# Patient Record
Sex: MALE | Race: BLACK OR AFRICAN AMERICAN | Employment: OTHER | URBAN - METROPOLITAN AREA
[De-identification: names, ages, dates, MRNs, and addresses within clinical notes are randomized per-mention and may not be internally consistent; named-entity substitution may affect disease eponyms.]

---

## 2018-12-03 ENCOUNTER — HOSPITAL ENCOUNTER (INPATIENT)
Age: 53
LOS: 8 days | Discharge: HOME | DRG: 192 | End: 2018-12-11
Attending: EMERGENCY MEDICINE | Admitting: INTERNAL MEDICINE
Payer: MEDICAID

## 2018-12-03 ENCOUNTER — APPOINTMENT (OUTPATIENT)
Dept: CT IMAGING | Age: 53
DRG: 192 | End: 2018-12-03
Payer: MEDICAID

## 2018-12-03 ENCOUNTER — APPOINTMENT (OUTPATIENT)
Dept: GENERAL RADIOLOGY | Age: 53
DRG: 192 | End: 2018-12-03
Payer: MEDICAID

## 2018-12-03 DIAGNOSIS — I50.21 ACUTE SYSTOLIC CONGESTIVE HEART FAILURE (HCC): ICD-10-CM

## 2018-12-03 DIAGNOSIS — I31.39 PERICARDIAL EFFUSION: ICD-10-CM

## 2018-12-03 DIAGNOSIS — I10 ESSENTIAL HYPERTENSION: ICD-10-CM

## 2018-12-03 DIAGNOSIS — I42.9 CARDIOMYOPATHY, UNSPECIFIED TYPE (HCC): ICD-10-CM

## 2018-12-03 DIAGNOSIS — I50.9 ACUTE CONGESTIVE HEART FAILURE, UNSPECIFIED HEART FAILURE TYPE (HCC): Primary | ICD-10-CM

## 2018-12-03 LAB
A/G RATIO: 0.9 (ref 1.1–2.2)
ALBUMIN SERPL-MCNC: 3.2 G/DL (ref 3.4–5)
ALP BLD-CCNC: 74 U/L (ref 40–129)
ALT SERPL-CCNC: 18 U/L (ref 10–40)
ANION GAP SERPL CALCULATED.3IONS-SCNC: 12 MMOL/L (ref 3–16)
AST SERPL-CCNC: 22 U/L (ref 15–37)
BASOPHILS ABSOLUTE: 0 K/UL (ref 0–0.2)
BASOPHILS RELATIVE PERCENT: 0.6 %
BILIRUB SERPL-MCNC: 0.9 MG/DL (ref 0–1)
BILIRUBIN URINE: NEGATIVE
BLOOD, URINE: NEGATIVE
BUN BLDV-MCNC: 22 MG/DL (ref 7–20)
CALCIUM SERPL-MCNC: 8.9 MG/DL (ref 8.3–10.6)
CHLORIDE BLD-SCNC: 106 MMOL/L (ref 99–110)
CLARITY: CLEAR
CO2: 22 MMOL/L (ref 21–32)
COLOR: YELLOW
CREAT SERPL-MCNC: 1 MG/DL (ref 0.9–1.3)
EOSINOPHILS ABSOLUTE: 0 K/UL (ref 0–0.6)
EOSINOPHILS RELATIVE PERCENT: 1 %
GFR AFRICAN AMERICAN: >60
GFR NON-AFRICAN AMERICAN: >60
GLOBULIN: 3.6 G/DL
GLUCOSE BLD-MCNC: 137 MG/DL
GLUCOSE BLD-MCNC: 137 MG/DL (ref 70–99)
GLUCOSE BLD-MCNC: 166 MG/DL (ref 70–99)
GLUCOSE BLD-MCNC: 219 MG/DL (ref 70–99)
GLUCOSE URINE: NEGATIVE MG/DL
HCT VFR BLD CALC: 48.6 % (ref 40.5–52.5)
HEMOGLOBIN: 15.5 G/DL (ref 13.5–17.5)
KETONES, URINE: NEGATIVE MG/DL
LACTIC ACID: 1.3 MMOL/L (ref 0.4–2)
LEUKOCYTE ESTERASE, URINE: NEGATIVE
LIPASE: 49 U/L (ref 13–60)
LYMPHOCYTES ABSOLUTE: 2.4 K/UL (ref 1–5.1)
LYMPHOCYTES RELATIVE PERCENT: 49.4 %
MCH RBC QN AUTO: 29.7 PG (ref 26–34)
MCHC RBC AUTO-ENTMCNC: 31.8 G/DL (ref 31–36)
MCV RBC AUTO: 93.4 FL (ref 80–100)
MICROSCOPIC EXAMINATION: NORMAL
MONOCYTES ABSOLUTE: 0.3 K/UL (ref 0–1.3)
MONOCYTES RELATIVE PERCENT: 6.8 %
NEUTROPHILS ABSOLUTE: 2.1 K/UL (ref 1.7–7.7)
NEUTROPHILS RELATIVE PERCENT: 42.2 %
NITRITE, URINE: NEGATIVE
PDW BLD-RTO: 15.1 % (ref 12.4–15.4)
PERFORMED ON: ABNORMAL
PERFORMED ON: ABNORMAL
PH UA: 5
PLATELET # BLD: 188 K/UL (ref 135–450)
PMV BLD AUTO: 8.6 FL (ref 5–10.5)
POTASSIUM SERPL-SCNC: 4.6 MMOL/L (ref 3.5–5.1)
PRO-BNP: 7919 PG/ML (ref 0–124)
PROTEIN UA: NEGATIVE MG/DL
RBC # BLD: 5.2 M/UL (ref 4.2–5.9)
SODIUM BLD-SCNC: 140 MMOL/L (ref 136–145)
SPECIFIC GRAVITY UA: 1.01
TOTAL PROTEIN: 6.8 G/DL (ref 6.4–8.2)
TROPONIN: 0.04 NG/ML
URINE REFLEX TO CULTURE: NORMAL
URINE TYPE: NORMAL
UROBILINOGEN, URINE: 0.2 E.U./DL
WBC # BLD: 4.9 K/UL (ref 4–11)

## 2018-12-03 PROCEDURE — 1200000000 HC SEMI PRIVATE

## 2018-12-03 PROCEDURE — 6360000002 HC RX W HCPCS: Performed by: PHYSICIAN ASSISTANT

## 2018-12-03 PROCEDURE — 80053 COMPREHEN METABOLIC PANEL: CPT

## 2018-12-03 PROCEDURE — 6360000002 HC RX W HCPCS: Performed by: INTERNAL MEDICINE

## 2018-12-03 PROCEDURE — 6370000000 HC RX 637 (ALT 250 FOR IP): Performed by: INTERNAL MEDICINE

## 2018-12-03 PROCEDURE — 94760 N-INVAS EAR/PLS OXIMETRY 1: CPT

## 2018-12-03 PROCEDURE — 6360000004 HC RX CONTRAST MEDICATION: Performed by: EMERGENCY MEDICINE

## 2018-12-03 PROCEDURE — 83880 ASSAY OF NATRIURETIC PEPTIDE: CPT

## 2018-12-03 PROCEDURE — 83690 ASSAY OF LIPASE: CPT

## 2018-12-03 PROCEDURE — 81003 URINALYSIS AUTO W/O SCOPE: CPT

## 2018-12-03 PROCEDURE — 83605 ASSAY OF LACTIC ACID: CPT

## 2018-12-03 PROCEDURE — 2580000003 HC RX 258: Performed by: INTERNAL MEDICINE

## 2018-12-03 PROCEDURE — 96374 THER/PROPH/DIAG INJ IV PUSH: CPT

## 2018-12-03 PROCEDURE — 71260 CT THORAX DX C+: CPT

## 2018-12-03 PROCEDURE — 84484 ASSAY OF TROPONIN QUANT: CPT

## 2018-12-03 PROCEDURE — 93005 ELECTROCARDIOGRAM TRACING: CPT | Performed by: EMERGENCY MEDICINE

## 2018-12-03 PROCEDURE — 99285 EMERGENCY DEPT VISIT HI MDM: CPT

## 2018-12-03 PROCEDURE — 74177 CT ABD & PELVIS W/CONTRAST: CPT

## 2018-12-03 PROCEDURE — 85025 COMPLETE CBC W/AUTO DIFF WBC: CPT

## 2018-12-03 PROCEDURE — 71046 X-RAY EXAM CHEST 2 VIEWS: CPT

## 2018-12-03 RX ORDER — FUROSEMIDE 10 MG/ML
40 INJECTION INTRAMUSCULAR; INTRAVENOUS ONCE
Status: COMPLETED | OUTPATIENT
Start: 2018-12-03 | End: 2018-12-03

## 2018-12-03 RX ORDER — AMLODIPINE BESYLATE 10 MG/1
10 TABLET ORAL DAILY
Status: DISCONTINUED | OUTPATIENT
Start: 2018-12-03 | End: 2018-12-08

## 2018-12-03 RX ORDER — DEXTROSE MONOHYDRATE 25 G/50ML
12.5 INJECTION, SOLUTION INTRAVENOUS PRN
Status: DISCONTINUED | OUTPATIENT
Start: 2018-12-03 | End: 2018-12-11 | Stop reason: HOSPADM

## 2018-12-03 RX ORDER — SODIUM CHLORIDE 0.9 % (FLUSH) 0.9 %
10 SYRINGE (ML) INJECTION PRN
Status: DISCONTINUED | OUTPATIENT
Start: 2018-12-03 | End: 2018-12-10 | Stop reason: SDUPTHER

## 2018-12-03 RX ORDER — ASPIRIN 81 MG/1
81 TABLET, CHEWABLE ORAL DAILY
Status: DISCONTINUED | OUTPATIENT
Start: 2018-12-04 | End: 2018-12-04

## 2018-12-03 RX ORDER — SODIUM CHLORIDE 0.9 % (FLUSH) 0.9 %
10 SYRINGE (ML) INJECTION EVERY 12 HOURS SCHEDULED
Status: DISCONTINUED | OUTPATIENT
Start: 2018-12-03 | End: 2018-12-10 | Stop reason: SDUPTHER

## 2018-12-03 RX ORDER — FUROSEMIDE 10 MG/ML
40 INJECTION INTRAMUSCULAR; INTRAVENOUS 2 TIMES DAILY
Status: DISCONTINUED | OUTPATIENT
Start: 2018-12-03 | End: 2018-12-09

## 2018-12-03 RX ORDER — SIMVASTATIN 20 MG
20 TABLET ORAL NIGHTLY
Status: DISCONTINUED | OUTPATIENT
Start: 2018-12-03 | End: 2018-12-05

## 2018-12-03 RX ORDER — ONDANSETRON 2 MG/ML
4 INJECTION INTRAMUSCULAR; INTRAVENOUS EVERY 4 HOURS PRN
Status: DISCONTINUED | OUTPATIENT
Start: 2018-12-03 | End: 2018-12-11 | Stop reason: HOSPADM

## 2018-12-03 RX ORDER — DEXTROSE MONOHYDRATE 50 MG/ML
100 INJECTION, SOLUTION INTRAVENOUS PRN
Status: DISCONTINUED | OUTPATIENT
Start: 2018-12-03 | End: 2018-12-11 | Stop reason: HOSPADM

## 2018-12-03 RX ORDER — NICOTINE POLACRILEX 4 MG
15 LOZENGE BUCCAL PRN
Status: DISCONTINUED | OUTPATIENT
Start: 2018-12-03 | End: 2018-12-11 | Stop reason: HOSPADM

## 2018-12-03 RX ORDER — BISACODYL 10 MG
10 SUPPOSITORY, RECTAL RECTAL DAILY PRN
Status: DISCONTINUED | OUTPATIENT
Start: 2018-12-03 | End: 2018-12-11 | Stop reason: HOSPADM

## 2018-12-03 RX ORDER — GLIPIZIDE 10 MG/1
10 TABLET ORAL
Status: ON HOLD | COMMUNITY
End: 2019-04-09 | Stop reason: SDUPTHER

## 2018-12-03 RX ORDER — DOCUSATE SODIUM 100 MG/1
100 CAPSULE, LIQUID FILLED ORAL 2 TIMES DAILY PRN
Status: DISCONTINUED | OUTPATIENT
Start: 2018-12-03 | End: 2018-12-11 | Stop reason: HOSPADM

## 2018-12-03 RX ORDER — LISINOPRIL 10 MG/1
40 TABLET ORAL DAILY
Status: CANCELLED | OUTPATIENT
Start: 2018-12-03

## 2018-12-03 RX ORDER — FAMOTIDINE 20 MG/1
20 TABLET, FILM COATED ORAL 2 TIMES DAILY
Status: DISCONTINUED | OUTPATIENT
Start: 2018-12-03 | End: 2018-12-11 | Stop reason: HOSPADM

## 2018-12-03 RX ADMIN — INSULIN LISPRO 1 UNITS: 100 INJECTION, SOLUTION INTRAVENOUS; SUBCUTANEOUS at 21:19

## 2018-12-03 RX ADMIN — Medication 10 ML: at 21:18

## 2018-12-03 RX ADMIN — FAMOTIDINE 20 MG: 20 TABLET ORAL at 21:18

## 2018-12-03 RX ADMIN — FUROSEMIDE 40 MG: 10 INJECTION, SOLUTION INTRAMUSCULAR; INTRAVENOUS at 21:14

## 2018-12-03 RX ADMIN — FUROSEMIDE 40 MG: 10 INJECTION, SOLUTION INTRAMUSCULAR; INTRAVENOUS at 15:56

## 2018-12-03 RX ADMIN — IOPAMIDOL 75 ML: 755 INJECTION, SOLUTION INTRAVENOUS at 15:42

## 2018-12-03 RX ADMIN — AMLODIPINE BESYLATE 10 MG: 10 TABLET ORAL at 21:18

## 2018-12-03 RX ADMIN — SIMVASTATIN 20 MG: 20 TABLET, FILM COATED ORAL at 22:55

## 2018-12-03 ASSESSMENT — ENCOUNTER SYMPTOMS
EYE REDNESS: 0
APNEA: 0
BACK PAIN: 0
FACIAL SWELLING: 0
EYE DISCHARGE: 0
NAUSEA: 0
ABDOMINAL PAIN: 0
CHOKING: 0
VOMITING: 0
SHORTNESS OF BREATH: 1

## 2018-12-03 ASSESSMENT — PAIN SCALES - GENERAL: PAINLEVEL_OUTOF10: 0

## 2018-12-03 NOTE — ED PROVIDER NOTES
Cardiovascular: Positive for chest pain and leg swelling. Gastrointestinal: Negative for abdominal pain, nausea and vomiting. Genitourinary: Negative for dysuria. Musculoskeletal: Negative for back pain, neck pain and neck stiffness. Neurological: Negative for dizziness, tremors, seizures and headaches. All other systems reviewed and are negative. Positives and Pertinent negatives as per HPI. Except as noted abovein the ROS, all other systems were reviewed and negative. PAST MEDICAL HISTORY     Past Medical History:   Diagnosis Date    Diabetes mellitus ([facility] [address]     Hyperlipidemia     Hypertension     Vitamin D deficiency          SURGICAL HISTORY   History reviewed. No pertinent surgical history. [address]       Current Discharge Medication List      CONTINUE these medications which have NOT CHANGED    Details   glipiZIDE (GLUCOTROL) 10 MG tablet Take 10 mg by mouth 2 times daily (before meals)      lisinopril (PRINIVIL;ZESTRIL) 40 MG tablet Take 40 mg by mouth daily      amLODIPine (NORVASC) 10 MG tablet Take 10 mg by mouth daily      simvastatin (ZOCOR) 20 MG tablet Take 20 mg by mouth nightly      Cholecalciferol (VITAMIN D3) 1000 units TABS Take 1,000 Units by mouth daily      metFORMIN (GLUCOPHAGE) 500 MG tablet Take 1,000 mg by mouth 2 times daily (with meals)       aspirin 81 MG tablet Take 81 mg by mouth daily               ALLERGIES     Lisinopril    FAMILYHISTORY     History reviewed. No pertinent family history.        SOCIAL HISTORY       Social History     Social History    Marital status: Single     Spouse name: N/A    Number of children: N/A    Years of education: N/A     Social History Main Topics    Smoking status: Current Every Day Smoker     Packs/day: 0.20     Types: Cigarettes    Smokeless tobacco: Never Used    Alcohol use Yes      Comment: 2-3 times a week    Drug use: No    Sexual activity: Yes     Other Topics Concern    None     Social History Narrative    None       SCREENINGS    [place] Coma Scale  Eye Opening: Spontaneous  Best Verbal Response: Oriented  Best Motor Response: Obeys commands  [place] Coma Scale Score: 15        PHYSICAL EXAM    (up to 7 for level 4, 8 or more for level 5)     ED Triage Vitals [12/03/18 1119]   BP Temp Temp Source Pulse Resp SpO2 Height Weight   (!) 151/103 97.7 °F (36.5 °C) Oral 114 20 97 % 6' 3\" (1.905 m) 267 lb 10.2 oz (121.4 kg)       Physical Exam   Constitutional: He is oriented to person, place, and time. He appears well-developed and well-nourished. HENT:   Head: Normocephalic and atraumatic. Nose: Nose normal.   Eyes: Right eye exhibits no discharge. Left eye exhibits no discharge. Neck: Normal range of motion. Neck supple. Cardiovascular: Normal rate, regular rhythm and normal heart sounds. Exam reveals no gallop and no friction rub. No murmur heard. Pulmonary/Chest: Effort normal and breath sounds normal. No respiratory distress. He has no wheezes. He has no rales. He exhibits no tenderness. Abdominal: Soft. Bowel sounds are normal. He exhibits no distension and no mass. There is no tenderness. There is no rebound and no guarding. Musculoskeletal: Normal range of motion. Right lower leg: He exhibits edema. Left lower leg: He exhibits edema. Neurological: He is alert and oriented to person, place, and time. Skin: Skin is warm and dry. He is not diaphoretic. Psychiatric: He has a normal mood and affect. His behavior is normal.   Nursing note and vitals reviewed.       DIAGNOSTIC RESULTS   LABS:    Labs Reviewed   COMPREHENSIVE METABOLIC PANEL - Abnormal; Notable for the following:        Result Value    Glucose 219 (*)     BUN 22 (*)     Alb 3.2 (*)     Albumin/Globulin Ratio 0.9 (*)     All other components within normal limits    Narrative:     Performed at:  [address]   Phone [phone]

## 2018-12-03 NOTE — H&P
Hospital Medicine  History and Physical    PCP: Nereyda Nunes  Patient Name: Gibran Omer    Date of Service: Pt seen/examined on 12/03/2018 and Admitted to Inpatient with expected LOS greater than two midnights due to medical therapy    CHIEF COMPLAINT:  Pt c/o shortness of breath, edema  HISTORY OF PRESENT ILLNESS: Patient is a 41-year-old man with history of hypertension, hyperlipidemia and diabetes mellitus. He has not taken any of his medications for the past several months. He presents to the emergency room following a one-month history of worsening shortness of breath and lower extremity edema. He states that his shortness of breath is worse with exertion in improved with rest. It is now severe. In the emergency room he was found to have an acute CHF exacerbation. He has been admitted for further evaluation and treatment. Associated signs and symptoms do not include typical chest pain, diaphoresis, orthopnea, paroxysmal nocturnal dyspnea, fever or chills. Past Medical History:        Diagnosis Date    Diabetes mellitus ([address]     Hyperlipidemia     Hypertension     Vitamin D deficiency        Past Surgical History:    History reviewed. No pertinent surgical history. Allergies:  Patient has no known allergies. Medications Prior to Admission:    Prior to Admission medications    Medication Sig Start Date End Date Taking?  Authorizing Provider   glipiZIDE (GLUCOTROL) 10 MG tablet Take 10 mg by mouth 2 times daily (before meals)    Historical Provider, MD   lisinopril (PRINIVIL;ZESTRIL) 40 MG tablet Take 40 mg by mouth daily    Historical Provider, MD   amLODIPine (NORVASC) 10 MG tablet Take 10 mg by mouth daily    Historical Provider, MD   simvastatin (ZOCOR) 20 MG tablet Take 20 mg by mouth nightly    Historical Provider, MD   Cholecalciferol (VITAMIN D3) 1000 units TABS Take 1,000 Units by mouth daily    Historical Provider, MD   metFORMIN (GLUCOPHAGE) 500 MG tablet Take 1,000 mg by mouth 2 see a dilated appendix. Pelvis: There is pelvic ascites. No pelvic masses are seen. Peritoneum/retroperitoneum: The abdominal aorta is not aneurysmal.  No retroperitoneal or mesenteric lymphadenopathy is seen. There intra-abdominal and pelvic ascites. Soft Tissues/Bones: No acute bony abnormalities are noted. There is diffuse body wall edema. 1. No CTA evidence for acute pulmonary embolus. 2. Interval development of cardiomegaly with small pericardial effusion. 3. Small right pleural effusion. 4. Intra-abdominal and pelvic ascites. 5. Anasarca. I suspect this likely is due to congestive failure. Ct Abdomen Pelvis W Iv Contrast Additional Contrast? Iv    Result Date: 12/3/2018  EXAMINATION: CT OF THE ABDOMEN AND PELVIS WITH CONTRAST; CT OF THE CHEST WITH CONTRAST 12/3/2018 3:43 pm TECHNIQUE: CT of the abdomen and pelvis was performed with the administration of intravenous contrast. Multiplanar reformatted images are provided for review. Dose modulation, iterative reconstruction, and/or weight based adjustment of the mA/kV was utilized to reduce the radiation dose to as low as reasonably achievable.; CT of the chest was performed with the administration of intravenous contrast. Multiplanar reformatted images are provided for review. Dose modulation, iterative reconstruction, and/or weight based adjustment of the mA/kV was utilized to reduce the radiation dose to as low as reasonably achievable. COMPARISON: None.  HISTORY: ORDERING SYSTEM PROVIDED HISTORY: Abdominal swelling TECHNOLOGIST PROVIDED HISTORY: Additional Contrast?->IV Ordering Physician Provided Reason for Exam: abd pain, swelling, distention, Acuity: Acute Type of Exam: Initial; ORDERING SYSTEM PROVIDED HISTORY: Possible pericardial effusion TECHNOLOGIST PROVIDED HISTORY: Ordering Physician Provided Reason for Exam: distention, bilateral lef/feet swelling, no surg, noca, Acuity: Acute Type of Exam: Initial FINDINGS: Pulmonary Arteries: Pulmonary strict I&Os and daily weights. A low sodium diet has been ordered. Pt on ACEI, as EF is <40%. Cardiology has been consulted. The CHF nurse has been consulted to discuss disease management     Non compliance with medications - Pt has not taken any medications in months. Explore cause and help facilitate compliance    Diabetes mellitus II - SSI and CCD    Essential (primary) hypertension - previous adverse to Lisinopril;  Restart Norvasc monitor blood pressure. Hyperlipidemia - Restart statin therapy         DVT Prophylaxis: Lovenox  Diet: Low Sodium, CCD  Code Status: Full  (Advanced care planning has been discussed with patient and/or responsible family member and is reflected in the code status. Further orders associated with this have been entered if appropriate)    Disposition: Anticipate that patient will remain in the hospital for 2 to 3 days depending on further evaluation and clinical course.      Theodora Pop MD

## 2018-12-03 NOTE — PROGRESS NOTES
Medication Reconciliation    List of medications patient is currently taking is complete. Source of information: 1. Conversation with patient at bedside                                       2. EPIC records      Allergies  Patient has no known allergies. Notes regarding home medications:   1. Patient has not taken any of his home medications for the past several months.     Naz DavisonD, BCPS  12/3/2018 5:14 PM

## 2018-12-04 LAB
ANION GAP SERPL CALCULATED.3IONS-SCNC: 17 MMOL/L (ref 3–16)
BASOPHILS ABSOLUTE: 0 K/UL (ref 0–0.2)
BASOPHILS RELATIVE PERCENT: 0.5 %
BUN BLDV-MCNC: 23 MG/DL (ref 7–20)
CALCIUM SERPL-MCNC: 9 MG/DL (ref 8.3–10.6)
CHLORIDE BLD-SCNC: 104 MMOL/L (ref 99–110)
CO2: 24 MMOL/L (ref 21–32)
CREAT SERPL-MCNC: 1.1 MG/DL (ref 0.9–1.3)
EOSINOPHILS ABSOLUTE: 0.1 K/UL (ref 0–0.6)
EOSINOPHILS RELATIVE PERCENT: 1.4 %
GFR AFRICAN AMERICAN: >60
GFR NON-AFRICAN AMERICAN: >60
GLUCOSE BLD-MCNC: 123 MG/DL (ref 70–99)
GLUCOSE BLD-MCNC: 127 MG/DL (ref 70–99)
GLUCOSE BLD-MCNC: 145 MG/DL (ref 70–99)
GLUCOSE BLD-MCNC: 159 MG/DL (ref 70–99)
GLUCOSE BLD-MCNC: 193 MG/DL (ref 70–99)
HCT VFR BLD CALC: 49.2 % (ref 40.5–52.5)
HEMOGLOBIN: 15.7 G/DL (ref 13.5–17.5)
LV EF: 18 %
LVEF MODALITY: NORMAL
LYMPHOCYTES ABSOLUTE: 2.8 K/UL (ref 1–5.1)
LYMPHOCYTES RELATIVE PERCENT: 49.3 %
MCH RBC QN AUTO: 29 PG (ref 26–34)
MCHC RBC AUTO-ENTMCNC: 31.9 G/DL (ref 31–36)
MCV RBC AUTO: 91 FL (ref 80–100)
MONOCYTES ABSOLUTE: 0.5 K/UL (ref 0–1.3)
MONOCYTES RELATIVE PERCENT: 8.2 %
NEUTROPHILS ABSOLUTE: 2.3 K/UL (ref 1.7–7.7)
NEUTROPHILS RELATIVE PERCENT: 40.6 %
PDW BLD-RTO: 14.8 % (ref 12.4–15.4)
PERFORMED ON: ABNORMAL
PLATELET # BLD: 213 K/UL (ref 135–450)
PMV BLD AUTO: 8.8 FL (ref 5–10.5)
POTASSIUM REFLEX MAGNESIUM: 4 MMOL/L (ref 3.5–5.1)
RBC # BLD: 5.41 M/UL (ref 4.2–5.9)
SODIUM BLD-SCNC: 145 MMOL/L (ref 136–145)
TROPONIN: 0.05 NG/ML
WBC # BLD: 5.7 K/UL (ref 4–11)

## 2018-12-04 PROCEDURE — 2580000003 HC RX 258: Performed by: INTERNAL MEDICINE

## 2018-12-04 PROCEDURE — 6360000002 HC RX W HCPCS: Performed by: INTERNAL MEDICINE

## 2018-12-04 PROCEDURE — 80048 BASIC METABOLIC PNL TOTAL CA: CPT

## 2018-12-04 PROCEDURE — 85025 COMPLETE CBC W/AUTO DIFF WBC: CPT

## 2018-12-04 PROCEDURE — 93306 TTE W/DOPPLER COMPLETE: CPT

## 2018-12-04 PROCEDURE — 99255 IP/OBS CONSLTJ NEW/EST HI 80: CPT | Performed by: INTERNAL MEDICINE

## 2018-12-04 PROCEDURE — 36415 COLL VENOUS BLD VENIPUNCTURE: CPT

## 2018-12-04 PROCEDURE — 84484 ASSAY OF TROPONIN QUANT: CPT

## 2018-12-04 PROCEDURE — 94760 N-INVAS EAR/PLS OXIMETRY 1: CPT

## 2018-12-04 PROCEDURE — 6370000000 HC RX 637 (ALT 250 FOR IP): Performed by: INTERNAL MEDICINE

## 2018-12-04 PROCEDURE — 1200000000 HC SEMI PRIVATE

## 2018-12-04 RX ORDER — CARVEDILOL 6.25 MG/1
6.25 TABLET ORAL 2 TIMES DAILY WITH MEALS
Status: DISCONTINUED | OUTPATIENT
Start: 2018-12-04 | End: 2018-12-08

## 2018-12-04 RX ORDER — HYDRALAZINE HYDROCHLORIDE 20 MG/ML
10 INJECTION INTRAMUSCULAR; INTRAVENOUS EVERY 6 HOURS PRN
Status: DISCONTINUED | OUTPATIENT
Start: 2018-12-04 | End: 2018-12-11 | Stop reason: HOSPADM

## 2018-12-04 RX ADMIN — ASPIRIN 81 MG 81 MG: 81 TABLET ORAL at 08:51

## 2018-12-04 RX ADMIN — INSULIN LISPRO 1 UNITS: 100 INJECTION, SOLUTION INTRAVENOUS; SUBCUTANEOUS at 21:45

## 2018-12-04 RX ADMIN — FAMOTIDINE 20 MG: 20 TABLET ORAL at 08:51

## 2018-12-04 RX ADMIN — HYDRALAZINE HYDROCHLORIDE 10 MG: 20 INJECTION INTRAMUSCULAR; INTRAVENOUS at 10:33

## 2018-12-04 RX ADMIN — ENOXAPARIN SODIUM 40 MG: 40 INJECTION SUBCUTANEOUS at 08:52

## 2018-12-04 RX ADMIN — FUROSEMIDE 40 MG: 10 INJECTION, SOLUTION INTRAMUSCULAR; INTRAVENOUS at 17:05

## 2018-12-04 RX ADMIN — INSULIN LISPRO 2 UNITS: 100 INJECTION, SOLUTION INTRAVENOUS; SUBCUTANEOUS at 17:06

## 2018-12-04 RX ADMIN — INSULIN LISPRO 2 UNITS: 100 INJECTION, SOLUTION INTRAVENOUS; SUBCUTANEOUS at 12:52

## 2018-12-04 RX ADMIN — AMLODIPINE BESYLATE 10 MG: 10 TABLET ORAL at 08:51

## 2018-12-04 RX ADMIN — FUROSEMIDE 40 MG: 10 INJECTION, SOLUTION INTRAMUSCULAR; INTRAVENOUS at 08:52

## 2018-12-04 RX ADMIN — SIMVASTATIN 20 MG: 20 TABLET, FILM COATED ORAL at 21:42

## 2018-12-04 RX ADMIN — FAMOTIDINE 20 MG: 20 TABLET ORAL at 21:42

## 2018-12-04 RX ADMIN — Medication 10 ML: at 08:54

## 2018-12-04 RX ADMIN — CARVEDILOL 6.25 MG: 6.25 TABLET, FILM COATED ORAL at 17:05

## 2018-12-04 RX ADMIN — Medication 10 ML: at 21:49

## 2018-12-04 ASSESSMENT — PAIN SCALES - GENERAL
PAINLEVEL_OUTOF10: 0
PAINLEVEL_OUTOF10: 0

## 2018-12-04 NOTE — PLAN OF CARE
Problem: Safety:  Goal: Free from accidental physical injury  Free from accidental physical injury   Outcome: Ongoing      Problem: Pain:  Goal: Patient's pain/discomfort is manageable  Patient's pain/discomfort is manageable   Outcome: Ongoing      Problem: Discharge Planning:  Goal: Patients continuum of care needs are met  Patients continuum of care needs are met   Outcome: Ongoing

## 2018-12-04 NOTE — PROGRESS NOTES
Page out to Dr. Zuleyma Burns r/t elevated Troponin 0.05. Patient denies having chest pain or discomfort,awaiting return call,will continue to monitor.

## 2018-12-04 NOTE — PROGRESS NOTES
Patient was admitted to [place] [address] at 2001PM. Patient oriented to room and taught how to use call light. Patient is resting comfortably in bed, no c/o SBO or chest pain at this time. Will continue to monitor and update as needed.

## 2018-12-04 NOTE — PROGRESS NOTES
Department of Internal Medicine  General Internal Medicine  Progress Note    CHIEF COMPLAINT:  Pt c/o shortness of breath, edema    SUBJECTIVE:    B/p 141/124 his am. IV hydralazine ordered. Feels better. No cp, abd pain, fever or chills. Still has LE swelling. OBJECTIVE      Medications    Current Facility-Administered Medications: hydrALAZINE (APRESOLINE) injection 10 mg, 10 mg, Intravenous, Q6H PRN  simvastatin (ZOCOR) tablet 20 mg, 20 mg, Oral, Nightly  aspirin chewable tablet 81 mg, 81 mg, Oral, Daily  amLODIPine (NORVASC) tablet 10 mg, 10 mg, Oral, Daily  furosemide (LASIX) injection 40 mg, 40 mg, Intravenous, BID  sodium chloride flush 0.9 % injection 10 mL, 10 mL, Intravenous, 2 times per day  sodium chloride flush 0.9 % injection 10 mL, 10 mL, Intravenous, PRN  magnesium hydroxide (MILK OF MAGNESIA) 400 MG/5ML suspension 30 mL, 30 mL, Oral, Daily PRN  docusate sodium (COLACE) capsule 100 mg, 100 mg, Oral, BID PRN  bisacodyl (DULCOLAX) suppository 10 mg, 10 mg, Rectal, Daily PRN  ondansetron (ZOFRAN) injection 4 mg, 4 mg, Intravenous, Q4H PRN  famotidine (PEPCID) tablet 20 mg, 20 mg, Oral, BID  enoxaparin (LOVENOX) injection 40 mg, 40 mg, Subcutaneous, Daily  insulin lispro (HUMALOG) injection vial 0-12 Units, 0-12 Units, Subcutaneous, TID WC  insulin lispro (HUMALOG) injection vial 0-6 Units, 0-6 Units, Subcutaneous, Nightly  glucose (GLUTOSE) 40 % oral gel 15 g, 15 g, Oral, PRN  dextrose 50 % solution 12.5 g, 12.5 g, Intravenous, PRN  glucagon (rDNA) injection 1 mg, 1 mg, Intramuscular, PRN  dextrose 5 % solution, 100 mL/hr, Intravenous, PRN  Physical    VITALS:  BP (!) 140/113 Comment: MD is aware,new orders received. Denies Symptons  Pulse 116   Temp 98.2 °F (36.8 °C) (Oral)   Resp 12   Ht 6' 3\" (1.905 m)   Wt 258 lb 2.5 oz (117.1 kg)   SpO2 95%   BMI 32.27 kg/m²   General appearance: WD/WN 46y.o. year-old AA male who is alert, appears stated age and is cooperative  HEENT: Head: Normocephalic,

## 2018-12-04 NOTE — CONSULTS
ng performed by Merlin Santana  Cardiology Consult    Ximena Quezada  1965 December 4, 2018      Reason for Referral: heart failure with unknown EF    CC: shortness of breath and edema      Subjective:     History of Present Illness:    Ximena Quezada is a 46 y.o. patient who  has a past medical history of Diabetes mellitus ([facility] [address] Hyperlipidemia; Hypertension; and Vitamin D deficiency. He presented to [facility] ED with complaints of one month history of SOB and lower extremity swelling which has progressively worsened. He reports he has not taken any of his medications for past several months. BNP on admission 7919, troponin 0.04. Past Medical History:   has a past medical history of Diabetes mellitus ([facility] [address] Hyperlipidemia; Hypertension; and Vitamin D deficiency. Surgical History:   has no past surgical history on file. Social History:   reports that he has been smoking Cigarettes. He has been smoking about 0.20 packs per day. He has never used smokeless tobacco. He reports that he drinks alcohol. He reports that he does not use drugs. Family History:  family history is not on file. Home Medications:  Were reviewed and are listed in nursing record and/or below  Prior to Admission medications    Medication Sig Start Date End Date Taking?  Authorizing Provider   glipiZIDE (GLUCOTROL) 10 MG tablet Take 10 mg by mouth 2 times daily (before meals)    Historical Provider, MD   lisinopril (PRINIVIL;ZESTRIL) 40 MG tablet Take 40 mg by mouth daily    Historical Provider, MD   amLODIPine (NORVASC) 10 MG tablet Take 10 mg by mouth daily    Historical Provider, MD   simvastatin (ZOCOR) 20 MG tablet Take 20 mg by mouth nightly    Historical Provider, MD   Cholecalciferol (VITAMIN D3) 1000 units TABS Take 1,000 Units by mouth daily    Historical Provider, MD   metFORMIN (GLUCOPHAGE) 500 MG tablet Take 1,000 mg by mouth 2 times daily (with meals)     Historical Provider, MD   aspirin 81 complaints. · Integumentary: No rash or pruritis. · Neurological: No headache, diplopia, change in muscle strength, numbness or tingling. · Psychiatric: No anxiety or depression. · Endocrine: No temperature intolerance. No excessive thirst, fluid intake, or urination. No tremor. · Hematologic/Lymphatic: No abnormal bruising or bleeding, blood clots or swollen lymph nodes. · Allergic/Immunologic: No nasal congestion or hives. Objective:     PHYSICAL EXAM:      Vitals:    12/04/18 1006   BP: (!) 141/124   Pulse: 116   Resp: 12   Temp: 98.2 °F (36.8 °C)   SpO2: 95%    Weight: 258 lb 2.5 oz (117.1 kg)       General Appearance:  Alert, cooperative, no distress, appears stated age. Head:  Normocephalic, without obvious abnormality, atraumatic. Eyes:  Pupils equal and round. No scleral icterus. Mouth: Moist mucosa, no pharyngeal erythema. Nose: Nares normal. No drainage or sinus tenderness. Neck: Supple, symmetrical, trachea midline. No adenopathy. No tenderness/mass/nodules. No carotid bruit or elevated JVD. Lungs:   Respiratory Effort: Normal   Auscultation: Clear to auscultation bilaterally, respirations unlabored. No wheeze, rales   Chest Wall:  No tenderness or deformity. Cardiovascular:    Pulses  Palpation: normal   Ascultation: Regular rate, S1/ S2 normal. No murmur, rub, or gallop. 2+ radial and pedal pulses, symmetric  Carotid  Femoral   Abdomen and Gastrointestinal:   Soft, non-tender, bowel sounds active. Liver and Spleen  Masses   Musculoskeletal: No muscle wasting  Back  Gait   Extremities: Extremities normal, atraumatic. 3+ edema. No cyanosis clubbing       Skin: Inspection and palpation performed, no rashes or lesions. Pysch: Normal mood and affect.  Alert and oriented to time place person   Neurologic: Normal gross motor and sensory exam.       Labs     CBC: Lab Results   Component Value Date    WBC 5.7 12/04/2018    RBC 5.41 12/04/2018    HGB 15.7 12/04/2018    HCT 49.2

## 2018-12-05 LAB
ANION GAP SERPL CALCULATED.3IONS-SCNC: 14 MMOL/L (ref 3–16)
BASOPHILS ABSOLUTE: 0 K/UL (ref 0–0.2)
BASOPHILS RELATIVE PERCENT: 0.6 %
BUN BLDV-MCNC: 21 MG/DL (ref 7–20)
CALCIUM SERPL-MCNC: 8.6 MG/DL (ref 8.3–10.6)
CHLORIDE BLD-SCNC: 101 MMOL/L (ref 99–110)
CO2: 27 MMOL/L (ref 21–32)
CREAT SERPL-MCNC: 1 MG/DL (ref 0.9–1.3)
EOSINOPHILS ABSOLUTE: 0.1 K/UL (ref 0–0.6)
EOSINOPHILS RELATIVE PERCENT: 1.5 %
GFR AFRICAN AMERICAN: >60
GFR NON-AFRICAN AMERICAN: >60
GLUCOSE BLD-MCNC: 150 MG/DL (ref 70–99)
GLUCOSE BLD-MCNC: 164 MG/DL (ref 70–99)
GLUCOSE BLD-MCNC: 168 MG/DL (ref 70–99)
GLUCOSE BLD-MCNC: 199 MG/DL (ref 70–99)
GLUCOSE BLD-MCNC: 211 MG/DL (ref 70–99)
HCT VFR BLD CALC: 45.4 % (ref 40.5–52.5)
HEMOGLOBIN: 14.7 G/DL (ref 13.5–17.5)
LYMPHOCYTES ABSOLUTE: 2.2 K/UL (ref 1–5.1)
LYMPHOCYTES RELATIVE PERCENT: 48.8 %
MCH RBC QN AUTO: 29.4 PG (ref 26–34)
MCHC RBC AUTO-ENTMCNC: 32.4 G/DL (ref 31–36)
MCV RBC AUTO: 90.6 FL (ref 80–100)
MONOCYTES ABSOLUTE: 0.3 K/UL (ref 0–1.3)
MONOCYTES RELATIVE PERCENT: 7.9 %
NEUTROPHILS ABSOLUTE: 1.8 K/UL (ref 1.7–7.7)
NEUTROPHILS RELATIVE PERCENT: 41.2 %
PDW BLD-RTO: 14.6 % (ref 12.4–15.4)
PERFORMED ON: ABNORMAL
PLATELET # BLD: 185 K/UL (ref 135–450)
PMV BLD AUTO: 8.6 FL (ref 5–10.5)
POTASSIUM SERPL-SCNC: 3.5 MMOL/L (ref 3.5–5.1)
RBC # BLD: 5.01 M/UL (ref 4.2–5.9)
SODIUM BLD-SCNC: 142 MMOL/L (ref 136–145)
TROPONIN: 0.05 NG/ML
WBC # BLD: 4.5 K/UL (ref 4–11)

## 2018-12-05 PROCEDURE — 6370000000 HC RX 637 (ALT 250 FOR IP): Performed by: NURSE PRACTITIONER

## 2018-12-05 PROCEDURE — 6360000002 HC RX W HCPCS: Performed by: INTERNAL MEDICINE

## 2018-12-05 PROCEDURE — 6370000000 HC RX 637 (ALT 250 FOR IP): Performed by: INTERNAL MEDICINE

## 2018-12-05 PROCEDURE — 84484 ASSAY OF TROPONIN QUANT: CPT

## 2018-12-05 PROCEDURE — 80048 BASIC METABOLIC PNL TOTAL CA: CPT

## 2018-12-05 PROCEDURE — 94640 AIRWAY INHALATION TREATMENT: CPT

## 2018-12-05 PROCEDURE — 2580000003 HC RX 258: Performed by: INTERNAL MEDICINE

## 2018-12-05 PROCEDURE — 36415 COLL VENOUS BLD VENIPUNCTURE: CPT

## 2018-12-05 PROCEDURE — 94664 DEMO&/EVAL PT USE INHALER: CPT

## 2018-12-05 PROCEDURE — 6370000000 HC RX 637 (ALT 250 FOR IP): Performed by: PHYSICIAN ASSISTANT

## 2018-12-05 PROCEDURE — 94761 N-INVAS EAR/PLS OXIMETRY MLT: CPT

## 2018-12-05 PROCEDURE — 99233 SBSQ HOSP IP/OBS HIGH 50: CPT | Performed by: NURSE PRACTITIONER

## 2018-12-05 PROCEDURE — 1200000000 HC SEMI PRIVATE

## 2018-12-05 PROCEDURE — 85025 COMPLETE CBC W/AUTO DIFF WBC: CPT

## 2018-12-05 RX ORDER — IPRATROPIUM BROMIDE AND ALBUTEROL SULFATE 2.5; .5 MG/3ML; MG/3ML
1 SOLUTION RESPIRATORY (INHALATION)
Status: DISCONTINUED | OUTPATIENT
Start: 2018-12-05 | End: 2018-12-05

## 2018-12-05 RX ORDER — ISOSORBIDE MONONITRATE 30 MG/1
30 TABLET, EXTENDED RELEASE ORAL DAILY
Status: DISCONTINUED | OUTPATIENT
Start: 2018-12-05 | End: 2018-12-09

## 2018-12-05 RX ORDER — ACETAMINOPHEN 500 MG
1000 TABLET ORAL EVERY 6 HOURS PRN
Status: DISCONTINUED | OUTPATIENT
Start: 2018-12-05 | End: 2018-12-10 | Stop reason: SDUPTHER

## 2018-12-05 RX ORDER — ASPIRIN 81 MG/1
81 TABLET, CHEWABLE ORAL DAILY
Status: DISCONTINUED | OUTPATIENT
Start: 2018-12-05 | End: 2018-12-11 | Stop reason: HOSPADM

## 2018-12-05 RX ORDER — HYDRALAZINE HYDROCHLORIDE 25 MG/1
25 TABLET, FILM COATED ORAL EVERY 8 HOURS SCHEDULED
Status: DISCONTINUED | OUTPATIENT
Start: 2018-12-05 | End: 2018-12-08

## 2018-12-05 RX ORDER — POTASSIUM CHLORIDE 20 MEQ/1
20 TABLET, EXTENDED RELEASE ORAL 2 TIMES DAILY WITH MEALS
Status: DISCONTINUED | OUTPATIENT
Start: 2018-12-05 | End: 2018-12-11 | Stop reason: HOSPADM

## 2018-12-05 RX ORDER — IPRATROPIUM BROMIDE AND ALBUTEROL SULFATE 2.5; .5 MG/3ML; MG/3ML
1 SOLUTION RESPIRATORY (INHALATION) EVERY 4 HOURS PRN
Status: DISCONTINUED | OUTPATIENT
Start: 2018-12-05 | End: 2018-12-11 | Stop reason: HOSPADM

## 2018-12-05 RX ORDER — SPIRONOLACTONE 25 MG/1
25 TABLET ORAL DAILY
Status: DISCONTINUED | OUTPATIENT
Start: 2018-12-05 | End: 2018-12-11 | Stop reason: HOSPADM

## 2018-12-05 RX ORDER — ATORVASTATIN CALCIUM 40 MG/1
40 TABLET, FILM COATED ORAL NIGHTLY
Status: DISCONTINUED | OUTPATIENT
Start: 2018-12-05 | End: 2018-12-11 | Stop reason: HOSPADM

## 2018-12-05 RX ADMIN — FUROSEMIDE 40 MG: 10 INJECTION, SOLUTION INTRAMUSCULAR; INTRAVENOUS at 08:16

## 2018-12-05 RX ADMIN — INSULIN LISPRO 4 UNITS: 100 INJECTION, SOLUTION INTRAVENOUS; SUBCUTANEOUS at 17:34

## 2018-12-05 RX ADMIN — AMLODIPINE BESYLATE 10 MG: 10 TABLET ORAL at 08:16

## 2018-12-05 RX ADMIN — CARVEDILOL 6.25 MG: 6.25 TABLET, FILM COATED ORAL at 17:29

## 2018-12-05 RX ADMIN — ASPIRIN 81 MG 81 MG: 81 TABLET ORAL at 10:57

## 2018-12-05 RX ADMIN — POTASSIUM CHLORIDE 20 MEQ: 20 TABLET, EXTENDED RELEASE ORAL at 10:57

## 2018-12-05 RX ADMIN — SPIRONOLACTONE 25 MG: 25 TABLET ORAL at 10:57

## 2018-12-05 RX ADMIN — INSULIN LISPRO 2 UNITS: 100 INJECTION, SOLUTION INTRAVENOUS; SUBCUTANEOUS at 08:21

## 2018-12-05 RX ADMIN — INSULIN LISPRO 2 UNITS: 100 INJECTION, SOLUTION INTRAVENOUS; SUBCUTANEOUS at 12:11

## 2018-12-05 RX ADMIN — INSULIN LISPRO 1 UNITS: 100 INJECTION, SOLUTION INTRAVENOUS; SUBCUTANEOUS at 21:26

## 2018-12-05 RX ADMIN — POTASSIUM CHLORIDE 20 MEQ: 20 TABLET, EXTENDED RELEASE ORAL at 17:34

## 2018-12-05 RX ADMIN — CARVEDILOL 6.25 MG: 6.25 TABLET, FILM COATED ORAL at 08:16

## 2018-12-05 RX ADMIN — ISOSORBIDE MONONITRATE 30 MG: 30 TABLET, EXTENDED RELEASE ORAL at 10:57

## 2018-12-05 RX ADMIN — HYDRALAZINE HYDROCHLORIDE 25 MG: 25 TABLET, FILM COATED ORAL at 14:01

## 2018-12-05 RX ADMIN — ATORVASTATIN CALCIUM 40 MG: 40 TABLET, FILM COATED ORAL at 21:25

## 2018-12-05 RX ADMIN — FAMOTIDINE 20 MG: 20 TABLET ORAL at 08:16

## 2018-12-05 RX ADMIN — Medication 10 ML: at 08:18

## 2018-12-05 RX ADMIN — FAMOTIDINE 20 MG: 20 TABLET ORAL at 21:25

## 2018-12-05 RX ADMIN — IPRATROPIUM BROMIDE AND ALBUTEROL SULFATE 1 AMPULE: .5; 3 SOLUTION RESPIRATORY (INHALATION) at 09:39

## 2018-12-05 RX ADMIN — ACETAMINOPHEN 1000 MG: 500 TABLET ORAL at 22:43

## 2018-12-05 RX ADMIN — FUROSEMIDE 40 MG: 10 INJECTION, SOLUTION INTRAMUSCULAR; INTRAVENOUS at 17:34

## 2018-12-05 RX ADMIN — HYDRALAZINE HYDROCHLORIDE 25 MG: 25 TABLET, FILM COATED ORAL at 21:25

## 2018-12-05 ASSESSMENT — PAIN SCALES - GENERAL
PAINLEVEL_OUTOF10: 0
PAINLEVEL_OUTOF10: 7

## 2018-12-05 NOTE — PROGRESS NOTES
Nutrition Education    Type and Reason for Visit: CHF dx  Patient with new dx CHF. Pt has not seen CHF nurse yet. Pt stated that he does his own shopping and cooking at home. He reports that he worked 3rd shift so his eating schedule was off. He snacks on junk food throughout the night and rarely eats breakfast in the morning. When he does eat lunch and dinner, it is usually convenience foods (hot dogs, frozen meals, etc). Pt knows that what he is eating is poor choice. Pt has not had to follow a low Na diet before and it is all new to him. Pt was falling asleep during interview and pt preferred that I come back at later time to complete education. Provided handout for \"heart failure nutrition therapy\". Time spent: 10 min. · Verbally reviewed following information with Patient. · Written educational materials provided. · Contact name and number provided. · Refer to Patient Education activity for more details.     Electronically signed by Alyssa Leon RD, LD on 12/5/18 at 11:34 AM    Contact Number: [phone]

## 2018-12-05 NOTE — PROGRESS NOTES
Patient c/o SOB when lying in bed,stated that he slept in his recliner last night. SPO2 98 % RA,denies having chest pain/discomfort. Spoke with ,new orders received for Dunoreen N every 4 hr. Pt is aware.

## 2018-12-05 NOTE — CONSULTS
[facility]  HEART FAILURE PROGRAM      NAME:  Kamron Burnett RECORD NUMBER:  [id number]  AGE: 46 y.o.    GENDER: male  : 1965  TODAY'S DATE:  2018    Subjective:     VISIT TYPE: evaluation     ADMITTING PHYSICIAN:  Sonia Valladares MD    PAST MEDICAL HISTORY        Diagnosis Date    Diabetes mellitus ([address]     Hyperlipidemia     Hypertension     Vitamin D deficiency        SOCIAL HISTORY    Social History   Substance Use Topics    Smoking status: Current Every Day Smoker     Packs/day: 0.20     Types: Cigarettes    Smokeless tobacco: Never Used    Alcohol use Yes      Comment: 2-3 times a week       ALLERGIES    No Known Allergies    MEDICATIONS  Scheduled Meds:   potassium chloride  20 mEq Oral BID WC    atorvastatin  40 mg Oral Nightly    spironolactone  25 mg Oral Daily    hydrALAZINE  25 mg Oral 3 times per day    isosorbide mononitrate  30 mg Oral Daily    aspirin  81 mg Oral Daily    carvedilol  6.25 mg Oral BID WC    amLODIPine  10 mg Oral Daily    furosemide  40 mg Intravenous BID    sodium chloride flush  10 mL Intravenous 2 times per day    famotidine  20 mg Oral BID    enoxaparin  40 mg Subcutaneous Daily    insulin lispro  0-12 Units Subcutaneous TID WC    insulin lispro  0-6 Units Subcutaneous Nightly       ADMIT DATE: 12/3/2018      Objective:     ADMISSION DIAGNOSIS:   CHF with unknown LVEF (HCC) [I50.9]     BP (!) 126/95   Pulse 93   Temp 98.2 °F (36.8 °C) (Oral)   Resp 16   Ht 6' 3\" (1.905 m)   Wt 250 lb 7.1 oz (113.6 kg)   SpO2 97%   BMI 31.30 kg/m²     ADMIT:  Weight: 267 lb 10.2 oz (121.4 kg)    TODAY: Weight: 250 lb 7.1 oz (113.6 kg)    Wt Readings from Last 25 Encounters:   18 250 lb 7.1 oz (113.6 kg)   18 222 lb 0.1 oz (100.7 kg)   08/10/15 260 lb (117.9 kg)          Intake/Output Summary (Last 24 hours) at 18 1412  Last data filed at 18 1058   Gross per 24 hour   Intake             1680 ml   Output 5000 ml   Net            -3320 ml       LABS  BMP: Lab Results   Component Value Date     12/05/2018    K 3.5 12/05/2018    K 4.0 12/04/2018     12/05/2018    CO2 27 12/05/2018    BUN 21 12/05/2018    LABALBU 3.2 12/03/2018    CREATININE 1.0 12/05/2018    CALCIUM 8.6 12/05/2018    GFRAA >60 12/05/2018    LABGLOM >60 12/05/2018    GLUCOSE 150 12/05/2018     CBC: Recent Labs      12/03/18   1206  12/04/18   0830  12/05/18   0603   WBC  4.9  5.7  4.5   HGB  15.5  15.7  14.7   HCT  48.6  49.2  45.4   MCV  93.4  91.0  90.6   PLT  188  213  185     BNP: No results found for: BNP    ECHOCARDIOGRAM: 12/4/2018  Summary   Global left ventricular function is severely decreased with ejection   fraction estimated from 15 % to 20 %.   concentric left ventricular hypertrophy.   The left atrium is moderately dilated.   Right ventricular systolic function is severely reduced   mild MR   SPAP 56 mmHg markedly elevated RA pressure (15 mmHg). Assessment:     CONSULTS:   IP CONSULT TO HOSPITALIST  IP CONSULT TO HEART FAILURE NURSE/COORDINATOR  IP CONSULT TO CARDIOLOGY    Patient has a CARDIOLOGY CONSULT: Yes      Patient taking an ACEI/ARB:  No: allergy       Patient taking a BETA BLOCKER:  Yes    SCALE AVAILABLE:  Yes     EDUCATION STATUS: Patient -- no one else present at this time. [x]  Provided both written and verbal education on Heart Failure signs/symptoms. [x]  Provided instructions on daily medications. [x]  Provided instructions to monitor and record weight daily. [x]  Provided instructions to call if weight increases 3 lbs in one day or 5 lbs in one week. [x]  Received verbal acknowledgment/understanding of Heart Failure related causes. [x]  Provided instructions on how to maintain a low sodium diet. [x]  Provided recommendations for smoking cessation programs  [x]  Provided recommendations on activity and exercise    [x]  Other:HF RN consult received from Drs Del Fothergill and Shayan Helm.   Patient is also being coded HF per CDS HF list.  Chart reviewed. Patient presented to ED with c/o SOB/CP/LE edema x 1 month. He reports he has been out of meds \"for some time\" (several months per Dr Ko Wynn consult). He was also noted to be hypertensive. I could find no established cardiologist in [address] [place] [address] or [facility]. Of note, patient did report \"lip swelling\" when he took Lisinopril before - so Lisinopril was added to his allergy list.     ProBNP on admission was 7919 with Cr 1.0. Cardiology was consulted. Patient has been treated with IV bolus Lasix and has diuresed ~5 liters to date. He continues to endorse orthopnea, cough, SOB, LE edema. Echo was done showing severe CMO EF 15-20%. Plan is for [facility] once CHF and BP improved. Attempted to meet with patient 12/5 afternoon but he was sleeping and asked if I would please wait. HF RN will attempt again tomorrow. Met with patient on 12/6. He is agreeable to spend time with me for HF education. He is on room air and appears comfortable at rest and with conversation. He confirms the above summary of events leading to admission. He states he went to a [facility] clinic and was initially treated for high BP (lisinopril reaction) and diabetes. He was working third shift and thought it was just shift related. He reports the edema was recent (last two weeks). He shares his niece, who works in cardiology office ([employer]) and his sister were insistent he would come to the hospital.  He states \"this is all scary\". I acknowledged his emotions and encouraged him that now he is on a path to feel better. We reviewed the general definition of systolic HF. We discussed the importance of daily management with self assessement to best treat this disease that has no cure. I emphasized the importance of EARLY report of concerns to most easily address issues. He voices understanding. He is agreeable for follow up with [facility]. Contact numbers and brochure provided. Patient reports he has a scale at home. I provided weight log and corresponding AHA HF zones sheet. We discussed the 3/5 rule and s/sx of worsening HF. We discussed different scenarios he should report. I urged him to use the verbage \"I am your HF patient \" when contacting the office. He voices understanding. He reports he had all of the yellow zone s/sx. He shares he was so SOB he would put 3-4 pillows in his lap as he sat on the edge of the bed and lay his head over his propped forearms to \"catnap\". He shares his symptoms are all improved - claims his feet / legs are \"half what they were\" though they remain with 2+ edema bilaterally. He reports his abdomen is also \"about half as big as it was\". Patient reports he really doesn't use added salt. He is aware of high sodium content foods and admits to frequently having chips / nachos, fast food, etc.  We discussed the rationale of sodium and fluid restriction and the recommendations of each. We discussed tips on how to offset dry mouth and to alleviate thirst.  He questioned if fruits were allowed and we discussed this as an alternative to drinking fluids. He readily grasped concept of how remain balanced with good food options coordinated with self assessment. Patient has a fairly good grasp of medications and reports he is still having headaches. He attributes it to lack of sleep. He voiced understanding of importance of taking meds as directed - not just when he feels bad. We discussed outpatient support / treatments. He is agreeable for Cardiac Rehab referral to be made as well as [address] Brochures given for each. Smoking cessation flyer supplied. Patient reports he had stopped smoking when symptoms were severe. Praise and encouragement given for sustaining. I also informed him of 7 day follow up in place with Hi Nino NP. He is agreeable to date / time.      Overall, patient was very polite and voiced

## 2018-12-06 LAB
ANION GAP SERPL CALCULATED.3IONS-SCNC: 14 MMOL/L (ref 3–16)
BASOPHILS ABSOLUTE: 0 K/UL (ref 0–0.2)
BASOPHILS RELATIVE PERCENT: 0.6 %
BUN BLDV-MCNC: 16 MG/DL (ref 7–20)
CALCIUM SERPL-MCNC: 8.9 MG/DL (ref 8.3–10.6)
CHLORIDE BLD-SCNC: 102 MMOL/L (ref 99–110)
CHOLESTEROL, TOTAL: 92 MG/DL (ref 0–199)
CO2: 27 MMOL/L (ref 21–32)
CREAT SERPL-MCNC: 1 MG/DL (ref 0.9–1.3)
EOSINOPHILS ABSOLUTE: 0.1 K/UL (ref 0–0.6)
EOSINOPHILS RELATIVE PERCENT: 2.1 %
GFR AFRICAN AMERICAN: >60
GFR NON-AFRICAN AMERICAN: >60
GLUCOSE BLD-MCNC: 114 MG/DL (ref 70–99)
GLUCOSE BLD-MCNC: 139 MG/DL (ref 70–99)
GLUCOSE BLD-MCNC: 151 MG/DL (ref 70–99)
GLUCOSE BLD-MCNC: 160 MG/DL (ref 70–99)
GLUCOSE BLD-MCNC: 215 MG/DL (ref 70–99)
HCT VFR BLD CALC: 47.4 % (ref 40.5–52.5)
HDLC SERPL-MCNC: 28 MG/DL (ref 40–60)
HEMOGLOBIN: 15.3 G/DL (ref 13.5–17.5)
LDL CHOLESTEROL CALCULATED: 53 MG/DL
LYMPHOCYTES ABSOLUTE: 2.5 K/UL (ref 1–5.1)
LYMPHOCYTES RELATIVE PERCENT: 48.5 %
MCH RBC QN AUTO: 29.2 PG (ref 26–34)
MCHC RBC AUTO-ENTMCNC: 32.4 G/DL (ref 31–36)
MCV RBC AUTO: 90.2 FL (ref 80–100)
MONOCYTES ABSOLUTE: 0.4 K/UL (ref 0–1.3)
MONOCYTES RELATIVE PERCENT: 8.4 %
NEUTROPHILS ABSOLUTE: 2.1 K/UL (ref 1.7–7.7)
NEUTROPHILS RELATIVE PERCENT: 40.4 %
PDW BLD-RTO: 14.6 % (ref 12.4–15.4)
PERFORMED ON: ABNORMAL
PLATELET # BLD: 199 K/UL (ref 135–450)
PMV BLD AUTO: 8.4 FL (ref 5–10.5)
POTASSIUM REFLEX MAGNESIUM: 3.7 MMOL/L (ref 3.5–5.1)
PRO-BNP: 4469 PG/ML (ref 0–124)
RBC # BLD: 5.25 M/UL (ref 4.2–5.9)
SODIUM BLD-SCNC: 143 MMOL/L (ref 136–145)
TRIGL SERPL-MCNC: 57 MG/DL (ref 0–150)
VLDLC SERPL CALC-MCNC: 11 MG/DL
WBC # BLD: 5.1 K/UL (ref 4–11)

## 2018-12-06 PROCEDURE — 2580000003 HC RX 258: Performed by: INTERNAL MEDICINE

## 2018-12-06 PROCEDURE — 6370000000 HC RX 637 (ALT 250 FOR IP): Performed by: PHYSICIAN ASSISTANT

## 2018-12-06 PROCEDURE — 93010 ELECTROCARDIOGRAM REPORT: CPT | Performed by: INTERNAL MEDICINE

## 2018-12-06 PROCEDURE — 80048 BASIC METABOLIC PNL TOTAL CA: CPT

## 2018-12-06 PROCEDURE — 6370000000 HC RX 637 (ALT 250 FOR IP): Performed by: INTERNAL MEDICINE

## 2018-12-06 PROCEDURE — 1200000000 HC SEMI PRIVATE

## 2018-12-06 PROCEDURE — 99232 SBSQ HOSP IP/OBS MODERATE 35: CPT | Performed by: NURSE PRACTITIONER

## 2018-12-06 PROCEDURE — 6370000000 HC RX 637 (ALT 250 FOR IP): Performed by: NURSE PRACTITIONER

## 2018-12-06 PROCEDURE — 94760 N-INVAS EAR/PLS OXIMETRY 1: CPT

## 2018-12-06 PROCEDURE — 6360000002 HC RX W HCPCS: Performed by: INTERNAL MEDICINE

## 2018-12-06 PROCEDURE — 83880 ASSAY OF NATRIURETIC PEPTIDE: CPT

## 2018-12-06 PROCEDURE — 80061 LIPID PANEL: CPT

## 2018-12-06 PROCEDURE — 85025 COMPLETE CBC W/AUTO DIFF WBC: CPT

## 2018-12-06 PROCEDURE — 36415 COLL VENOUS BLD VENIPUNCTURE: CPT

## 2018-12-06 RX ADMIN — ENOXAPARIN SODIUM 40 MG: 40 INJECTION SUBCUTANEOUS at 09:02

## 2018-12-06 RX ADMIN — Medication 10 ML: at 09:07

## 2018-12-06 RX ADMIN — HYDRALAZINE HYDROCHLORIDE 25 MG: 25 TABLET, FILM COATED ORAL at 13:47

## 2018-12-06 RX ADMIN — ASPIRIN 81 MG 81 MG: 81 TABLET ORAL at 09:02

## 2018-12-06 RX ADMIN — INSULIN LISPRO 1 UNITS: 100 INJECTION, SOLUTION INTRAVENOUS; SUBCUTANEOUS at 21:18

## 2018-12-06 RX ADMIN — FUROSEMIDE 40 MG: 10 INJECTION, SOLUTION INTRAMUSCULAR; INTRAVENOUS at 17:38

## 2018-12-06 RX ADMIN — CARVEDILOL 6.25 MG: 6.25 TABLET, FILM COATED ORAL at 09:03

## 2018-12-06 RX ADMIN — ISOSORBIDE MONONITRATE 30 MG: 30 TABLET, EXTENDED RELEASE ORAL at 09:02

## 2018-12-06 RX ADMIN — SPIRONOLACTONE 25 MG: 25 TABLET ORAL at 09:06

## 2018-12-06 RX ADMIN — POTASSIUM CHLORIDE 20 MEQ: 20 TABLET, EXTENDED RELEASE ORAL at 17:38

## 2018-12-06 RX ADMIN — POTASSIUM CHLORIDE 20 MEQ: 20 TABLET, EXTENDED RELEASE ORAL at 09:02

## 2018-12-06 RX ADMIN — INSULIN LISPRO 4 UNITS: 100 INJECTION, SOLUTION INTRAVENOUS; SUBCUTANEOUS at 17:38

## 2018-12-06 RX ADMIN — ACETAMINOPHEN 1000 MG: 500 TABLET ORAL at 21:17

## 2018-12-06 RX ADMIN — FUROSEMIDE 40 MG: 10 INJECTION, SOLUTION INTRAMUSCULAR; INTRAVENOUS at 09:02

## 2018-12-06 RX ADMIN — FAMOTIDINE 20 MG: 20 TABLET ORAL at 21:15

## 2018-12-06 RX ADMIN — INSULIN LISPRO 2 UNITS: 100 INJECTION, SOLUTION INTRAVENOUS; SUBCUTANEOUS at 09:03

## 2018-12-06 RX ADMIN — AMLODIPINE BESYLATE 10 MG: 10 TABLET ORAL at 09:03

## 2018-12-06 RX ADMIN — HYDRALAZINE HYDROCHLORIDE 25 MG: 25 TABLET, FILM COATED ORAL at 21:15

## 2018-12-06 RX ADMIN — FAMOTIDINE 20 MG: 20 TABLET ORAL at 09:02

## 2018-12-06 RX ADMIN — ATORVASTATIN CALCIUM 40 MG: 40 TABLET, FILM COATED ORAL at 21:15

## 2018-12-06 RX ADMIN — CARVEDILOL 6.25 MG: 6.25 TABLET, FILM COATED ORAL at 17:38

## 2018-12-06 RX ADMIN — HYDRALAZINE HYDROCHLORIDE 25 MG: 25 TABLET, FILM COATED ORAL at 04:53

## 2018-12-06 ASSESSMENT — PAIN SCALES - GENERAL: PAINLEVEL_OUTOF10: 7

## 2018-12-06 NOTE — PROGRESS NOTES
Patient is alert and oriented, up at brielle, call light within reach. Fall precautions in place. AM meds complete, patient tolerated well. VSS and WDL. No s/s of distress, no further needs noted at this time.  Electronically signed by Carlota Evans RN on 12/6/2018 at 11:24 AM

## 2018-12-06 NOTE — CONSULTS
ejection   fraction estimated from 15 % to 20 %.   concentric left ventricular hypertrophy.   The left atrium is moderately dilated.   Right ventricular systolic function is severely reduced   mild MR   SPAP 56 mmHg markedly elevated RA pressure (15 mmHg).      HgBA1c:  No components found for: HGBA1C  LIPID PANEL:    Lab Results   Component Value Date    CHOL 92 12/06/2018    HDL 28 12/06/2018    TRIG 57 12/06/2018        Assessment:     CONSULTS:   IP CONSULT TO HOSPITALIST  IP CONSULT TO HEART FAILURE NURSE/COORDINATOR  IP CONSULT TO CARDIOLOGY  IP CONSULT TO CARDIAC REHAB    Patient has a CARDIOLOGY CONSULT: Yes        EDUCATION STATUS: Patient   [x]  Provided both written and verbal education on Cardiopulmonary Rehabilitation. []  Provided instructions for smoking cessation programs. []  Provided education of CAD risk factors. []  Provided recommendations on activity and exercise. []  Provided education on medications. []  Provided education on coronary anatomy and coronary interventions. []  Other:    CURRENT DIET: DIET LOW SODIUM 2 GM;    EDUCATIONAL PACKETS PROVIDED- PRINTED FROM [employer]. Titles and material given:  Yes   [x]  Managing Heart Disease and Preventing Stroke  []  Heart Owner's Manual  []  Living Well with Heart Failure  []  Other:     PATIENT/CAREGIVER TEACHING:    Level of patient/caregiver understanding able to:   [x] Verbalize understanding   [] Demonstrate understanding       [] Teach back        [] Needs reinforcement     []  Other:       TEACHING TIME:  15 minutes       Plan:       DISCHARGE PLAN:  Placement for patient upon discharge: home with support   Hospice Care:  no  Code Status: Full Code  Discharge appointment scheduled: No     RECOMMENDATIONS:   [x]  Patient/Family instructed to call Cardiopulmonary Rehab ([phone]) upon discharge schedule the initial evaluation  [x]  Encourage to call Cardiopulmonary Rehabilitation with any questions.   []  Referral to alternate Cardiopulmonary Rehabilitation facility.    []  Other:    [] Appointment scheduled for   [] Chooses to not schedule at this time          Electronically signed by Maddie Menjivar RN,MS on 12/6/2018 at 5:50 PM

## 2018-12-06 NOTE — PLAN OF CARE
Problem: Safety:  Goal: Free from accidental physical injury  Free from accidental physical injury   Outcome: Ongoing    Goal: Free from intentional harm  Free from intentional harm   Outcome: Ongoing      Problem: Daily Care:  Goal: Daily care needs are met  Daily care needs are met   Outcome: Ongoing      Problem: Pain:  Goal: Patient's pain/discomfort is manageable  Patient's pain/discomfort is manageable   Outcome: Ongoing      Problem: Discharge Planning:  Goal: Patients continuum of care needs are met  Patients continuum of care needs are met   Outcome: Ongoing      Problem: OXYGENATION/RESPIRATORY FUNCTION  Goal: Patient will maintain patent airway  Outcome: Ongoing    Goal: Patient will achieve/maintain normal respiratory rate/effort  Respiratory rate and effort will be within normal limits for the patient   Outcome: Ongoing      Problem: HEMODYNAMIC STATUS  Goal: Patient has stable vital signs and fluid balance  Outcome: Ongoing      Problem: FLUID AND ELECTROLYTE IMBALANCE  Goal: Fluid and electrolyte balance are achieved/maintained  Outcome: Ongoing      Problem: ACTIVITY INTOLERANCE/IMPAIRED MOBILITY  Goal: Mobility/activity is maintained at optimum level for patient  Outcome: Ongoing

## 2018-12-07 LAB
ANION GAP SERPL CALCULATED.3IONS-SCNC: 13 MMOL/L (ref 3–16)
BASOPHILS ABSOLUTE: 0 K/UL (ref 0–0.2)
BASOPHILS RELATIVE PERCENT: 0.4 %
BUN BLDV-MCNC: 17 MG/DL (ref 7–20)
CALCIUM SERPL-MCNC: 8.6 MG/DL (ref 8.3–10.6)
CHLORIDE BLD-SCNC: 101 MMOL/L (ref 99–110)
CO2: 27 MMOL/L (ref 21–32)
CREAT SERPL-MCNC: 1 MG/DL (ref 0.9–1.3)
EOSINOPHILS ABSOLUTE: 0.3 K/UL (ref 0–0.6)
EOSINOPHILS RELATIVE PERCENT: 5 %
GFR AFRICAN AMERICAN: >60
GFR NON-AFRICAN AMERICAN: >60
GLUCOSE BLD-MCNC: 124 MG/DL (ref 70–99)
GLUCOSE BLD-MCNC: 159 MG/DL (ref 70–99)
GLUCOSE BLD-MCNC: 182 MG/DL (ref 70–99)
GLUCOSE BLD-MCNC: 206 MG/DL (ref 70–99)
GLUCOSE BLD-MCNC: 217 MG/DL (ref 70–99)
HCT VFR BLD CALC: 46.4 % (ref 40.5–52.5)
HEMOGLOBIN: 14.9 G/DL (ref 13.5–17.5)
LYMPHOCYTES ABSOLUTE: 2.3 K/UL (ref 1–5.1)
LYMPHOCYTES RELATIVE PERCENT: 41 %
MCH RBC QN AUTO: 29.1 PG (ref 26–34)
MCHC RBC AUTO-ENTMCNC: 32.1 G/DL (ref 31–36)
MCV RBC AUTO: 90.6 FL (ref 80–100)
MONOCYTES ABSOLUTE: 0.5 K/UL (ref 0–1.3)
MONOCYTES RELATIVE PERCENT: 9 %
NEUTROPHILS ABSOLUTE: 2.4 K/UL (ref 1.7–7.7)
NEUTROPHILS RELATIVE PERCENT: 44.6 %
PDW BLD-RTO: 15 % (ref 12.4–15.4)
PERFORMED ON: ABNORMAL
PLATELET # BLD: 196 K/UL (ref 135–450)
PMV BLD AUTO: 8.7 FL (ref 5–10.5)
POTASSIUM REFLEX MAGNESIUM: 3.6 MMOL/L (ref 3.5–5.1)
RBC # BLD: 5.12 M/UL (ref 4.2–5.9)
SODIUM BLD-SCNC: 141 MMOL/L (ref 136–145)
WBC # BLD: 5.5 K/UL (ref 4–11)

## 2018-12-07 PROCEDURE — 6370000000 HC RX 637 (ALT 250 FOR IP): Performed by: PHYSICIAN ASSISTANT

## 2018-12-07 PROCEDURE — 6370000000 HC RX 637 (ALT 250 FOR IP): Performed by: INTERNAL MEDICINE

## 2018-12-07 PROCEDURE — 6360000002 HC RX W HCPCS: Performed by: INTERNAL MEDICINE

## 2018-12-07 PROCEDURE — 2580000003 HC RX 258: Performed by: INTERNAL MEDICINE

## 2018-12-07 PROCEDURE — 1200000000 HC SEMI PRIVATE

## 2018-12-07 PROCEDURE — 94760 N-INVAS EAR/PLS OXIMETRY 1: CPT

## 2018-12-07 PROCEDURE — 6370000000 HC RX 637 (ALT 250 FOR IP): Performed by: NURSE PRACTITIONER

## 2018-12-07 PROCEDURE — 80048 BASIC METABOLIC PNL TOTAL CA: CPT

## 2018-12-07 PROCEDURE — 85025 COMPLETE CBC W/AUTO DIFF WBC: CPT

## 2018-12-07 PROCEDURE — 99232 SBSQ HOSP IP/OBS MODERATE 35: CPT | Performed by: INTERNAL MEDICINE

## 2018-12-07 RX ADMIN — HYDRALAZINE HYDROCHLORIDE 25 MG: 25 TABLET, FILM COATED ORAL at 20:06

## 2018-12-07 RX ADMIN — ISOSORBIDE MONONITRATE 30 MG: 30 TABLET, EXTENDED RELEASE ORAL at 09:18

## 2018-12-07 RX ADMIN — POTASSIUM CHLORIDE 20 MEQ: 20 TABLET, EXTENDED RELEASE ORAL at 09:18

## 2018-12-07 RX ADMIN — FUROSEMIDE 40 MG: 10 INJECTION, SOLUTION INTRAMUSCULAR; INTRAVENOUS at 09:17

## 2018-12-07 RX ADMIN — CARVEDILOL 6.25 MG: 6.25 TABLET, FILM COATED ORAL at 09:17

## 2018-12-07 RX ADMIN — ACETAMINOPHEN 1000 MG: 500 TABLET ORAL at 22:43

## 2018-12-07 RX ADMIN — ASPIRIN 81 MG 81 MG: 81 TABLET ORAL at 09:18

## 2018-12-07 RX ADMIN — FAMOTIDINE 20 MG: 20 TABLET ORAL at 20:06

## 2018-12-07 RX ADMIN — Medication 10 ML: at 20:06

## 2018-12-07 RX ADMIN — INSULIN LISPRO 4 UNITS: 100 INJECTION, SOLUTION INTRAVENOUS; SUBCUTANEOUS at 09:20

## 2018-12-07 RX ADMIN — Medication 10 ML: at 18:04

## 2018-12-07 RX ADMIN — Medication 10 ML: at 10:34

## 2018-12-07 RX ADMIN — HYDRALAZINE HYDROCHLORIDE 25 MG: 25 TABLET, FILM COATED ORAL at 06:05

## 2018-12-07 RX ADMIN — SPIRONOLACTONE 25 MG: 25 TABLET ORAL at 09:18

## 2018-12-07 RX ADMIN — CARVEDILOL 6.25 MG: 6.25 TABLET, FILM COATED ORAL at 18:02

## 2018-12-07 RX ADMIN — INSULIN LISPRO 1 UNITS: 100 INJECTION, SOLUTION INTRAVENOUS; SUBCUTANEOUS at 22:40

## 2018-12-07 RX ADMIN — ATORVASTATIN CALCIUM 40 MG: 40 TABLET, FILM COATED ORAL at 20:06

## 2018-12-07 RX ADMIN — FUROSEMIDE 40 MG: 10 INJECTION, SOLUTION INTRAMUSCULAR; INTRAVENOUS at 18:03

## 2018-12-07 RX ADMIN — FAMOTIDINE 20 MG: 20 TABLET ORAL at 09:19

## 2018-12-07 RX ADMIN — INSULIN LISPRO 2 UNITS: 100 INJECTION, SOLUTION INTRAVENOUS; SUBCUTANEOUS at 13:24

## 2018-12-07 RX ADMIN — POTASSIUM CHLORIDE 20 MEQ: 20 TABLET, EXTENDED RELEASE ORAL at 18:02

## 2018-12-07 RX ADMIN — AMLODIPINE BESYLATE 10 MG: 10 TABLET ORAL at 09:18

## 2018-12-07 ASSESSMENT — PAIN SCALES - GENERAL
PAINLEVEL_OUTOF10: 0
PAINLEVEL_OUTOF10: 6

## 2018-12-07 NOTE — PROGRESS NOTES
sputum. · Respiratory: No cough or wheezing, no sputum production. No hematemesis. · Gastrointestinal: No abdominal pain, appetite loss, blood in stools. No change in bowel or bladder habits. · Genitourinary: No dysuria, trouble voiding, or hematuria. · Musculoskeletal:  No gait disturbance, no joint complaints. · Integumentary: No rash or pruritis. · Neurological: No headache, diplopia, change in muscle strength, numbness or tingling. · Psychiatric: No anxiety or depression. · Endocrine: No temperature intolerance. No excessive thirst, fluid intake, or urination. No tremor. · Hematologic/Lymphatic: No abnormal bruising or bleeding, blood clots or swollen lymph nodes. · Allergic/Immunologic: No nasal congestion or hives. Objective:     PHYSICAL EXAM:      Vitals:    12/07/18 1319   BP: 133/84   Pulse: 103   Resp: 16   Temp: 98.2 °F (36.8 °C)   SpO2: 94%    Weight: 236 lb 1.8 oz (107.1 kg)       General Appearance:  Alert, cooperative, no distress, appears stated age. Head:  Normocephalic, without obvious abnormality, atraumatic. Eyes:  Pupils equal and round. No scleral icterus. Mouth: Moist mucosa, no pharyngeal erythema. Nose: Nares normal. No drainage or sinus tenderness. Neck: Supple, symmetrical, trachea midline. No adenopathy. No tenderness/mass/nodules. No carotid bruit or elevated JVD. Lungs:   Respiratory Effort: Normal   Auscultation: Clear to auscultation bilaterally, respirations unlabored. No wheeze, rales   Chest Wall:  No tenderness or deformity. Cardiovascular:    Pulses  Palpation: normal   Ascultation: Regular rate, S1/ S2 normal. No murmur, rub, or gallop. 2+ radial and pedal pulses, symmetric  Carotid  Femoral   Abdomen and Gastrointestinal:   Soft, non-tender, bowel sounds active. Liver and Spleen  Masses   Musculoskeletal: No muscle wasting  Back  Gait   Extremities: Extremities normal, atraumatic. 2+ edema.  No cyanosis clubbing       Skin: Inspection and

## 2018-12-07 NOTE — PROGRESS NOTES
tested. Psychiatric: Alert and oriented, thought content appropriate, normal insight    Medications:  Scheduled Meds:   potassium chloride  20 mEq Oral BID WC    atorvastatin  40 mg Oral Nightly    spironolactone  25 mg Oral Daily    hydrALAZINE  25 mg Oral 3 times per day    isosorbide mononitrate  30 mg Oral Daily    aspirin  81 mg Oral Daily    carvedilol  6.25 mg Oral BID WC    amLODIPine  10 mg Oral Daily    furosemide  40 mg Intravenous BID    sodium chloride flush  10 mL Intravenous 2 times per day    famotidine  20 mg Oral BID    enoxaparin  40 mg Subcutaneous Daily    insulin lispro  0-12 Units Subcutaneous TID     insulin lispro  0-6 Units Subcutaneous Nightly       PRN Meds:  ipratropium-albuterol, acetaminophen, hydrALAZINE, sodium chloride flush, magnesium hydroxide, docusate sodium, bisacodyl, ondansetron, glucose, dextrose, glucagon (rDNA), dextrose    IV:   dextrose           Intake/Output Summary (Last 24 hours) at 12/07/18 1051  Last data filed at 12/07/18 0725   Gross per 24 hour   Intake              720 ml   Output             2650 ml   Net            -1930 ml       Results:  CBC: Recent Labs      12/05/18   0603  12/06/18   0739  12/07/18   0634   WBC  4.5  5.1  5.5   HGB  14.7  15.3  14.9   HCT  45.4  47.4  46.4   MCV  90.6  90.2  90.6   PLT  185  199  196     BMP: Recent Labs      12/05/18   0603  12/06/18   0739  12/07/18   0634   NA  142  143  141   K  3.5  3.7  3.6   CL  101  102  101   CO2  27  27  27   BUN  21*  16  17   CREATININE  1.0  1.0  1.0     Mag: No results for input(s): MAG in the last 72 hours. Phos: No results found for: PHOS  No components found for: GLU    LIVER PROFILE: No results for input(s): AST, ALT, LIPASE, BILIDIR, BILITOT, ALKPHOS in the last 72 hours. Invalid input(s): AMYLASE,  ALB  PT/INR: No results for input(s): PROTIME, INR in the last 72 hours. APTT: No results for input(s): APTT in the last 72 hours.   UA:No results for input(s):

## 2018-12-07 NOTE — PROGRESS NOTES
No complaints t/o night. Denies SOB and CP. Pt A&O x4, on RA, tele monitor on, VSS. Pt UAL.  Call light in reach

## 2018-12-08 LAB
ANION GAP SERPL CALCULATED.3IONS-SCNC: 12 MMOL/L (ref 3–16)
BASOPHILS ABSOLUTE: 0 K/UL (ref 0–0.2)
BASOPHILS RELATIVE PERCENT: 1 %
BUN BLDV-MCNC: 15 MG/DL (ref 7–20)
CALCIUM SERPL-MCNC: 8.8 MG/DL (ref 8.3–10.6)
CHLORIDE BLD-SCNC: 100 MMOL/L (ref 99–110)
CO2: 27 MMOL/L (ref 21–32)
CREAT SERPL-MCNC: 0.9 MG/DL (ref 0.9–1.3)
EOSINOPHILS ABSOLUTE: 0.1 K/UL (ref 0–0.6)
EOSINOPHILS RELATIVE PERCENT: 2.6 %
GFR AFRICAN AMERICAN: >60
GFR NON-AFRICAN AMERICAN: >60
GLUCOSE BLD-MCNC: 120 MG/DL (ref 70–99)
GLUCOSE BLD-MCNC: 146 MG/DL (ref 70–99)
GLUCOSE BLD-MCNC: 156 MG/DL (ref 70–99)
GLUCOSE BLD-MCNC: 225 MG/DL (ref 70–99)
GLUCOSE BLD-MCNC: 229 MG/DL (ref 70–99)
HCT VFR BLD CALC: 44.5 % (ref 40.5–52.5)
HEMOGLOBIN: 14.6 G/DL (ref 13.5–17.5)
LYMPHOCYTES ABSOLUTE: 2.7 K/UL (ref 1–5.1)
LYMPHOCYTES RELATIVE PERCENT: 55.4 %
MCH RBC QN AUTO: 29.4 PG (ref 26–34)
MCHC RBC AUTO-ENTMCNC: 32.8 G/DL (ref 31–36)
MCV RBC AUTO: 89.4 FL (ref 80–100)
MONOCYTES ABSOLUTE: 0.4 K/UL (ref 0–1.3)
MONOCYTES RELATIVE PERCENT: 8.8 %
NEUTROPHILS ABSOLUTE: 1.6 K/UL (ref 1.7–7.7)
NEUTROPHILS RELATIVE PERCENT: 32.2 %
PDW BLD-RTO: 14.3 % (ref 12.4–15.4)
PERFORMED ON: ABNORMAL
PLATELET # BLD: 176 K/UL (ref 135–450)
PMV BLD AUTO: 8.9 FL (ref 5–10.5)
POTASSIUM REFLEX MAGNESIUM: 3.8 MMOL/L (ref 3.5–5.1)
RBC # BLD: 4.97 M/UL (ref 4.2–5.9)
SODIUM BLD-SCNC: 139 MMOL/L (ref 136–145)
WBC # BLD: 4.8 K/UL (ref 4–11)

## 2018-12-08 PROCEDURE — 6370000000 HC RX 637 (ALT 250 FOR IP): Performed by: INTERNAL MEDICINE

## 2018-12-08 PROCEDURE — 2580000003 HC RX 258: Performed by: INTERNAL MEDICINE

## 2018-12-08 PROCEDURE — 6370000000 HC RX 637 (ALT 250 FOR IP): Performed by: NURSE PRACTITIONER

## 2018-12-08 PROCEDURE — 94760 N-INVAS EAR/PLS OXIMETRY 1: CPT

## 2018-12-08 PROCEDURE — 6370000000 HC RX 637 (ALT 250 FOR IP): Performed by: PHYSICIAN ASSISTANT

## 2018-12-08 PROCEDURE — 36415 COLL VENOUS BLD VENIPUNCTURE: CPT

## 2018-12-08 PROCEDURE — 85025 COMPLETE CBC W/AUTO DIFF WBC: CPT

## 2018-12-08 PROCEDURE — 6360000002 HC RX W HCPCS: Performed by: INTERNAL MEDICINE

## 2018-12-08 PROCEDURE — 1200000000 HC SEMI PRIVATE

## 2018-12-08 PROCEDURE — 99233 SBSQ HOSP IP/OBS HIGH 50: CPT | Performed by: NURSE PRACTITIONER

## 2018-12-08 PROCEDURE — 80048 BASIC METABOLIC PNL TOTAL CA: CPT

## 2018-12-08 RX ORDER — CARVEDILOL 12.5 MG/1
12.5 TABLET ORAL 2 TIMES DAILY WITH MEALS
Status: DISCONTINUED | OUTPATIENT
Start: 2018-12-08 | End: 2018-12-09

## 2018-12-08 RX ORDER — AMLODIPINE BESYLATE 5 MG/1
5 TABLET ORAL DAILY
Status: DISCONTINUED | OUTPATIENT
Start: 2018-12-09 | End: 2018-12-09

## 2018-12-08 RX ORDER — HYDRALAZINE HYDROCHLORIDE 25 MG/1
50 TABLET, FILM COATED ORAL EVERY 8 HOURS SCHEDULED
Status: DISCONTINUED | OUTPATIENT
Start: 2018-12-08 | End: 2018-12-11 | Stop reason: HOSPADM

## 2018-12-08 RX ADMIN — ACETAMINOPHEN 1000 MG: 500 TABLET ORAL at 17:10

## 2018-12-08 RX ADMIN — CARVEDILOL 6.25 MG: 6.25 TABLET, FILM COATED ORAL at 08:27

## 2018-12-08 RX ADMIN — INSULIN LISPRO 2 UNITS: 100 INJECTION, SOLUTION INTRAVENOUS; SUBCUTANEOUS at 20:55

## 2018-12-08 RX ADMIN — POTASSIUM CHLORIDE 20 MEQ: 20 TABLET, EXTENDED RELEASE ORAL at 08:27

## 2018-12-08 RX ADMIN — ISOSORBIDE MONONITRATE 30 MG: 30 TABLET, EXTENDED RELEASE ORAL at 08:27

## 2018-12-08 RX ADMIN — FAMOTIDINE 20 MG: 20 TABLET ORAL at 08:28

## 2018-12-08 RX ADMIN — Medication 10 ML: at 20:59

## 2018-12-08 RX ADMIN — POTASSIUM CHLORIDE 20 MEQ: 20 TABLET, EXTENDED RELEASE ORAL at 17:10

## 2018-12-08 RX ADMIN — ENOXAPARIN SODIUM 40 MG: 40 INJECTION SUBCUTANEOUS at 08:27

## 2018-12-08 RX ADMIN — Medication 10 ML: at 08:28

## 2018-12-08 RX ADMIN — HYDRALAZINE HYDROCHLORIDE 25 MG: 25 TABLET, FILM COATED ORAL at 05:45

## 2018-12-08 RX ADMIN — CARVEDILOL 12.5 MG: 12.5 TABLET, FILM COATED ORAL at 17:10

## 2018-12-08 RX ADMIN — FUROSEMIDE 40 MG: 10 INJECTION, SOLUTION INTRAMUSCULAR; INTRAVENOUS at 17:10

## 2018-12-08 RX ADMIN — ATORVASTATIN CALCIUM 40 MG: 40 TABLET, FILM COATED ORAL at 21:00

## 2018-12-08 RX ADMIN — INSULIN LISPRO 4 UNITS: 100 INJECTION, SOLUTION INTRAVENOUS; SUBCUTANEOUS at 12:44

## 2018-12-08 RX ADMIN — ASPIRIN 81 MG 81 MG: 81 TABLET ORAL at 08:27

## 2018-12-08 RX ADMIN — HYDRALAZINE HYDROCHLORIDE 50 MG: 50 TABLET, FILM COATED ORAL at 14:22

## 2018-12-08 RX ADMIN — FUROSEMIDE 40 MG: 10 INJECTION, SOLUTION INTRAMUSCULAR; INTRAVENOUS at 08:27

## 2018-12-08 RX ADMIN — HYDRALAZINE HYDROCHLORIDE 50 MG: 50 TABLET, FILM COATED ORAL at 21:00

## 2018-12-08 RX ADMIN — FAMOTIDINE 20 MG: 20 TABLET ORAL at 21:00

## 2018-12-08 RX ADMIN — SPIRONOLACTONE 25 MG: 25 TABLET ORAL at 08:27

## 2018-12-08 RX ADMIN — AMLODIPINE BESYLATE 10 MG: 10 TABLET ORAL at 08:28

## 2018-12-08 RX ADMIN — INSULIN LISPRO 2 UNITS: 100 INJECTION, SOLUTION INTRAVENOUS; SUBCUTANEOUS at 17:19

## 2018-12-08 ASSESSMENT — PAIN SCALES - GENERAL
PAINLEVEL_OUTOF10: 7
PAINLEVEL_OUTOF10: 0
PAINLEVEL_OUTOF10: 4

## 2018-12-08 ASSESSMENT — PAIN DESCRIPTION - LOCATION: LOCATION: HEAD

## 2018-12-08 ASSESSMENT — PAIN DESCRIPTION - FREQUENCY: FREQUENCY: INTERMITTENT

## 2018-12-08 ASSESSMENT — PAIN DESCRIPTION - ORIENTATION: ORIENTATION: MID

## 2018-12-08 ASSESSMENT — PAIN DESCRIPTION - DESCRIPTORS: DESCRIPTORS: ACHING

## 2018-12-08 ASSESSMENT — PAIN DESCRIPTION - PAIN TYPE: TYPE: ACUTE PAIN

## 2018-12-08 NOTE — PROGRESS NOTES
dextrose         Intake/Output Summary (Last 24 hours) at 12/08/18 1132  Last data filed at 12/08/18 0547   Gross per 24 hour   Intake              610 ml   Output             3200 ml   Net            -2590 ml       CBC:   Recent Labs      12/08/18   0621   WBC  4.8   HGB  14.6   PLT  176     BMP:  Recent Labs      12/08/18   0621   NA  139   K  3.8   CL  100   CO2  27   BUN  15   CREATININE  0.9   GLUCOSE  146*     ABGs: No results found for: PHART, PO2ART, WMV9ZPM  INR: No results for input(s): INR in the last 72 hours. CARDIAC LABS     ENZYMES:No results for input(s): CKMB, CKMBINDEX, TROPONINI in the last 72 hours. Invalid input(s): CKTOTAL;3  FASTING LIPID PANEL:  Lab Results   Component Value Date    HDL 28 12/06/2018    LDLCALC 53 12/06/2018    TRIG 57 12/06/2018     LIVER PROFILE:No results for input(s): AST, ALT, ALB in the last 72 hours. -----------------------------------------------------------------  Telemetry: personally reviewed     RAD:   12/3/18  CXR:  FINDINGS:   The heart size is enlarged.  The pulmonary vasculature is within normal   limits.  No acute infiltrates are seen.  No pneumothoraces are noted. Harlene Moros   is subsegmental atelectasis in the right lung base.           Impression   1. Interval cardiomegaly without overt failure.  The possibility of   pericardial effusion is raised. EKG: personally reviewed     Echo:   12/4/18  Summary   Global left ventricular function is severely decreased with ejection   fraction estimated from 15 % to 20 %.   concentric left ventricular hypertrophy.   The left atrium is moderately dilated.   Right ventricular systolic function is severely reduced   mild MR   SPAP 56 mmHg markedly elevated RA pressure (15 mmHg).     Cath: none       Objective:   Vitals: BP (!) 136/94   Pulse 103   Temp 98.3 °F (36.8 °C) (Oral)   Resp 18   Ht 6' 3\" (1.905 m)   Wt 236 lb 12.4 oz (107.4 kg)   SpO2 97%   BMI 29.59 kg/m²   General appearance: alert, appears

## 2018-12-08 NOTE — PROGRESS NOTES
Hospitalist Progress Note    CC: sob, elevated BP    Admit date: 12/3/2018  Days in hospital:  5    Subjective: Pt is feeling better and has no new complaints. ROS:   Review of Systems   Constitutional: Negative for chills and fever. Respiratory: Negative for cough and shortness of breath. Cardiovascular: Negative for chest pain and palpitations. Gastrointestinal: Negative for abdominal pain, constipation and diarrhea. Genitourinary: Negative for dysuria and urgency. Neurological: Negative for headaches. Objective:  Vitals: BP (!) 136/94   Pulse 103   Temp 98.3 °F (36.8 °C) (Oral)   Resp 18   Ht 6' 3\" (1.905 m)   Wt 236 lb 12.4 oz (107.4 kg)   SpO2 97%   BMI 29.59 kg/m²   General appearance: WD/WN 46y.o. year-old 2+ edema bilateral lower extremities male who is sitting in a chair alert, appears stated age and is cooperative  HEENT: Head: Normocephalic, no lesions, without obvious abnormality. Eye: Normal external eye, conjunctiva, lids cornea, ANGELINA. Ears: Normal TM's bilaterally. Normal auditory canals and external ears. Non-tender. Nose: Normal external nose, mucus membranes and septum. Pharynx: Dental Hygiene adequate. Normal buccal mucosa. Normal pharynx. Neck: no adenopathy, no carotid bruit, no JVD, supple, symmetrical, trachea midline and thyroid not enlarged, symmetric, no tenderness/mass/nodules  Lungs: clear to auscultation bilaterally and no use of accessory muscles.   Heart: regular rate and rhythm, S1, S2 normal, no murmur, click, rub or gallop and normal apical impulse  Abdomen: soft, non-tender; bowel sounds normal; no masses,  no organomegaly  Extremities: extremities normal, atraumatic, no cyanosis, + edema bilateral lower extremities, Homans sign is negative, no sign of DVT  Skin: Skin color, texture, turgor normal. No rashes or lesions  Neurologic: Mental status: Alert, oriented, thought content appropriate, Cranial nerves II-XII intact; grossly non focal. Gait was LEUKOCYTESUR, UROBILINOGEN, BILIRUBINUR, BLOODU, GLUCOSEU, AMORPHOUS in the last 72 hours. Invalid input(s): Soo Fam input(s): ABG  Lab Results   Component Value Date    CALCIUM 8.8 12/08/2018       Assessment and Plan:    Acute systolic CHF exacerbation - new onset  Severely decreased EF estimated from 15 % to 20 %  Diurese with IV Lasix   Monitoring  strict I&Os and daily weights. Low sodium diet   Cardiology following - ct med optimization, no ace/arb given angioedema  Given elevated troponin and smoking history with abnormal ECG, will need ischemic evaluation planned once better compensated. Continue BB and aldactone     Hypertensive crisis - BP controlled   2/2 non compliance with medications - Pt has not taken any medications in months. Previous adverse to Lisinopril. Continue BB and Amlodpine for now.  Hydralazine and Nitrate added      Elevated troponin  2/2 demand ischemia mismatch  Cont asa, BB and statin      Diabetes mellitus II - SSI and CCD     Hyperlipidemia - Cont statin therapy      DVT Prophylaxis: Lovenox  Diet: Low Sodium, CCD  Code Status: Full     Electronically signed by Antoni Nick MD on 12/8/2018 at 11:48 AM

## 2018-12-08 NOTE — PROGRESS NOTES
Denies pain or nausea. Respirations even and unlabored. 1800 ml of yellow urine out. Call light in reach. All needs met at this time.

## 2018-12-09 LAB
ANION GAP SERPL CALCULATED.3IONS-SCNC: 12 MMOL/L (ref 3–16)
BASOPHILS ABSOLUTE: 0.1 K/UL (ref 0–0.2)
BASOPHILS RELATIVE PERCENT: 1 %
BUN BLDV-MCNC: 18 MG/DL (ref 7–20)
CALCIUM SERPL-MCNC: 9.2 MG/DL (ref 8.3–10.6)
CHLORIDE BLD-SCNC: 105 MMOL/L (ref 99–110)
CO2: 26 MMOL/L (ref 21–32)
CREAT SERPL-MCNC: 1 MG/DL (ref 0.9–1.3)
EOSINOPHILS ABSOLUTE: 0.1 K/UL (ref 0–0.6)
EOSINOPHILS RELATIVE PERCENT: 2.5 %
GFR AFRICAN AMERICAN: >60
GFR NON-AFRICAN AMERICAN: >60
GLUCOSE BLD-MCNC: 167 MG/DL (ref 70–99)
GLUCOSE BLD-MCNC: 168 MG/DL (ref 70–99)
GLUCOSE BLD-MCNC: 241 MG/DL (ref 70–99)
GLUCOSE BLD-MCNC: 248 MG/DL (ref 70–99)
GLUCOSE BLD-MCNC: 257 MG/DL (ref 70–99)
HCT VFR BLD CALC: 47.7 % (ref 40.5–52.5)
HEMOGLOBIN: 15.3 G/DL (ref 13.5–17.5)
LYMPHOCYTES ABSOLUTE: 2.6 K/UL (ref 1–5.1)
LYMPHOCYTES RELATIVE PERCENT: 51.7 %
MCH RBC QN AUTO: 29.4 PG (ref 26–34)
MCHC RBC AUTO-ENTMCNC: 32.1 G/DL (ref 31–36)
MCV RBC AUTO: 91.5 FL (ref 80–100)
MONOCYTES ABSOLUTE: 0.5 K/UL (ref 0–1.3)
MONOCYTES RELATIVE PERCENT: 9.2 %
NEUTROPHILS ABSOLUTE: 1.8 K/UL (ref 1.7–7.7)
NEUTROPHILS RELATIVE PERCENT: 35.6 %
PDW BLD-RTO: 14.4 % (ref 12.4–15.4)
PERFORMED ON: ABNORMAL
PLATELET # BLD: 198 K/UL (ref 135–450)
PMV BLD AUTO: 8.6 FL (ref 5–10.5)
POTASSIUM REFLEX MAGNESIUM: 4.3 MMOL/L (ref 3.5–5.1)
RBC # BLD: 5.21 M/UL (ref 4.2–5.9)
SODIUM BLD-SCNC: 143 MMOL/L (ref 136–145)
WBC # BLD: 5 K/UL (ref 4–11)

## 2018-12-09 PROCEDURE — 6370000000 HC RX 637 (ALT 250 FOR IP): Performed by: NURSE PRACTITIONER

## 2018-12-09 PROCEDURE — 36415 COLL VENOUS BLD VENIPUNCTURE: CPT

## 2018-12-09 PROCEDURE — 94760 N-INVAS EAR/PLS OXIMETRY 1: CPT

## 2018-12-09 PROCEDURE — 1200000000 HC SEMI PRIVATE

## 2018-12-09 PROCEDURE — 2580000003 HC RX 258: Performed by: INTERNAL MEDICINE

## 2018-12-09 PROCEDURE — 6370000000 HC RX 637 (ALT 250 FOR IP): Performed by: PHYSICIAN ASSISTANT

## 2018-12-09 PROCEDURE — 80048 BASIC METABOLIC PNL TOTAL CA: CPT

## 2018-12-09 PROCEDURE — 99232 SBSQ HOSP IP/OBS MODERATE 35: CPT | Performed by: NURSE PRACTITIONER

## 2018-12-09 PROCEDURE — 85025 COMPLETE CBC W/AUTO DIFF WBC: CPT

## 2018-12-09 PROCEDURE — 6370000000 HC RX 637 (ALT 250 FOR IP): Performed by: INTERNAL MEDICINE

## 2018-12-09 PROCEDURE — 6360000002 HC RX W HCPCS: Performed by: INTERNAL MEDICINE

## 2018-12-09 PROCEDURE — 6360000002 HC RX W HCPCS: Performed by: PHYSICIAN ASSISTANT

## 2018-12-09 RX ORDER — CARVEDILOL 25 MG/1
25 TABLET ORAL 2 TIMES DAILY WITH MEALS
Status: DISCONTINUED | OUTPATIENT
Start: 2018-12-09 | End: 2018-12-11 | Stop reason: HOSPADM

## 2018-12-09 RX ORDER — FUROSEMIDE 40 MG/1
40 TABLET ORAL 2 TIMES DAILY
Status: DISCONTINUED | OUTPATIENT
Start: 2018-12-09 | End: 2018-12-11 | Stop reason: HOSPADM

## 2018-12-09 RX ORDER — KETOROLAC TROMETHAMINE 30 MG/ML
30 INJECTION, SOLUTION INTRAMUSCULAR; INTRAVENOUS ONCE
Status: COMPLETED | OUTPATIENT
Start: 2018-12-09 | End: 2018-12-09

## 2018-12-09 RX ORDER — LANOLIN ALCOHOL/MO/W.PET/CERES
3 CREAM (GRAM) TOPICAL NIGHTLY PRN
Status: DISCONTINUED | OUTPATIENT
Start: 2018-12-09 | End: 2018-12-11 | Stop reason: HOSPADM

## 2018-12-09 RX ORDER — ISOSORBIDE MONONITRATE 60 MG/1
60 TABLET, EXTENDED RELEASE ORAL DAILY
Status: DISCONTINUED | OUTPATIENT
Start: 2018-12-09 | End: 2018-12-11 | Stop reason: HOSPADM

## 2018-12-09 RX ORDER — CARVEDILOL 25 MG/1
25 TABLET ORAL 2 TIMES DAILY WITH MEALS
Status: DISCONTINUED | OUTPATIENT
Start: 2018-12-09 | End: 2018-12-09

## 2018-12-09 RX ADMIN — POTASSIUM CHLORIDE 20 MEQ: 20 TABLET, EXTENDED RELEASE ORAL at 16:32

## 2018-12-09 RX ADMIN — HYDRALAZINE HYDROCHLORIDE 50 MG: 50 TABLET, FILM COATED ORAL at 06:40

## 2018-12-09 RX ADMIN — CARVEDILOL 25 MG: 25 TABLET, FILM COATED ORAL at 16:32

## 2018-12-09 RX ADMIN — Medication 3 MG: at 21:32

## 2018-12-09 RX ADMIN — CARVEDILOL 25 MG: 25 TABLET, FILM COATED ORAL at 09:23

## 2018-12-09 RX ADMIN — HYDRALAZINE HYDROCHLORIDE 50 MG: 50 TABLET, FILM COATED ORAL at 21:18

## 2018-12-09 RX ADMIN — FAMOTIDINE 20 MG: 20 TABLET ORAL at 21:18

## 2018-12-09 RX ADMIN — HYDRALAZINE HYDROCHLORIDE 50 MG: 50 TABLET, FILM COATED ORAL at 14:16

## 2018-12-09 RX ADMIN — Medication 10 ML: at 09:24

## 2018-12-09 RX ADMIN — ACETAMINOPHEN 1000 MG: 500 TABLET ORAL at 16:32

## 2018-12-09 RX ADMIN — INSULIN LISPRO 4 UNITS: 100 INJECTION, SOLUTION INTRAVENOUS; SUBCUTANEOUS at 12:03

## 2018-12-09 RX ADMIN — ASPIRIN 81 MG 81 MG: 81 TABLET ORAL at 09:23

## 2018-12-09 RX ADMIN — INSULIN LISPRO 2 UNITS: 100 INJECTION, SOLUTION INTRAVENOUS; SUBCUTANEOUS at 21:19

## 2018-12-09 RX ADMIN — FUROSEMIDE 40 MG: 40 TABLET ORAL at 16:32

## 2018-12-09 RX ADMIN — SPIRONOLACTONE 25 MG: 25 TABLET ORAL at 09:23

## 2018-12-09 RX ADMIN — INSULIN LISPRO 2 UNITS: 100 INJECTION, SOLUTION INTRAVENOUS; SUBCUTANEOUS at 16:36

## 2018-12-09 RX ADMIN — POTASSIUM CHLORIDE 20 MEQ: 20 TABLET, EXTENDED RELEASE ORAL at 09:23

## 2018-12-09 RX ADMIN — FAMOTIDINE 20 MG: 20 TABLET ORAL at 09:23

## 2018-12-09 RX ADMIN — FUROSEMIDE 40 MG: 10 INJECTION, SOLUTION INTRAMUSCULAR; INTRAVENOUS at 09:23

## 2018-12-09 RX ADMIN — KETOROLAC TROMETHAMINE 30 MG: 30 INJECTION, SOLUTION INTRAMUSCULAR at 21:31

## 2018-12-09 RX ADMIN — Medication 10 ML: at 21:32

## 2018-12-09 RX ADMIN — ISOSORBIDE MONONITRATE 60 MG: 60 TABLET, EXTENDED RELEASE ORAL at 09:23

## 2018-12-09 RX ADMIN — ATORVASTATIN CALCIUM 40 MG: 40 TABLET, FILM COATED ORAL at 21:18

## 2018-12-09 RX ADMIN — INSULIN LISPRO 2 UNITS: 100 INJECTION, SOLUTION INTRAVENOUS; SUBCUTANEOUS at 09:24

## 2018-12-09 ASSESSMENT — PAIN SCALES - GENERAL
PAINLEVEL_OUTOF10: 4
PAINLEVEL_OUTOF10: 7
PAINLEVEL_OUTOF10: 3

## 2018-12-09 NOTE — PROGRESS NOTES
General appearance: alert, appears stated age and cooperative, No acute distress   Skin: Skin color, texture, turgor normal. No rashes or ecchymosis. HEENT: Head: Normocephalic, no lesions, without obvious abnormality. Neck: no adenopathy, no carotid bruit, no JVD, supple, symmetrical, trachea midline and thyroid not enlarged, symmetric, no tenderness/mass/nodules  Lungs: clear to auscultation bilaterally, no accessory muscle use, no respiratory distress, on RA  Heart: regular rate and rhythm, S1, S2 normal, no murmur, click, rub or gallop  Abdomen: soft, non-tender; bowel sounds normal; no masses,  no organomegaly  Extremities: extremities normal, atraumatic, no cyanosis or edema, pulses: DP +2/+2, PT +2/+2  Neurologic: Mental status: Alert, oriented, thought content appropriate, no tremors, no gross sensory motor deficit,   Psychiatric: normal insight and affect      Assessment & Plan:    Patient Active Problem List:     CHF with unknown LVEF ([address]     Essential hypertension     Hyperlipidemia     DMII (diabetes mellitus, type 2) (HCC)     Acute systolic heart failure (HCC)     Cardiomyopathy (HCC)     Elevated troponin        Plan:  1. Heart failure   Acute on chronic systolic    Appears nearly compensated on exam    EF 15-20%   No ACE/ARB secondary to angioedema    On Imdur/hydralazine-continue    Continue spironolactone    On Coreg    Increase with borderline tachycardia   On lasix IVP     Change to oral    Compression stockings   Continue low Na diet and fluid restrictions   Patient needs LHC/RHC to assess for CAD as an outpt   2. Cardiomyopathy   With severe LV dysfunction   Likely secondary to HTN however needs ischemic evaluation given risk factors    Patient will need outpt sleep study  3. HTN    stable   Increase Coreg   Increase Imdur and stop Norvasc   4.  Tobacco abuse    Patient counseled     Saad Miller [facility]  Cardiology   12/9/2018  8:47 AM

## 2018-12-09 NOTE — PROGRESS NOTES
tested. Medications:  Scheduled Meds:   isosorbide mononitrate  60 mg Oral Daily    carvedilol  25 mg Oral BID     furosemide  40 mg Oral BID    hydrALAZINE  50 mg Oral 3 times per day    potassium chloride  20 mEq Oral BID     atorvastatin  40 mg Oral Nightly    spironolactone  25 mg Oral Daily    aspirin  81 mg Oral Daily    sodium chloride flush  10 mL Intravenous 2 times per day    famotidine  20 mg Oral BID    insulin lispro  0-12 Units Subcutaneous TID     insulin lispro  0-6 Units Subcutaneous Nightly       PRN Meds:  ipratropium-albuterol, acetaminophen, hydrALAZINE, sodium chloride flush, magnesium hydroxide, docusate sodium, bisacodyl, ondansetron, glucose, dextrose, glucagon (rDNA), dextrose    IV:   dextrose           Intake/Output Summary (Last 24 hours) at 12/09/18 1518  Last data filed at 12/08/18 1743   Gross per 24 hour   Intake              120 ml   Output             1025 ml   Net             -905 ml       Results:  CBC:   Recent Labs      12/07/18   0634  12/08/18   0621  12/09/18   0700   WBC  5.5  4.8  5.0   HGB  14.9  14.6  15.3   HCT  46.4  44.5  47.7   MCV  90.6  89.4  91.5   PLT  196  176  198     BMP:   Recent Labs      12/07/18   0634  12/08/18   0621  12/09/18   0701   NA  141  139  143   K  3.6  3.8  4.3   CL  101  100  105   CO2  27  27  26   BUN  17  15  18   CREATININE  1.0  0.9  1.0     Mag: No results for input(s): MAG in the last 72 hours. Phos: No results found for: PHOS  No components found for: GLU    LIVER PROFILE: No results for input(s): AST, ALT, LIPASE, BILIDIR, BILITOT, ALKPHOS in the last 72 hours. Invalid input(s): AMYLASE,  ALB  PT/INR: No results for input(s): PROTIME, INR in the last 72 hours. APTT: No results for input(s): APTT in the last 72 hours.   UA:No results for input(s): NITRITE, [address], PHUR, LABCAST, [address], [facility], MUCUS, TRICHOMONAS, YEAST, BACTERIA, CLARITYU, SPECGRAV, [facility], [address], [address] [facility] [address] GLUCOSEU, AMORPHOUS in the last 72 hours. Invalid input(s): Yoanna Arriaza input(s): ABG  Lab Results   Component Value Date    CALCIUM 9.2 12/09/2018       Assessment and Plan:    Acute systolic CHF exacerbation - new onset  - Echocardiogram with severely decreased EF estimated from 15 % to 20 %  - Diureseed with IV Lasix, changed to oral today  - Monitoring  strict I&Os and daily weights. - Low sodium diet     Cardiomyopathy  - Cardiology following - continue med optimization, no ACE/ARB given h/o Angioedema  - Given elevated troponin and smoking history with abnormal ECG, will need ischemic evaluation planned once better compensated. - Continue BB and aldactone     Hypertensive crisis - BP now controlled   - Crisis was secondary to non compliance with medications - Pt has not taken any medications in months. - Previous adverse to Lisinopril. Continue BB and Amlodpine for now.  Hydralazine and Nitrate added      Elevated troponin  2/2 demand ischemia mismatch  Cont asa, BB and statin      Diabetes mellitus II - SSI and CCD     Hyperlipidemia - Cont statin therapy          DVT Prophylaxis: Lovenox  Diet: Low Sodium, CCD  Code Status: Full     Electronically signed by Ashley Olivas MD on 12/9/2018 at 3:18 PM

## 2018-12-09 NOTE — PROGRESS NOTES
D: CMR reported 17 beats PAT at 1946. A: Notified NP Joon Maynard at 2002 via [address] [place] [address]. R: Awaiting response. 2006  R: Message read by NPJim.

## 2018-12-09 NOTE — PROGRESS NOTES
Pt's son at bedside at 1927, states pt is not at baseline with cognitive function and that his confusion is reminiscent of a previous hospitalization when he had a blood infection, he is wondering if his father has a UTI. Informed pt that I will check labs, but that blood cultures are reportedly still pending. Later reviewed chart, U/A negative; pt informed, pt's son had already left. Will continue to monitor.

## 2018-12-10 LAB
ANION GAP SERPL CALCULATED.3IONS-SCNC: 12 MMOL/L (ref 3–16)
BASOPHILS ABSOLUTE: 0 K/UL (ref 0–0.2)
BASOPHILS RELATIVE PERCENT: 0.7 %
BUN BLDV-MCNC: 20 MG/DL (ref 7–20)
CALCIUM SERPL-MCNC: 8.5 MG/DL (ref 8.3–10.6)
CHLORIDE BLD-SCNC: 103 MMOL/L (ref 99–110)
CO2: 26 MMOL/L (ref 21–32)
CREAT SERPL-MCNC: 1 MG/DL (ref 0.9–1.3)
EKG ATRIAL RATE: 116 BPM
EKG DIAGNOSIS: NORMAL
EKG P AXIS: 52 DEGREES
EKG P-R INTERVAL: 150 MS
EKG Q-T INTERVAL: 332 MS
EKG QRS DURATION: 86 MS
EKG QTC CALCULATION (BAZETT): 461 MS
EKG R AXIS: 82 DEGREES
EKG T AXIS: 3 DEGREES
EKG VENTRICULAR RATE: 116 BPM
EOSINOPHILS ABSOLUTE: 0.1 K/UL (ref 0–0.6)
EOSINOPHILS RELATIVE PERCENT: 3 %
GFR AFRICAN AMERICAN: >60
GFR NON-AFRICAN AMERICAN: >60
GLUCOSE BLD-MCNC: 122 MG/DL (ref 70–99)
GLUCOSE BLD-MCNC: 161 MG/DL (ref 70–99)
GLUCOSE BLD-MCNC: 222 MG/DL (ref 70–99)
GLUCOSE BLD-MCNC: 295 MG/DL (ref 70–99)
GLUCOSE BLD-MCNC: 315 MG/DL (ref 70–99)
HCT VFR BLD CALC: 45.1 % (ref 40.5–52.5)
HEMOGLOBIN: 14.3 G/DL (ref 13.5–17.5)
LYMPHOCYTES ABSOLUTE: 1.9 K/UL (ref 1–5.1)
LYMPHOCYTES RELATIVE PERCENT: 53.5 %
MCH RBC QN AUTO: 28.9 PG (ref 26–34)
MCHC RBC AUTO-ENTMCNC: 31.8 G/DL (ref 31–36)
MCV RBC AUTO: 91.1 FL (ref 80–100)
MONOCYTES ABSOLUTE: 0.3 K/UL (ref 0–1.3)
MONOCYTES RELATIVE PERCENT: 8.8 %
NEUTROPHILS ABSOLUTE: 1.2 K/UL (ref 1.7–7.7)
NEUTROPHILS RELATIVE PERCENT: 34 %
PDW BLD-RTO: 14.5 % (ref 12.4–15.4)
PERFORMED ON: ABNORMAL
PLATELET # BLD: 170 K/UL (ref 135–450)
PMV BLD AUTO: 8.6 FL (ref 5–10.5)
POTASSIUM REFLEX MAGNESIUM: 3.9 MMOL/L (ref 3.5–5.1)
RBC # BLD: 4.95 M/UL (ref 4.2–5.9)
SODIUM BLD-SCNC: 141 MMOL/L (ref 136–145)
WBC # BLD: 3.5 K/UL (ref 4–11)

## 2018-12-10 PROCEDURE — 99152 MOD SED SAME PHYS/QHP 5/>YRS: CPT | Performed by: INTERNAL MEDICINE

## 2018-12-10 PROCEDURE — 36415 COLL VENOUS BLD VENIPUNCTURE: CPT

## 2018-12-10 PROCEDURE — 94760 N-INVAS EAR/PLS OXIMETRY 1: CPT

## 2018-12-10 PROCEDURE — 6370000000 HC RX 637 (ALT 250 FOR IP): Performed by: INTERNAL MEDICINE

## 2018-12-10 PROCEDURE — 80048 BASIC METABOLIC PNL TOTAL CA: CPT

## 2018-12-10 PROCEDURE — 2580000003 HC RX 258: Performed by: INTERNAL MEDICINE

## 2018-12-10 PROCEDURE — 6370000000 HC RX 637 (ALT 250 FOR IP): Performed by: NURSE PRACTITIONER

## 2018-12-10 PROCEDURE — 6360000002 HC RX W HCPCS

## 2018-12-10 PROCEDURE — 99153 MOD SED SAME PHYS/QHP EA: CPT | Performed by: INTERNAL MEDICINE

## 2018-12-10 PROCEDURE — 2709999900 HC NON-CHARGEABLE SUPPLY

## 2018-12-10 PROCEDURE — 99024 POSTOP FOLLOW-UP VISIT: CPT | Performed by: INTERNAL MEDICINE

## 2018-12-10 PROCEDURE — 6360000004 HC RX CONTRAST MEDICATION: Performed by: INTERNAL MEDICINE

## 2018-12-10 PROCEDURE — C1769 GUIDE WIRE: HCPCS

## 2018-12-10 PROCEDURE — B2161ZZ FLUOROSCOPY OF RIGHT AND LEFT HEART USING LOW OSMOLAR CONTRAST: ICD-10-PCS | Performed by: INTERNAL MEDICINE

## 2018-12-10 PROCEDURE — B2111ZZ FLUOROSCOPY OF MULTIPLE CORONARY ARTERIES USING LOW OSMOLAR CONTRAST: ICD-10-PCS | Performed by: INTERNAL MEDICINE

## 2018-12-10 PROCEDURE — 93460 R&L HRT ART/VENTRICLE ANGIO: CPT | Performed by: INTERNAL MEDICINE

## 2018-12-10 PROCEDURE — C1894 INTRO/SHEATH, NON-LASER: HCPCS

## 2018-12-10 PROCEDURE — 4A023N8 MEASUREMENT OF CARDIAC SAMPLING AND PRESSURE, BILATERAL, PERCUTANEOUS APPROACH: ICD-10-PCS | Performed by: INTERNAL MEDICINE

## 2018-12-10 PROCEDURE — 85025 COMPLETE CBC W/AUTO DIFF WBC: CPT

## 2018-12-10 PROCEDURE — C1751 CATH, INF, PER/CENT/MIDLINE: HCPCS

## 2018-12-10 PROCEDURE — 1200000000 HC SEMI PRIVATE

## 2018-12-10 PROCEDURE — 2500000003 HC RX 250 WO HCPCS

## 2018-12-10 RX ORDER — SODIUM CHLORIDE 0.9 % (FLUSH) 0.9 %
10 SYRINGE (ML) INJECTION PRN
Status: DISCONTINUED | OUTPATIENT
Start: 2018-12-10 | End: 2018-12-11 | Stop reason: HOSPADM

## 2018-12-10 RX ORDER — SODIUM CHLORIDE 0.9 % (FLUSH) 0.9 %
10 SYRINGE (ML) INJECTION EVERY 12 HOURS SCHEDULED
Status: CANCELLED | OUTPATIENT
Start: 2018-12-10

## 2018-12-10 RX ORDER — GLIPIZIDE 5 MG/1
10 TABLET ORAL
Status: DISCONTINUED | OUTPATIENT
Start: 2018-12-10 | End: 2018-12-10

## 2018-12-10 RX ORDER — ACETAMINOPHEN 325 MG/1
650 TABLET ORAL EVERY 4 HOURS PRN
Status: DISCONTINUED | OUTPATIENT
Start: 2018-12-10 | End: 2018-12-11 | Stop reason: HOSPADM

## 2018-12-10 RX ORDER — SODIUM CHLORIDE 0.9 % (FLUSH) 0.9 %
10 SYRINGE (ML) INJECTION EVERY 12 HOURS SCHEDULED
Status: DISCONTINUED | OUTPATIENT
Start: 2018-12-10 | End: 2018-12-11 | Stop reason: HOSPADM

## 2018-12-10 RX ORDER — SODIUM CHLORIDE 0.9 % (FLUSH) 0.9 %
10 SYRINGE (ML) INJECTION PRN
Status: CANCELLED | OUTPATIENT
Start: 2018-12-10

## 2018-12-10 RX ADMIN — ISOSORBIDE MONONITRATE 60 MG: 60 TABLET, EXTENDED RELEASE ORAL at 09:06

## 2018-12-10 RX ADMIN — INSULIN LISPRO 4 UNITS: 100 INJECTION, SOLUTION INTRAVENOUS; SUBCUTANEOUS at 12:35

## 2018-12-10 RX ADMIN — IOPAMIDOL 85 ML: 755 INJECTION, SOLUTION INTRAVENOUS at 15:57

## 2018-12-10 RX ADMIN — Medication 10 ML: at 09:06

## 2018-12-10 RX ADMIN — FAMOTIDINE 20 MG: 20 TABLET ORAL at 09:06

## 2018-12-10 RX ADMIN — CARVEDILOL 25 MG: 25 TABLET, FILM COATED ORAL at 17:57

## 2018-12-10 RX ADMIN — POTASSIUM CHLORIDE 20 MEQ: 20 TABLET, EXTENDED RELEASE ORAL at 09:06

## 2018-12-10 RX ADMIN — CARVEDILOL 25 MG: 25 TABLET, FILM COATED ORAL at 09:06

## 2018-12-10 RX ADMIN — HYDRALAZINE HYDROCHLORIDE 50 MG: 50 TABLET, FILM COATED ORAL at 22:13

## 2018-12-10 RX ADMIN — ASPIRIN 81 MG 81 MG: 81 TABLET ORAL at 09:06

## 2018-12-10 RX ADMIN — POTASSIUM CHLORIDE 20 MEQ: 20 TABLET, EXTENDED RELEASE ORAL at 17:57

## 2018-12-10 RX ADMIN — INSULIN LISPRO 1 UNITS: 100 INJECTION, SOLUTION INTRAVENOUS; SUBCUTANEOUS at 20:13

## 2018-12-10 RX ADMIN — FUROSEMIDE 40 MG: 40 TABLET ORAL at 17:57

## 2018-12-10 RX ADMIN — HYDRALAZINE HYDROCHLORIDE 50 MG: 50 TABLET, FILM COATED ORAL at 05:54

## 2018-12-10 RX ADMIN — FUROSEMIDE 40 MG: 40 TABLET ORAL at 09:06

## 2018-12-10 RX ADMIN — Medication 10 ML: at 22:16

## 2018-12-10 RX ADMIN — FAMOTIDINE 20 MG: 20 TABLET ORAL at 20:13

## 2018-12-10 RX ADMIN — SPIRONOLACTONE 25 MG: 25 TABLET ORAL at 09:06

## 2018-12-10 RX ADMIN — ATORVASTATIN CALCIUM 40 MG: 40 TABLET, FILM COATED ORAL at 20:13

## 2018-12-10 RX ADMIN — INSULIN LISPRO 8 UNITS: 100 INJECTION, SOLUTION INTRAVENOUS; SUBCUTANEOUS at 09:05

## 2018-12-10 NOTE — PLAN OF CARE
Problem: Pain:  Goal: Patient's pain/discomfort is manageable  Patient's pain/discomfort is manageable   Outcome: Ongoing  Patient c/o ongoing pain from left side of head  Toradol and melatonin ordered  Patient is resting comfortably

## 2018-12-10 NOTE — PROGRESS NOTES
Hospitalist Progress Note      PCP: Carmella Willis    Date of Admission: 12/3/2018      Subjective: up to chair, feels ok, no chest pain or SOB at rest, no fever, chills or blurry vision. Medications:  Reviewed    Infusion Medications    dextrose       Scheduled Medications    glipiZIDE  10 mg Oral BID AC    isosorbide mononitrate  60 mg Oral Daily    carvedilol  25 mg Oral BID WC    furosemide  40 mg Oral BID    hydrALAZINE  50 mg Oral 3 times per day    potassium chloride  20 mEq Oral BID WC    atorvastatin  40 mg Oral Nightly    spironolactone  25 mg Oral Daily    aspirin  81 mg Oral Daily    sodium chloride flush  10 mL Intravenous 2 times per day    famotidine  20 mg Oral BID    insulin lispro  0-12 Units Subcutaneous TID WC    insulin lispro  0-6 Units Subcutaneous Nightly     PRN Meds: melatonin, ipratropium-albuterol, acetaminophen, hydrALAZINE, sodium chloride flush, magnesium hydroxide, docusate sodium, bisacodyl, ondansetron, glucose, dextrose, glucagon (rDNA), dextrose      Intake/Output Summary (Last 24 hours) at 12/10/18 1228  Last data filed at 12/10/18 0756   Gross per 24 hour   Intake              240 ml   Output                0 ml   Net              240 ml       Physical Exam Performed:    /84   Pulse 91   Temp 98.4 °F (36.9 °C) (Oral)   Resp 17   Ht 6' 3\" (1.905 m)   Wt 227 lb 4.7 oz (103.1 kg)   SpO2 98%   BMI 28.41 kg/m²     General appearance: No apparent distress. Neck: Supple  Respiratory:  Normal respiratory effort. Clear to auscultation, bilaterally without Rales/Wheezes/Rhonchi. Cardiovascular: Regular rate and rhythm with normal S1/S2 without murmurs, rubs or gallops. Abdomen: Soft, non-tender, non-distended with normal bowel sounds. Musculoskeletal: No clubbing. Minimal edema LE. Skin: Skin color, texture, turgor normal.  No rashes or lesions. Neurologic:  No focal weakness.    Psychiatric: Alert and oriented  Capillary Refill: Brisk,< 3

## 2018-12-10 NOTE — PROGRESS NOTES
Yesika Sidhu  1965    December 10, 2018      Reason for Referral: CHF    CC: Dyspnea       Subjective:     History of Present Illness:    Yesika Sidhu is a 46 y.o. patient who seeing us for heart failure who presented with complaints of shortness of breath     He is improving  Making urine  Shortness of breath better  Edema still present       Patient denies any symptoms of chest pain, pressure, tightness, nausea, vomiting, near syncope, syncope, heart racing, palpitations, dizziness, lightheaded, PND, orthopnea, bilateral lower extremities pain, cramping or fatigue. Past Medical History:   has a past medical history of Diabetes mellitus ([facility] [address] Hyperlipidemia; Hypertension; and Vitamin D deficiency. Surgical History:   has no past surgical history on file. Social History:   reports that he has been smoking Cigarettes. He has been smoking about 0.20 packs per day. He has never used smokeless tobacco. He reports that he drinks alcohol. He reports that he does not use drugs. Family History:  family history is not on file. Home Medications:  Were reviewed and are listed in nursing record and/or below  Prior to Admission medications    Medication Sig Start Date End Date Taking?  Authorizing Provider   glipiZIDE (GLUCOTROL) 10 MG tablet Take 10 mg by mouth 2 times daily (before meals)    Historical Provider, MD   lisinopril (PRINIVIL;ZESTRIL) 40 MG tablet Take 40 mg by mouth daily    Historical Provider, MD   amLODIPine (NORVASC) 10 MG tablet Take 10 mg by mouth daily    Historical Provider, MD   simvastatin (ZOCOR) 20 MG tablet Take 20 mg by mouth nightly    Historical Provider, MD   Cholecalciferol (VITAMIN D3) 1000 units TABS Take 1,000 Units by mouth daily    Historical Provider, MD   metFORMIN (GLUCOPHAGE) 500 MG tablet Take 1,000 mg by mouth 2 times daily (with meals)     Historical Provider, MD   aspirin 81 MG tablet Take 81 mg by mouth daily    Historical Provider, MD CURRENT Medications:    isosorbide mononitrate (IMDUR) extended release tablet 60 mg Daily   carvedilol (COREG) tablet 25 mg BID WC   furosemide (LASIX) tablet 40 mg BID   melatonin tablet 3 mg Nightly PRN   hydrALAZINE (APRESOLINE) tablet 50 mg 3 times per day   potassium chloride (KLOR-CON M) extended release tablet 20 mEq BID    ipratropium-albuterol (DUONEB) nebulizer solution 1 ampule Q4H PRN   atorvastatin (LIPITOR) tablet 40 mg Nightly   spironolactone (ALDACTONE) tablet 25 mg Daily   aspirin chewable tablet 81 mg Daily   acetaminophen (TYLENOL) tablet 1,000 mg Q6H PRN   hydrALAZINE (APRESOLINE) injection 10 mg Q6H PRN   sodium chloride flush 0.9 % injection 10 mL 2 times per day   sodium chloride flush 0.9 % injection 10 mL PRN   magnesium hydroxide (MILK OF MAGNESIA) 400 MG/5ML suspension 30 mL Daily PRN   docusate sodium (COLACE) capsule 100 mg BID PRN   bisacodyl (DULCOLAX) suppository 10 mg Daily PRN   ondansetron (ZOFRAN) injection 4 mg Q4H PRN   famotidine (PEPCID) tablet 20 mg BID   insulin lispro (HUMALOG) injection vial 0-12 Units TID WC   insulin lispro (HUMALOG) injection vial 0-6 Units Nightly   glucose (GLUTOSE) 40 % oral gel 15 g PRN   dextrose 50 % solution 12.5 g PRN   glucagon (rDNA) injection 1 mg PRN   dextrose 5 % solution PRN       Allergies:  Lisinopril           Review of Systems:  · Constitutional: no unanticipated weight loss. There's been no change in energy level, sleep pattern, or activity level. No fevers, chills. · Eyes: No visual changes or diplopia. No scleral icterus. · ENT: No Headaches, hearing loss or vertigo. No mouth sores or sore throat. · Cardiovascular: No Chest pain, tightness or discomfort.  has Shortness of breath.  No Palpitations.  No Syncope ('blackouts', 'faints', 'collapse') or dizziness.  has Dyspnea on exertion   · No Wheezing, sweating, distress and cough with frothy or bloodstained sputum.    · Respiratory: No cough or wheezing, no sputum and affect. Alert and oriented to time place person   Neurologic: Normal gross motor and sensory exam.       Labs     All labs have been reviewed    CBC:   Lab Results   Component Value Date    WBC 3.5 12/10/2018    RBC 4.95 12/10/2018    HGB 14.3 12/10/2018    HCT 45.1 12/10/2018    MCV 91.1 12/10/2018    RDW 14.5 12/10/2018     12/10/2018     CMP:  Lab Results   Component Value Date     12/10/2018    K 3.9 12/10/2018     12/10/2018    CO2 26 12/10/2018    BUN 20 12/10/2018    CREATININE 1.0 12/10/2018    GFRAA >60 12/10/2018    AGRATIO 0.9 12/03/2018    LABGLOM >60 12/10/2018    GLUCOSE 295 12/10/2018    PROT 6.8 12/03/2018    CALCIUM 8.5 12/10/2018    BILITOT 0.9 12/03/2018    ALKPHOS 74 12/03/2018    AST 22 12/03/2018    ALT 18 12/03/2018     PT/INR:  No results found for: PTINR  HgBA1c:No results found for: LABA1C  Lab Results   Component Value Date    TROPONINI 0.05 (H) 12/05/2018       Cardiac, Vascular and Imaging Data all Personally Reviewed in Detail by Myself      All imaging reveiwed    EKG: Sinus tachycardiaPossible Left atrial enlargementSeptal infarct , age undeterminedAbnormal ECGWhen compared with ECG of 19-JAN-2018 11:51,Questionable change in QRS axis     Echocardiogram: Global left ventricular function is severely decreased with ejection   fraction estimated from 15 % to 20 %.   concentric left ventricular hypertrophy.   The left atrium is moderately dilated.   Right ventricular systolic function is severely reduced   mild MR   SPAP 56 mmHg markedly elevated RA pressure (15 mmHg).    Stress Test: n/a     Cath: n/a     Imaging:    CXR 12/3/18  Interval cardiomegaly without overt failure.  The possibility of   pericardial effusion is raised         Assessment and Plan     --Patient has Acute-Systolic Heart Failure.   Severe Cardiomyopathy   Uncontrolled HTN    LVEF assessed by imaging is 20%  Strict I/O, fluid balance  Daily weight assessment  Fluid restriction  Diet education lower salt diet, exercise following weight  HF Nurse education   Follow Labs, check BNP and BMP  Continue diuretics with lasix   Patient needs to be on BB with proven heart failure benefit   Cannot take ACEI ARB as has had angioedema   Systolic and diastolic blood pressure should be controlled   Patient is on hydralazine and carvedilol     Outpatient [facility] and C     Thank you for allowing us to participate in the care of [facility]. Please do not hesitate to contact me if you have any questions.     Zara Rodríguez MD, MPH    [facility], [facility] [address]  Ph: [phone]  Fax: [phone]    12/10/2018 10:11 AM

## 2018-12-10 NOTE — PROGRESS NOTES
Patient c/o persistent right sided headache that has been ongoing for a few days now, unrelieved with tylenol.  Message sent to MD

## 2018-12-11 VITALS
DIASTOLIC BLOOD PRESSURE: 73 MMHG | BODY MASS INDEX: 28.29 KG/M2 | SYSTOLIC BLOOD PRESSURE: 112 MMHG | HEIGHT: 75 IN | TEMPERATURE: 98.3 F | HEART RATE: 94 BPM | OXYGEN SATURATION: 99 % | RESPIRATION RATE: 18 BRPM | WEIGHT: 227.51 LBS

## 2018-12-11 LAB
ANION GAP SERPL CALCULATED.3IONS-SCNC: 12 MMOL/L (ref 3–16)
BASOPHILS ABSOLUTE: 0 K/UL (ref 0–0.2)
BASOPHILS RELATIVE PERCENT: 0.4 %
BUN BLDV-MCNC: 17 MG/DL (ref 7–20)
CALCIUM SERPL-MCNC: 8.8 MG/DL (ref 8.3–10.6)
CHLORIDE BLD-SCNC: 104 MMOL/L (ref 99–110)
CO2: 26 MMOL/L (ref 21–32)
CREAT SERPL-MCNC: 0.8 MG/DL (ref 0.9–1.3)
EOSINOPHILS ABSOLUTE: 0.1 K/UL (ref 0–0.6)
EOSINOPHILS RELATIVE PERCENT: 2.7 %
GFR AFRICAN AMERICAN: >60
GFR NON-AFRICAN AMERICAN: >60
GLUCOSE BLD-MCNC: 198 MG/DL (ref 70–99)
GLUCOSE BLD-MCNC: 210 MG/DL (ref 70–99)
GLUCOSE BLD-MCNC: 274 MG/DL (ref 70–99)
HCT VFR BLD CALC: 44 % (ref 40.5–52.5)
HEMOGLOBIN: 14.2 G/DL (ref 13.5–17.5)
LYMPHOCYTES ABSOLUTE: 1.9 K/UL (ref 1–5.1)
LYMPHOCYTES RELATIVE PERCENT: 47.6 %
MCH RBC QN AUTO: 29.2 PG (ref 26–34)
MCHC RBC AUTO-ENTMCNC: 32.4 G/DL (ref 31–36)
MCV RBC AUTO: 90.2 FL (ref 80–100)
MONOCYTES ABSOLUTE: 0.4 K/UL (ref 0–1.3)
MONOCYTES RELATIVE PERCENT: 9.9 %
NEUTROPHILS ABSOLUTE: 1.6 K/UL (ref 1.7–7.7)
NEUTROPHILS RELATIVE PERCENT: 39.4 %
PDW BLD-RTO: 14.5 % (ref 12.4–15.4)
PERFORMED ON: ABNORMAL
PERFORMED ON: ABNORMAL
PLATELET # BLD: 171 K/UL (ref 135–450)
PMV BLD AUTO: 8.6 FL (ref 5–10.5)
POTASSIUM SERPL-SCNC: 4.3 MMOL/L (ref 3.5–5.1)
RBC # BLD: 4.88 M/UL (ref 4.2–5.9)
SODIUM BLD-SCNC: 142 MMOL/L (ref 136–145)
WBC # BLD: 4 K/UL (ref 4–11)

## 2018-12-11 PROCEDURE — 36415 COLL VENOUS BLD VENIPUNCTURE: CPT

## 2018-12-11 PROCEDURE — 6370000000 HC RX 637 (ALT 250 FOR IP): Performed by: NURSE PRACTITIONER

## 2018-12-11 PROCEDURE — 6370000000 HC RX 637 (ALT 250 FOR IP): Performed by: INTERNAL MEDICINE

## 2018-12-11 PROCEDURE — 85025 COMPLETE CBC W/AUTO DIFF WBC: CPT

## 2018-12-11 PROCEDURE — 2580000003 HC RX 258: Performed by: INTERNAL MEDICINE

## 2018-12-11 PROCEDURE — 80048 BASIC METABOLIC PNL TOTAL CA: CPT

## 2018-12-11 PROCEDURE — 99233 SBSQ HOSP IP/OBS HIGH 50: CPT | Performed by: NURSE PRACTITIONER

## 2018-12-11 PROCEDURE — 94760 N-INVAS EAR/PLS OXIMETRY 1: CPT

## 2018-12-11 RX ORDER — SPIRONOLACTONE 25 MG/1
25 TABLET ORAL DAILY
Qty: 30 TABLET | Refills: 0 | Status: SHIPPED | OUTPATIENT
Start: 2018-12-12 | End: 2019-02-07 | Stop reason: SDUPTHER

## 2018-12-11 RX ORDER — SPIRONOLACTONE 25 MG/1
25 TABLET ORAL DAILY
Qty: 30 TABLET | Refills: 0 | Status: SHIPPED | OUTPATIENT
Start: 2018-12-12 | End: 2018-12-11

## 2018-12-11 RX ORDER — ATORVASTATIN CALCIUM 40 MG/1
40 TABLET, FILM COATED ORAL NIGHTLY
Qty: 30 TABLET | Refills: 0 | Status: SHIPPED | OUTPATIENT
Start: 2018-12-11 | End: 2019-02-07 | Stop reason: SDUPTHER

## 2018-12-11 RX ORDER — POTASSIUM CHLORIDE 20 MEQ/1
20 TABLET, EXTENDED RELEASE ORAL DAILY
Qty: 60 TABLET | Refills: 0 | Status: SHIPPED | OUTPATIENT
Start: 2018-12-11 | End: 2018-12-11

## 2018-12-11 RX ORDER — FUROSEMIDE 40 MG/1
40 TABLET ORAL 2 TIMES DAILY
Qty: 60 TABLET | Refills: 0 | Status: SHIPPED | OUTPATIENT
Start: 2018-12-11 | End: 2018-12-11

## 2018-12-11 RX ORDER — ISOSORBIDE MONONITRATE 60 MG/1
60 TABLET, EXTENDED RELEASE ORAL DAILY
Qty: 30 TABLET | Refills: 0 | Status: SHIPPED | OUTPATIENT
Start: 2018-12-12 | End: 2018-12-11

## 2018-12-11 RX ORDER — ISOSORBIDE MONONITRATE 60 MG/1
60 TABLET, EXTENDED RELEASE ORAL DAILY
Qty: 30 TABLET | Refills: 0 | Status: SHIPPED | OUTPATIENT
Start: 2018-12-12 | End: 2019-02-07 | Stop reason: SDUPTHER

## 2018-12-11 RX ORDER — ATORVASTATIN CALCIUM 40 MG/1
40 TABLET, FILM COATED ORAL NIGHTLY
Qty: 30 TABLET | Refills: 0 | Status: SHIPPED | OUTPATIENT
Start: 2018-12-11 | End: 2018-12-11

## 2018-12-11 RX ORDER — FUROSEMIDE 40 MG/1
40 TABLET ORAL 2 TIMES DAILY
Qty: 60 TABLET | Refills: 0 | Status: SHIPPED | OUTPATIENT
Start: 2018-12-11 | End: 2019-02-07 | Stop reason: SDUPTHER

## 2018-12-11 RX ORDER — CARVEDILOL 25 MG/1
25 TABLET ORAL 2 TIMES DAILY WITH MEALS
Qty: 60 TABLET | Refills: 0 | Status: SHIPPED | OUTPATIENT
Start: 2018-12-11 | End: 2018-12-11

## 2018-12-11 RX ORDER — POTASSIUM CHLORIDE 20 MEQ/1
20 TABLET, EXTENDED RELEASE ORAL DAILY
Qty: 60 TABLET | Refills: 0 | Status: SHIPPED | OUTPATIENT
Start: 2018-12-11 | End: 2019-02-07 | Stop reason: SDUPTHER

## 2018-12-11 RX ORDER — HYDRALAZINE HYDROCHLORIDE 50 MG/1
50 TABLET, FILM COATED ORAL EVERY 8 HOURS SCHEDULED
Qty: 90 TABLET | Refills: 0 | Status: SHIPPED | OUTPATIENT
Start: 2018-12-11 | End: 2019-02-07 | Stop reason: SDUPTHER

## 2018-12-11 RX ORDER — HYDRALAZINE HYDROCHLORIDE 50 MG/1
50 TABLET, FILM COATED ORAL EVERY 8 HOURS SCHEDULED
Qty: 90 TABLET | Refills: 0 | Status: SHIPPED | OUTPATIENT
Start: 2018-12-11 | End: 2018-12-11

## 2018-12-11 RX ORDER — CARVEDILOL 25 MG/1
25 TABLET ORAL 2 TIMES DAILY WITH MEALS
Qty: 60 TABLET | Refills: 0 | Status: SHIPPED | OUTPATIENT
Start: 2018-12-11 | End: 2019-02-07 | Stop reason: SDUPTHER

## 2018-12-11 RX ADMIN — HYDRALAZINE HYDROCHLORIDE 50 MG: 50 TABLET, FILM COATED ORAL at 15:39

## 2018-12-11 RX ADMIN — ISOSORBIDE MONONITRATE 60 MG: 60 TABLET, EXTENDED RELEASE ORAL at 08:09

## 2018-12-11 RX ADMIN — HYDRALAZINE HYDROCHLORIDE 50 MG: 50 TABLET, FILM COATED ORAL at 05:58

## 2018-12-11 RX ADMIN — FUROSEMIDE 40 MG: 40 TABLET ORAL at 08:09

## 2018-12-11 RX ADMIN — CARVEDILOL 25 MG: 25 TABLET, FILM COATED ORAL at 08:09

## 2018-12-11 RX ADMIN — ACETAMINOPHEN 650 MG: 325 TABLET, FILM COATED ORAL at 15:38

## 2018-12-11 RX ADMIN — SPIRONOLACTONE 25 MG: 25 TABLET ORAL at 08:09

## 2018-12-11 RX ADMIN — POTASSIUM CHLORIDE 20 MEQ: 20 TABLET, EXTENDED RELEASE ORAL at 08:09

## 2018-12-11 RX ADMIN — INSULIN LISPRO 6 UNITS: 100 INJECTION, SOLUTION INTRAVENOUS; SUBCUTANEOUS at 12:16

## 2018-12-11 RX ADMIN — ASPIRIN 81 MG 81 MG: 81 TABLET ORAL at 08:09

## 2018-12-11 RX ADMIN — FAMOTIDINE 20 MG: 20 TABLET ORAL at 08:09

## 2018-12-11 RX ADMIN — INSULIN LISPRO 2 UNITS: 100 INJECTION, SOLUTION INTRAVENOUS; SUBCUTANEOUS at 08:13

## 2018-12-11 RX ADMIN — Medication 10 ML: at 08:11

## 2018-12-11 ASSESSMENT — PAIN SCALES - GENERAL
PAINLEVEL_OUTOF10: 4
PAINLEVEL_OUTOF10: 7

## 2018-12-12 ENCOUNTER — TELEPHONE (OUTPATIENT)
Dept: PHARMACY | Age: 53
End: 2018-12-12

## 2018-12-12 NOTE — TELEPHONE ENCOUNTER
Patient did not show for today's appointment in the [facility] I number not in service.   Will try to call again next week

## 2018-12-18 ENCOUNTER — SCHEDULED TELEPHONE ENCOUNTER (OUTPATIENT)
Dept: PHARMACY | Age: 53
End: 2018-12-18
Payer: MEDICAID

## 2018-12-20 ENCOUNTER — SCHEDULED TELEPHONE ENCOUNTER (OUTPATIENT)
Dept: PHARMACY | Age: 53
End: 2018-12-20
Payer: MEDICAID

## 2019-01-03 ENCOUNTER — SCHEDULED TELEPHONE ENCOUNTER (OUTPATIENT)
Dept: PHARMACY | Age: 54
End: 2019-01-03
Payer: COMMERCIAL

## 2019-01-31 ENCOUNTER — SCHEDULED TELEPHONE ENCOUNTER (OUTPATIENT)
Dept: PHARMACY | Age: 54
End: 2019-01-31

## 2019-01-31 DIAGNOSIS — I42.8 NONISCHEMIC CARDIOMYOPATHY (HCC): Primary | ICD-10-CM

## 2019-02-02 ENCOUNTER — APPOINTMENT (OUTPATIENT)
Dept: GENERAL RADIOLOGY | Age: 54
End: 2019-02-02
Payer: COMMERCIAL

## 2019-02-02 ENCOUNTER — HOSPITAL ENCOUNTER (EMERGENCY)
Age: 54
Discharge: HOME | End: 2019-02-02
Attending: EMERGENCY MEDICINE
Payer: COMMERCIAL

## 2019-02-02 VITALS
DIASTOLIC BLOOD PRESSURE: 92 MMHG | SYSTOLIC BLOOD PRESSURE: 136 MMHG | OXYGEN SATURATION: 100 % | RESPIRATION RATE: 23 BRPM | HEIGHT: 76 IN | HEART RATE: 85 BPM | WEIGHT: 256.17 LBS | TEMPERATURE: 96.7 F | BODY MASS INDEX: 31.2 KG/M2

## 2019-02-02 DIAGNOSIS — R60.9 FLUID RETENTION: Primary | ICD-10-CM

## 2019-02-02 DIAGNOSIS — R06.00 DYSPNEA, UNSPECIFIED TYPE: ICD-10-CM

## 2019-02-02 LAB
ANION GAP SERPL CALCULATED.3IONS-SCNC: 9 MMOL/L (ref 3–16)
BASOPHILS ABSOLUTE: 0.1 K/UL (ref 0–0.2)
BASOPHILS RELATIVE PERCENT: 0.9 %
BUN BLDV-MCNC: 13 MG/DL (ref 7–20)
CALCIUM SERPL-MCNC: 8.8 MG/DL (ref 8.3–10.6)
CHLORIDE BLD-SCNC: 103 MMOL/L (ref 99–110)
CO2: 28 MMOL/L (ref 21–32)
CREAT SERPL-MCNC: 1 MG/DL (ref 0.9–1.3)
EOSINOPHILS ABSOLUTE: 0.1 K/UL (ref 0–0.6)
EOSINOPHILS RELATIVE PERCENT: 1.5 %
GFR AFRICAN AMERICAN: >60
GFR NON-AFRICAN AMERICAN: >60
GLUCOSE BLD-MCNC: 206 MG/DL (ref 70–99)
HCT VFR BLD CALC: 42 % (ref 40.5–52.5)
HEMOGLOBIN: 13.4 G/DL (ref 13.5–17.5)
LACTIC ACID: 1.2 MMOL/L (ref 0.4–2)
LYMPHOCYTES ABSOLUTE: 2.1 K/UL (ref 1–5.1)
LYMPHOCYTES RELATIVE PERCENT: 37.6 %
MCH RBC QN AUTO: 29.1 PG (ref 26–34)
MCHC RBC AUTO-ENTMCNC: 31.9 G/DL (ref 31–36)
MCV RBC AUTO: 91.4 FL (ref 80–100)
MONOCYTES ABSOLUTE: 0.6 K/UL (ref 0–1.3)
MONOCYTES RELATIVE PERCENT: 10.8 %
NEUTROPHILS ABSOLUTE: 2.7 K/UL (ref 1.7–7.7)
NEUTROPHILS RELATIVE PERCENT: 49.2 %
PDW BLD-RTO: 18 % (ref 12.4–15.4)
PLATELET # BLD: 179 K/UL (ref 135–450)
PMV BLD AUTO: 8.7 FL (ref 5–10.5)
POTASSIUM REFLEX MAGNESIUM: 4.3 MMOL/L (ref 3.5–5.1)
PRO-BNP: ABNORMAL PG/ML (ref 0–124)
RAPID INFLUENZA  B AGN: NEGATIVE
RAPID INFLUENZA A AGN: NEGATIVE
RBC # BLD: 4.6 M/UL (ref 4.2–5.9)
SODIUM BLD-SCNC: 140 MMOL/L (ref 136–145)
TROPONIN: 0.01 NG/ML
WBC # BLD: 5.6 K/UL (ref 4–11)

## 2019-02-02 PROCEDURE — 84484 ASSAY OF TROPONIN QUANT: CPT

## 2019-02-02 PROCEDURE — 93005 ELECTROCARDIOGRAM TRACING: CPT | Performed by: EMERGENCY MEDICINE

## 2019-02-02 PROCEDURE — 71046 X-RAY EXAM CHEST 2 VIEWS: CPT

## 2019-02-02 PROCEDURE — 85025 COMPLETE CBC W/AUTO DIFF WBC: CPT

## 2019-02-02 PROCEDURE — 6360000002 HC RX W HCPCS: Performed by: EMERGENCY MEDICINE

## 2019-02-02 PROCEDURE — 80048 BASIC METABOLIC PNL TOTAL CA: CPT

## 2019-02-02 PROCEDURE — 83880 ASSAY OF NATRIURETIC PEPTIDE: CPT

## 2019-02-02 PROCEDURE — 99285 EMERGENCY DEPT VISIT HI MDM: CPT

## 2019-02-02 PROCEDURE — 96374 THER/PROPH/DIAG INJ IV PUSH: CPT

## 2019-02-02 PROCEDURE — 87804 INFLUENZA ASSAY W/OPTIC: CPT

## 2019-02-02 PROCEDURE — 83605 ASSAY OF LACTIC ACID: CPT

## 2019-02-02 RX ORDER — FUROSEMIDE 10 MG/ML
40 INJECTION INTRAMUSCULAR; INTRAVENOUS ONCE
Status: COMPLETED | OUTPATIENT
Start: 2019-02-02 | End: 2019-02-02

## 2019-02-02 RX ADMIN — FUROSEMIDE 40 MG: 10 INJECTION, SOLUTION INTRAMUSCULAR; INTRAVENOUS at 13:41

## 2019-02-02 ASSESSMENT — ENCOUNTER SYMPTOMS
BACK PAIN: 0
NAUSEA: 0
CHEST TIGHTNESS: 0
VOMITING: 0
COLOR CHANGE: 0
BLOOD IN STOOL: 0
EYE PAIN: 0
COUGH: 0
WHEEZING: 0
APNEA: 0
EYE ITCHING: 0
EYE DISCHARGE: 0
CHOKING: 0
STRIDOR: 0
ABDOMINAL PAIN: 0
DIARRHEA: 0
ANAL BLEEDING: 0
RECTAL PAIN: 0
EYE REDNESS: 0
SHORTNESS OF BREATH: 1
ABDOMINAL DISTENTION: 0
PHOTOPHOBIA: 0
CONSTIPATION: 0

## 2019-02-02 ASSESSMENT — PAIN DESCRIPTION - LOCATION: LOCATION: LEG

## 2019-02-02 ASSESSMENT — PAIN SCALES - GENERAL
PAINLEVEL_OUTOF10: 0
PAINLEVEL_OUTOF10: 6

## 2019-02-02 ASSESSMENT — PAIN DESCRIPTION - ORIENTATION: ORIENTATION: LEFT;RIGHT

## 2019-02-02 ASSESSMENT — PAIN DESCRIPTION - PAIN TYPE: TYPE: ACUTE PAIN

## 2019-02-03 LAB
EKG ATRIAL RATE: 84 BPM
EKG DIAGNOSIS: NORMAL
EKG P AXIS: 70 DEGREES
EKG P-R INTERVAL: 164 MS
EKG Q-T INTERVAL: 402 MS
EKG QRS DURATION: 86 MS
EKG QTC CALCULATION (BAZETT): 475 MS
EKG R AXIS: 81 DEGREES
EKG T AXIS: -4 DEGREES
EKG VENTRICULAR RATE: 84 BPM

## 2019-02-03 PROCEDURE — 93010 ELECTROCARDIOGRAM REPORT: CPT | Performed by: INTERNAL MEDICINE

## 2019-02-06 ENCOUNTER — OFFICE VISIT (OUTPATIENT)
Dept: CARDIOLOGY CLINIC | Age: 54
End: 2019-02-06
Payer: COMMERCIAL

## 2019-02-06 ENCOUNTER — HOSPITAL ENCOUNTER (OUTPATIENT)
Dept: INFUSION THERAPY | Age: 54
Setting detail: INFUSION SERIES
Discharge: HOME | End: 2019-02-06
Payer: COMMERCIAL

## 2019-02-06 VITALS
WEIGHT: 237 LBS | HEIGHT: 76 IN | BODY MASS INDEX: 28.86 KG/M2 | DIASTOLIC BLOOD PRESSURE: 70 MMHG | SYSTOLIC BLOOD PRESSURE: 104 MMHG | OXYGEN SATURATION: 98 % | HEART RATE: 80 BPM

## 2019-02-06 VITALS
DIASTOLIC BLOOD PRESSURE: 70 MMHG | HEART RATE: 84 BPM | BODY MASS INDEX: 28.3 KG/M2 | WEIGHT: 232.37 LBS | OXYGEN SATURATION: 97 % | TEMPERATURE: 97.8 F | HEIGHT: 76 IN | RESPIRATION RATE: 16 BRPM | SYSTOLIC BLOOD PRESSURE: 120 MMHG

## 2019-02-06 DIAGNOSIS — I25.10 CAD IN NATIVE ARTERY: ICD-10-CM

## 2019-02-06 DIAGNOSIS — I50.23 ACUTE ON CHRONIC SYSTOLIC HF (HEART FAILURE) (HCC): Primary | ICD-10-CM

## 2019-02-06 DIAGNOSIS — Z72.0 TOBACCO USE: ICD-10-CM

## 2019-02-06 DIAGNOSIS — I42.8 NONISCHEMIC CARDIOMYOPATHY (HCC): ICD-10-CM

## 2019-02-06 DIAGNOSIS — I10 ESSENTIAL HYPERTENSION: ICD-10-CM

## 2019-02-06 DIAGNOSIS — I50.23 ACUTE ON CHRONIC SYSTOLIC HF (HEART FAILURE) (HCC): ICD-10-CM

## 2019-02-06 PROBLEM — I50.9 ACUTE CONGESTIVE HEART FAILURE (HCC): Chronic | Status: ACTIVE | Noted: 2018-12-03

## 2019-02-06 LAB
ANION GAP SERPL CALCULATED.3IONS-SCNC: 10 MMOL/L (ref 3–16)
ANION GAP SERPL CALCULATED.3IONS-SCNC: 11 MMOL/L (ref 3–16)
BUN BLDV-MCNC: 11 MG/DL (ref 7–20)
BUN BLDV-MCNC: 11 MG/DL (ref 7–20)
CALCIUM SERPL-MCNC: 8.7 MG/DL (ref 8.3–10.6)
CALCIUM SERPL-MCNC: 8.8 MG/DL (ref 8.3–10.6)
CHLORIDE BLD-SCNC: 103 MMOL/L (ref 99–110)
CHLORIDE BLD-SCNC: 105 MMOL/L (ref 99–110)
CO2: 28 MMOL/L (ref 21–32)
CO2: 29 MMOL/L (ref 21–32)
CREAT SERPL-MCNC: 1 MG/DL (ref 0.9–1.3)
CREAT SERPL-MCNC: 1 MG/DL (ref 0.9–1.3)
GFR AFRICAN AMERICAN: >60
GFR AFRICAN AMERICAN: >60
GFR NON-AFRICAN AMERICAN: >60
GFR NON-AFRICAN AMERICAN: >60
GLUCOSE BLD-MCNC: 195 MG/DL (ref 70–99)
GLUCOSE BLD-MCNC: 212 MG/DL (ref 70–99)
POTASSIUM SERPL-SCNC: 3.9 MMOL/L (ref 3.5–5.1)
POTASSIUM SERPL-SCNC: 4.5 MMOL/L (ref 3.5–5.1)
PRO-BNP: 6927 PG/ML (ref 0–124)
PRO-BNP: 6948 PG/ML (ref 0–124)
SODIUM BLD-SCNC: 143 MMOL/L (ref 136–145)
SODIUM BLD-SCNC: 143 MMOL/L (ref 136–145)

## 2019-02-06 PROCEDURE — 96374 THER/PROPH/DIAG INJ IV PUSH: CPT

## 2019-02-06 PROCEDURE — 99214 OFFICE O/P EST MOD 30 MIN: CPT | Performed by: NURSE PRACTITIONER

## 2019-02-06 PROCEDURE — 80048 BASIC METABOLIC PNL TOTAL CA: CPT

## 2019-02-06 PROCEDURE — 6360000002 HC RX W HCPCS: Performed by: NURSE PRACTITIONER

## 2019-02-06 PROCEDURE — 83880 ASSAY OF NATRIURETIC PEPTIDE: CPT

## 2019-02-06 RX ORDER — FUROSEMIDE 10 MG/ML
40 INJECTION INTRAMUSCULAR; INTRAVENOUS ONCE
Status: COMPLETED | OUTPATIENT
Start: 2019-02-06 | End: 2019-02-06

## 2019-02-06 RX ORDER — FUROSEMIDE 10 MG/ML
40 INJECTION INTRAMUSCULAR; INTRAVENOUS ONCE
Status: CANCELLED
Start: 2019-02-06 | End: 2019-02-06

## 2019-02-06 RX ADMIN — FUROSEMIDE 40 MG: 10 INJECTION, SOLUTION INTRAMUSCULAR; INTRAVENOUS at 12:10

## 2019-02-06 NOTE — PROGRESS NOTES
Outpatient [address]     Lasix Visit    NAME:  Paul Fu  YOB: 1965  MEDICAL RECORD NUMBER:  [id number]  Episode Date:  2/6/2019    Patient arrived to [facility]     [] per wheelchair   [x] ambulatory    Alert and oriented X4. Referred to clinic after appointment with Qian Durand. Patient was seen 2/2/19 in the ED for 1 dose of Lasix IV. Weight has decreased since visit but continues to c/o difficulty sleeping, difficulty breathing in bed. Hospitalized in December for heart failure. Did not start weighing daily until ED visit. Meds updated this Am at MD office. C/O increased neuropathy, had not been taking diabetic meds for 1 month. /78   Pulse 79   Temp 97.8 °F (36.6 °C) (Oral)   Resp 16   Ht 6' 4\" (1.93 m)   Wt 232 lb 5.8 oz (105.4 kg)   SpO2 97%   BMI 28.28 kg/m²     Recent Weight Gain 0 lbs  Wt 2/2/19 in  ED 256lb 2.8 oz. (wt in md office this  lb and in our office after voiding 232 5.8 oz) weight loss of almost 24 lbs    Pulse Oximetry: 97 %      Breath Sounds: No increased work of breathing, Breath sounds clear to auscultation bilaterally and Good air exchange.  Denies cough     Shortness of Breath:   []   None   []   Dyspneic on exertion   [x]   Dyspnea with normal activities  []   Dyspnea at rest    Fatigue:  Not sleeping at night  []   None  []   Increase over baseline but not altering normal activities  [x]   Moderate of causing difficulty performing some activities  []   Severe or loss of ability to perform some activities  []   Bedridden or disabling    Edema: 1+ Location of edema: left leg, right leg    Chest Pain: No    Patient Active Problem List   Diagnosis    Essential hypertension    Hyperlipidemia    DMII [facility]di[facility] mellitus, type 2) [facility] [facility]    Nonischemic cardiomyopathy ([facility]    Tobacco abuse    CAD in native artery    Acute on chronic systolic HF (heart failure) ([facility] [facility]       PAST MEDICAL HISTORY        Diagnosis Date    CHF (congestive heart failure) (HCC)     Diabetes mellitus ([facility] [address]     Hyperlipidemia     Hypertension     Vitamin D deficiency        CMP:    Lab Results   Component Value Date     02/06/2019    K 3.9 02/06/2019    K 4.3 02/02/2019     02/06/2019    CO2 28 02/06/2019    BUN 11 02/06/2019    PROT 6.8 12/03/2018         Normal saline lock placed: Yes    Labs drawn using a Vacutainer/Syringe: Yes    BMP, pro BNP stat    Voided 350 ml while waiting on labs. Napping for short periods waiting on results. Given Lasix 40mg IVP slowly over 8 minutes. Up to void at 1230      For   375 ml    Saline lock left in place per patient request. Alcohol cap applied, Wrapped with 4x4 and coban wrap. Response to treatment:  Well tolerated by patient. Given written information re:  heart failure, when to call MD, daily weights, low sodium diet, fluid restriction, activity. He did not feel well today, reviewed lightly and will review more in depth the next 2 days. Patient to follow up with Infusion center tomorrow.     Electronically signed by Dannie Dorantes RN on 2/6/2019 at 12:21 PM

## 2019-02-07 ENCOUNTER — TELEPHONE (OUTPATIENT)
Dept: CARDIOLOGY CLINIC | Age: 54
End: 2019-02-07

## 2019-02-07 ENCOUNTER — HOSPITAL ENCOUNTER (OUTPATIENT)
Dept: INFUSION THERAPY | Age: 54
Setting detail: INFUSION SERIES
Discharge: HOME | End: 2019-02-07
Payer: COMMERCIAL

## 2019-02-07 VITALS
DIASTOLIC BLOOD PRESSURE: 97 MMHG | SYSTOLIC BLOOD PRESSURE: 142 MMHG | BODY MASS INDEX: 28.02 KG/M2 | HEART RATE: 91 BPM | OXYGEN SATURATION: 97 % | WEIGHT: 230.16 LBS | RESPIRATION RATE: 17 BRPM | TEMPERATURE: 98 F

## 2019-02-07 DIAGNOSIS — I50.23 ACUTE ON CHRONIC SYSTOLIC HF (HEART FAILURE) (HCC): ICD-10-CM

## 2019-02-07 DIAGNOSIS — I50.23 ACUTE ON CHRONIC SYSTOLIC CONGESTIVE HEART FAILURE (HCC): Primary | ICD-10-CM

## 2019-02-07 LAB
ANION GAP SERPL CALCULATED.3IONS-SCNC: 11 MMOL/L (ref 3–16)
BUN BLDV-MCNC: 14 MG/DL (ref 7–20)
CALCIUM SERPL-MCNC: 8.9 MG/DL (ref 8.3–10.6)
CHLORIDE BLD-SCNC: 104 MMOL/L (ref 99–110)
CO2: 26 MMOL/L (ref 21–32)
CREAT SERPL-MCNC: 0.9 MG/DL (ref 0.9–1.3)
GFR AFRICAN AMERICAN: >60
GFR NON-AFRICAN AMERICAN: >60
GLUCOSE BLD-MCNC: 186 MG/DL (ref 70–99)
POTASSIUM SERPL-SCNC: 3.9 MMOL/L (ref 3.5–5.1)
SODIUM BLD-SCNC: 141 MMOL/L (ref 136–145)

## 2019-02-07 PROCEDURE — 96374 THER/PROPH/DIAG INJ IV PUSH: CPT

## 2019-02-07 PROCEDURE — 6360000002 HC RX W HCPCS: Performed by: NURSE PRACTITIONER

## 2019-02-07 PROCEDURE — 80048 BASIC METABOLIC PNL TOTAL CA: CPT

## 2019-02-07 RX ORDER — FUROSEMIDE 40 MG/1
40 TABLET ORAL 2 TIMES DAILY
Qty: 60 TABLET | Refills: 3 | Status: SHIPPED | OUTPATIENT
Start: 2019-02-07 | End: 2019-02-08 | Stop reason: SDUPTHER

## 2019-02-07 RX ORDER — SPIRONOLACTONE 25 MG/1
25 TABLET ORAL DAILY
Qty: 30 TABLET | Refills: 3 | Status: SHIPPED | OUTPATIENT
Start: 2019-02-07 | End: 2019-02-08 | Stop reason: SDUPTHER

## 2019-02-07 RX ORDER — POTASSIUM CHLORIDE 20 MEQ/1
20 TABLET, EXTENDED RELEASE ORAL DAILY
Qty: 30 TABLET | Refills: 3 | Status: ON HOLD | OUTPATIENT
Start: 2019-02-07 | End: 2019-04-09 | Stop reason: SDUPTHER

## 2019-02-07 RX ORDER — FUROSEMIDE 10 MG/ML
40 INJECTION INTRAMUSCULAR; INTRAVENOUS ONCE
Status: COMPLETED | OUTPATIENT
Start: 2019-02-07 | End: 2019-02-07

## 2019-02-07 RX ORDER — CARVEDILOL 25 MG/1
25 TABLET ORAL 2 TIMES DAILY WITH MEALS
Qty: 60 TABLET | Refills: 3 | Status: SHIPPED | OUTPATIENT
Start: 2019-02-07 | End: 2019-02-08 | Stop reason: SDUPTHER

## 2019-02-07 RX ORDER — ATORVASTATIN CALCIUM 40 MG/1
40 TABLET, FILM COATED ORAL NIGHTLY
Qty: 30 TABLET | Refills: 3 | Status: SHIPPED | OUTPATIENT
Start: 2019-02-07 | End: 2019-02-08 | Stop reason: SDUPTHER

## 2019-02-07 RX ORDER — HYDRALAZINE HYDROCHLORIDE 50 MG/1
50 TABLET, FILM COATED ORAL EVERY 8 HOURS SCHEDULED
Qty: 90 TABLET | Refills: 3 | Status: SHIPPED | OUTPATIENT
Start: 2019-02-07 | End: 2019-02-08 | Stop reason: SDUPTHER

## 2019-02-07 RX ORDER — ISOSORBIDE MONONITRATE 60 MG/1
60 TABLET, EXTENDED RELEASE ORAL DAILY
Qty: 30 TABLET | Refills: 3 | Status: SHIPPED | OUTPATIENT
Start: 2019-02-07 | End: 2019-02-08 | Stop reason: SDUPTHER

## 2019-02-07 RX ORDER — FUROSEMIDE 10 MG/ML
40 INJECTION INTRAMUSCULAR; INTRAVENOUS ONCE
Status: CANCELLED
Start: 2019-02-07 | End: 2019-02-07

## 2019-02-07 RX ADMIN — FUROSEMIDE 40 MG: 10 INJECTION, SOLUTION INTRAMUSCULAR; INTRAVENOUS at 15:43

## 2019-02-07 NOTE — PROGRESS NOTES
Outpatient [id number]     Las Visit    NAME:  Franklyn Thompson  YOB: 1965  MEDICAL RECORD NUMBER:  [id number]  Episode Date:  2/7/2019    Patient arrived to [facility]    [] per wheelchair   [x] ambulatory   Color natural , pt sl. Drowsy but is alert. , oriented , . Pt states his sob has sl. Lessened and he states the swelling in his lower legs has lessened . Pt forgot to weigh self this am. States he urinated several times when he got home yesterday. Wt 230 lb 2.6 oz (104.4 kg)   BMI 28.02 kg/m²     Recent Weight Gain - none --is losing wt. See flow sheets== has lost 2 lbs since yesterday     Pt took his am meds when he arrived at his appnt today. Shortness of Breath: states sob is sl. Improved , has trouble sleeping at night unless he uses several pillows and props himself up . No wheezing noted   []   None   []   Dyspneic on exertion   []   Dyspnea with normal activities  [x]   Dyspnea at rest    Fatigue:  []   None  []   Increase over baseline but not altering normal activities  [x]   Moderate of causing difficulty performing some activities  []   Severe or loss of ability to perform some activities  []   Bedridden or disabling    Edema: 1+ Location of edema:  [place].  Ankles and lower legs to mid calf     Chest Pain: No, has occasional heart palpitations    Patient Active Problem List   Diagnosis    Essential hypertension    Hyperlipidemia    DMII (diabetes mellitus, type 2) ([address]    Nonischemic cardiomyopathy ([address]    Tobacco abuse    CAD in native artery    Acute on chronic systolic HF (heart failure) ([address]       PAST MEDICAL HISTORY        Diagnosis Date    CHF (congestive heart failure) ([address]     Diabetes mellitus ([address]     Hyperlipidemia     Hypertension     Vitamin D deficiency        CMP:    Lab Results   Component Value Date     02/07/2019    K 3.9 02/07/2019    K 4.3 02/02/2019     02/07/2019    CO2 26 02/07/2019    BUN 14 02/07/2019    PROT 6.8 12/03/2018     Today;s labs reviewed before Lasix given IVP    Normal saline lock placed: -- on 2/6/19--    Labs drawn using a Vacutainer/Syringe: Yes, from left wrist with # 19 ga butterfly , DSD to site . Stat BMP  drawn at 1455pm    Given Lasix 40  mg IVP slowly over 7 minutes. ( after BMP results were done and  reviewed by RN today) . Saline lock left in place per pt request . NSS flush given, Wrapped with Coban drsng . Response to treatment:  Well tolerated by patient. Pt voided 375 ml of clear med. Orange/yellow  urine   Pt will see a NP at [address] [place] [address] in am to get his diabetic meds filled . He has not taken his diabetic meds in approx one mos. Pt then mentioned that he has not been taking his cardiac meds and his diuretics. Pt was given these meds on discharge from [facility] on 12/11/18, Pt had a one mos supply . Pt states he left his meds at a relatives house and he was without them for awhile. Pt needs refills on his cardiac meds  And his diuretics . Patient to follow up with outpt clinic tomorrow at [facility] for third dose of Lasix. at 230pm. Pt has a list of all his upcoming appnts on his AVS and these reviewed with pt. Pt was given a calendar to keep his appnts recorded. Pt also given a pill container to set up his meds. Pt given calender sheets from the [facility] to keep a record of his weights every am. Reviewed fluid restriction and salt restriction with pt. Pt has basic understanding and will need more teaching.      Electronically signed by Lizz Jefferson RN on 2/7/2019 at 4:23 PM

## 2019-02-08 ENCOUNTER — HOSPITAL ENCOUNTER (OUTPATIENT)
Dept: INFUSION THERAPY | Age: 54
Setting detail: INFUSION SERIES
Discharge: HOME | End: 2019-02-08
Payer: COMMERCIAL

## 2019-02-08 VITALS
HEART RATE: 86 BPM | DIASTOLIC BLOOD PRESSURE: 80 MMHG | BODY MASS INDEX: 27.35 KG/M2 | TEMPERATURE: 98.4 F | WEIGHT: 224.65 LBS | OXYGEN SATURATION: 97 % | SYSTOLIC BLOOD PRESSURE: 138 MMHG | RESPIRATION RATE: 16 BRPM

## 2019-02-08 DIAGNOSIS — I50.23 ACUTE ON CHRONIC SYSTOLIC HF (HEART FAILURE) (HCC): ICD-10-CM

## 2019-02-08 DIAGNOSIS — I50.23 ACUTE ON CHRONIC SYSTOLIC CONGESTIVE HEART FAILURE (HCC): ICD-10-CM

## 2019-02-08 LAB
ANION GAP SERPL CALCULATED.3IONS-SCNC: 13 MMOL/L (ref 3–16)
BUN BLDV-MCNC: 17 MG/DL (ref 7–20)
CALCIUM SERPL-MCNC: 9.4 MG/DL (ref 8.3–10.6)
CHLORIDE BLD-SCNC: 102 MMOL/L (ref 99–110)
CO2: 28 MMOL/L (ref 21–32)
CREAT SERPL-MCNC: 1 MG/DL (ref 0.9–1.3)
GFR AFRICAN AMERICAN: >60
GFR NON-AFRICAN AMERICAN: >60
GLUCOSE BLD-MCNC: 230 MG/DL (ref 70–99)
POTASSIUM SERPL-SCNC: 3.8 MMOL/L (ref 3.5–5.1)
PRO-BNP: 4778 PG/ML (ref 0–124)
SODIUM BLD-SCNC: 143 MMOL/L (ref 136–145)

## 2019-02-08 PROCEDURE — 83880 ASSAY OF NATRIURETIC PEPTIDE: CPT

## 2019-02-08 PROCEDURE — 80048 BASIC METABOLIC PNL TOTAL CA: CPT

## 2019-02-08 PROCEDURE — 6360000002 HC RX W HCPCS: Performed by: NURSE PRACTITIONER

## 2019-02-08 PROCEDURE — 96374 THER/PROPH/DIAG INJ IV PUSH: CPT

## 2019-02-08 RX ORDER — ISOSORBIDE MONONITRATE 60 MG/1
60 TABLET, EXTENDED RELEASE ORAL DAILY
Qty: 30 TABLET | Refills: 3 | Status: ON HOLD | OUTPATIENT
Start: 2019-02-08 | End: 2019-04-09 | Stop reason: SDUPTHER

## 2019-02-08 RX ORDER — CARVEDILOL 25 MG/1
25 TABLET ORAL 2 TIMES DAILY WITH MEALS
Qty: 60 TABLET | Refills: 3 | Status: ON HOLD | OUTPATIENT
Start: 2019-02-08 | End: 2019-04-09 | Stop reason: HOSPADM

## 2019-02-08 RX ORDER — SPIRONOLACTONE 25 MG/1
25 TABLET ORAL DAILY
Qty: 30 TABLET | Refills: 3 | Status: ON HOLD | OUTPATIENT
Start: 2019-02-08 | End: 2019-04-09 | Stop reason: SDUPTHER

## 2019-02-08 RX ORDER — HYDRALAZINE HYDROCHLORIDE 50 MG/1
50 TABLET, FILM COATED ORAL EVERY 8 HOURS SCHEDULED
Qty: 90 TABLET | Refills: 3 | Status: ON HOLD | OUTPATIENT
Start: 2019-02-08 | End: 2019-04-09 | Stop reason: HOSPADM

## 2019-02-08 RX ORDER — FUROSEMIDE 10 MG/ML
40 INJECTION INTRAMUSCULAR; INTRAVENOUS ONCE
Status: COMPLETED | OUTPATIENT
Start: 2019-02-08 | End: 2019-02-08

## 2019-02-08 RX ORDER — ATORVASTATIN CALCIUM 40 MG/1
40 TABLET, FILM COATED ORAL NIGHTLY
Qty: 30 TABLET | Refills: 3 | Status: ON HOLD | OUTPATIENT
Start: 2019-02-08 | End: 2019-04-09 | Stop reason: SDUPTHER

## 2019-02-08 RX ORDER — LOSARTAN POTASSIUM 25 MG/1
25 TABLET ORAL DAILY
Status: ON HOLD | COMMUNITY
End: 2019-04-09 | Stop reason: SDUPTHER

## 2019-02-08 RX ORDER — FUROSEMIDE 10 MG/ML
40 INJECTION INTRAMUSCULAR; INTRAVENOUS ONCE
Status: CANCELLED
Start: 2019-02-08 | End: 2019-02-08

## 2019-02-08 RX ORDER — FUROSEMIDE 40 MG/1
40 TABLET ORAL 2 TIMES DAILY
Qty: 60 TABLET | Refills: 3 | Status: ON HOLD | OUTPATIENT
Start: 2019-02-08 | End: 2019-04-09 | Stop reason: SDUPTHER

## 2019-02-08 RX ADMIN — FUROSEMIDE 40 MG: 10 INJECTION, SOLUTION INTRAMUSCULAR; INTRAVENOUS at 16:01

## 2019-02-08 NOTE — PROGRESS NOTES
[facility]    Peripheral Lab Draw    NAME:  Nimisha Parish  YOB: 1965  MEDICAL RECORD NUMBER:  [id number]  Episode Date:  2/8/2019    Blood Draw Site: Location:left arm  Site cleansed with Chloroprep Scrub for 30 seconds: Yes  Site cleansed with Alcohol pads: Yes  Labs drawn with: 23 gauge             [x] Butterfly    [] Needle  Labs Obtained: Yes  Number of attempts: 1    Lab Test(s) Ordered: BMP, BNP    Lab Draw Site:  Redness: No  Bruising: No   Edema: No  Pain: No     Response to treatment:  Well tolerated by patient. Electronically signed by Phillip Holden RN on 2/8/2019 at 4:35 PM             Outpatient [address] [facility]     Lasix Visit    NAME:  Nimisha Parish  YOB: 1965  MEDICAL RECORD NUMBER:  [id number]  Episode Date:  2/8/2019    Patient arrived to [facility] 58    [] per wheelchair   [x] ambulatory    Alert and oriented X4, States is breathing better since having Lasix, C/O pain in legs but improving. Saw NP at [facility] clinic today. States meds adjusted but did not bring new list with him. (encounter printed from [facility] and verified with med list.) States he will  his cardiac meds from the hospital pharmacy. Call placed to [facility] [place] [facility]. No prescriptions available. Instructed I spoke with Nayely this AM and she said she will go through med list and talk with Gisel Birmingham  for any prescriptions not filled. Sister showed up to transport pt home and clarified prescriptions were called to pharmacy on [address] and they are picking up this afternoon.        /80   Pulse 86   Temp 98.4 °F (36.9 °C) (Oral)   Resp 16   Wt 224 lb 10.4 oz (101.9 kg)   SpO2 97%   BMI 27.35 kg/m²     Recent Weight Loss 5 lbs since yesterday    Pulse Oximetry: 97 %      Breath Sounds: No increased work of breathing, Breath sounds clear to auscultation bilaterally and Good air exchange    Shortness of Breath:   [x]   None   []   Dyspneic on exertion   []   Dyspnea with normal activities  []   Dyspnea at rest    Fatigue: Still not sleeping well   []   None  [x]   Increase over baseline but not altering normal activities  []   Moderate of causing difficulty performing some activities  []   Severe or loss of ability to perform some activities  []   Bedridden or disabling    Edema: Trace Location of edema: left leg and right leg    Chest Pain: No    Patient Active Problem List   Diagnosis    Essential hypertension    Hyperlipidemia    DMII (diabetes mellitus, type 2) ([facility]    Nonischemic cardiomyopathy ([facility]    Tobacco abuse    CAD in native artery    Acute on chronic systolic HF (heart failure) ([facility]       PAST MEDICAL HISTORY        Diagnosis Date    [facility] (congest[facility] failure) ([facility]     [facility]     Hyperlipidemia     Hypertension     Vitamin D deficiency        CMP:    Lab Results   Component Value Date     02/08/2019    K 3.8 02/08/2019    K 4.3 02/02/2019     02/08/2019    CO2 28 02/08/2019    BUN 17 02/08/2019    PROT 6.8 12/03/2018         Normal saline lock placed: No: already in place from initial visit    Labs drawn using a Vacutainer/Syringe: Yes, BMP results reviewed    Given Lasix 40mg IVP slowly over 8 minutes. Saline lock discontinued after voiding 300 ml martha urine 10 mins post Lasix                    Response to treatment:  Well tolerated by patient.       Patient to follow up with Umu Padilla NP 2/13/19    Electronically signed by Geeta Gunderson RN on 2/8/2019 at 4:35 PM

## 2019-02-13 ENCOUNTER — APPOINTMENT (OUTPATIENT)
Dept: PHARMACY | Age: 54
End: 2019-02-13
Payer: COMMERCIAL

## 2019-02-21 ENCOUNTER — OFFICE VISIT (OUTPATIENT)
Dept: PHARMACY | Age: 54
End: 2019-02-21
Payer: COMMERCIAL

## 2019-02-21 ENCOUNTER — APPOINTMENT (OUTPATIENT)
Dept: GENERAL RADIOLOGY | Age: 54
End: 2019-02-21
Payer: COMMERCIAL

## 2019-02-21 ENCOUNTER — HOSPITAL ENCOUNTER (EMERGENCY)
Age: 54
Discharge: HOME | End: 2019-02-21
Payer: COMMERCIAL

## 2019-02-21 ENCOUNTER — OFFICE VISIT (OUTPATIENT)
Dept: CARDIOLOGY CLINIC | Age: 54
End: 2019-02-21
Payer: COMMERCIAL

## 2019-02-21 VITALS
SYSTOLIC BLOOD PRESSURE: 144 MMHG | HEIGHT: 72 IN | DIASTOLIC BLOOD PRESSURE: 72 MMHG | WEIGHT: 228 LBS | OXYGEN SATURATION: 98 % | BODY MASS INDEX: 30.88 KG/M2 | HEART RATE: 83 BPM

## 2019-02-21 VITALS
WEIGHT: 231.48 LBS | DIASTOLIC BLOOD PRESSURE: 90 MMHG | SYSTOLIC BLOOD PRESSURE: 138 MMHG | OXYGEN SATURATION: 98 % | HEART RATE: 96 BPM | TEMPERATURE: 97.5 F | BODY MASS INDEX: 28.78 KG/M2 | RESPIRATION RATE: 17 BRPM | HEIGHT: 75 IN

## 2019-02-21 VITALS — BODY MASS INDEX: 31.17 KG/M2 | WEIGHT: 229.8 LBS

## 2019-02-21 DIAGNOSIS — S89.92XA LEFT KNEE INJURY, INITIAL ENCOUNTER: Primary | ICD-10-CM

## 2019-02-21 DIAGNOSIS — I10 ESSENTIAL HYPERTENSION: ICD-10-CM

## 2019-02-21 DIAGNOSIS — I25.10 CAD IN NATIVE ARTERY: ICD-10-CM

## 2019-02-21 DIAGNOSIS — I42.8 NONISCHEMIC CARDIOMYOPATHY (HCC): ICD-10-CM

## 2019-02-21 DIAGNOSIS — I50.22 CHRONIC SYSTOLIC HEART FAILURE (HCC): Primary | ICD-10-CM

## 2019-02-21 DIAGNOSIS — I50.23 ACUTE ON CHRONIC SYSTOLIC HF (HEART FAILURE) (HCC): Primary | ICD-10-CM

## 2019-02-21 PROCEDURE — 1036F TOBACCO NON-USER: CPT | Performed by: NURSE PRACTITIONER

## 2019-02-21 PROCEDURE — 3017F COLORECTAL CA SCREEN DOC REV: CPT | Performed by: NURSE PRACTITIONER

## 2019-02-21 PROCEDURE — G8417 CALC BMI ABV UP PARAM F/U: HCPCS | Performed by: NURSE PRACTITIONER

## 2019-02-21 PROCEDURE — 99213 OFFICE O/P EST LOW 20 MIN: CPT | Performed by: NURSE PRACTITIONER

## 2019-02-21 PROCEDURE — 73560 X-RAY EXAM OF KNEE 1 OR 2: CPT

## 2019-02-21 PROCEDURE — 99283 EMERGENCY DEPT VISIT LOW MDM: CPT

## 2019-02-21 PROCEDURE — G8598 ASA/ANTIPLAT THER USED: HCPCS | Performed by: NURSE PRACTITIONER

## 2019-02-21 PROCEDURE — G8427 DOCREV CUR MEDS BY ELIG CLIN: HCPCS | Performed by: NURSE PRACTITIONER

## 2019-02-21 PROCEDURE — 99214 OFFICE O/P EST MOD 30 MIN: CPT

## 2019-02-21 PROCEDURE — G8484 FLU IMMUNIZE NO ADMIN: HCPCS | Performed by: NURSE PRACTITIONER

## 2019-02-21 PROCEDURE — 6370000000 HC RX 637 (ALT 250 FOR IP): Performed by: PHYSICIAN ASSISTANT

## 2019-02-21 RX ORDER — IBUPROFEN 800 MG/1
800 TABLET ORAL EVERY 8 HOURS PRN
Qty: 20 TABLET | Refills: 0 | Status: SHIPPED | OUTPATIENT
Start: 2019-02-21 | End: 2019-02-21

## 2019-02-21 RX ORDER — IBUPROFEN 400 MG/1
800 TABLET ORAL ONCE
Status: COMPLETED | OUTPATIENT
Start: 2019-02-21 | End: 2019-02-21

## 2019-02-21 RX ADMIN — IBUPROFEN 800 MG: 400 TABLET ORAL at 10:17

## 2019-02-21 ASSESSMENT — PAIN SCALES - GENERAL
PAINLEVEL_OUTOF10: 5
PAINLEVEL_OUTOF10: 5
PAINLEVEL_OUTOF10: 8

## 2019-02-21 ASSESSMENT — PAIN - FUNCTIONAL ASSESSMENT: PAIN_FUNCTIONAL_ASSESSMENT: 0-10

## 2019-04-06 ENCOUNTER — APPOINTMENT (OUTPATIENT)
Dept: GENERAL RADIOLOGY | Age: 54
DRG: 194 | End: 2019-04-06
Payer: COMMERCIAL

## 2019-04-06 ENCOUNTER — APPOINTMENT (OUTPATIENT)
Dept: CT IMAGING | Age: 54
DRG: 194 | End: 2019-04-06
Payer: COMMERCIAL

## 2019-04-06 ENCOUNTER — HOSPITAL ENCOUNTER (INPATIENT)
Age: 54
LOS: 3 days | Discharge: HOME | DRG: 194 | End: 2019-04-09
Attending: EMERGENCY MEDICINE | Admitting: FAMILY MEDICINE
Payer: COMMERCIAL

## 2019-04-06 DIAGNOSIS — M25.431 WRIST EFFUSION, RIGHT: ICD-10-CM

## 2019-04-06 DIAGNOSIS — R00.0 TACHYCARDIA: ICD-10-CM

## 2019-04-06 DIAGNOSIS — I50.9 ACUTE ON CHRONIC CONGESTIVE HEART FAILURE, UNSPECIFIED HEART FAILURE TYPE (HCC): ICD-10-CM

## 2019-04-06 DIAGNOSIS — M13.0 POLYARTHROPATHY: ICD-10-CM

## 2019-04-06 DIAGNOSIS — M25.462 KNEE EFFUSION, LEFT: Primary | ICD-10-CM

## 2019-04-06 DIAGNOSIS — I50.23 ACUTE ON CHRONIC SYSTOLIC CONGESTIVE HEART FAILURE (HCC): ICD-10-CM

## 2019-04-06 PROBLEM — I50.21 CHF (CONGESTIVE HEART FAILURE), NYHA CLASS III, ACUTE, SYSTOLIC (HCC): Status: ACTIVE | Noted: 2019-04-06

## 2019-04-06 LAB
ANION GAP SERPL CALCULATED.3IONS-SCNC: 12 MMOL/L (ref 3–16)
BASOPHILS ABSOLUTE: 0 K/UL (ref 0–0.2)
BASOPHILS RELATIVE PERCENT: 0.6 %
BUN BLDV-MCNC: 8 MG/DL (ref 7–20)
CALCIUM SERPL-MCNC: 9 MG/DL (ref 8.3–10.6)
CHLORIDE BLD-SCNC: 98 MMOL/L (ref 99–110)
CO2: 24 MMOL/L (ref 21–32)
CREAT SERPL-MCNC: 0.6 MG/DL (ref 0.9–1.3)
D DIMER: 563 NG/ML DDU (ref 0–229)
EKG ATRIAL RATE: 113 BPM
EKG DIAGNOSIS: NORMAL
EKG P AXIS: 74 DEGREES
EKG P-R INTERVAL: 156 MS
EKG Q-T INTERVAL: 352 MS
EKG QRS DURATION: 90 MS
EKG QTC CALCULATION (BAZETT): 482 MS
EKG R AXIS: 31 DEGREES
EKG T AXIS: 45 DEGREES
EKG VENTRICULAR RATE: 113 BPM
EOSINOPHILS ABSOLUTE: 0.1 K/UL (ref 0–0.6)
EOSINOPHILS RELATIVE PERCENT: 1.3 %
GFR AFRICAN AMERICAN: >60
GFR NON-AFRICAN AMERICAN: >60
GLUCOSE BLD-MCNC: 191 MG/DL (ref 70–99)
GLUCOSE BLD-MCNC: 228 MG/DL (ref 70–99)
GLUCOSE BLD-MCNC: 238 MG/DL (ref 70–99)
HCT VFR BLD CALC: 43.1 % (ref 40.5–52.5)
HEMOGLOBIN: 14.3 G/DL (ref 13.5–17.5)
LYMPHOCYTES ABSOLUTE: 2.2 K/UL (ref 1–5.1)
LYMPHOCYTES RELATIVE PERCENT: 29.8 %
MCH RBC QN AUTO: 29.9 PG (ref 26–34)
MCHC RBC AUTO-ENTMCNC: 33.2 G/DL (ref 31–36)
MCV RBC AUTO: 90 FL (ref 80–100)
MONOCYTES ABSOLUTE: 0.6 K/UL (ref 0–1.3)
MONOCYTES RELATIVE PERCENT: 7.9 %
NEUTROPHILS ABSOLUTE: 4.5 K/UL (ref 1.7–7.7)
NEUTROPHILS RELATIVE PERCENT: 60.4 %
PDW BLD-RTO: 15.7 % (ref 12.4–15.4)
PERFORMED ON: ABNORMAL
PERFORMED ON: ABNORMAL
PLATELET # BLD: 234 K/UL (ref 135–450)
PMV BLD AUTO: 7.8 FL (ref 5–10.5)
POTASSIUM REFLEX MAGNESIUM: 4 MMOL/L (ref 3.5–5.1)
PRO-BNP: 6803 PG/ML (ref 0–124)
RBC # BLD: 4.79 M/UL (ref 4.2–5.9)
SODIUM BLD-SCNC: 134 MMOL/L (ref 136–145)
TROPONIN: <0.01 NG/ML
URIC ACID, SERUM: 6.6 MG/DL (ref 3.5–7.2)
WBC # BLD: 7.4 K/UL (ref 4–11)

## 2019-04-06 PROCEDURE — 71046 X-RAY EXAM CHEST 2 VIEWS: CPT

## 2019-04-06 PROCEDURE — 83880 ASSAY OF NATRIURETIC PEPTIDE: CPT

## 2019-04-06 PROCEDURE — 36415 COLL VENOUS BLD VENIPUNCTURE: CPT

## 2019-04-06 PROCEDURE — 93005 ELECTROCARDIOGRAM TRACING: CPT | Performed by: EMERGENCY MEDICINE

## 2019-04-06 PROCEDURE — 93010 ELECTROCARDIOGRAM REPORT: CPT | Performed by: INTERNAL MEDICINE

## 2019-04-06 PROCEDURE — 99285 EMERGENCY DEPT VISIT HI MDM: CPT

## 2019-04-06 PROCEDURE — G0378 HOSPITAL OBSERVATION PER HR: HCPCS

## 2019-04-06 PROCEDURE — 2580000003 HC RX 258: Performed by: FAMILY MEDICINE

## 2019-04-06 PROCEDURE — 84550 ASSAY OF BLOOD/URIC ACID: CPT

## 2019-04-06 PROCEDURE — 6370000000 HC RX 637 (ALT 250 FOR IP): Performed by: FAMILY MEDICINE

## 2019-04-06 PROCEDURE — 85379 FIBRIN DEGRADATION QUANT: CPT

## 2019-04-06 PROCEDURE — 84484 ASSAY OF TROPONIN QUANT: CPT

## 2019-04-06 PROCEDURE — 6360000002 HC RX W HCPCS: Performed by: FAMILY MEDICINE

## 2019-04-06 PROCEDURE — 80048 BASIC METABOLIC PNL TOTAL CA: CPT

## 2019-04-06 PROCEDURE — 96374 THER/PROPH/DIAG INJ IV PUSH: CPT

## 2019-04-06 PROCEDURE — 1200000000 HC SEMI PRIVATE

## 2019-04-06 PROCEDURE — 85025 COMPLETE CBC W/AUTO DIFF WBC: CPT

## 2019-04-06 PROCEDURE — 6370000000 HC RX 637 (ALT 250 FOR IP): Performed by: EMERGENCY MEDICINE

## 2019-04-06 PROCEDURE — 71260 CT THORAX DX C+: CPT

## 2019-04-06 PROCEDURE — 6360000004 HC RX CONTRAST MEDICATION: Performed by: EMERGENCY MEDICINE

## 2019-04-06 PROCEDURE — 6360000002 HC RX W HCPCS: Performed by: EMERGENCY MEDICINE

## 2019-04-06 PROCEDURE — 96375 TX/PRO/DX INJ NEW DRUG ADDON: CPT

## 2019-04-06 PROCEDURE — 96376 TX/PRO/DX INJ SAME DRUG ADON: CPT

## 2019-04-06 RX ORDER — POTASSIUM CHLORIDE 20 MEQ/1
20 TABLET, EXTENDED RELEASE ORAL DAILY
Status: DISCONTINUED | OUTPATIENT
Start: 2019-04-06 | End: 2019-04-09 | Stop reason: HOSPADM

## 2019-04-06 RX ORDER — HYDRALAZINE HYDROCHLORIDE 50 MG/1
50 TABLET, FILM COATED ORAL EVERY 8 HOURS SCHEDULED
Status: DISCONTINUED | OUTPATIENT
Start: 2019-04-06 | End: 2019-04-07

## 2019-04-06 RX ORDER — ATORVASTATIN CALCIUM 40 MG/1
40 TABLET, FILM COATED ORAL NIGHTLY
Status: DISCONTINUED | OUTPATIENT
Start: 2019-04-06 | End: 2019-04-09 | Stop reason: HOSPADM

## 2019-04-06 RX ORDER — SODIUM CHLORIDE 0.9 % (FLUSH) 0.9 %
10 SYRINGE (ML) INJECTION PRN
Status: DISCONTINUED | OUTPATIENT
Start: 2019-04-06 | End: 2019-04-09 | Stop reason: HOSPADM

## 2019-04-06 RX ORDER — NICOTINE POLACRILEX 4 MG
15 LOZENGE BUCCAL PRN
Status: DISCONTINUED | OUTPATIENT
Start: 2019-04-06 | End: 2019-04-09 | Stop reason: HOSPADM

## 2019-04-06 RX ORDER — CARVEDILOL 25 MG/1
25 TABLET ORAL 2 TIMES DAILY WITH MEALS
Status: DISCONTINUED | OUTPATIENT
Start: 2019-04-06 | End: 2019-04-07

## 2019-04-06 RX ORDER — DEXTROSE MONOHYDRATE 25 G/50ML
12.5 INJECTION, SOLUTION INTRAVENOUS PRN
Status: DISCONTINUED | OUTPATIENT
Start: 2019-04-06 | End: 2019-04-09 | Stop reason: HOSPADM

## 2019-04-06 RX ORDER — FUROSEMIDE 10 MG/ML
40 INJECTION INTRAMUSCULAR; INTRAVENOUS 2 TIMES DAILY
Status: DISCONTINUED | OUTPATIENT
Start: 2019-04-06 | End: 2019-04-07

## 2019-04-06 RX ORDER — SODIUM CHLORIDE 0.9 % (FLUSH) 0.9 %
10 SYRINGE (ML) INJECTION EVERY 12 HOURS SCHEDULED
Status: DISCONTINUED | OUTPATIENT
Start: 2019-04-06 | End: 2019-04-09 | Stop reason: HOSPADM

## 2019-04-06 RX ORDER — COLCHICINE 0.6 MG/1
1.2 TABLET ORAL ONCE
Status: COMPLETED | OUTPATIENT
Start: 2019-04-06 | End: 2019-04-06

## 2019-04-06 RX ORDER — FUROSEMIDE 40 MG/1
40 TABLET ORAL 2 TIMES DAILY
Status: DISCONTINUED | OUTPATIENT
Start: 2019-04-06 | End: 2019-04-06 | Stop reason: SDUPTHER

## 2019-04-06 RX ORDER — LOSARTAN POTASSIUM 25 MG/1
25 TABLET ORAL DAILY
Status: DISCONTINUED | OUTPATIENT
Start: 2019-04-06 | End: 2019-04-09 | Stop reason: HOSPADM

## 2019-04-06 RX ORDER — ACETAMINOPHEN 325 MG/1
650 TABLET ORAL EVERY 4 HOURS PRN
Status: DISCONTINUED | OUTPATIENT
Start: 2019-04-06 | End: 2019-04-09 | Stop reason: HOSPADM

## 2019-04-06 RX ORDER — ASPIRIN 81 MG/1
324 TABLET, CHEWABLE ORAL ONCE
Status: COMPLETED | OUTPATIENT
Start: 2019-04-06 | End: 2019-04-06

## 2019-04-06 RX ORDER — ISOSORBIDE MONONITRATE 60 MG/1
60 TABLET, EXTENDED RELEASE ORAL DAILY
Status: DISCONTINUED | OUTPATIENT
Start: 2019-04-06 | End: 2019-04-09 | Stop reason: HOSPADM

## 2019-04-06 RX ORDER — ONDANSETRON 2 MG/ML
4 INJECTION INTRAMUSCULAR; INTRAVENOUS EVERY 6 HOURS PRN
Status: DISCONTINUED | OUTPATIENT
Start: 2019-04-06 | End: 2019-04-09 | Stop reason: HOSPADM

## 2019-04-06 RX ORDER — FUROSEMIDE 10 MG/ML
40 INJECTION INTRAMUSCULAR; INTRAVENOUS ONCE
Status: COMPLETED | OUTPATIENT
Start: 2019-04-06 | End: 2019-04-06

## 2019-04-06 RX ORDER — SPIRONOLACTONE 25 MG/1
25 TABLET ORAL DAILY
Status: DISCONTINUED | OUTPATIENT
Start: 2019-04-06 | End: 2019-04-09 | Stop reason: HOSPADM

## 2019-04-06 RX ORDER — ASPIRIN 81 MG/1
81 TABLET, CHEWABLE ORAL DAILY
Status: DISCONTINUED | OUTPATIENT
Start: 2019-04-06 | End: 2019-04-09 | Stop reason: HOSPADM

## 2019-04-06 RX ORDER — DEXTROSE MONOHYDRATE 50 MG/ML
100 INJECTION, SOLUTION INTRAVENOUS PRN
Status: DISCONTINUED | OUTPATIENT
Start: 2019-04-06 | End: 2019-04-09 | Stop reason: HOSPADM

## 2019-04-06 RX ADMIN — HYDRALAZINE HYDROCHLORIDE 50 MG: 50 TABLET, FILM COATED ORAL at 14:39

## 2019-04-06 RX ADMIN — ASPIRIN 81 MG 81 MG: 81 TABLET ORAL at 14:39

## 2019-04-06 RX ADMIN — POTASSIUM CHLORIDE 20 MEQ: 20 TABLET, EXTENDED RELEASE ORAL at 14:39

## 2019-04-06 RX ADMIN — VITAMIN D, TAB 1000IU (100/BT) 1000 UNITS: 25 TAB at 14:39

## 2019-04-06 RX ADMIN — ISOSORBIDE MONONITRATE 60 MG: 60 TABLET, EXTENDED RELEASE ORAL at 14:39

## 2019-04-06 RX ADMIN — COLCHICINE 1.2 MG: 0.6 TABLET, FILM COATED ORAL at 14:39

## 2019-04-06 RX ADMIN — Medication 10 ML: at 21:46

## 2019-04-06 RX ADMIN — CARVEDILOL 25 MG: 25 TABLET, FILM COATED ORAL at 17:09

## 2019-04-06 RX ADMIN — IOPAMIDOL 75 ML: 755 INJECTION, SOLUTION INTRAVENOUS at 10:09

## 2019-04-06 RX ADMIN — SPIRONOLACTONE 25 MG: 25 TABLET ORAL at 14:39

## 2019-04-06 RX ADMIN — LOSARTAN POTASSIUM 25 MG: 25 TABLET ORAL at 14:39

## 2019-04-06 RX ADMIN — ATORVASTATIN CALCIUM 40 MG: 40 TABLET, FILM COATED ORAL at 21:44

## 2019-04-06 RX ADMIN — FUROSEMIDE 40 MG: 10 INJECTION, SOLUTION INTRAMUSCULAR; INTRAVENOUS at 10:58

## 2019-04-06 RX ADMIN — FUROSEMIDE 40 MG: 10 INJECTION, SOLUTION INTRAMUSCULAR; INTRAVENOUS at 17:09

## 2019-04-06 RX ADMIN — ASPIRIN 81 MG 324 MG: 81 TABLET ORAL at 08:59

## 2019-04-06 RX ADMIN — ACETAMINOPHEN 650 MG: 325 TABLET, FILM COATED ORAL at 22:07

## 2019-04-06 ASSESSMENT — PAIN DESCRIPTION - LOCATION
LOCATION: HAND;KNEE
LOCATION: CHEST
LOCATION: CHEST
LOCATION: KNEE
LOCATION: CHEST

## 2019-04-06 ASSESSMENT — PAIN DESCRIPTION - ORIENTATION
ORIENTATION: LEFT
ORIENTATION: MID
ORIENTATION: RIGHT;LEFT

## 2019-04-06 ASSESSMENT — PAIN DESCRIPTION - DESCRIPTORS
DESCRIPTORS: TIGHTNESS
DESCRIPTORS: ACHING;BURNING
DESCRIPTORS: TIGHTNESS
DESCRIPTORS: TIGHTNESS

## 2019-04-06 ASSESSMENT — PAIN DESCRIPTION - PAIN TYPE
TYPE: ACUTE PAIN

## 2019-04-06 ASSESSMENT — PAIN DESCRIPTION - ONSET
ONSET: ON-GOING

## 2019-04-06 ASSESSMENT — PAIN SCALES - GENERAL
PAINLEVEL_OUTOF10: 3
PAINLEVEL_OUTOF10: 3
PAINLEVEL_OUTOF10: 5
PAINLEVEL_OUTOF10: 3
PAINLEVEL_OUTOF10: 5
PAINLEVEL_OUTOF10: 5
PAINLEVEL_OUTOF10: 0

## 2019-04-06 ASSESSMENT — ENCOUNTER SYMPTOMS
SORE THROAT: 0
COUGH: 0
RHINORRHEA: 0
ABDOMINAL PAIN: 0
EYE REDNESS: 0
SHORTNESS OF BREATH: 1

## 2019-04-06 ASSESSMENT — PAIN DESCRIPTION - PROGRESSION
CLINICAL_PROGRESSION: GRADUALLY IMPROVING
CLINICAL_PROGRESSION: GRADUALLY IMPROVING
CLINICAL_PROGRESSION: NOT CHANGED

## 2019-04-06 ASSESSMENT — PAIN DESCRIPTION - FREQUENCY
FREQUENCY: INTERMITTENT
FREQUENCY: INTERMITTENT
FREQUENCY: CONTINUOUS
FREQUENCY: INTERMITTENT

## 2019-04-06 ASSESSMENT — PAIN - FUNCTIONAL ASSESSMENT: PAIN_FUNCTIONAL_ASSESSMENT: PREVENTS OR INTERFERES SOME ACTIVE ACTIVITIES AND ADLS

## 2019-04-06 NOTE — FLOWSHEET NOTE
4 Eyes Skin Assessment     The patient is being assess for  Admission    I agree that 2 RN's have performed a thorough Head to Toe Skin Assessment on the patient. ALL assessment sites listed below have been assessed. Areas assessed by both nurses:   [x]   Head, Face, and Ears   [x]   Shoulders, Back, and Chest  [x]   Arms, Elbows, and Hands   [x]   Coccyx, Sacrum, and IschIum  [x]   Legs, Feet, and Heels        Does the Patient have Skin Breakdown?   No         Omega Prevention initiated:  NA   Wound Care Orders initiated:  NA      [facility] nurse consulted for Pressure Injury (Stage 3,4, Unstageable, DTI, NWPT, and Complex wounds), New and Established Ostomies:  NA      Nurse 1 eSignature: Electronically signed by María Aguila RN on 5/2/47 at 2:12 PM    **SHARE this note so that the co-signing nurse is able to place an eSignature**    Nurse 2 eSignature: Electronically signed by Emma Sanabria RN on 4/6/19 at 2:12 PM

## 2019-04-06 NOTE — H&P
Hospital Medicine History & Physical      PCP: Thalia Nick    Date of Admission: 4/6/2019    Date of Service: Pt seen/examined, with 1st encounter, on 4/6/2019 and Admitted to Inpatient   Chief Complaint:  SOB      History Of Present Illness: The patient is a 46 y.o. male who presents to [facility] with chest pain, SOB. In the ED his ECG showed nonspecific ST changes; the Twave inversions in lateral leads were present on previous ECGs. His initial troponins were negative. His BNP was elevated. CT chest and CXR were nonacute. He also had pain and swelling in his left knee and rt hand. He was here for the same reasons of CHF exacerbation and elevated troponins from 12/3/18 - 12/11/18. He has continued to have decompensations with an effort by cardiology to keep him out of the hospital     Past Medical History:        Diagnosis Date    [facility]     Diabetes mellitus [facility]     Hyperlipidemia     Hypertension     Vitamin D deficiency        Past Surgical History:        Procedure Laterality Date    DIAGNOSTIC CARDIAC CATH LAB PROCEDURE  12/2018       Medications Prior to Admission:    Prior to Admission medications    Medication Sig Start Date End Date Taking?  Authorizing Provider   metFORMIN (GLUCOPHAGE) 1000 MG tablet Take 1,000 mg by mouth 2 times daily (with meals)   Yes Historical Provider, MD   isosorbide mononitrate (IMDUR) 60 MG extended release tablet Take 1 tablet by mouth daily 2/8/19  Yes MACO Hubbard CNP   hydrALAZINE (APRESOLINE) 50 MG tablet Take 1 tablet by mouth every 8 hours 2/8/19  Yes MACO Hubbard CNP   furosemide (LASIX) 40 MG tablet Take 1 tablet by mouth 2 times daily 2/8/19  Yes MACO Olivares CNP   carvedilol (COREG) 25 MG tablet Take 1 tablet by mouth 2 times daily (with cardiology    CAD  - trop neg, will trend  - cath 12/18: proximal RCA 30% disease, LCx 50% disease, LAD 25%, LVEF 20%, pulm HTN   - cont ASA, statin, BB    Polyarthropathy, likely gout   - pain and swelling in Lt knee and Rt hand  - check uric acid  - start colchicine 1.2 mg x 1, then . 6 mg daily    HTN  - cont BB, ARB, indur, hydralazine, lasix    Diabetes  - stable  - hold home PO meds  - SSI, accuchecks    DVT Prophylaxis: lovenox  Diet: No diet orders on file  Code Status: Prior    Dispo - in pt       Bernice Lomas, DO    Thank you Anna Miller for the opportunity to be involved in this patient's care. If you have any questions or concerns please feel free to contact me at [phone].

## 2019-04-06 NOTE — ED PROVIDER NOTES
[facility]  eMERGENCY dEPARTMENT eNCOUnter      Pt Name: Millie Pierson  MRN: [id number]  [place] 1965  Date of evaluation: 4/6/2019  Provider: Hu Stearns MD    CHIEF COMPLAINT       Chief Complaint   Patient presents with    Shortness of Breath     started a \"couple of days ago\" woith swelling in right hand and left knee    Chest Pain     started a \"couple of days ago\", dx with CHF in December         HISTORY OF PRESENT ILLNESS   (Location/Symptom, Timing/Onset,Context/Setting, Quality, Duration, Modifying Factors, Severity)  Note limiting factors. Millie Pierson is a 46 y.o. male who presents to the emergency department plane of chest pain and shortness of breath. The patient states for the past 2 days she's had intermittent chest pain to his right anterior chest.  He describes it as sharp. At times is pleuritic. It doesn't radiate. No ripping or tearing pain. He reports associated shortness of breath with this chest discomfort. No fever or chills or cough. He has no past medical history of gout but also states for last couple of days he'sswelling to his left knee and his right hand. Her low bit warm. No history of arthritis. No prior history of PE or DVT. HPI    NursingNotes were reviewed. REVIEW OF SYSTEMS    (2-9 systems for level 4, 10 or more for level 5)     Review of Systems   Constitutional: Negative for chills and fever. HENT: Negative for rhinorrhea and sore throat. Eyes: Negative for redness. Respiratory: Positive for shortness of breath. Negative for cough. Cardiovascular: Positive for chest pain. Gastrointestinal: Negative for abdominal pain. Genitourinary: Negative for flank pain. Musculoskeletal:        Left knee, right hand swelling. Skin: Negative for rash. Neurological: Negative for headaches. Hematological: Negative for adenopathy. Psychiatric/Behavioral: Negative for confusion. Except as noted above the remainder of the review of systems was reviewed and negative.        PAST MEDICAL HISTORY     Past Medical History:   Diagnosis Date    [facility]) [facility]     Diabetes mellitus ([facility]     Hyperlipidemia     Hypertension     Vitamin D deficiency          SURGICALHISTORY       Past Surgical History:   Procedure Laterality Date    DIAGNOSTIC CARDIAC CATH LAB PROCEDURE  12/2018         CURRENT MEDICATIONS       Previous Medications    ASPIRIN 81 MG TABLET    Take 1 tablet by mouth daily    ATORVASTATIN (LIPITOR) 40 MG TABLET    Take 1 tablet by mouth nightly    CARVEDILOL (COREG) 25 MG TABLET    Take 1 tablet by mouth 2 times daily (with meals)    CHOLECALCIFEROL (VITAMIN D3) 1000 UNITS TABS    Take 1,000 Units by mouth daily    FUROSEMIDE (LASIX) 40 MG TABLET    Take 1 tablet by mouth 2 times daily    GLIPIZIDE (GLUCOTROL) 10 MG TABLET    Take 10 mg by mouth 2 times daily (before meals)    HYDRALAZINE (APRESOLINE) 50 MG TABLET    Take 1 tablet by mouth every 8 hours    ISOSORBIDE MONONITRATE (IMDUR) 60 MG EXTENDED RELEASE TABLET    Take 1 tablet by mouth daily    LOSARTAN (COZAAR) 25 MG TABLET    Take 25 mg by mouth daily    METFORMIN (GLUCOPHAGE) 1000 MG TABLET    Take 1,000 mg by mouth 2 times daily (with meals)    POTASSIUM CHLORIDE (KLOR-CON M) 20 MEQ EXTENDED RELEASE TABLET    Take 1 tablet by mouth daily    SPIRONOLACTONE (ALDACTONE) 25 MG TABLET    Take 1 tablet by mouth daily       ALLERGIES     Lisinopril    FAMILY HISTORY       Family History   Problem Relation Age of Onset    Heart Failure Mother     Hypertension Father     Diabetes Father     Hypertension Sister     Heart Failure Sister           SOCIAL HISTORY       Social History     Socioeconomic History    Marital status: Single     Spouse name: None    Number of children: None    Years of education: None    Highest education level: None   Occupational History    None   Social Needs    Financial resource strain: None    Food insecurity:     Worry: None     Inability: None    Transportation needs:     Medical: None     Non-medical: None   Tobacco Use    Smoking status: Former Smoker     Packs/day: 0.20     Types: Cigarettes    Smokeless tobacco: Never Used    Tobacco comment: occassional   Substance and Sexual Activity    Alcohol use: Yes     Comment: few beers every few weeks    Drug use: No    Sexual activity: Yes   Lifestyle    Physical activity:     Days per week: None     Minutes per session: None    Stress: None   Relationships    Social connections:     Talks on phone: None     Gets together: None     Attends Taoist service: None     Active member of club or organization: None     Attends meetings of clubs or organizations: None     Relationship status: None    Intimate partner violence:     Fear of current or ex partner: None     Emotionally abused: None     Physically abused: None     Forced sexual activity: None   Other Topics Concern    None   Social History Narrative    None       SCREENINGS             PHYSICAL EXAM    (up to 7 for level 4, 8 or more for level 5)     ED Triage Vitals   BP Temp Temp src Pulse Resp SpO2 Height Weight   -- -- -- -- -- -- -- --       Physical Exam   Constitutional: He is oriented to person, place, and time. He appears well-developed and well-nourished. HENT:   Head: Normocephalic and atraumatic. Eyes: Conjunctivae are normal. Right eye exhibits no discharge. Left eye exhibits no discharge. Neck: Neck supple. Cardiovascular: Regular rhythm. Exam reveals no friction rub. No murmur heard. The patient is tachycardic. His heart rate is regular. Pulmonary/Chest: Effort normal and breath sounds normal. No stridor. No respiratory distress. He has no wheezes. Abdominal: Soft. Bowel sounds are normal. He exhibits no distension and no mass. There is no tenderness. There is no guarding. Musculoskeletal: Normal range of motion.    The patient's left knee is swollen and warm. No cellulitis. Patient's right hand is warm as well and swollen. His distal pulses are normal.  Cap refill normal.   Neurological: He is alert and oriented to person, place, and time. Skin: Skin is warm and dry. He is not diaphoretic. Psychiatric: He has a normal mood and affect. His behavior is normal.   Nursing note and vitals reviewed. DIAGNOSTIC RESULTS     EKG: All EKG's are interpreted by the Emergency Department Physician who either signs or Co-signsthis chart in the absence of a cardiologist.    The Ekg interpreted by me shows  sinus tachycardia, pbnv=683   Axis is   Normal  QTc is  482 msec  Intervals and Durations are unremarkable. ST Segments: She has nonspecific ST-T wave changes. The patient has T-wave inversion noted laterally. When I compared today's EKG with an EKG performed February 2, 2019 the patient sinus rhythm has been replaced with sinus tachycardia. The lateral T-wave changes are not new. ECG #2 - unchanged      RADIOLOGY:   Non-plain filmimages such as CT, Ultrasound and MRI are read by the radiologist. Plain radiographic images are visualized and preliminarily interpreted by the emergency physician with the below findings:        Interpretation per the Radiologist below, if available at the time ofthis note:    CT CHEST PULMONARY EMBOLISM W CONTRAST   Final Result   No evidence of pulmonary embolism or acute pulmonary abnormality.          XR CHEST STANDARD (2 VW)   Final Result   No acute findings               ED BEDSIDE ULTRASOUND:   Performed by ED Physician - none    LABS:  Labs Reviewed   CBC WITH AUTO DIFFERENTIAL - Abnormal; Notable for the following components:       Result Value    RDW 15.7 (*)     All other components within normal limits    Narrative:     Performed at:  [address] [address]   Phone [phone]   BASIC METABOLIC PANEL W/ REFLEX TO MG FOR LOW K - Abnormal; Notable for the following components:    Sodium 134 (*)     Chloride 98 (*)     Glucose 191 (*)     CREATININE 0.6 (*)     All other components within normal limits    Narrative:     Performed at:  [facility]  [address] [facility] [employer] [address]   Phone [phone]   D-DIMER, QUANTITATIVE - Abnormal; Notable for the following components:    D-Dimer, Quant 563 (*)     All other components within normal limits    Narrative:     Performed at:  [facility]  [address] [facility] [employer] [address]   Phone [phone]   BRAIN NATRIURETIC PEPTIDE - Abnormal; Notable for the following components:    Pro-BNP 6,803 (*)     All other components within normal limits    Narrative:     Performed at:  [facility]  [address] [facility] [employer] [address]   Phone [phone]   TROPONIN    Narrative:     Performed at:  [facility]  [address] [place] [address] [employer] [address]   Phone [phone]       All other labs were within normal range or not returned as of this dictation. EMERGENCY DEPARTMENT COURSE and DIFFERENTIAL DIAGNOSIS/MDM:   Vitals:    Vitals:    04/06/19 1001 04/06/19 1016 04/06/19 1032 04/06/19 1047   BP: (!) 153/106 (!) 150/97 (!) 148/97 (!) 151/102   Pulse: 112 100 110 113   Resp: 20 14 21 20   Temp:       SpO2: 95% 99% 98% 97%   Weight:       Height:             MDM  Number of Diagnoses or Management Options  Acute on chronic congestive heart failure, unspecified heart failure type [facility]):   Knee effusion, left:   Polyarthropathy:   Tachycardia:   Wrist effusion, right:   Diagnosis management comments: DDX: Pneumonia, PE, pleural effusion, other    Workup of the patient shows a negative troponin so far. There is no pulmonary emboli on his chest CT. Chest x-ray shows no pneumothorax, pneumonia, pleural effusion.   Patient's BMP is elevated and clinically I believe that he has congestive heart care. He is given Lasix 40 mg IV. I'm not sure what is causing the patient's polyarthropathy. If he had monoarticular joint swelling be concerned about a potential infection, however with his left knee and right wrist being swollen that is very unlikely. The patient has large left knee effusion and a right wrist effusion. I feel that he would benefit from admission to better control his heart failure, see if that resolves his tachycardia, and further evaluate his arthropathies. CRITICAL CARE TIME   Total Critical Care time was 0 minutes, excluding separately reportable procedures. There was a high probability of clinically significant/life threatening deterioration in the patient's condition which required my urgent intervention. CONSULTS:  None    PROCEDURES:  Unless otherwise noted below, none     Procedures    FINAL IMPRESSION      1. Acute on chronic congestive heart failure, unspecified heart failure type ([address]    2. Knee effusion, left    3. Wrist effusion, right    4. Polyarthropathy    5. Tachycardia          DISPOSITION/PLAN   DISPOSITION Decision To Admit 04/06/2019 10:53:14 AM      PATIENT REFERRED TO:  No follow-up provider specified.     DISCHARGE MEDICATIONS:  New Prescriptions    No medications on file          (Please note that portions of this note were completed with a voice recognition program.Efforts were made to edit the dictations but occasionally words are mis-transcribed.)    Patricia Null MD (electronically signed)  Attending Emergency Physician          Patricia Null MD  04/06/19 7126

## 2019-04-06 NOTE — PLAN OF CARE
Problem: OXYGENATION/RESPIRATORY FUNCTION  Goal: Patient will maintain patent airway  Outcome: Ongoing  Goal: Patient will achieve/maintain normal respiratory rate/effort  Description  Respiratory rate and effort will be within normal limits for the patient  Outcome: Ongoing     Problem: HEMODYNAMIC STATUS  Goal: Patient has stable vital signs and fluid balance  Outcome: Ongoing     Problem: FLUID AND ELECTROLYTE IMBALANCE  Goal: Fluid and electrolyte balance are achieved/maintained  Outcome: Ongoing     Problem: ACTIVITY INTOLERANCE/IMPAIRED MOBILITY  Goal: Mobility/activity is maintained at optimum level for patient  Outcome: Ongoing     Problem: Falls - Risk of:  Goal: Will remain free from falls  Description  Will remain free from falls  Outcome: Ongoing  Goal: Absence of physical injury  Description  Absence of physical injury  Outcome: Ongoing

## 2019-04-06 NOTE — PROGRESS NOTES
Medication Reconciliation     List of medications patient is currently taking is complete. Source of information: 1. Conversation with patient at bedside                                      2. EPIC records     Allergies  Lisinopril     Notes regarding home medications:  1.  Patient states that he has not had any of his home medications in \"a few days\" because he left them at his sister's house     Denies use of any OTC products or dietary supplements        Wilfred Driver, Pharmacy Intern  4/6/2019 8:59 AM

## 2019-04-06 NOTE — ED TRIAGE NOTES
Pt to ED via private vehicle with c/o chest pain and shortness of breath that started a \"couple of days ago\". Pt states he was dx with CHF in December. Also c/o swelling in his right and and left knee. No signs of distress noted. Call light in reach.

## 2019-04-06 NOTE — LETTER
2019    [facility]  Consult Billing Form:      DEMOGRAPHICS:                                                                                                              .    Patient Name:  Amelia Crenshaw  Patient :  1965   Patient SS#:  [id number]    Patient Phone:  [phone] (home)  Alt. Patient Phone:    Patient Address:  [address] [address]    PCP:  [address]:  Payor: [address] [facility]  [address] / Plan: [address] [facility] [address] / Product Type: *No Product type* /   Insurance ID Number:    DIAGNOSIS & PROCEDURE:                                                                                            .    Diagnosis:   Right wrist pain, left knee effusion, ?gout    Hospital:  [facility]    Provider:  Mary SIERRA    SCHEDULING INFORMATION:                                                                                         .     Date of Consultation:                              Mary SIERRA  19     BILLING INFORMATION:                                                                                                    .    Procedure:       CPT Code Modifier

## 2019-04-06 NOTE — FLOWSHEET NOTE
ED admit here to room 4104, orders reviewed and released. Standing scale for weight. Dr. Celi Hagan to room.

## 2019-04-07 LAB
ANION GAP SERPL CALCULATED.3IONS-SCNC: 13 MMOL/L (ref 3–16)
BUN BLDV-MCNC: 15 MG/DL (ref 7–20)
CALCIUM SERPL-MCNC: 8.8 MG/DL (ref 8.3–10.6)
CHLORIDE BLD-SCNC: 97 MMOL/L (ref 99–110)
CHOLESTEROL, TOTAL: 120 MG/DL (ref 0–199)
CO2: 27 MMOL/L (ref 21–32)
CREAT SERPL-MCNC: 0.9 MG/DL (ref 0.9–1.3)
EKG ATRIAL RATE: 112 BPM
EKG DIAGNOSIS: NORMAL
EKG P AXIS: 65 DEGREES
EKG P-R INTERVAL: 158 MS
EKG Q-T INTERVAL: 348 MS
EKG QRS DURATION: 90 MS
EKG QTC CALCULATION (BAZETT): 475 MS
EKG R AXIS: 4 DEGREES
EKG T AXIS: 18 DEGREES
EKG VENTRICULAR RATE: 112 BPM
GFR AFRICAN AMERICAN: >60
GFR NON-AFRICAN AMERICAN: >60
GLUCOSE BLD-MCNC: 149 MG/DL (ref 70–99)
GLUCOSE BLD-MCNC: 178 MG/DL (ref 70–99)
GLUCOSE BLD-MCNC: 201 MG/DL (ref 70–99)
GLUCOSE BLD-MCNC: 251 MG/DL (ref 70–99)
GLUCOSE BLD-MCNC: 336 MG/DL (ref 70–99)
HCT VFR BLD CALC: 40.8 % (ref 40.5–52.5)
HDLC SERPL-MCNC: 38 MG/DL (ref 40–60)
HEMOGLOBIN: 13.7 G/DL (ref 13.5–17.5)
LDL CHOLESTEROL CALCULATED: 71 MG/DL
MAGNESIUM: 1.7 MG/DL (ref 1.8–2.4)
MCH RBC QN AUTO: 29.7 PG (ref 26–34)
MCHC RBC AUTO-ENTMCNC: 33.6 G/DL (ref 31–36)
MCV RBC AUTO: 88.2 FL (ref 80–100)
PDW BLD-RTO: 15.4 % (ref 12.4–15.4)
PERFORMED ON: ABNORMAL
PLATELET # BLD: 224 K/UL (ref 135–450)
PMV BLD AUTO: 8 FL (ref 5–10.5)
POTASSIUM SERPL-SCNC: 3.7 MMOL/L (ref 3.5–5.1)
RBC # BLD: 4.62 M/UL (ref 4.2–5.9)
SODIUM BLD-SCNC: 137 MMOL/L (ref 136–145)
TRIGL SERPL-MCNC: 56 MG/DL (ref 0–150)
VLDLC SERPL CALC-MCNC: 11 MG/DL
WBC # BLD: 6 K/UL (ref 4–11)

## 2019-04-07 PROCEDURE — 83735 ASSAY OF MAGNESIUM: CPT

## 2019-04-07 PROCEDURE — 85027 COMPLETE CBC AUTOMATED: CPT

## 2019-04-07 PROCEDURE — G0378 HOSPITAL OBSERVATION PER HR: HCPCS

## 2019-04-07 PROCEDURE — 80048 BASIC METABOLIC PNL TOTAL CA: CPT

## 2019-04-07 PROCEDURE — 96372 THER/PROPH/DIAG INJ SC/IM: CPT

## 2019-04-07 PROCEDURE — 6360000002 HC RX W HCPCS: Performed by: FAMILY MEDICINE

## 2019-04-07 PROCEDURE — 96366 THER/PROPH/DIAG IV INF ADDON: CPT

## 2019-04-07 PROCEDURE — 80061 LIPID PANEL: CPT

## 2019-04-07 PROCEDURE — 96376 TX/PRO/DX INJ SAME DRUG ADON: CPT

## 2019-04-07 PROCEDURE — 93010 ELECTROCARDIOGRAM REPORT: CPT | Performed by: INTERNAL MEDICINE

## 2019-04-07 PROCEDURE — 1200000000 HC SEMI PRIVATE

## 2019-04-07 PROCEDURE — 99223 1ST HOSP IP/OBS HIGH 75: CPT | Performed by: INTERNAL MEDICINE

## 2019-04-07 PROCEDURE — 96365 THER/PROPH/DIAG IV INF INIT: CPT

## 2019-04-07 PROCEDURE — 6370000000 HC RX 637 (ALT 250 FOR IP): Performed by: FAMILY MEDICINE

## 2019-04-07 PROCEDURE — 2580000003 HC RX 258: Performed by: FAMILY MEDICINE

## 2019-04-07 RX ORDER — HYDRALAZINE HYDROCHLORIDE 25 MG/1
25 TABLET, FILM COATED ORAL EVERY 8 HOURS SCHEDULED
Status: DISCONTINUED | OUTPATIENT
Start: 2019-04-07 | End: 2019-04-09 | Stop reason: HOSPADM

## 2019-04-07 RX ORDER — FUROSEMIDE 40 MG/1
40 TABLET ORAL 2 TIMES DAILY
Status: DISCONTINUED | OUTPATIENT
Start: 2019-04-08 | End: 2019-04-09 | Stop reason: HOSPADM

## 2019-04-07 RX ORDER — FUROSEMIDE 10 MG/ML
40 INJECTION INTRAMUSCULAR; INTRAVENOUS DAILY
Status: DISCONTINUED | OUTPATIENT
Start: 2019-04-07 | End: 2019-04-07

## 2019-04-07 RX ORDER — PREDNISONE 20 MG/1
40 TABLET ORAL ONCE
Status: COMPLETED | OUTPATIENT
Start: 2019-04-07 | End: 2019-04-07

## 2019-04-07 RX ORDER — COLCHICINE 0.6 MG/1
0.6 TABLET ORAL DAILY
Status: DISCONTINUED | OUTPATIENT
Start: 2019-04-07 | End: 2019-04-09 | Stop reason: HOSPADM

## 2019-04-07 RX ORDER — FUROSEMIDE 40 MG/1
40 TABLET ORAL 2 TIMES DAILY
Status: DISCONTINUED | OUTPATIENT
Start: 2019-04-07 | End: 2019-04-07

## 2019-04-07 RX ORDER — MAGNESIUM SULFATE IN WATER 40 MG/ML
2 INJECTION, SOLUTION INTRAVENOUS ONCE
Status: COMPLETED | OUTPATIENT
Start: 2019-04-07 | End: 2019-04-07

## 2019-04-07 RX ORDER — CARVEDILOL 12.5 MG/1
12.5 TABLET ORAL 2 TIMES DAILY WITH MEALS
Status: DISCONTINUED | OUTPATIENT
Start: 2019-04-07 | End: 2019-04-09 | Stop reason: HOSPADM

## 2019-04-07 RX ADMIN — ISOSORBIDE MONONITRATE 60 MG: 60 TABLET, EXTENDED RELEASE ORAL at 11:12

## 2019-04-07 RX ADMIN — LOSARTAN POTASSIUM 25 MG: 25 TABLET ORAL at 11:12

## 2019-04-07 RX ADMIN — ATORVASTATIN CALCIUM 40 MG: 40 TABLET, FILM COATED ORAL at 21:30

## 2019-04-07 RX ADMIN — INSULIN LISPRO 4 UNITS: 100 INJECTION, SOLUTION INTRAVENOUS; SUBCUTANEOUS at 21:30

## 2019-04-07 RX ADMIN — HYDRALAZINE HYDROCHLORIDE 25 MG: 25 TABLET ORAL at 21:30

## 2019-04-07 RX ADMIN — INSULIN LISPRO 2 UNITS: 100 INJECTION, SOLUTION INTRAVENOUS; SUBCUTANEOUS at 08:46

## 2019-04-07 RX ADMIN — ENOXAPARIN SODIUM 40 MG: 40 INJECTION SUBCUTANEOUS at 21:29

## 2019-04-07 RX ADMIN — PREDNISONE 40 MG: 20 TABLET ORAL at 12:18

## 2019-04-07 RX ADMIN — CARVEDILOL 25 MG: 25 TABLET, FILM COATED ORAL at 08:45

## 2019-04-07 RX ADMIN — ACETAMINOPHEN 650 MG: 325 TABLET, FILM COATED ORAL at 21:36

## 2019-04-07 RX ADMIN — INSULIN LISPRO 4 UNITS: 100 INJECTION, SOLUTION INTRAVENOUS; SUBCUTANEOUS at 12:18

## 2019-04-07 RX ADMIN — MAGNESIUM SULFATE HEPTAHYDRATE 2 G: 40 INJECTION, SOLUTION INTRAVENOUS at 09:56

## 2019-04-07 RX ADMIN — Medication 10 ML: at 21:30

## 2019-04-07 RX ADMIN — FUROSEMIDE 40 MG: 10 INJECTION, SOLUTION INTRAMUSCULAR; INTRAVENOUS at 08:45

## 2019-04-07 RX ADMIN — CARVEDILOL 12.5 MG: 12.5 TABLET, FILM COATED ORAL at 16:58

## 2019-04-07 RX ADMIN — INSULIN LISPRO 6 UNITS: 100 INJECTION, SOLUTION INTRAVENOUS; SUBCUTANEOUS at 16:58

## 2019-04-07 RX ADMIN — SPIRONOLACTONE 25 MG: 25 TABLET ORAL at 08:45

## 2019-04-07 RX ADMIN — COLCHICINE 0.6 MG: 0.6 TABLET, FILM COATED ORAL at 09:56

## 2019-04-07 RX ADMIN — POTASSIUM CHLORIDE 20 MEQ: 20 TABLET, EXTENDED RELEASE ORAL at 08:45

## 2019-04-07 RX ADMIN — ASPIRIN 81 MG 81 MG: 81 TABLET ORAL at 08:45

## 2019-04-07 RX ADMIN — VITAMIN D, TAB 1000IU (100/BT) 1000 UNITS: 25 TAB at 08:45

## 2019-04-07 ASSESSMENT — PAIN - FUNCTIONAL ASSESSMENT: PAIN_FUNCTIONAL_ASSESSMENT: PREVENTS OR INTERFERES SOME ACTIVE ACTIVITIES AND ADLS

## 2019-04-07 ASSESSMENT — PAIN DESCRIPTION - ORIENTATION
ORIENTATION: LEFT
ORIENTATION: RIGHT;LEFT

## 2019-04-07 ASSESSMENT — PAIN DESCRIPTION - PAIN TYPE
TYPE: ACUTE PAIN
TYPE: ACUTE PAIN

## 2019-04-07 ASSESSMENT — PAIN SCALES - GENERAL
PAINLEVEL_OUTOF10: 3
PAINLEVEL_OUTOF10: 3
PAINLEVEL_OUTOF10: 6

## 2019-04-07 ASSESSMENT — PAIN DESCRIPTION - FREQUENCY: FREQUENCY: CONTINUOUS

## 2019-04-07 ASSESSMENT — PAIN DESCRIPTION - LOCATION
LOCATION: KNEE
LOCATION: HAND;KNEE

## 2019-04-07 ASSESSMENT — PAIN DESCRIPTION - DESCRIPTORS: DESCRIPTORS: ACHING;BURNING

## 2019-04-07 NOTE — PLAN OF CARE
Problem: OXYGENATION/RESPIRATORY FUNCTION  Goal: Patient will maintain patent airway  Outcome: Ongoing  Goal: Patient will achieve/maintain normal respiratory rate/effort  Description  Respiratory rate and effort will be within normal limits for the patient  Outcome: Ongoing     Problem: HEMODYNAMIC STATUS  Goal: Patient has stable vital signs and fluid balance  Outcome: Ongoing     Problem: FLUID AND ELECTROLYTE IMBALANCE  Goal: Fluid and electrolyte balance are achieved/maintained  Outcome: Ongoing     Problem: ACTIVITY INTOLERANCE/IMPAIRED MOBILITY  Goal: Mobility/activity is maintained at optimum level for patient  Outcome: Ongoing     Problem: Falls - Risk of:  Goal: Will remain free from falls  Description  Will remain free from falls  Outcome: Ongoing  Goal: Absence of physical injury  Description  Absence of physical injury  Outcome: Ongoing     Problem: Pain:  Goal: Pain level will decrease  Description  Pain level will decrease  Outcome: Ongoing  Goal: Control of acute pain  Description  Control of acute pain  Outcome: Ongoing  Goal: Control of chronic pain  Description  Control of chronic pain  Outcome: Ongoing

## 2019-04-07 NOTE — PROGRESS NOTES
Pt refused bedtime dose of sliding scale insulin and also refused to take his Lovenox. Blood pressure noted to be 82/59. Pt appears asymptomatic.   Perfect serve sent to MD.

## 2019-04-07 NOTE — PROGRESS NOTES
Hospitalist Progress Note    CC: <principal problem not specified>      Admit date: 4/6/2019  Days in hospital:  1    Subjective: Pt S/E. On RA, no SOB. States his wrist feels a little better but his knee still hurts when bearing wt on it. ROS:   Pertinent items are noted in HPI. Objective:    /79   Pulse 81   Temp 98.9 °F (37.2 °C) (Oral)   Resp 18   Ht 6' 3\" (1.905 m)   Wt 215 lb 6.4 oz (97.7 kg)   SpO2 97%   BMI 26.92 kg/m²     Gen: alert, NAD  HEENT: NC/AT, moist mucous membranes, no oropharyngeal erythema or exudate  Neck: supple, trachea midline, no JVD  Heart: Normal s1/s2, RRR, no murmurs, gallops, or rubs. Lungs: clear bilaterally, no wheezing, no rales, no rhonchi, no use of accessory muscles  Abd: bowel sounds present, soft, nontender, nondistended, no masses  Ext: rt wrist warm but less swollen and TTp today. Lt knee war, swollen +TTP  Skin: no rashes or lesions  Psych: A & O x3, affect appropriate  Neuro: grossly intact, moves all four extremities spontaneously.   Cap refill: +2 sec    Medications:  Scheduled Meds:   vitamin D  1,000 Units Oral Daily    potassium chloride  20 mEq Oral Daily    aspirin  81 mg Oral Daily    spironolactone  25 mg Oral Daily    isosorbide mononitrate  60 mg Oral Daily    hydrALAZINE  50 mg Oral 3 times per day    carvedilol  25 mg Oral BID WC    atorvastatin  40 mg Oral Nightly    losartan  25 mg Oral Daily    sodium chloride flush  10 mL Intravenous 2 times per day    enoxaparin  40 mg Subcutaneous Nightly    furosemide  40 mg Intravenous BID    insulin lispro  0-12 Units Subcutaneous TID WC    insulin lispro  0-6 Units Subcutaneous Nightly       PRN Meds:  sodium chloride flush, magnesium hydroxide, ondansetron, acetaminophen, glucose, dextrose, glucagon (rDNA), dextrose    IV:   dextrose           Intake/Output Summary (Last 24 hours) at 4/7/2019 0836  Last data filed at 4/7/2019 0412  Gross per 24 hour   Intake 540 ml   Output 1525 ml   Net -985 ml       Results:  CBC:   Recent Labs     04/06/19  0855 04/07/19  0526   WBC 7.4 6.0   HGB 14.3 13.7   HCT 43.1 40.8   MCV 90.0 88.2    224     BMP:   Recent Labs     04/06/19  0855 04/07/19  0526   * 137   K 4.0 3.7   CL 98* 97*   CO2 24 27   BUN 8 15   CREATININE 0.6* 0.9     Mag: No results for input(s): MAG in the last 72 hours. Phos: No results found for: PHOS  No components found for: GLU    LIVER PROFILE: No results for input(s): AST, ALT, LIPASE, BILIDIR, BILITOT, ALKPHOS in the last 72 hours. Invalid input(s): AMYLASE,  ALB  PT/INR: No results for input(s): PROTIME, INR in the last 72 hours. APTT: No results for input(s): APTT in the last 72 hours. UA:No results for input(s): NITRITE, COLORU, PHUR, LABCAST, WBCUA, RBCUA, MUCUS, TRICHOMONAS, YEAST, BACTERIA, CLARITYU, SPECGRAV, LEUKOCYTESUR, UROBILINOGEN, BILIRUBINUR, BLOODU, GLUCOSEU, AMORPHOUS in the last 72 hours. Invalid input(s): Armani Presleyter input(s): ABG  Lab Results   Component Value Date    CALCIUM 8.8 04/07/2019       Assessment:    Active Problems:    CHF (congestive heart failure), NYHA class III, acute, systolic (HCC)  Resolved Problems:    * No resolved hospital problems. [facility] course: a 46 y.o. male who presents to [facility] with chest pain, SOB. In the ED his ECG showed nonspecific ST changes; the Twave inversions in lateral leads were present on previous ECGs. His initial troponins were negative. His BNP was elevated. CT chest and CXR were nonacute. He also had pain and swelling in his left knee and rt hand. He was here for the same reasons of CHF exacerbation and elevated troponins from 12/3/18 - 12/11/18.  He has continued to have decompensations with an effort by cardiology to keep him out of the hospital         Plan:  Cardiomyopathy   chronic systolic CHF with questionable exacerbation - he appears euvolemic now  - last ECHO: LVEF 15-20%, pulm HTN  - proBNP 68K  - lasix 40 mg IV switched to PO lasix tomorrow  - cont BB, ARB, aldactone   - daily wts; dry wt 218 - 221lbs, today he is 215  - I/O: .9 L out since admission   - consult CHF RN  - consult cardiology     CAD  - trop neg x3  - cath 12/18: proximal RCA 30% disease, LCx 50% disease, LAD 25%, LVEF 20%, pulm HTN   - cont ASA, statin, BB     Gout flare, new onset   - pain and swelling in Lt knee and Rt hand  - check uric acid not elevated, although this does not preclude gout  - started colchicine 1.2 mg x 1, then . 6 mg daily  - will give a dose of prednisone 40 mg today  - if no improvement in symptoms, will consult ortho for aspiration  - will need allopurinol after acute attack resolves     HTN  - cont BB, ARB, indur, hydralazine, lasix     Diabetes  - stable  - hold home PO meds  - SSI, accuchecks      Code status:  full  DVT prophylaxis: [x] Lovenox  [] SQ Heparin  [] SCDs because of  [] warfarin/oral direct thrombin inhibitor [] Encourage ambulation      Disposition:  [] Home [] Rehab [] Psych [] SNF  [] LTAC  [] Transfer to ICU  [] Transfer to PCU [] Other: in pt      Electronically signed by Romy Grimm DO on 4/7/2019 at 8:36 AM

## 2019-04-07 NOTE — FLOWSHEET NOTE
/65, pt symptomatic with some dizziness on standing. Spoke with Dr. Marychuy King, see new orders for parameters on medications.

## 2019-04-07 NOTE — PROGRESS NOTES
Caden Carroll NP notified of pt's vital signs and episode of PAT.   Will continue to observe pt and repeat vital signs in a couple of hours

## 2019-04-07 NOTE — CONSULTS
[facility]   Cardiology Consultation   Date: 4/7/2019  Admit Date:  4/6/2019  Reason for Consultation: chest pain, dyspnea  Consult Requesting Physician: Kishore Lazo DO     Chief Complaint   Patient presents with    Shortness of Breath     started a \"couple of days ago\" woith swelling in right hand and left knee    Chest Pain     started a \"couple of days ago\", dx with CHF in December     HPI: Christel Grajeda is a 46 y.o. male who presents to [facility] ED with chest pain and dyspnea. [facility] ED workup reveals BNP 6800+, CT Chest and CXR unrevealing for PE, pleural effusion, pulmonary edema. Joselo negative x 3. EKG shows nonspecific ST changes and known T wave inversion in lateral leads. Past Medical History:   Diagnosis Date    [facility]congest[facility]) [facility]     Diabetes mellitus ([facility]     Hyperlipidemia     Hypertension     Vitamin D deficiency         Past Surgical History:   Procedure Laterality Date    DIAGNOSTIC CARDIAC CATH LAB PROCEDURE  12/2018       Allergies   Allergen Reactions    Lisinopril Swelling     Lips swell       Social History:  Reviewed. reports that he has quit smoking. His smoking use included cigarettes. He smoked 0.20 packs per day. He has never used smokeless tobacco. He reports that he drinks alcohol. He reports that he does not use drugs. Family History:  Reviewed. family history includes Diabetes in his father; Heart Failure in his mother and sister; Hypertension in his father and sister. No premature CAD. Review of System:  All other systems reviewed except for that noted above.  Pertinent negatives and positives are:     · General: negative for fever, chills   · Ophthalmic ROS: negative for - eye pain or loss of vision  · ENT ROS: negative for - headaches, sore throat   · Respiratory: negative for - cough, sputum  · Cardiovascular: Reviewed in HPI  · Gastrointestinal: negative for - abdominal pain, diarrhea, N/V  · Hematology: negative for - bleeding, blood clots, bruising or jaundice  · Genito-Urinary:  negative for - Dysuria or incontinence  · Musculoskeletal: negative for - Joint swelling, muscle pain  · Neurological: negative for - confusion, dizziness, headaches   · Psychiatric: No anxiety, no depression. · Dermatological: negative for - rash    Physical Examination:  Vitals:    19 0806   BP:    Pulse:    Resp: 18   Temp:    SpO2: 97%        Intake/Output Summary (Last 24 hours) at 2019 0907  Last data filed at 2019 0852  Gross per 24 hour   Intake 660 ml   Output 2025 ml   Net -1365 ml     In: 420 [P.O.:420]  Out: 725    Wt Readings from Last 3 Encounters:   19 215 lb 6.4 oz (97.7 kg)   19 229 lb 12.8 oz (104.2 kg)   19 228 lb (103.4 kg)     Temp  Av °F (37.2 °C)  Min: 98.2 °F (36.8 °C)  Max: 99.7 °F (37.6 °C)  Pulse  Av.4  Min: 81  Max: 139  BP  Min: 82/59  Max: 167/106  SpO2  Av.9 %  Min: 93 %  Max: 100 %    · Telemetry: Sinus rhythm with v-rates 90's bpm   · Constitutional: Alert. Oriented to person, place, and time. No distress. · Head: Normocephalic and atraumatic. · Mouth/Throat: Lips appear moist. Oropharynx is clear and moist.  · Eyes: Conjunctivae normal. EOM are normal.   · Neck: Neck supple. No lymphadenopathy. No rigidity. No JVD present. · Cardiovascular: Normal rate, regular rhythm. Normal S1&S2. Carotid pulse 2+ bilaterally. · Pulmonary/Chest: Bilateral respiratory sounds present. No respiratory accessory muscle use. No wheezes, No rhonchi. Few crackles bibasilar, but not profound. · Abdominal: Soft. Normal bowel sounds present. No distension, No tenderness. No splenomegaly. No hernia. · Musculoskeletal: No tenderness. No edema    · Lymphadenopathy: Has no cervical adenopathy. · Neurological: Alert and oriented. Cranial nerve II-XII grossly intact, No gross deficit to touch. · Skin: Skin is warm and dry. No rash, lesions, ulcerations noted.   · Psychiatric: No anxiety nor agitation. Labs:  Reviewed. Recent Labs     04/06/19  0855 04/07/19  0526   * 137   K 4.0 3.7   CL 98* 97*   CO2 24 27   BUN 8 15   CREATININE 0.6* 0.9     Recent Labs     04/06/19  0855 04/07/19  0526   WBC 7.4 6.0   HGB 14.3 13.7   HCT 43.1 40.8   MCV 90.0 88.2    224     Lab Results   Component Value Date    TROPONINI <0.01 04/06/2019     No results found for: BNP  No results found for: PROTIME, INR  Lab Results   Component Value Date    CHOL 120 04/07/2019    HDL 38 04/07/2019    TRIG 56 04/07/2019       Diagnostic and imaging results reviewed. ECG: sinus rhythm with v-rate 80's bpm, nonspecific ST changes. T wave inversion is known. Echo: 12/3/18 Summary   Global left ventricular function is severely decreased with ejection   fraction estimated from 15 % to 20 %.   concentric left ventricular hypertrophy.   The left atrium is moderately dilated.   Right ventricular systolic function is severely reduced   mild MR   SPAP 56 mmHg markedly elevated RA pressure (15 mmHg).    Cath: n/a    I independently reviewed the ECG and telemetry.     Scheduled Meds:   colchicine  0.6 mg Oral Daily    furosemide  40 mg Intravenous Daily    magnesium sulfate  2 g Intravenous Once    vitamin D  1,000 Units Oral Daily    potassium chloride  20 mEq Oral Daily    aspirin  81 mg Oral Daily    spironolactone  25 mg Oral Daily    isosorbide mononitrate  60 mg Oral Daily    hydrALAZINE  50 mg Oral 3 times per day    carvedilol  25 mg Oral BID WC    atorvastatin  40 mg Oral Nightly    losartan  25 mg Oral Daily    sodium chloride flush  10 mL Intravenous 2 times per day    enoxaparin  40 mg Subcutaneous Nightly    insulin lispro  0-12 Units Subcutaneous TID WC    insulin lispro  0-6 Units Subcutaneous Nightly     Continuous Infusions:   dextrose       PRN Meds:.sodium chloride flush, magnesium hydroxide, ondansetron, acetaminophen, glucose, dextrose, glucagon (rDNA), dextrose     Assessment: Patient Active Problem List    Diagnosis Date Noted    CHF (congestive heart failure), NYHA class III, acute, systolic ([facility] 96/70/6732    Acute on chronic systolic HF (heart failure) ([facility] 02/06/2019    CAD in native artery     Tobacco abuse     Nonischemic cardiomyopathy ([facility]     Essential hypertension     Hyperlipidemia     DMII (diabetes mellitus, type 2) ([facility]       Active Hospital Problems    Diagnosis Date Noted    CHF (congestive heart failure), NYHA class III, acute, systolic ([facility]) [R26.97] 81/05/6893         Recommendation(s):    Chest pain  -EKG unremarkable  -Joselo negative x 3 - no signs of myocardial infarction  -no further workup at this time. Consider outpatient stress test.  -cont ASA, lipitor, Coreg. Dyspnea  -although BNP is elevated, the patient's abdomen, lung exam, BLE, and JVP do not appear abnormal.  -no current obvious signs of CHF.  -diuresed overnight (-985cc)and therefore now appears near euvolemic.  -perhaps one more day of IV lasix, and then transition to oral tomorrow or late today.  -cont coreg, losartan, spironolactone. Will follow with you. Thank you for allowing me to participate in the care of [email or web address] . If you have any questions/comments, please do not hesitate to contact us.       Maria Eugenia Whitaker MD, MS, [facility]  Cardiac Electrophysiology  [address]  [address] [facility], [address] [place] [address]  Fernando Celestin [facility] [address]  [phone]

## 2019-04-08 ENCOUNTER — TELEPHONE (OUTPATIENT)
Dept: ORTHOPEDIC SURGERY | Age: 54
End: 2019-04-08

## 2019-04-08 ENCOUNTER — APPOINTMENT (OUTPATIENT)
Dept: GENERAL RADIOLOGY | Age: 54
DRG: 194 | End: 2019-04-08
Payer: COMMERCIAL

## 2019-04-08 PROBLEM — I50.22 CHRONIC SYSTOLIC HF (HEART FAILURE) (HCC): Status: ACTIVE | Noted: 2019-02-06

## 2019-04-08 LAB
ANION GAP SERPL CALCULATED.3IONS-SCNC: 11 MMOL/L (ref 3–16)
APPEARANCE FLUID: NORMAL
BUN BLDV-MCNC: 17 MG/DL (ref 7–20)
C-REACTIVE PROTEIN: 39.1 MG/L (ref 0–5.1)
CALCIUM SERPL-MCNC: 8.7 MG/DL (ref 8.3–10.6)
CELL COUNT FLUID TYPE: NORMAL
CHLORIDE BLD-SCNC: 102 MMOL/L (ref 99–110)
CLOT EVALUATION: NORMAL
CO2: 27 MMOL/L (ref 21–32)
COLOR FLUID: YELLOW
CREAT SERPL-MCNC: 0.7 MG/DL (ref 0.9–1.3)
CRYSTAL CMT 2: NORMAL
CRYSTALS, FLUID: NORMAL
EOSINOPHIL FLUID: 1 %
GFR AFRICAN AMERICAN: >60
GFR NON-AFRICAN AMERICAN: >60
GLUCOSE BLD-MCNC: 170 MG/DL (ref 70–99)
GLUCOSE BLD-MCNC: 188 MG/DL (ref 70–99)
GLUCOSE BLD-MCNC: 191 MG/DL (ref 70–99)
GLUCOSE BLD-MCNC: 204 MG/DL (ref 70–99)
GLUCOSE BLD-MCNC: 217 MG/DL (ref 70–99)
LYMPHOCYTES, BODY FLUID: 1 %
MACROPHAGE FLUID: 28 %
MAGNESIUM: 1.9 MG/DL (ref 1.8–2.4)
NEUTROPHIL, FLUID: 70 %
NUCLEATED CELLS FLUID: 6075 /CUMM
NUMBER OF CELLS COUNTED FLUID: 100
PERFORMED ON: ABNORMAL
POTASSIUM SERPL-SCNC: 4 MMOL/L (ref 3.5–5.1)
PRO-BNP: 627 PG/ML (ref 0–124)
RBC FLUID: 2082 /CUMM
SEDIMENTATION RATE, ERYTHROCYTE: 69 MM/HR (ref 0–20)
SODIUM BLD-SCNC: 140 MMOL/L (ref 136–145)
SOURCE BODY FLUID: NORMAL
URIC ACID, SERUM: 7.2 MG/DL (ref 3.5–7.2)

## 2019-04-08 PROCEDURE — 87205 SMEAR GRAM STAIN: CPT

## 2019-04-08 PROCEDURE — 88112 CYTOPATH CELL ENHANCE TECH: CPT

## 2019-04-08 PROCEDURE — 99232 SBSQ HOSP IP/OBS MODERATE 35: CPT | Performed by: NURSE PRACTITIONER

## 2019-04-08 PROCEDURE — 99221 1ST HOSP IP/OBS SF/LOW 40: CPT | Performed by: NURSE PRACTITIONER

## 2019-04-08 PROCEDURE — 87070 CULTURE OTHR SPECIMN AEROBIC: CPT

## 2019-04-08 PROCEDURE — 1200000000 HC SEMI PRIVATE

## 2019-04-08 PROCEDURE — 97161 PT EVAL LOW COMPLEX 20 MIN: CPT

## 2019-04-08 PROCEDURE — 89051 BODY FLUID CELL COUNT: CPT

## 2019-04-08 PROCEDURE — 83880 ASSAY OF NATRIURETIC PEPTIDE: CPT

## 2019-04-08 PROCEDURE — 97116 GAIT TRAINING THERAPY: CPT

## 2019-04-08 PROCEDURE — 85652 RBC SED RATE AUTOMATED: CPT

## 2019-04-08 PROCEDURE — 83735 ASSAY OF MAGNESIUM: CPT

## 2019-04-08 PROCEDURE — 94762 N-INVAS EAR/PLS OXIMTRY CONT: CPT

## 2019-04-08 PROCEDURE — 73560 X-RAY EXAM OF KNEE 1 OR 2: CPT

## 2019-04-08 PROCEDURE — 89060 EXAM SYNOVIAL FLUID CRYSTALS: CPT

## 2019-04-08 PROCEDURE — G0378 HOSPITAL OBSERVATION PER HR: HCPCS

## 2019-04-08 PROCEDURE — 0S9D3ZZ DRAINAGE OF LEFT KNEE JOINT, PERCUTANEOUS APPROACH: ICD-10-PCS | Performed by: INTERNAL MEDICINE

## 2019-04-08 PROCEDURE — 6370000000 HC RX 637 (ALT 250 FOR IP): Performed by: FAMILY MEDICINE

## 2019-04-08 PROCEDURE — 80048 BASIC METABOLIC PNL TOTAL CA: CPT

## 2019-04-08 PROCEDURE — 20610 DRAIN/INJ JOINT/BURSA W/O US: CPT | Performed by: STUDENT IN AN ORGANIZED HEALTH CARE EDUCATION/TRAINING PROGRAM

## 2019-04-08 PROCEDURE — 88305 TISSUE EXAM BY PATHOLOGIST: CPT

## 2019-04-08 PROCEDURE — 36415 COLL VENOUS BLD VENIPUNCTURE: CPT

## 2019-04-08 PROCEDURE — 86140 C-REACTIVE PROTEIN: CPT

## 2019-04-08 PROCEDURE — 84550 ASSAY OF BLOOD/URIC ACID: CPT

## 2019-04-08 PROCEDURE — 87040 BLOOD CULTURE FOR BACTERIA: CPT

## 2019-04-08 PROCEDURE — 73110 X-RAY EXAM OF WRIST: CPT

## 2019-04-08 RX ADMIN — SPIRONOLACTONE 25 MG: 25 TABLET ORAL at 09:23

## 2019-04-08 RX ADMIN — ASPIRIN 81 MG 81 MG: 81 TABLET ORAL at 09:23

## 2019-04-08 RX ADMIN — ACETAMINOPHEN 650 MG: 325 TABLET, FILM COATED ORAL at 20:58

## 2019-04-08 RX ADMIN — FUROSEMIDE 40 MG: 40 TABLET ORAL at 09:23

## 2019-04-08 RX ADMIN — ATORVASTATIN CALCIUM 40 MG: 40 TABLET, FILM COATED ORAL at 20:56

## 2019-04-08 RX ADMIN — VITAMIN D, TAB 1000IU (100/BT) 1000 UNITS: 25 TAB at 09:23

## 2019-04-08 RX ADMIN — INSULIN LISPRO 4 UNITS: 100 INJECTION, SOLUTION INTRAVENOUS; SUBCUTANEOUS at 12:15

## 2019-04-08 RX ADMIN — HYDRALAZINE HYDROCHLORIDE 25 MG: 25 TABLET ORAL at 06:57

## 2019-04-08 RX ADMIN — LOSARTAN POTASSIUM 25 MG: 25 TABLET ORAL at 09:23

## 2019-04-08 RX ADMIN — HYDRALAZINE HYDROCHLORIDE 25 MG: 25 TABLET ORAL at 20:56

## 2019-04-08 RX ADMIN — CARVEDILOL 12.5 MG: 12.5 TABLET, FILM COATED ORAL at 09:23

## 2019-04-08 RX ADMIN — POTASSIUM CHLORIDE 20 MEQ: 20 TABLET, EXTENDED RELEASE ORAL at 09:23

## 2019-04-08 RX ADMIN — INSULIN LISPRO 2 UNITS: 100 INJECTION, SOLUTION INTRAVENOUS; SUBCUTANEOUS at 20:56

## 2019-04-08 RX ADMIN — COLCHICINE 0.6 MG: 0.6 TABLET, FILM COATED ORAL at 09:23

## 2019-04-08 RX ADMIN — FUROSEMIDE 40 MG: 40 TABLET ORAL at 17:05

## 2019-04-08 RX ADMIN — ISOSORBIDE MONONITRATE 60 MG: 60 TABLET, EXTENDED RELEASE ORAL at 09:23

## 2019-04-08 RX ADMIN — INSULIN LISPRO 2 UNITS: 100 INJECTION, SOLUTION INTRAVENOUS; SUBCUTANEOUS at 17:05

## 2019-04-08 ASSESSMENT — PAIN SCALES - GENERAL
PAINLEVEL_OUTOF10: 5
PAINLEVEL_OUTOF10: 5
PAINLEVEL_OUTOF10: 0

## 2019-04-08 ASSESSMENT — PAIN DESCRIPTION - ONSET
ONSET: ON-GOING
ONSET: ON-GOING

## 2019-04-08 ASSESSMENT — PAIN - FUNCTIONAL ASSESSMENT
PAIN_FUNCTIONAL_ASSESSMENT: ACTIVITIES ARE NOT PREVENTED
PAIN_FUNCTIONAL_ASSESSMENT: PREVENTS OR INTERFERES SOME ACTIVE ACTIVITIES AND ADLS

## 2019-04-08 ASSESSMENT — PAIN DESCRIPTION - PAIN TYPE
TYPE: ACUTE PAIN
TYPE: ACUTE PAIN

## 2019-04-08 ASSESSMENT — PAIN DESCRIPTION - LOCATION
LOCATION: KNEE
LOCATION: KNEE

## 2019-04-08 ASSESSMENT — PAIN DESCRIPTION - FREQUENCY
FREQUENCY: INTERMITTENT
FREQUENCY: CONTINUOUS

## 2019-04-08 ASSESSMENT — PAIN DESCRIPTION - PROGRESSION
CLINICAL_PROGRESSION: NOT CHANGED
CLINICAL_PROGRESSION: GRADUALLY IMPROVING

## 2019-04-08 ASSESSMENT — PAIN DESCRIPTION - ORIENTATION
ORIENTATION: LEFT
ORIENTATION: LEFT

## 2019-04-08 ASSESSMENT — PAIN DESCRIPTION - DESCRIPTORS
DESCRIPTORS: ACHING;BURNING
DESCRIPTORS: ACHING

## 2019-04-08 NOTE — CONSULTS
12.5 mg, 12.5 mg, Oral, BID WC  hydrALAZINE (APRESOLINE) tablet 25 mg, 25 mg, Oral, 3 times per day  vitamin D (CHOLECALCIFEROL) tablet 1,000 Units, 1,000 Units, Oral, Daily  potassium chloride (KLOR-CON M) extended release tablet 20 mEq, 20 mEq, Oral, Daily  aspirin chewable tablet 81 mg, 81 mg, Oral, Daily  spironolactone (ALDACTONE) tablet 25 mg, 25 mg, Oral, Daily  isosorbide mononitrate (IMDUR) extended release tablet 60 mg, 60 mg, Oral, Daily  atorvastatin (LIPITOR) tablet 40 mg, 40 mg, Oral, Nightly  losartan (COZAAR) tablet 25 mg, 25 mg, Oral, Daily  sodium chloride flush 0.9 % injection 10 mL, 10 mL, Intravenous, 2 times per day  sodium chloride flush 0.9 % injection 10 mL, 10 mL, Intravenous, PRN  magnesium hydroxide (MILK OF MAGNESIA) 400 MG/5ML suspension 30 mL, 30 mL, Oral, Daily PRN  ondansetron (ZOFRAN) injection 4 mg, 4 mg, Intravenous, Q6H PRN  enoxaparin (LOVENOX) injection 40 mg, 40 mg, Subcutaneous, Nightly  acetaminophen (TYLENOL) tablet 650 mg, 650 mg, Oral, Q4H PRN  insulin lispro (HUMALOG) injection vial 0-12 Units, 0-12 Units, Subcutaneous, TID WC  insulin lispro (HUMALOG) injection vial 0-6 Units, 0-6 Units, Subcutaneous, Nightly  glucose (GLUTOSE) 40 % oral gel 15 g, 15 g, Oral, PRN  dextrose 50 % solution 12.5 g, 12.5 g, Intravenous, PRN  glucagon (rDNA) injection 1 mg, 1 mg, Intramuscular, PRN  dextrose 5 % solution, 100 mL/hr, Intravenous, PRN  Allergies:  Lisinopril    REVIEW OF SYSTEMS:    CONSTITUTIONAL:  negative for  fevers, chills and malaise  MUSCULOSKELETAL:  positive for  myalgias, arthralgias and pain  All other ROS reviewed in chart or with patient or family and are grossly negative. PHYSICAL EXAM:    VITALS:  /72   Pulse 86   Temp 98.2 °F (36.8 °C) (Oral)   Resp 16   Ht 6' 3\" (1.905 m)   Wt 215 lb 12.8 oz (97.9 kg)   SpO2 98%   BMI 26.97 kg/m²     MUSCULOSKELETAL:  bilateral foot NVI. Wiggles toes to command. Pedal pulses are palpable.  Bilateral hands NVI. Right hand grasp is not full due to right wrist pain and mild right hand and finger swelling. Able to flex and extend right wrist with mild limitation due to pain only. NO gross deformity noted. Left knee with moderate effusion and warmth, no erythema. There is moderate tenderness left knee with palpation but FROM is noted. Right knee without pain, swelling or limitations Bilateral great toes with tenderness at the base but no warmth, erythema or swelling is noted. NEUROLOGIC:   Sensory:    Touch:                     Right Upper Extremity:  normal                   Left Upper Extremity:  normal                  Right Lower Extremity:  normal                  Left Lower Extremity:  normal  Skin warm and dry  Resp deep and easy  Abdomen soft and round  Pulse is with regular rate and rhythm    DATA:    CBC:   Lab Results   Component Value Date    WBC 6.0 04/07/2019    RBC 4.62 04/07/2019    HGB 13.7 04/07/2019    HCT 40.8 04/07/2019    MCV 88.2 04/07/2019    MCH 29.7 04/07/2019    MCHC 33.6 04/07/2019    RDW 15.4 04/07/2019     04/07/2019    MPV 8.0 04/07/2019     WBC:    Lab Results   Component Value Date    WBC 6.0 04/07/2019     PT/INR:  No results found for: PROTIME, INR  PTT:  No results found for: APTT[APTT  Sed rate:pending  CRP:39.1  Radiology Review:  Noted left knee xray 2/21/19 with moderate effusion. No fracture    Xrays left knee and right wrist ordered           IMPRESSION/RECOMMENDATIONS:    Hx ETOH use  Right wrist pain, improving  Left knee pain with effusion, doubt infection but would wait until aspirate cx complete to confirm  Bilateral great toe pain, ? Podagra/arthritic  Dr Molly Tyler wants to perform knee aspirate today for me, agreed. Look for cx and crystals to confirm or deny its gout On colchicine, helping wrist. Consider medrol pack if cx neg. Had one dose Deltasone yesterday. WBAT left leg and right wrist.   Will follow. Discussed with Dr Frederick Thompson via [address] [place] [address] Jessie Gee Shine  4/8/2019  11:14 AM

## 2019-04-08 NOTE — CARE COORDINATION
INITIAL CASE MANAGEMENT ASSESSMENT    Reviewed chart, met with patient to assess possible discharge needs. Explained Case Management role/services. Living Situation: Patient lives with his Sister in a house with 5 steps to enter. ADLs: Independent      DME: none    PT/OT Recs: not seen     Active Services: none     Transportation: Doesn't drive/Sister will transport to home     Medications: [employer]/affordable with Medicaid    PCP: Dr. Adrienne Guerrero      HD/PD: n/a    PLAN/COMMENTS: Patient plans to return to home with family support. SW/CM provided contact information for patient or family to call with any questions. SW/CM will follow and assist as needed. Electronically signed by Desi Uribe RN on 4/8/2019 at 4:03 PM

## 2019-04-08 NOTE — TELEPHONE ENCOUNTER
Consult left knee pain ,right wrist pain, ?gout. Hospitalists' med student will perform the aspiration today. On colchicine. Pt is able to bend both well.

## 2019-04-08 NOTE — PROGRESS NOTES
hours) at 4/8/2019 0840  Last data filed at 4/8/2019 0730  Gross per 24 hour   Intake 1200 ml   Output 1500 ml   Net -300 ml     Wt Readings from Last 3 Encounters:   04/08/19 217 lb 9.6 oz (98.7 kg)   02/21/19 229 lb 12.8 oz (104.2 kg)   02/21/19 228 lb (103.4 kg)       Physical Exam:  General: In no acute distress. Awake, alert, and oriented X4. Resting comfortably  Skin:  Warm and dry. No new appearing rashes or lesions. Neck:  Supple. No JVD or carotid bruit appreciated. Chest: Lungs clear to auscultation. No wheezes/rhonchi/rales  Cardiovascular:  RRR. Normal S1 and S2. No murmur/gallop or rub  Abdomen:  soft, nontender, nondistended, +bowel sounds. No hepatomegaly  Extremities:  No LE edema. No clubbing or cyanosis. L knee tender to touch and with erythema and swelling; R hand with minimal tenderness. 2+ bilateral radial/brachial pulses; 2+ bilateral femoral, DP/PT pulses. Cap refill brisk.      Medications:    colchicine  0.6 mg Oral Daily    furosemide  40 mg Oral BID    carvedilol  12.5 mg Oral BID WC    hydrALAZINE  25 mg Oral 3 times per day    vitamin D  1,000 Units Oral Daily    potassium chloride  20 mEq Oral Daily    aspirin  81 mg Oral Daily    spironolactone  25 mg Oral Daily    isosorbide mononitrate  60 mg Oral Daily    atorvastatin  40 mg Oral Nightly    losartan  25 mg Oral Daily    sodium chloride flush  10 mL Intravenous 2 times per day    enoxaparin  40 mg Subcutaneous Nightly    insulin lispro  0-12 Units Subcutaneous TID     insulin lispro  0-6 Units Subcutaneous Nightly      dextrose       sodium chloride flush, magnesium hydroxide, ondansetron, acetaminophen, glucose, dextrose, glucagon (rDNA), dextrose    Lab Data:  CBC:   Recent Labs     04/06/19  0855 04/07/19  0526   WBC 7.4 6.0   HGB 14.3 13.7    224     BMP:    Recent Labs     04/06/19  0855 04/07/19  0526 04/08/19  0607   * 137 140   K 4.0 3.7 4.0   CO2 24 27 27   BUN 8 15 17   CREATININE 0.6* 0.9 mean pulmonary arterial pressure of 28.  6.  Normal mixed venous oxygen saturations at 65% in the right atrium  and 60% in the pulmonary artery. 7.  Pulmonary vascular resistance of 31.  8.  Normal cardiac output by thermodilution at 5.08 liters per minute  with a cardiac index of 2.19 liters per kilogram per minute.  This is  likely falsely elevated. Telemetry: SR-sinus tachycardia with rare PVC    Assessment/Plan:    1. Chest pain  -no objective evidence of ischemia  -improved and without recurrence  -known CAD (nonobstructive) on prior cath in 12/2018: 50% circ at bifurcation with OM, LAD 25%, RCA 30%  -continue ASA, statin and BB    2. Nonischemic Cardiomyopathy with chronic systolic HF  -euvolemic at present; last LVEF 15-20%  -now on po lasix; doubt significant decompensation on admission  -continue carvedilol, lasix, losartan, hydralazine and imdur  -NYHA class II-III  -low sodium diet and fluid restriction    3. Essential HTN  -on multiple antihypertensives  -goal BP < 130/80  -controlled    4. Gout  -L knee and R hand  -on colchicine  -RX per hospitalist: ? Ortho consult for aspiration    5. Diabetes Mellitus, type II  -Rx per hospitalist    6. H/O tobacco use  -has remained off cigarettes     He is stable from a cardiac standpoint. His CHF is compensated on exam. Defer RX of gout to hospitalist.    Emphasized and reinforced low-fat/low sodium diet, monitoring of daily weights, fluid restriction, worsening signs and symptoms of heart failure and when to call, and the importance of regular exercise and activity.     Will need follow up with cardiology (D. Enzweiler, CNP) within 7 days of discharge    Continue ASA, atorvastatin, carvedilol, lasix, hydralazine, imdur, Losartan, Klor Con and spironolactone    Will follow up as outpt. Please call if questions. Thanks for allowing us to participate in Mr. Elvia chowdary.      Electronically signed by MACO Martinez CNP on 4/8/2019 at 8:40 AM

## 2019-04-08 NOTE — PROGRESS NOTES
Internal Medicine Resident PGY-2  Progress Note    Admit Date: 4/6/2019      Chief Complaint: SOB, chest pain. Interval History: Urine output 1.5L/24h; weight is down from admission. IV lasix transitioned to oral home dose 40 mg PO BID. Continues to have right wrist pain and left knee pain/swelling. Hospital Course: Pt complaining of right wrist and left knee pain and swelling. Pain is improved since time of admission, he was unable to bear weight on knee on admission but now is able. Pt denies fever, chills. States he has had similar pain, swelling a couple weeks prior in his wrist. Denies history of trauma, gout, IVDU, multiple sexual partners.      Scheduled Medications:    colchicine  0.6 mg Oral Daily    furosemide  40 mg Oral BID    carvedilol  12.5 mg Oral BID WC    hydrALAZINE  25 mg Oral 3 times per day    vitamin D  1,000 Units Oral Daily    potassium chloride  20 mEq Oral Daily    aspirin  81 mg Oral Daily    spironolactone  25 mg Oral Daily    isosorbide mononitrate  60 mg Oral Daily    atorvastatin  40 mg Oral Nightly    losartan  25 mg Oral Daily    sodium chloride flush  10 mL Intravenous 2 times per day    enoxaparin  40 mg Subcutaneous Nightly    insulin lispro  0-12 Units Subcutaneous TID WC    insulin lispro  0-6 Units Subcutaneous Nightly      PRN Medications: sodium chloride flush, magnesium hydroxide, ondansetron, acetaminophen, glucose, dextrose, glucagon (rDNA), dextrose    Continuous Infusions:   dextrose         PHYSICAL EXAM:  /72   Pulse 86   Temp 98.2 °F (36.8 °C) (Oral)   Resp 16   Ht 6' 3\" (1.905 m)   Wt 215 lb 12.8 oz (97.9 kg)   SpO2 98%   BMI 26.97 kg/m²   Recent Labs     04/07/19  0737 04/07/19  1138 04/07/19  1651 04/07/19  1959 04/08/19  0753   POCGLU 178* 201* 251* 336* 170*       Intake/Output Summary (Last 24 hours) at 4/8/2019 0932  Last data filed at 4/8/2019 0929  Gross per 24 hour   Intake 1320 ml   Output 1000 ml   Net 320 ml Physical Exam   Constitutional: He is oriented to person, place, and time. No distress. HENT:   Head: Normocephalic and atraumatic. Eyes: Pupils are equal, round, and reactive to light. EOM are normal.   Neck: Normal range of motion. Neck supple. No JVD present. Cardiovascular: Normal rate, regular rhythm, normal heart sounds and intact distal pulses. No murmur heard. Pulmonary/Chest: Effort normal and breath sounds normal. No stridor. No respiratory distress. He has no wheezes. He has no rales. Abdominal: Soft. Bowel sounds are normal. He exhibits no distension. There is no tenderness. Musculoskeletal: Normal range of motion. Right wrist with swelling, tenderness; pain with passive/active ROM. Left knee with effusion, ROM intact but with pain. Neurological: He is alert and oriented to person, place, and time. He displays normal reflexes. No cranial nerve deficit or sensory deficit. He exhibits normal muscle tone. Coordination normal.   Skin: Skin is warm and dry. He is not diaphoretic. LABS:  Recent Labs     04/06/19  0855 04/07/19  0526   WBC 7.4 6.0   HGB 14.3 13.7   HCT 43.1 40.8    224                                                                    Recent Labs     04/06/19  0855 04/07/19  0526 04/08/19  0607   * 137 140   K 4.0 3.7 4.0   CL 98* 97* 102   CO2 24 27 27   BUN 8 15 17   CREATININE 0.6* 0.9 0.7*   GLUCOSE 191* 149* 191*     No results for input(s): AST, ALT, ALB, BILITOT, ALKPHOS in the last 72 hours. Recent Labs     04/06/19  0855 04/06/19  1407 04/06/19  2104   TROPONINI <0.01 <0.01 <0.01     No results for input(s): BNP in the last 72 hours. Recent Labs     04/07/19  0526   CHOL 120   HDL 38*     No results for input(s): INR in the last 72 hours. ECG: sinus rhythm with v-rate 80's bpm, nonspecific ST changes. T wave inversion is known.   Echo: 12/3/18 Summary   Global left ventricular function is severely decreased with ejection   fraction estimated from 15 % to 20 %.   concentric left ventricular hypertrophy.   The left atrium is moderately dilated.   Right ventricular systolic function is severely reduced   mild MR   SPAP 56 mmHg markedly elevated RA pressure (15 mmHg). Cath 12/3/18:  1. Right dominant coronary arterial circulation with a 30% proximal RCA  disease in the dominant vessel. These are very large coronary arteries. In the left system, there is no  left main disease. The circumflex  artery has 50% disease at the bifurcation of the obtuse marginal  branches and the proximal circ. In the left anterior descending artery,  there is a 25% disease in the proximal and mid-portions. 2.  Severe left ventricular systolic function. LV ejection fraction of  20%. 3.  Moderately elevated left ventricular end-diastolic pressure at 25  mmHg. 4.  Evidence of right-sided volume overload with right atrial pressure  of 11 and pulmonary capillary wedge pressure of 26 mmHg. This  corresponds to left ventricular end-diastolic pressure of 25 mmHg. 5.  Pulmonary hypertension, likely venous with an instantaneous pressure  of 37/15 for a mean pulmonary arterial pressure of 28.  6.  Normal mixed venous oxygen saturations at 65% in the right atrium  and 60% in the pulmonary artery. 7.  Pulmonary vascular resistance of 31.  8.  Normal cardiac output by thermodilution at 5.08 liters per minute  with a cardiac index of 2.19 liters per kilogram per minute. This is  likely falsely elevated. Assessment & Plan:    Millie Pierson is a 46 y.o. male, who was admitted with <principal problem not specified>. Cardiomyopathy, nonischemic. Acute on chronic systolic CHF exacerbation, now compensated. - last ECHO: LVEF 15-20%, pulm HTN  - proBNP 68K  - transitioned from IV lasix to home PO 40 mg BID.    - cont BB, ARB, aldactone  - daily wts, I/Os; dry wt 218 - 221lbs  - consult CHF RN  - consult cardiology     CAD  - trop neg, will trend  - cath 12/18: proximal RCA 30% disease, LCx 50% disease, LAD 25%, LVEF 20%, pulm HTN   - cont ASA, statin, BB     Polyarthropathy, left knee and right hand. - r/o septic arthritis. On colchicine for suspected gout. - Ortho c/s for arthrocentesis  - ESR/CRP ordered. - Synovial fluid studies. HTN  - cont BB, ARB, indur, hydralazine, lasix    HLD  - cont atorvastatin 40 mg daily     DM II  - stable  - hold home PO meds  - SSI, accuchecks    Hx ETOH abuse, tobacco dependence. Cut down but still smoking about 1 PPD. Still drinking on weekends. FEN: DIET CARB CONTROL; Low Sodium (2 GM); Daily Fluid Restriction: 1800 ml  VTE Prophylaxis:   Code Status: Full Code  Disposition: Inpatient    I will discuss the patient with attending physician. Floyd Avery M.D.    Internal Medicine Resident, PGY-2  Contact via [employer]  4/8/2019, 9:32 AM

## 2019-04-08 NOTE — PLAN OF CARE
Problem: ACTIVITY INTOLERANCE/IMPAIRED MOBILITY  Goal: Mobility/activity is maintained at optimum level for patient  Outcome: Ongoing  Pt will continue to assist with personal hygiene and needs as tolerated. Pt will state when they needs help and RN/PCA will continue to help perform personal hygiene. RN will assess skin care needs. Pt will be free from injury by using appropriate safety awareness and judgement. Pt will call for help when needed. RN will continue to do safety checks and ask if pt needs help to the bathroom in advance to prevent falls. Problem: Falls - Risk of:  Goal: Will remain free from falls  Description  Will remain free from falls  Outcome: Ongoing  Pt will remain free from falls by using appropriate safety awareness/judgement and alert the RN/PCA when help is needed. Problem: Pain:  Goal: Pain level will decrease  Description  Pain level will decrease  Outcome: Ongoing  Will continue to use the pain scale (0-10) to measure pt's pain and monitor their needs. Will assess patients pain with assessments and interactions. Pt will notify RN of any changes in pain and where. Will continue to perform therapeutic comfort measures and give pain medications as prescribed/needed.

## 2019-04-08 NOTE — PROGRESS NOTES
Nutrition Education    Type and Reason for Visit:      Patient stated he is not doing well following lo sodium diet. He seems to have good understanding of what he needs to do. States he does not use salt and has given up deli meats and canned soup. Due to his current living arrangement he has gone to eating mostly frozen dinners. He is selecting the ones on sale and not reading labels. Suggested lower sodium brands and possibly buying ahead when those brands are on sale. He is not eating lunch since he gave up the soup and sandwich items. Encouraged alternatives to that to eat three meals a day as he also has diabetes. Stressed that he has made some changes and to continue and not to be so hard on himself. · Verbally reviewed following information with patient. · Written educational materials provided for heart failure diet. · Contact name and number provided.       Electronically signed by Carla Rosas RD, REAGAN on 4/8/19 at 2:57 PM    Contact Number: [phone]

## 2019-04-09 VITALS
HEIGHT: 75 IN | HEART RATE: 90 BPM | WEIGHT: 214.29 LBS | BODY MASS INDEX: 26.64 KG/M2 | RESPIRATION RATE: 16 BRPM | OXYGEN SATURATION: 98 % | TEMPERATURE: 97.9 F | DIASTOLIC BLOOD PRESSURE: 82 MMHG | SYSTOLIC BLOOD PRESSURE: 160 MMHG

## 2019-04-09 LAB
ANION GAP SERPL CALCULATED.3IONS-SCNC: 11 MMOL/L (ref 3–16)
BUN BLDV-MCNC: 16 MG/DL (ref 7–20)
CALCIUM SERPL-MCNC: 8.8 MG/DL (ref 8.3–10.6)
CHLORIDE BLD-SCNC: 104 MMOL/L (ref 99–110)
CO2: 26 MMOL/L (ref 21–32)
CREAT SERPL-MCNC: 0.7 MG/DL (ref 0.9–1.3)
GFR AFRICAN AMERICAN: >60
GFR NON-AFRICAN AMERICAN: >60
GLUCOSE BLD-MCNC: 148 MG/DL (ref 70–99)
GLUCOSE BLD-MCNC: 164 MG/DL (ref 70–99)
GLUCOSE BLD-MCNC: 249 MG/DL (ref 70–99)
MAGNESIUM: 1.7 MG/DL (ref 1.8–2.4)
PERFORMED ON: ABNORMAL
PERFORMED ON: ABNORMAL
POTASSIUM SERPL-SCNC: 4 MMOL/L (ref 3.5–5.1)
SODIUM BLD-SCNC: 141 MMOL/L (ref 136–145)

## 2019-04-09 PROCEDURE — 6370000000 HC RX 637 (ALT 250 FOR IP): Performed by: FAMILY MEDICINE

## 2019-04-09 PROCEDURE — G0378 HOSPITAL OBSERVATION PER HR: HCPCS

## 2019-04-09 PROCEDURE — 6370000000 HC RX 637 (ALT 250 FOR IP): Performed by: NURSE PRACTITIONER

## 2019-04-09 PROCEDURE — 99232 SBSQ HOSP IP/OBS MODERATE 35: CPT | Performed by: NURSE PRACTITIONER

## 2019-04-09 PROCEDURE — 80048 BASIC METABOLIC PNL TOTAL CA: CPT

## 2019-04-09 PROCEDURE — 99231 SBSQ HOSP IP/OBS SF/LOW 25: CPT | Performed by: NURSE PRACTITIONER

## 2019-04-09 PROCEDURE — 36415 COLL VENOUS BLD VENIPUNCTURE: CPT

## 2019-04-09 PROCEDURE — 94760 N-INVAS EAR/PLS OXIMETRY 1: CPT

## 2019-04-09 PROCEDURE — 83735 ASSAY OF MAGNESIUM: CPT

## 2019-04-09 PROCEDURE — 6370000000 HC RX 637 (ALT 250 FOR IP): Performed by: STUDENT IN AN ORGANIZED HEALTH CARE EDUCATION/TRAINING PROGRAM

## 2019-04-09 RX ORDER — LANOLIN ALCOHOL/MO/W.PET/CERES
400 CREAM (GRAM) TOPICAL 2 TIMES DAILY
Status: DISCONTINUED | OUTPATIENT
Start: 2019-04-09 | End: 2019-04-09 | Stop reason: HOSPADM

## 2019-04-09 RX ORDER — COLCHICINE 0.6 MG/1
0.6 TABLET ORAL DAILY
Qty: 30 TABLET | Refills: 1 | Status: ON HOLD | OUTPATIENT
Start: 2019-04-10 | End: 2020-01-31 | Stop reason: HOSPADM

## 2019-04-09 RX ORDER — POTASSIUM CHLORIDE 20 MEQ/1
20 TABLET, EXTENDED RELEASE ORAL DAILY
Qty: 30 TABLET | Refills: 3 | Status: ON HOLD | OUTPATIENT
Start: 2019-04-09 | End: 2020-01-22

## 2019-04-09 RX ORDER — GLIPIZIDE 10 MG/1
10 TABLET ORAL
Qty: 60 TABLET | Refills: 3 | Status: ON HOLD | OUTPATIENT
Start: 2019-04-09 | End: 2020-01-22

## 2019-04-09 RX ORDER — ISOSORBIDE MONONITRATE 60 MG/1
60 TABLET, EXTENDED RELEASE ORAL DAILY
Qty: 30 TABLET | Refills: 3 | Status: ON HOLD | OUTPATIENT
Start: 2019-04-09 | End: 2020-01-31 | Stop reason: SDUPTHER

## 2019-04-09 RX ORDER — SPIRONOLACTONE 25 MG/1
25 TABLET ORAL DAILY
Qty: 30 TABLET | Refills: 3 | Status: ON HOLD | OUTPATIENT
Start: 2019-04-09 | End: 2020-01-31 | Stop reason: SDUPTHER

## 2019-04-09 RX ORDER — ALLOPURINOL 100 MG/1
100 TABLET ORAL DAILY
Status: DISCONTINUED | OUTPATIENT
Start: 2019-04-09 | End: 2019-04-09 | Stop reason: HOSPADM

## 2019-04-09 RX ORDER — FUROSEMIDE 40 MG/1
40 TABLET ORAL 2 TIMES DAILY
Qty: 60 TABLET | Refills: 3 | Status: ON HOLD | OUTPATIENT
Start: 2019-04-09 | End: 2020-01-22

## 2019-04-09 RX ORDER — LOSARTAN POTASSIUM 25 MG/1
25 TABLET ORAL DAILY
Qty: 30 TABLET | Refills: 3 | Status: ON HOLD | OUTPATIENT
Start: 2019-04-09 | End: 2020-01-22

## 2019-04-09 RX ORDER — ALLOPURINOL 100 MG/1
100 TABLET ORAL DAILY
Qty: 90 TABLET | Refills: 1 | Status: ON HOLD | OUTPATIENT
Start: 2019-04-09 | End: 2020-01-31 | Stop reason: SDUPTHER

## 2019-04-09 RX ORDER — ATORVASTATIN CALCIUM 40 MG/1
40 TABLET, FILM COATED ORAL NIGHTLY
Qty: 30 TABLET | Refills: 3 | Status: ON HOLD | OUTPATIENT
Start: 2019-04-09 | End: 2020-01-31 | Stop reason: SDUPTHER

## 2019-04-09 RX ORDER — HYDRALAZINE HYDROCHLORIDE 25 MG/1
25 TABLET, FILM COATED ORAL EVERY 8 HOURS SCHEDULED
Qty: 90 TABLET | Refills: 3 | Status: ON HOLD | OUTPATIENT
Start: 2019-04-09 | End: 2020-01-22

## 2019-04-09 RX ORDER — CARVEDILOL 12.5 MG/1
12.5 TABLET ORAL 2 TIMES DAILY WITH MEALS
Qty: 60 TABLET | Refills: 3 | Status: ON HOLD | OUTPATIENT
Start: 2019-04-09 | End: 2020-01-23

## 2019-04-09 RX ADMIN — POTASSIUM CHLORIDE 20 MEQ: 20 TABLET, EXTENDED RELEASE ORAL at 08:25

## 2019-04-09 RX ADMIN — ISOSORBIDE MONONITRATE 60 MG: 60 TABLET, EXTENDED RELEASE ORAL at 08:25

## 2019-04-09 RX ADMIN — INSULIN LISPRO 4 UNITS: 100 INJECTION, SOLUTION INTRAVENOUS; SUBCUTANEOUS at 12:49

## 2019-04-09 RX ADMIN — ASPIRIN 81 MG 81 MG: 81 TABLET ORAL at 08:25

## 2019-04-09 RX ADMIN — ALLOPURINOL 100 MG: 100 TABLET ORAL at 12:55

## 2019-04-09 RX ADMIN — Medication 400 MG: at 12:49

## 2019-04-09 RX ADMIN — CARVEDILOL 12.5 MG: 12.5 TABLET, FILM COATED ORAL at 08:25

## 2019-04-09 RX ADMIN — HYDRALAZINE HYDROCHLORIDE 25 MG: 25 TABLET ORAL at 12:49

## 2019-04-09 RX ADMIN — LOSARTAN POTASSIUM 25 MG: 25 TABLET ORAL at 08:25

## 2019-04-09 RX ADMIN — HYDRALAZINE HYDROCHLORIDE 25 MG: 25 TABLET ORAL at 06:51

## 2019-04-09 RX ADMIN — SPIRONOLACTONE 25 MG: 25 TABLET ORAL at 08:25

## 2019-04-09 RX ADMIN — COLCHICINE 0.6 MG: 0.6 TABLET, FILM COATED ORAL at 08:25

## 2019-04-09 RX ADMIN — FUROSEMIDE 40 MG: 40 TABLET ORAL at 17:31

## 2019-04-09 RX ADMIN — VITAMIN D, TAB 1000IU (100/BT) 1000 UNITS: 25 TAB at 08:25

## 2019-04-09 RX ADMIN — FUROSEMIDE 40 MG: 40 TABLET ORAL at 08:25

## 2019-04-09 ASSESSMENT — PAIN SCALES - GENERAL
PAINLEVEL_OUTOF10: 0

## 2019-04-09 NOTE — DISCHARGE INSTR - DIET

## 2019-04-09 NOTE — PROGRESS NOTES
Internal Medicine Resident PGY-2  Progress Note    Admit Date: 4/6/2019      Chief Complaint: SOB, chest pain; right wrist and left knee pain/swelling. Interval History:   Left knee arthrocentesis performed yesterday. Pleural fluid studies showing monosodium urate crystals, consistent with acute gout flare. Urine output 600 ml/24h but pt not fully compliant with using urinal. Weight today is down to 214 lb. Hospital Course:   Left knee pain and swelling is significantly improved. Pt able to bear weight. Denies fever or chills. Still having some wrist pain and swelling, but improving.       Scheduled Medications:    lidocaine  5 mL Intradermal Once    colchicine  0.6 mg Oral Daily    furosemide  40 mg Oral BID    carvedilol  12.5 mg Oral BID WC    hydrALAZINE  25 mg Oral 3 times per day    vitamin D  1,000 Units Oral Daily    potassium chloride  20 mEq Oral Daily    aspirin  81 mg Oral Daily    spironolactone  25 mg Oral Daily    isosorbide mononitrate  60 mg Oral Daily    atorvastatin  40 mg Oral Nightly    losartan  25 mg Oral Daily    sodium chloride flush  10 mL Intravenous 2 times per day    enoxaparin  40 mg Subcutaneous Nightly    insulin lispro  0-12 Units Subcutaneous TID WC    insulin lispro  0-6 Units Subcutaneous Nightly      PRN Medications: sodium chloride flush, magnesium hydroxide, ondansetron, acetaminophen, glucose, dextrose, glucagon (rDNA), dextrose    Continuous Infusions:   dextrose         PHYSICAL EXAM:  /86   Pulse 93   Temp 98.5 °F (36.9 °C) (Oral)   Resp 16   Ht 6' 3\" (1.905 m)   Wt 214 lb 4.6 oz (97.2 kg)   SpO2 96%   BMI 26.78 kg/m²   Recent Labs     04/08/19  0753 04/08/19  1148 04/08/19  1632 04/08/19  1943 04/09/19  0737   POCGLU 170* 217* 188* 204* 164*       Intake/Output Summary (Last 24 hours) at 4/9/2019 0920  Last data filed at 4/9/2019 0831  Gross per 24 hour   Intake 1620 ml   Output 600 ml   Net 1020 ml       Physical Exam Constitutional: He is oriented to person, place, and time. No distress. HENT:   Head: Normocephalic and atraumatic. Eyes: Pupils are equal, round, and reactive to light. EOM are normal.   Neck: Normal range of motion. Neck supple. No JVD present. Cardiovascular: Normal rate, regular rhythm, normal heart sounds and intact distal pulses. No murmur heard. Pulmonary/Chest: Effort normal and breath sounds normal. No stridor. No respiratory distress. He has no wheezes. He has no rales. Abdominal: Soft. Bowel sounds are normal. He exhibits no distension. There is no tenderness. Musculoskeletal: Normal range of motion. Right wrist with swelling, tenderness; pain with passive/active ROM. Left knee effusion (improved), ROM intact and without pain. Neurological: He is alert and oriented to person, place, and time. He displays normal reflexes. No cranial nerve deficit or sensory deficit. He exhibits normal muscle tone. Coordination normal.   Skin: Skin is warm and dry. He is not diaphoretic. LABS:  Recent Labs     04/07/19  0526   WBC 6.0   HGB 13.7   HCT 40.8                                                                       Recent Labs     04/07/19  0526 04/08/19  0607 04/09/19  0549    140 141   K 3.7 4.0 4.0   CL 97* 102 104   CO2 27 27 26   BUN 15 17 16   CREATININE 0.9 0.7* 0.7*   GLUCOSE 149* 191* 148*     No results for input(s): AST, ALT, ALB, BILITOT, ALKPHOS in the last 72 hours. Recent Labs     04/06/19  1407 04/06/19  2104   TROPONINI <0.01 <0.01     No results for input(s): BNP in the last 72 hours. Recent Labs     04/07/19  0526   CHOL 120   HDL 38*     No results for input(s): INR in the last 72 hours. ECG: sinus rhythm with v-rate 80's bpm, nonspecific ST changes. T wave inversion is known.   Echo: 12/3/18 Summary   Global left ventricular function is severely decreased with ejection   fraction estimated from 15 % to 20 %.   concentric left ventricular hypertrophy.   The left atrium is moderately dilated.   Right ventricular systolic function is severely reduced   mild MR   SPAP 56 mmHg markedly elevated RA pressure (15 mmHg). Cath 12/3/18:  1. Right dominant coronary arterial circulation with a 30% proximal RCA  disease in the dominant vessel. These are very large coronary arteries. In the left system, there is no  left main disease. The circumflex  artery has 50% disease at the bifurcation of the obtuse marginal  branches and the proximal circ. In the left anterior descending artery,  there is a 25% disease in the proximal and mid-portions. 2.  Severe left ventricular systolic function. LV ejection fraction of  20%. 3.  Moderately elevated left ventricular end-diastolic pressure at 25  mmHg. 4.  Evidence of right-sided volume overload with right atrial pressure  of 11 and pulmonary capillary wedge pressure of 26 mmHg. This  corresponds to left ventricular end-diastolic pressure of 25 mmHg. 5.  Pulmonary hypertension, likely venous with an instantaneous pressure  of 37/15 for a mean pulmonary arterial pressure of 28.  6.  Normal mixed venous oxygen saturations at 65% in the right atrium  and 60% in the pulmonary artery. 7.  Pulmonary vascular resistance of 31.  8.  Normal cardiac output by thermodilution at 5.08 liters per minute  with a cardiac index of 2.19 liters per kilogram per minute. This is  likely falsely elevated. Assessment & Plan:    Jessy Workman is a 46 y.o. male, who was admitted with acute on chronic systolic heart failure and left knee and right wrist polyarthralgia. Acute Gout Flare - likely 2/2 ETOH and tobacco dependence; aspirin and lasix may also acutely worsen condition. S/P arthrocentesis left knee with monosodium urate crystals in synovial fluid. - Cont colchicine for acute flare and will continue for prophylaxis with allopurinol.  - Start allopurinol.   - Stop alcohol and tobacco use, pt counseled. Polyarthropathy, left knee and right hand. - r/o septic arthritis. ESR and CRP elevated. - Synovial fluid studies of left knee consistent with gout. - Orthopedic surgery consulted. Cardiomyopathy, nonischemic. Acute on chronic systolic CHF exacerbation, now compensated. - last ECHO: LVEF 15-20%, pulm HTN  - proBNP 68K  - transitioned from IV lasix to home PO 40 mg BID. - cont BB, ARB, aldactone; imdur/hydralazine. - daily wts, I/Os; dry wt 218 - 221lbs  - Cardiology and CHF RN consulted. - No ETOH or tobacco.       CAD  - trop neg  - cath 12/18: proximal RCA 30% disease, LCx 50% disease, LAD 25%, LVEF 20%, pulm HTN   - cont ASA, statin, BB    HTN  - cont BB, ARB, indur, hydralazine, lasix    HLD  - cont atorvastatin 40 mg daily     DM II  - stable  - hold home PO meds  - SSI, accuchecks    Hx ETOH abuse, tobacco dependence. Cut down but still smoking about 1 PPD. Still drinking on weekends. - Counseled on importance of cessation. FEN: DIET CARB CONTROL; Low Sodium (2 GM); Daily Fluid Restriction: 1800 ml  VTE Prophylaxis: Lovenox  Code Status: Full Code  Disposition: Inpatient    I will discuss the patient with attending physician. Zaki Brown M.D.    Internal Medicine Resident, PGY-2  Contact via [employer]  4/9/2019, 9:20 AM

## 2019-04-09 NOTE — PROGRESS NOTES
Patient in bed resting comfortably. Respirations steady and unlabored. No signs of respiratory or cardiac distress. No complaints of pain at this time. No needs at this time. Call light in reach. Will continue to monitor.   Electronically signed by Ruby Castro RN on 4/9/2019 at 1:40 AM

## 2019-04-09 NOTE — PROGRESS NOTES
Pharmacy Heart Failure Medication Reconciliation Note    Pt discharged from [facility] today after admission for chest pain / SOB. Candelario Jo has a diagnosis of chronic systolic heart failure (last EF = 15-20% on 12/3/18).     Patient taking an ACEI / ARB / Entresto:  Yes     Patient taking a BETA BLOCKER:  Yes  Patient taking a LOOP DIURETIC: Yes  Patient taking a ALDOSTERONE RECEPTOR ANTAGONIST: Yes    Corrections to discharge medications include:  none    Discharge Medications:         Medication List        START taking these medications      allopurinol 100 MG tablet  Commonly known as:  ZYLOPRIM  Take 1 tablet by mouth daily  Notes to patient:  FOR GOUT     colchicine 0.6 MG tablet  Commonly known as:  COLCRYS  Take 1 tablet by mouth daily  Start taking on:  4/10/2019  Notes to patient:  FOR GOUT            CHANGE how you take these medications      carvedilol 12.5 MG tablet  Commonly known as:  COREG  Take 1 tablet by mouth 2 times daily (with meals)  What changed:    medication strength  how much to take  Notes to patient:  FOR YOUR HEART      hydrALAZINE 25 MG tablet  Commonly known as:  APRESOLINE  Take 1 tablet by mouth every 8 hours  What changed:    medication strength  how much to take  Notes to patient:  FOR BLOOD PRESSURE             CONTINUE taking these medications      aspirin 81 MG tablet  Take 1 tablet by mouth daily     atorvastatin 40 MG tablet  Commonly known as:  LIPITOR  Take 1 tablet by mouth nightly     furosemide 40 MG tablet  Commonly known as:  LASIX  Take 1 tablet by mouth 2 times daily     glipiZIDE 10 MG tablet  Commonly known as:  GLUCOTROL  Take 1 tablet by mouth 2 times daily (before meals)     isosorbide mononitrate 60 MG extended release tablet  Commonly known as:  IMDUR  Take 1 tablet by mouth daily     losartan 25 MG tablet  Commonly known as:  COZAAR  Take 1 tablet by mouth daily     metFORMIN 1000 MG tablet  Commonly known as:  GLUCOPHAGE  Take 1 tablet by mouth 2 times daily (with meals)     potassium chloride 20 MEQ extended release tablet  Commonly known as:  KLOR-CON M  Take 1 tablet by mouth daily     spironolactone 25 MG tablet  Commonly known as:  ALDACTONE  Take 1 tablet by mouth daily     vitamin D3 1000 units Tabs               Where to Get Your Medications        You can get these medications from any pharmacy    Bring a paper prescription for each of these medications  allopurinol 100 MG tablet  aspirin 81 MG tablet  atorvastatin 40 MG tablet  carvedilol 12.5 MG tablet  colchicine 0.6 MG tablet  furosemide 40 MG tablet  glipiZIDE 10 MG tablet  hydrALAZINE 25 MG tablet  isosorbide mononitrate 60 MG extended release tablet  losartan 25 MG tablet  metFORMIN 1000 MG tablet  potassium chloride 20 MEQ extended release tablet  spironolactone 25 MG tablet         Bernice Moran, PharmD  Heart Failure Discharge Medication Reconciliation Program  [phone]

## 2019-04-09 NOTE — PROGRESS NOTES
(CHOLECALCIFEROL) tablet 1,000 Units, 1,000 Units, Oral, Daily  potassium chloride (KLOR-CON M) extended release tablet 20 mEq, 20 mEq, Oral, Daily  aspirin chewable tablet 81 mg, 81 mg, Oral, Daily  spironolactone (ALDACTONE) tablet 25 mg, 25 mg, Oral, Daily  isosorbide mononitrate (IMDUR) extended release tablet 60 mg, 60 mg, Oral, Daily  atorvastatin (LIPITOR) tablet 40 mg, 40 mg, Oral, Nightly  losartan (COZAAR) tablet 25 mg, 25 mg, Oral, Daily  sodium chloride flush 0.9 % injection 10 mL, 10 mL, Intravenous, 2 times per day  sodium chloride flush 0.9 % injection 10 mL, 10 mL, Intravenous, PRN  magnesium hydroxide (MILK OF MAGNESIA) 400 MG/5ML suspension 30 mL, 30 mL, Oral, Daily PRN  ondansetron (ZOFRAN) injection 4 mg, 4 mg, Intravenous, Q6H PRN  enoxaparin (LOVENOX) injection 40 mg, 40 mg, Subcutaneous, Nightly  acetaminophen (TYLENOL) tablet 650 mg, 650 mg, Oral, Q4H PRN  insulin lispro (HUMALOG) injection vial 0-12 Units, 0-12 Units, Subcutaneous, TID WC  insulin lispro (HUMALOG) injection vial 0-6 Units, 0-6 Units, Subcutaneous, Nightly  glucose (GLUTOSE) 40 % oral gel 15 g, 15 g, Oral, PRN  dextrose 50 % solution 12.5 g, 12.5 g, Intravenous, PRN  glucagon (rDNA) injection 1 mg, 1 mg, Intramuscular, PRN  dextrose 5 % solution, 100 mL/hr, Intravenous, PRN    ASSESSMENT AND PLAN    Right wrist pain  Left knee pain  Post left knee aspirate 90 cc yesterday, improved pain  Pseudogout per crystal eval , no abnormality in cell count, cx pending still  Urci acid levels nl  OK for DC home today per ortho and FU PCP as needed for tx of gout. Would not recommend steroid in light of good mobility now.          Marylu Del Rosario  4/9/2019  9:50 AM

## 2019-04-09 NOTE — PROGRESS NOTES
Pharmacy Note - CHF Education    Lalo Lindsey agreed to speak with me regarding medications and management of his congestive heart failure  He has several new meds: Apresoline,Coreg,Lasix,Aldactone,Imdur,ASA,Colchicine,  Allopurinol,Lipitor,Cozaar,Glipizide, KCl   He voiced understanding of compliance with these possible side effects. We also reviewed some of the important things to remember in regards to weighing himself ( 3 lb rule) , watching daily sodium and fluid intake. He feels that most of his issues with compliance are due to lack of a stable home to go to and family support. Lalo Lindsey appears to understand that he needs to make a commitment to following the rules of CHF. In his own words, t is now a \"life or death thing\" for him. He has been given materials to take with him to read over and has a f/u appt scheduled in our [facility]. I provided him with a digital scale to take with him and he was most appreciative of that.    Provided 30 minutes of CHF education     Olive Quiñones, [address] [facility]

## 2019-04-09 NOTE — DISCHARGE SUMMARY
INTERNAL MEDICINE DEPARTMENT  DISCHARGE SUMMARY    Patient ID: Vishnu Maravilla                                             Discharge Date: 4/9/2019   Patient's PCP: Anna Miller                                          Discharge Physician: Chantal Santo MD  Admit Date: 4/6/2019   Admitting Physician: Jada Ceballos DO    PROBLEMS DURING HOSPITALIZATION:  Present on Admission:   CHF (congestive heart failure), NYHA class III, acute, systolic (HCC)   Chronic systolic HF (heart failure) (HCC)   Chest pain   Knee effusion, left   Right wrist pain  Acute on chronic systolic heart failure  Alcohol abuse   Tobacco dependence   Left knee pain/effusion   Right wrist pain    DISCHARGE DIAGNOSES:  Acute on chronic systolic heart failure, now compensated  Alcohol abuse   Tobacco dependence   Acute gout flare, treating  Left knee pain/effusion 2/2 acute gout, s/p arthrocentesis  Right wrist pain 2/2 acute gout    HPI: (per admitting physician)  \"The patient is a 46 y.o. male who presents to [facility] with chest pain, SOB. In the ED his ECG showed nonspecific ST changes; the Twave inversions in lateral leads were present on previous ECGs. His initial troponins were negative. His BNP was elevated. CT chest and CXR were nonacute. He also had pain and swelling in his left knee and rt hand. He was here for the same reasons of CHF exacerbation and elevated troponins from 12/3/18 - 12/11/18. He has continued to have decompensations with an effort by cardiology to keep him out of the hospital.\"      The following issues were addressed during hospitalization:  Acute Gout Flare - left knee and right wrist; likely 2/2 ETOH and tobacco dependence; aspirin and lasix may also acutely worsen condition. S/P arthrocentesis left knee with synovial fluid showing monosodium urate crystals, culture and cytology negative. Started colchicine for acute flare and will continue for prophylaxis with allopurinol. Continue allopurinol.  Stop alcohol and tobacco use, dietary modifications (ie, low purine diet).    Acute on chronic systolic CHF exacerbation - (LVEF 15-20%, pulm HTN)  SOB and elevated proBNP 68K on admission. Cardiology and CHF RN consulted. Pt started on IV lasix and transitioned to home oral dose. Continue ASA/ARB/BB/Aldactone/Imdur/hydralazine. No alcohol or tobacco. Patient instructed to follow up with Cardiology and CHF clinic. CAD  Cath 12/18: proximal RCA 30% disease, LCx 50% disease, LAD 25%, LVEF 20%, pulm HTN. Pleuritic chest pain, SOB on admission. EKG with T wave inversions in lateral leads. Troponin negative x3. Cardiology consulted. Recommended continue ASA/statin and consider outpatient stress test.      Essential hypertension  Cont BB, ARB, imdur, hydralazine, lasix     HLD  Cont atorvastatin 40 mg daily     DM II  Last hemoglobin A1c 8.2%. Managed inpatient with sliding scale insulin. Resumed on home oral medications at time of discharge.      Hx ETOH abuse, tobacco dependence. Counseled on importance of smoking and tobacco cessation. Physical Exam:  BP (!) 160/82   Pulse 90   Temp 97.9 °F (36.6 °C) (Oral)   Resp 16   Ht 6' 3\" (1.905 m)   Wt 214 lb 4.6 oz (97.2 kg)   SpO2 98%   BMI 26.78 kg/m²   Constitutional: He is oriented to person, place, and time. No distress. HENT:   Head: Normocephalic and atraumatic. Eyes: Pupils are equal, round, and reactive to light. EOM are normal.   Neck: Normal range of motion. Neck supple. No JVD present. Cardiovascular: Normal rate, regular rhythm, normal heart sounds and intact distal pulses. No murmur heard. Pulmonary/Chest: Effort normal and breath sounds normal. No stridor. No respiratory distress. He has no wheezes. He has no rales. Abdominal: Soft. Bowel sounds are normal. He exhibits no distension. There is no tenderness. Musculoskeletal: Normal range of motion. Right wrist with swelling, tenderness; pain with passive/active ROM.    Left knee effusion (improved), ROM intact and without pain. Neurological: He is alert and oriented to person, place, and time. He displays normal reflexes. No cranial nerve deficit or sensory deficit. He exhibits normal muscle tone. Coordination normal.   Skin: Skin is warm and dry. He is not diaphoretic. Consults: Cardiology, CHF clinic, Orthopedic Surgery  Significant Diagnostic Studies:    Arthrocentesis left knee with synovial fluid analysis: intracellular and extracellular crystals, monosodium urate crystals. CT Chest PE W Contrast: no acute PE or acute pulmonary abnormality    Treatments: IV lasix, ASA/Statin/BB/ARB/Aldactone/Imdur/Hydralazine; Colchicine, Allopurinol.   Disposition: home  Discharged Condition: Stable  Follow Up: Primary Care Physician in one week    DISCHARGE MEDICATION:     Medication List      START taking these medications    allopurinol 100 MG tablet  Commonly known as:  ZYLOPRIM  Take 1 tablet by mouth daily     colchicine 0.6 MG tablet  Commonly known as:  COLCRYS  Take 1 tablet by mouth daily  Start taking on:  4/10/2019        CHANGE how you take these medications    carvedilol 12.5 MG tablet  Commonly known as:  COREG  Take 1 tablet by mouth 2 times daily (with meals)  What changed:    · medication strength  · how much to take     hydrALAZINE 25 MG tablet  Commonly known as:  APRESOLINE  Take 1 tablet by mouth every 8 hours  What changed:    · medication strength  · how much to take        CONTINUE taking these medications    aspirin 81 MG tablet  Take 1 tablet by mouth daily     atorvastatin 40 MG tablet  Commonly known as:  LIPITOR  Take 1 tablet by mouth nightly     furosemide 40 MG tablet  Commonly known as:  LASIX  Take 1 tablet by mouth 2 times daily     glipiZIDE 10 MG tablet  Commonly known as:  GLUCOTROL  Take 1 tablet by mouth 2 times daily (before meals)     isosorbide mononitrate 60 MG extended release tablet  Commonly known as:  IMDUR  Take 1 tablet by mouth daily losartan 25 MG tablet  Commonly known as:  COZAAR  Take 1 tablet by mouth daily     metFORMIN 1000 MG tablet  Commonly known as:  GLUCOPHAGE  Take 1 tablet by mouth 2 times daily (with meals)     potassium chloride 20 MEQ extended release tablet  Commonly known as:  KLOR-CON M  Take 1 tablet by mouth daily     spironolactone 25 MG tablet  Commonly known as:  ALDACTONE  Take 1 tablet by mouth daily     vitamin D3 1000 units Tabs           Where to Get Your Medications      You can get these medications from any pharmacy    Bring a paper prescription for each of these medications  · allopurinol 100 MG tablet  · aspirin 81 MG tablet  · atorvastatin 40 MG tablet  · carvedilol 12.5 MG tablet  · colchicine 0.6 MG tablet  · furosemide 40 MG tablet  · glipiZIDE 10 MG tablet  · hydrALAZINE 25 MG tablet  · isosorbide mononitrate 60 MG extended release tablet  · losartan 25 MG tablet  · metFORMIN 1000 MG tablet  · potassium chloride 20 MEQ extended release tablet  · spironolactone 25 MG tablet       Activity: activity as tolerated  Diet: low purine diet  Wound Care: none needed    Time Spent on discharge is more than 30 minutes    Signed:  Krishna Melchor MD  Internal Medicine, PGY-2  4/9/2019       Addendum to Resident H&P/Progress note:  Pt seen,examined and evaluated. I have reviewed the current history, physical findings, labs and assessment and plan and agree with note as documented by resident MD      Chronic non ischemic systolic CHF. Coreg  ASA, lipitor, losartan, imdur hydralazine, lasix PO, aldactone. Compensated today. Close OP CHF follow up . R wrist and Left knee arthritis. Arthrocentesis 4/8 - testing consistent with crystal arthropathy with monosodium urate on analysis. No signs of infection. Responded well to colchicine, a dose of steroid. Will start allopurinol on discharge. Avoid steroids as able in setting of CHF.        Malorie Jain

## 2019-04-09 NOTE — DISCHARGE INSTR - COC
Continuity of Care Form    Patient Name: Tayler Ybarra   :  1965  MRN:  [id number]    Admit date:  2019  Discharge date:  19    Code Status Order: Full Code   Advance Directives:   Advance Care Flowsheet Documentation     Date/Time Healthcare Directive Type of Healthcare Directive Copy in [address] Agent's Name Healthcare Agent's Phone Number    19 1421  No, patient does not have an advance directive for healthcare treatment -- -- -- -- --          Admitting Physician:  Jennie Ruelas DO  PCP: Sabina Aviles    Discharging Nurse: Erick Clark Unit/Room#: G9Z-2435/0146-14  Discharging Unit Phone Number: [phone]    Emergency Contact:   Extended Emergency Contact Information  Primary Emergency Contact: CruzKarrie  Home Phone: [phone]  Relation: Brother/Sister  Secondary Emergency Contact: Cherelle Cabrera   [address] Phone: [phone]  Mobile Phone: [phone]  Relation: Niece/Nephew    Past Surgical History:  Past Surgical History:   Procedure Laterality Date    DIAGNOSTIC CARDIAC CATH LAB PROCEDURE  2018       Immunization History: There is no immunization history on file for this patient.     Active Problems:  Patient Active Problem List   Diagnosis Code    Essential hypertension I10    Hyperlipidemia E78.5    DMII (diabetes mellitus, type 2) ([facility] [address] E11.9    Nonischemic cardiomyopathy ([facility] [address] I42.8    Tobacco abuse Z72.0    CAD in native artery I25.10    Chronic systolic HF (heart failure) ([facility] I50.22    CHF (congestive heart failure), NYHA class III, acute, systolic ([facility]) U06.74    Chest pain R07.9    Knee effusion, left M25.462    Right wrist pain M25.531       Isolation/Infection:   Isolation          No Isolation            Nurse Assessment:  Last Vital Signs: /86   Pulse 93   Temp 98.5 °F (36.9 °C) (Oral)   Resp 16   Ht 6' 3\" (1.905 m)   Wt 214 lb 4.6 oz (97.2 kg) SpO2 96%   BMI 26.78 kg/m²     Last documented pain score (0-10 scale): Pain Level: 0  Last Weight:   Wt Readings from Last 1 Encounters:   04/09/19 214 lb 4.6 oz (97.2 kg)     Mental Status:  oriented, alert, coherent, thought processes intact and able to concentrate and follow conversation    IV Access:  - None    Nursing Mobility/ADLs:  Walking   Independent  Transfer  Independent  Bathing  Independent  Dressing  Independent  [address] Delivery   none    Wound Care Documentation and Therapy:        Elimination:  Continence:   · Bowel: Yes  · Bladder: Yes  Urinary Catheter: None   Colostomy/Ileostomy/Ileal Conduit: No       Date of Last BM: 4/9/19    Intake/Output Summary (Last 24 hours) at 4/9/2019 1218  Last data filed at 4/9/2019 0831  Gross per 24 hour   Intake 1380 ml   Output 600 ml   Net 780 ml     I/O last 3 completed shifts: In: 1595 [P.O.:1740]  Out: 600 [Urine:600]    Safety Concerns: At Risk for Falls    Impairments/Disabilities:      None    Nutrition Therapy:  Current Nutrition Therapy:   Oral diet: Carb Control, low sodium (2GM), 1800 ml fluid restriction    Routes of Feeding: Oral  Liquids: No Restrictions  Daily Fluid Restriction: yes - amount 1800 ml  Last Modified Barium Swallow with Video (Video Swallowing Test): not done    Treatments at the Time of Hospital Discharge:   Respiratory Treatments: None  Oxygen Therapy:  is not on home oxygen therapy.   Ventilator:    - No ventilator support    Rehab Therapies: ***  Weight Bearing Status/Restrictions: No weight bearing restirctions  Other Medical Equipment (for information only, NOT a DME order):  ***  Other Treatments: ***    Patient's personal belongings (please select all that are sent with patient):  Abram TARIQ SIGNATURE:  Electronically signed by Bairon Kraus RN on 4/9/19 at 5:12 PM    CASE MANAGEMENT/SOCIAL WORK SECTION    Inpatient Status Date: ***    Readmission Risk Assessment Score:  Readmission Risk              Risk of Unplanned Readmission:        16           Discharging to Facility/ Agency   · Name:   · Address:  · Phone:  · Fax:    Dialysis Facility (if applicable)   · Name:  · Address:  · Dialysis Schedule:  · Phone:  · Fax:    / signature: {Esignature:[id number]}    PHYSICIAN SECTION    Prognosis: Good    Condition at Discharge: Stable    Rehab Potential (if transferring to Rehab): Good    Recommended Labs or Other Treatments After Discharge: home health CHF nurse    Physician Certification: I certify the above information and transfer of Basilio Willett  is necessary for the continuing treatment of the diagnosis listed and that he requires [address] for greater 30 days.      Update Admission H&P: No change in H&P    PHYSICIAN SIGNATURE:  Electronically signed by Massiel Heath MD on 4/9/19 at 4:07 PM

## 2019-04-09 NOTE — PROGRESS NOTES
4/8/2019  6:21 PM - Brand Aguila Incoming Lab Results From Soft (Epic Adt)     Component Collected Lab   Source Body Fluid 04/08/2019  2:19 PM [address] Lab   Knee   Left    Crystals, Fluid 04/08/2019  2:19 PM [address] [facility]   see below    Comment:   Monosodium Urate   Crystal Cmt 2 04/08/2019  2:19 PM [address] [facility]   see below    Comment:   Intra and Extra cellular crystals are present     REsults from aspirate left knee reviewed. Noted Mono urate crystal as in pseudogout., REcommend either Medrol pack or IV Decadron 6mg x1 today if having difficulty with pain and ambulation.

## 2019-04-09 NOTE — PROGRESS NOTES
[facility]   Daily Progress Note      Admit Date:  4/6/2019    Reason for follow up visit: SOB; chest pain; knee pain    CC: \"I feel a lot better. My knee feels a whole lot better. \"    Interval History:  Now on po diuretic. Appears to be euvolemic  SOB much improved. L knee pain much improved after aspiration yesterday. Denies chest pain, cough, palpitations, or dizziness  BP remains stable. Wt today 214#    Subjective:  Pt with no acute overnight cardiac events. Review of Systems:   · Constitutional: no unanticipated weight loss. There's been no change in energy level, sleep pattern, or activity level. No fevers, chills. · Eyes: No visual changes or diplopia. No scleral icterus. · ENT: No headaches, hearing loss or vertigo. No mouth sores or sore throat. · Cardiovascular: as reviewed in HPI  · Respiratory: No cough or wheezing, no sputum production. No hematemesis. · Gastrointestinal: No abdominal pain, appetite loss, blood in stools. No change in bowel or bladder habits. · Genitourinary: No dysuria, trouble voiding, or hematuria. · Musculoskeletal:  No gait disturbance. + L knee pain much improved  · Integumentary: No rash or pruritis. · Neurological: No headache, diplopia, change in muscle strength, numbness or tingling. · Psychiatric: No anxiety or depression. · Endocrine: No temperature intolerance. No excessive thirst, fluid intake, or urination. No tremor. · Hematologic/Lymphatic: No abnormal bruising or bleeding, blood clots or swollen lymph nodes. · Allergic/Immunologic: No nasal congestion or hives.     Objective:   /86   Pulse 93   Temp 98.5 °F (36.9 °C) (Oral)   Resp 16   Ht 6' 3\" (1.905 m)   Wt 214 lb 4.6 oz (97.2 kg)   SpO2 96%   BMI 26.78 kg/m²       Intake/Output Summary (Last 24 hours) at 4/9/2019 0906  Last data filed at 4/9/2019 0831  Gross per 24 hour   Intake 1620 ml   Output 600 ml   Net 1020 ml     Wt Readings from Last 3 Encounters: 04/09/19 214 lb 4.6 oz (97.2 kg)   02/21/19 229 lb 12.8 oz (104.2 kg)   02/21/19 228 lb (103.4 kg)       Physical Exam:  General: In no acute distress. Awake, alert, and oriented X4. Resting comfortably in bed  Skin:  Warm and dry. No new appearing rashes or lesions. Neck:  Supple. No JVD or carotid bruit appreciated. Chest: Lungs clear to auscultation. No wheezes/rhonchi/rales  Cardiovascular:  RRR. Normal S1 and S2. No murmur/gallop or rub  Abdomen:  soft, nontender, nondistended, +bowel sounds. No hepatomegaly  Extremities:  No LE edema. No clubbing or cyanosis. 2+ bilateral radial/DP/PT pulses. Cap refill brisk. L knee with minimal swelling; less tenderness on touch    Medications:    lidocaine  5 mL Intradermal Once    colchicine  0.6 mg Oral Daily    furosemide  40 mg Oral BID    carvedilol  12.5 mg Oral BID WC    hydrALAZINE  25 mg Oral 3 times per day    vitamin D  1,000 Units Oral Daily    potassium chloride  20 mEq Oral Daily    aspirin  81 mg Oral Daily    spironolactone  25 mg Oral Daily    isosorbide mononitrate  60 mg Oral Daily    atorvastatin  40 mg Oral Nightly    losartan  25 mg Oral Daily    sodium chloride flush  10 mL Intravenous 2 times per day    enoxaparin  40 mg Subcutaneous Nightly    insulin lispro  0-12 Units Subcutaneous TID WC    insulin lispro  0-6 Units Subcutaneous Nightly      dextrose       sodium chloride flush, magnesium hydroxide, ondansetron, acetaminophen, glucose, dextrose, glucagon (rDNA), dextrose    Lab Data:  CBC:   Recent Labs     04/07/19  0526   WBC 6.0   HGB 13.7        BMP:    Recent Labs     04/07/19  0526 04/08/19  0607 04/09/19  0549    140 141   K 3.7 4.0 4.0   CO2 27 27 26   BUN 15 17 16   CREATININE 0.9 0.7* 0.7*     Magnesium 1.70    Results for Fiorella Esteves (MRN [id number]) as of 4/9/2019 09:11   Ref.  Range 4/6/2019 08:55 4/6/2019 14:07 4/6/2019 21:04 4/7/2019 05:26 4/8/2019 06:07   Pro-BNP Latest Ref Range: 0 - 124 pg/mL 6,803 (H)    627 (H)   Cholesterol, Total Latest Ref Range: 0 - 199 mg/dL    120    HDL Cholesterol Latest Ref Range: 40 - 60 mg/dL    38 (L)    LDL Calculated Latest Ref Range: <100 mg/dL    71    Triglycerides Latest Ref Range: 0 - 150 mg/dL    56    VLDL Cholesterol Calculated Latest Ref Range: Not Established mg/dL    11      ECG: sinus tachycardia, possible left atrial enlargement; septal infarct; prolonged QT     Echo 12/3/2018:  Global left ventricular function is severely decreased with ejection   fraction estimated from 15 % to 20 %.   concentric left ventricular hypertrophy.   The left atrium is moderately dilated.   Right ventricular systolic function is severely reduced   mild MR   SPAP 56 mmHg markedly elevated RA pressure (15 mmHg).    Cardiac cath 12/10/2018:  1.  Right dominant coronary arterial circulation with a 30% proximal RCA  disease in the dominant vessel.  These are very large coronary arteries. In the left system, there is no  left main disease.  The circumflex  artery has 50% disease at the bifurcation of the obtuse marginal  branches and the proximal circ.  In the left anterior descending artery,  there is a 25% disease in the proximal and mid-portions. 2.  Severe left ventricular systolic function.  LV ejection fraction of  20%. 3.  Moderately elevated left ventricular end-diastolic pressure at 25  mmHg. 4.  Evidence of right-sided volume overload with right atrial pressure  of 11 and pulmonary capillary wedge pressure of 26 mmHg.  This  corresponds to left ventricular end-diastolic pressure of 25 mmHg. 5.  Pulmonary hypertension, likely venous with an instantaneous pressure  of 37/15 for a mean pulmonary arterial pressure of 28.  6.  Normal mixed venous oxygen saturations at 65% in the right atrium  and 60% in the pulmonary artery.   7.  Pulmonary vascular resistance of 31.  8.  Normal cardiac output by thermodilution at 5.08 liters per minute  with a cardiac index of 2.19 daily        Electronically signed by MACO Nicolsa CNP on 4/9/2019 at 9:06 AM

## 2019-04-09 NOTE — PROGRESS NOTES
Patient is A&O x3.  RA, sat 96%. No complaints of pain or SOB. Respirations appear to be easy and unlabored. Lungs clear. Respirations easy with no complaints of cough. Cardiac monitor in place, current rhythm NSR to sinus tachycardia at times. Patient remains asymptomatic with elevated heart rate. No complaints of nausea/vomiting/diarrhea. Up ad brielle to the bathroom/BSC as needed. No IV access. Tolerating diabetic diet with a 1.8 liter fluid restriction. Patient not using urinal when voiding. Instructed patient on the importance of using urinal for accurate I&O. Patient states he will use urinal.  Plan of care and safety measures reviewed with the patient. Call light in reach. Will continue to monitor.   Electronically signed by Raphael Choi RN on 4/8/2019 at 11:51 PM

## 2019-04-09 NOTE — CARE COORDINATION
4/9 Patient will be discharged today to his [place] [id number] where he is currently staying. He will utilize his [facility] transportation benefit for transport to home. He will be receiving his medications from the retail pharmacy prior to leaving.  Electronically signed by Monique Parks RN on 4/9/2019 at 12:13 PM

## 2019-04-09 NOTE — PLAN OF CARE
Problem: ACTIVITY INTOLERANCE/IMPAIRED MOBILITY  Goal: Mobility/activity is maintained at optimum level for patient  Outcome: Ongoing  Pt will continue to assist with personal hygiene and needs as tolerated. Pt will state when they needs help and RN/PCA will continue to help perform personal hygiene. RN will assess skin care needs. Pt will be free from injury by using appropriate safety awareness and judgement. Pt will call for help when needed. RN will continue to do safety checks and ask if pt needs help to the bathroom in advance to prevent falls. Problem: Falls - Risk of:  Goal: Will remain free from falls  Description  Will remain free from falls  4/9/2019 1026 by Fannie Magana RN  Outcome: Ongoing  Pt will remain free from falls by using appropriate safety awareness/judgement and alert the RN/PCA when help is needed. Problem: Pain:  Goal: Pain level will decrease  Description  Pain level will decrease  4/9/2019 1026 by Fannie Magana RN  Outcome: Ongoing  Will continue to use the pain scale (0-10) to measure pt's pain and monitor their needs. Will assess patients pain with assessments and interactions. Pt will notify RN of any changes in pain and where. Will continue to perform therapeutic comfort measures and give pain medications as prescribed/needed.

## 2019-04-09 NOTE — CONSULTS
[facility]  HEART FAILURE PROGRAM      NAME:  aKmron Burnett RECORD NUMBER:  [id number]  AGE: 46 y.o.    GENDER: male  : 1965  TODAY'S DATE:  2019    Subjective:     VISIT TYPE: evaluation     ADMITTING PHYSICIAN:  Bernice Lomas DO    PAST MEDICAL HISTORY        Diagnosis Date    CHF (congestive heart failure) (HCC)     Diabetes mellitus ([address]     Hyperlipidemia     Hypertension     Vitamin D deficiency        SOCIAL HISTORY    Social History     Tobacco Use    Smoking status: Former Smoker     Packs/day: 0.20     Types: Cigarettes    Smokeless tobacco: Never Used    Tobacco comment: occassional   Substance Use Topics    Alcohol use: Yes     Comment: few beers every few weeks    Drug use: No       ALLERGIES    Allergies   Allergen Reactions    Lisinopril Swelling     Lips swell       MEDICATIONS  Scheduled Meds:   lidocaine  5 mL Intradermal Once    colchicine  0.6 mg Oral Daily    furosemide  40 mg Oral BID    carvedilol  12.5 mg Oral BID WC    hydrALAZINE  25 mg Oral 3 times per day    vitamin D  1,000 Units Oral Daily    potassium chloride  20 mEq Oral Daily    aspirin  81 mg Oral Daily    spironolactone  25 mg Oral Daily    isosorbide mononitrate  60 mg Oral Daily    atorvastatin  40 mg Oral Nightly    losartan  25 mg Oral Daily    sodium chloride flush  10 mL Intravenous 2 times per day    enoxaparin  40 mg Subcutaneous Nightly    insulin lispro  0-12 Units Subcutaneous TID WC    insulin lispro  0-6 Units Subcutaneous Nightly       ADMIT DATE: 2019      Objective:     ADMISSION DIAGNOSIS:   CHF (congestive heart failure), NYHA class III, acute, systolic ([facility]) [F36.71]     /64   Pulse 83   Temp 98.4 °F (36.9 °C) (Oral)   Resp 16   Ht 6' 3\" (1.905 m)   Wt 215 lb 12.8 oz (97.9 kg)   SpO2 96%   BMI 26.97 kg/m²     ADMIT:  Weight: 227 lb 8.2 oz (103.2 kg)    TODAY: Weight: 215 lb 12.8 oz (97.9 kg)    Wt Readings from [employer] diet difficult sometimes but he hopes to get his own place soon. He reports she has a scale and he has been weighing himself \"just about every day\". Patient denies further need. He is aware of requirement for 7 day follow up and voices no particular needs for date / time. Later in day, Natalie Baum came to HF RN office and asked if there were any scales available. Patient had been less than truthful to me -- he does NOT have a scale for his use at his sisters. Clint shares he is really trying to get his life turned around and get back on his own feet. Scale was provided from [employer]. Pt had all HF measures in place: daily weights, I/O, and sodium /flluid restricted diet. He has received >60 mins of HF education. HF instructions were added to AVS.  Follow up arranged for Tues Apr 16 at 3pm with John Geller NP. CURRENT DIET: DIET CARB CONTROL; Low Sodium (2 GM); Daily Fluid Restriction: 1800 ml    EDUCATIONAL PACKETS PROVIDED- PRINTED FROM [facility]. Titles and material given:  Yes   []  What is Heart Failure? []  Heart Failure: Warning Signs of a Flare-Up  []  Heart Failure: Making Changes to Your Diet  []  Heart Failure: Medications to Help Your Heart   [x]  Other:  Weight log, AHA HF zones sheet     PATIENT/CAREGIVER TEACHING:    Level of patient/caregiver understanding able to:   [x] Verbalize understanding   [] Demonstrate understanding       [x] Teach back        [x] Needs reinforcement     []  Other:      TEACHING TIME:  30 minutes       Plan:       DISCHARGE PLAN:  Placement for patient upon discharge: home with support   Hospice Care:  no  Code Status: Full Code  Discharge appointment scheduled: Yes     RECOMMENDATIONS:   [x]  Encourage to call Heart Failure Resource Line with any questions or concerns. [x]  Educate further Mr. Debi Pablo on fluid restriction 48 oz- 64 oz during inpatient stay so he can understand how to measure intake at home.    [x]  Continue to educate on S/S of Heart Failure. [x]  Emphasize daily weights, diet, and if changes, to call Heart Failure Resource Line  [x]  Other:  [address] referral: patient declines at this time but will accept brochure for future reference.    Cardiac Rehab referral : patient declined at this time - states his living situation is not optimal and he cannot do any type of extra appts currently          Electronically signed by Jonatan Ruelas RN, BSN CHFN  on 4/8/2019 at 11:15 PM

## 2019-04-13 LAB
BLOOD CULTURE, ROUTINE: NORMAL
CULTURE, BLOOD 2: NORMAL

## 2019-04-29 LAB
BODY FLUID CULTURE, STERILE: NORMAL
GRAM STAIN RESULT: NORMAL

## 2019-06-12 ENCOUNTER — SCHEDULED TELEPHONE ENCOUNTER (OUTPATIENT)
Dept: PHARMACY | Age: 54
End: 2019-06-12

## 2019-06-12 NOTE — TELEPHONE ENCOUNTER
[facility]  Heart Failure Service  [phone]        Follow up phone call:     I called patient today because he missed his 5/23 appointment. His voice mail box is full. I called his emergency contact number and left message to have him please call back.       PLAN:   I will try to reach him again next week.     Appropriate staff has been notified with regards to any concerns noted above   [facility]  Heart Failure Service  [phone]

## 2019-06-18 NOTE — PROGRESS NOTES
smoking ~ 1 month ago  -smoking cessation emphasized    Lengthy discussion with pt and his significant other regarding echo findings and his dx of CHF/Cardiomyopathy. Will adjust meds, continue IV lasix and plan for cardiac cath once CHF better compensated and BP better controlled.      Electronically signed by MACO Denney CNP on 12/5/2018 at 9:29 AM no JVD/supple

## 2019-06-26 ENCOUNTER — SCHEDULED TELEPHONE ENCOUNTER (OUTPATIENT)
Dept: PHARMACY | Age: 54
End: 2019-06-26

## 2019-07-10 ENCOUNTER — SCHEDULED TELEPHONE ENCOUNTER (OUTPATIENT)
Dept: PHARMACY | Age: 54
End: 2019-07-10

## 2019-08-02 ENCOUNTER — SCHEDULED TELEPHONE ENCOUNTER (OUTPATIENT)
Dept: PHARMACY | Age: 54
End: 2019-08-02

## 2019-08-02 NOTE — TELEPHONE ENCOUNTER
[address] [facility]  Heart Failure Service  [phone]     NAME: Meggan Chappell  MEDICAL RECORD NUMBER:  [id number]. Called and left another message with our number to call back to schedule. PLAN:   Patient has not returned our calls. We will discharge him from our services he can call back to schedule or with any questions or concerns.      [facility]  Heart Failure Service  [phone]

## 2019-11-14 NOTE — PROGRESS NOTES
11/14/2019            RE: Terry Hamman         [phone] [id number]              To Whom It May Concern,      Due to medical reasons, Terry Hamman may  remain out of work until 11/18/2019. Was inpatient from 11/08/2019.         Sincerely,          Júnior Self MD Cholesterol Latest Ref Range: 40 - 60 mg/dL    28 (L)   LDL Calculated Latest Ref Range: <100 mg/dL    53   Triglycerides Latest Ref Range: 0 - 150 mg/dL    57   VLDL Cholesterol Calculated Latest Ref Range: Not Established mg/dL    11     12/3/2018 CT chest and abdomen:  1. No CTA evidence for acute pulmonary embolus. 2. Interval development of cardiomegaly with small pericardial effusion. 3. Small right pleural effusion. 4. Intra-abdominal and pelvic ascites. 5. Anasarca.  I suspect this likely is due to congestive failure.      Echo 12/4/2018:  Gregg Bruce left ventricular function is severely decreased with ejection fraction estimated from 15 % to 20 %. Concentric left ventricular hypertrophy.   The left atrium is moderately dilated.   Right ventricular systolic function is severely reduced   mild MR   SPAP 56 mmHg markedly elevated RA pressure (15 mmHg).      ECG: Sinus tach; L atrial enlargement; age indeterminate septal infarct; nonspecific ST-T abnormalities    Telemetry: SR-sinus tach    Assessment/Plan:    1. Acute systolic heart failure: NYHA class IV  -continue IV lasix; needs further diuresis  -continue K+ supplement  -continue carvedilol, hydralazine and aldactone  -no ACE-I/ARB/ARNI d/t angioedema     2. Cardiomyopathy with severe LV dysfunction  -LVEF 15-20%  -suspect largely d/t HTN, however given elevated troponin and smoking history with abnormal ECG, will need ischemic evaluation once CHF compensated.  -continue BB and aldactone  -no ACE-I/ARB/ARNI d/t angioedema  -sleep evaluation as outpt     3.  Hypertension  -improving  -goal BP < 130/80 with CHF  -continue BB (increase dose) and amlodpine for now  - continue hydralazine and nitrate     4. Elevated troponin  -suspect multifactorial and d/t demand ischemia with uncontrolled HTN and CHF  -continue BB, change statin to Atorvastatin (high intensity)  -add ASA  -given multiple risk factors with elevated troponin, would recommend eventual cardiac cath to evaluate for CAD once CHF better compensated     5. Diabetes Mellitus, type II  -Rx per Primary team  -on statin     6. H/O tobacco use  -quit smoking ~ 1 month ago  -smoking cessation emphasized    Still needs further diuresis and will continue IV diuretic. Eventual R and L heart cath once compensated from CHF standpoint.     Electronically signed by MACO Edmonds CNP on 12/6/2018 at 12:47 PM

## 2020-01-22 ENCOUNTER — HOSPITAL ENCOUNTER (INPATIENT)
Age: 55
LOS: 12 days | Discharge: HOME | DRG: 058 | End: 2020-02-03
Attending: PHYSICAL MEDICINE & REHABILITATION | Admitting: PHYSICAL MEDICINE & REHABILITATION
Payer: COMMERCIAL

## 2020-01-22 PROBLEM — I63.511 ACUTE ISCHEMIC RIGHT MCA STROKE (HCC): Status: ACTIVE | Noted: 2020-01-22

## 2020-01-22 LAB
GLUCOSE BLD-MCNC: 172 MG/DL (ref 70–99)
GLUCOSE BLD-MCNC: 191 MG/DL (ref 70–99)
PERFORMED ON: ABNORMAL
PERFORMED ON: ABNORMAL

## 2020-01-22 PROCEDURE — 1280000000 HC REHAB R&B

## 2020-01-22 PROCEDURE — 6370000000 HC RX 637 (ALT 250 FOR IP): Performed by: PHYSICAL MEDICINE & REHABILITATION

## 2020-01-22 RX ORDER — ALLOPURINOL 100 MG/1
100 TABLET ORAL DAILY
Status: DISCONTINUED | OUTPATIENT
Start: 2020-01-23 | End: 2020-02-03 | Stop reason: HOSPADM

## 2020-01-22 RX ORDER — LOSARTAN POTASSIUM 50 MG/1
50 TABLET ORAL DAILY
Status: ON HOLD | COMMUNITY
End: 2020-01-31 | Stop reason: SDUPTHER

## 2020-01-22 RX ORDER — DOCUSATE SODIUM 100 MG/1
100 CAPSULE, LIQUID FILLED ORAL 2 TIMES DAILY
Status: DISCONTINUED | OUTPATIENT
Start: 2020-01-22 | End: 2020-01-31

## 2020-01-22 RX ORDER — SPIRONOLACTONE 25 MG/1
25 TABLET ORAL DAILY
Status: DISCONTINUED | OUTPATIENT
Start: 2020-01-23 | End: 2020-02-03 | Stop reason: HOSPADM

## 2020-01-22 RX ORDER — HYDRALAZINE HYDROCHLORIDE 25 MG/1
25 TABLET, FILM COATED ORAL EVERY 6 HOURS PRN
Status: DISCONTINUED | OUTPATIENT
Start: 2020-01-22 | End: 2020-02-03 | Stop reason: HOSPADM

## 2020-01-22 RX ORDER — DEXTROSE MONOHYDRATE 25 G/50ML
12.5 INJECTION, SOLUTION INTRAVENOUS PRN
Status: DISCONTINUED | OUTPATIENT
Start: 2020-01-22 | End: 2020-02-03 | Stop reason: HOSPADM

## 2020-01-22 RX ORDER — ISOSORBIDE MONONITRATE 60 MG/1
60 TABLET, EXTENDED RELEASE ORAL DAILY
Status: DISCONTINUED | OUTPATIENT
Start: 2020-01-23 | End: 2020-02-03 | Stop reason: HOSPADM

## 2020-01-22 RX ORDER — HYDROXYZINE HYDROCHLORIDE 10 MG/1
10 TABLET, FILM COATED ORAL 3 TIMES DAILY PRN
Status: DISCONTINUED | OUTPATIENT
Start: 2020-01-22 | End: 2020-02-03 | Stop reason: HOSPADM

## 2020-01-22 RX ORDER — POLYETHYLENE GLYCOL 3350 17 G/17G
17 POWDER, FOR SOLUTION ORAL DAILY PRN
Status: DISCONTINUED | OUTPATIENT
Start: 2020-01-22 | End: 2020-02-03 | Stop reason: HOSPADM

## 2020-01-22 RX ORDER — ATORVASTATIN CALCIUM 40 MG/1
40 TABLET, FILM COATED ORAL NIGHTLY
Status: DISCONTINUED | OUTPATIENT
Start: 2020-01-23 | End: 2020-02-03 | Stop reason: HOSPADM

## 2020-01-22 RX ORDER — ASPIRIN 81 MG/1
81 TABLET ORAL DAILY
Status: DISCONTINUED | OUTPATIENT
Start: 2020-01-23 | End: 2020-02-03 | Stop reason: HOSPADM

## 2020-01-22 RX ORDER — FUROSEMIDE 20 MG/1
20 TABLET ORAL DAILY
Status: ON HOLD | COMMUNITY
End: 2020-01-31 | Stop reason: HOSPADM

## 2020-01-22 RX ORDER — FUROSEMIDE 20 MG/1
20 TABLET ORAL DAILY
Status: DISCONTINUED | OUTPATIENT
Start: 2020-01-23 | End: 2020-01-24

## 2020-01-22 RX ORDER — BISACODYL 10 MG
10 SUPPOSITORY, RECTAL RECTAL DAILY PRN
Status: DISCONTINUED | OUTPATIENT
Start: 2020-01-22 | End: 2020-02-03 | Stop reason: HOSPADM

## 2020-01-22 RX ORDER — HYDRALAZINE HYDROCHLORIDE 10 MG/1
10 TABLET, FILM COATED ORAL EVERY 6 HOURS
Status: ON HOLD | COMMUNITY
End: 2020-01-31 | Stop reason: HOSPADM

## 2020-01-22 RX ORDER — METOPROLOL SUCCINATE 50 MG/1
100 TABLET, EXTENDED RELEASE ORAL DAILY
Status: DISCONTINUED | OUTPATIENT
Start: 2020-01-23 | End: 2020-02-03 | Stop reason: HOSPADM

## 2020-01-22 RX ORDER — HYDRALAZINE HYDROCHLORIDE 10 MG/1
10 TABLET, FILM COATED ORAL EVERY 6 HOURS SCHEDULED
Status: DISCONTINUED | OUTPATIENT
Start: 2020-01-22 | End: 2020-01-24

## 2020-01-22 RX ORDER — ONDANSETRON 4 MG/1
4 TABLET, FILM COATED ORAL EVERY 8 HOURS PRN
Status: DISCONTINUED | OUTPATIENT
Start: 2020-01-22 | End: 2020-02-03 | Stop reason: HOSPADM

## 2020-01-22 RX ORDER — LOSARTAN POTASSIUM 50 MG/1
50 TABLET ORAL DAILY
Status: DISCONTINUED | OUTPATIENT
Start: 2020-01-23 | End: 2020-01-27

## 2020-01-22 RX ORDER — VITAMIN B COMPLEX
1000 TABLET ORAL DAILY
Status: DISCONTINUED | OUTPATIENT
Start: 2020-01-23 | End: 2020-02-03 | Stop reason: HOSPADM

## 2020-01-22 RX ORDER — DEXTROSE MONOHYDRATE 50 MG/ML
100 INJECTION, SOLUTION INTRAVENOUS PRN
Status: DISCONTINUED | OUTPATIENT
Start: 2020-01-22 | End: 2020-02-03 | Stop reason: HOSPADM

## 2020-01-22 RX ORDER — LANOLIN ALCOHOL/MO/W.PET/CERES
3 CREAM (GRAM) TOPICAL NIGHTLY PRN
Status: DISCONTINUED | OUTPATIENT
Start: 2020-01-22 | End: 2020-02-03 | Stop reason: HOSPADM

## 2020-01-22 RX ORDER — GLIPIZIDE 5 MG/1
10 TABLET ORAL
Status: DISCONTINUED | OUTPATIENT
Start: 2020-01-23 | End: 2020-01-24

## 2020-01-22 RX ORDER — NICOTINE POLACRILEX 4 MG
15 LOZENGE BUCCAL PRN
Status: DISCONTINUED | OUTPATIENT
Start: 2020-01-22 | End: 2020-02-03 | Stop reason: HOSPADM

## 2020-01-22 RX ORDER — ALBUTEROL SULFATE 90 UG/1
2 AEROSOL, METERED RESPIRATORY (INHALATION) EVERY 6 HOURS PRN
Status: DISCONTINUED | OUTPATIENT
Start: 2020-01-22 | End: 2020-02-03 | Stop reason: HOSPADM

## 2020-01-22 RX ORDER — ACETAMINOPHEN 325 MG/1
650 TABLET ORAL EVERY 6 HOURS PRN
Status: DISCONTINUED | OUTPATIENT
Start: 2020-01-22 | End: 2020-02-03 | Stop reason: HOSPADM

## 2020-01-22 RX ORDER — GLIPIZIDE 10 MG/1
10 TABLET ORAL
Status: ON HOLD | COMMUNITY
End: 2020-01-31 | Stop reason: HOSPADM

## 2020-01-22 RX ADMIN — INSULIN LISPRO 1 UNITS: 100 INJECTION, SOLUTION INTRAVENOUS; SUBCUTANEOUS at 17:23

## 2020-01-22 RX ADMIN — HYDRALAZINE HYDROCHLORIDE 10 MG: 10 TABLET, FILM COATED ORAL at 23:32

## 2020-01-22 RX ADMIN — HYDRALAZINE HYDROCHLORIDE 25 MG: 25 TABLET, FILM COATED ORAL at 17:21

## 2020-01-22 RX ADMIN — INSULIN LISPRO 1 UNITS: 100 INJECTION, SOLUTION INTRAVENOUS; SUBCUTANEOUS at 21:23

## 2020-01-22 RX ADMIN — METFORMIN HYDROCHLORIDE 1000 MG: 500 TABLET ORAL at 17:21

## 2020-01-22 ASSESSMENT — PAIN SCALES - GENERAL
PAINLEVEL_OUTOF10: 0
PAINLEVEL_OUTOF10: 0

## 2020-01-22 NOTE — H&P
Department of Physical Medicine & Rehabilitation  History & Physical      Patient Identification:  Srinivasan Miller  : 1965  Admit date: 2020   Attending provider: January Sanchez MD        Primary care provider: Hugh Vo     Chief Complaint: Left side weakness    History of Present Illness/Hospital Course:  Mr. Srinivasan Miller is a 52 yo right handed M with pmh nonischemic cardiomyopathy, HTN, DM2 who initially presented to [address] [place] [address] on 2020 with slurred speech and left side weakness. Imaging revealed acute ischemic right MCA stroke and M2 occlusion. He therefore underwent thrombectomy (). Of note, patient had not been taking his medications for several months. Etiology of stroke, thought to be cardioembolic. TTE revealed LVH, severe diffuse hypokinesis, with an EF of 15-20%. Cardiac MRI was negative for thrombus. He was started on Xarelto for secondary prevention. Cardiology was consulted for possible AICD placement, however, recommended optimizing medical management and using LifeVest for 3 months. He did have elevated troponin upon admission (peak 0.06) which later normalized. Course was complicated by shortness of breath and productive cough. Discharging services not feel this was due to infection but rather, pulmonary hypertension, right-sided heart failure, and tobacco use. Patient now presents to ARU with impaired mobility and self-care below his baseline. Currently, he reports ongoing slurred speech and left side weakness which is starting to improve. He denies headache, vision changes, tingling/numbness, vertigo. He does feel he is having some difficulties with memory and concentration. He has shortness of breath with exertion and with lying flat. He denies chest pain, palpitations, lightheadedness.  He is motivated to start inpatient rehab program.     Prior Level of Function:  Independent for mobility, ADLs, and IADLs  Working part time as a     Current Level of activity: Not on file   Other Topics Concern    Not on file   Social History Narrative    Not on file       Allergies   Allergen Reactions    Lisinopril Swelling     Lips swell         Current Facility-Administered Medications   Medication Dose Route Frequency Provider Last Rate Last Dose    ondansetron (ZOFRAN) tablet 4 mg  4 mg Oral Q8H PRN Derek Weiss MD        bisacodyl (DULCOLAX) suppository 10 mg  10 mg Rectal Daily PRN Derek Weiss MD        melatonin tablet 3 mg  3 mg Oral Nightly PRN Derek Weiss MD        hydrALAZINE (APRESOLINE) tablet 25 mg  25 mg Oral Q6H PRN Derek Weiss MD        albuterol sulfate  (90 Base) MCG/ACT inhaler 2 puff  2 puff Inhalation Q6H PRN Derek Weiss MD        hydrOXYzine (ATARAX) tablet 10 mg  10 mg Oral TID PRN Derek Weiss MD        [START ON 1/23/2020] metoprolol succinate (TOPROL XL) extended release tablet 100 mg  100 mg Oral Daily Derek Weiss MD        [START ON 1/23/2020] rivaroxaban (XARELTO) tablet 20 mg  20 mg Oral MD Kaela Werner ON 1/23/2020] furosemide (LASIX) tablet 20 mg  20 mg Oral Daily Derek Weiss MD        hydrALAZINE (APRESOLINE) tablet 10 mg  10 mg Oral 4 times per day Derek Weiss MD        [START ON 1/23/2020] losartan (COZAAR) tablet 50 mg  50 mg Oral Daily Derek Weiss MD        [START ON 1/23/2020] spironolactone (ALDACTONE) tablet 25 mg  25 mg Oral Daily Derek Weiss MD        [START ON 1/23/2020] allopurinol (ZYLOPRIM) tablet 100 mg  100 mg Oral Daily Derek Weiss MD        [START ON 1/23/2020] aspirin EC tablet 81 mg  81 mg Oral Daily Derek Weiss MD        [START ON 1/23/2020] atorvastatin (LIPITOR) tablet 40 mg  40 mg Oral Nightly Derek Weiss MD        [START ON 1/23/2020] Vitamin D (CHOLECALCIFEROL) tablet 1,000 Units  1,000 Units Oral Daily Derek Weiss MD        [START ON 1/23/2020] glipiZIDE decreased urine volume, and hematuria. HEMATOLOGIC/LYMPHATIC: negative for easy bruising, bleeding and lymphadenopathy. ALLERGIC/IMMUNOLOGIC: negative for recurrent infections, angioedema, anaphylaxis and drug reactions. ENDOCRINE: negative for weight changes and diabetic symptoms including polyuria, polydipsia and polyphagia. MUSCULOSKELETAL: negative for pain, joint swelling, decreased range of motion. NEUROLOGICAL: refer to HPI  PSYCHIATRIC/BEHAVIORAL: negative for hallucinations, behavioral problems, confusion and agitation. All pertinent positives are noted in the HPI. Physical Examination:  Vitals:   Patient Vitals for the past 24 hrs:   BP Temp Temp src Pulse Resp SpO2 Height Weight   01/22/20 1345 (!) 149/106 97.6 °F (36.4 °C) Oral 81 18 99 % 6' 3\" (1.905 m) 246 lb 11.1 oz (111.9 kg)       Const: Alert. WDWN. No distress. Eyes: Conjunctiva noninjected, no icterus noted; pupils equal, round, and reactive to light. HENT: Atraumatic, normocephalic; Oral mucosa moist  Neck: Trachea midline, neck supple. No thyromegaly noted. CV: Regular rate and rhythm, no murmur rub or gallop noted. Resp: Lungs clear to auscultation bilaterally, no rales wheezes or ronchi, no retractions. Respirations unlabored. GI: Soft, nontender, nondistended. Normal bowel sounds. No palpable masses. Skin: Normal temperature and turgor. No rashes or breakdown noted. Ext: Trace LE edema. No varicosities. MSK: No joint tenderness, erythema, warmth noted. AROM intact. Neuro:   -Mental status: Alert. Oriented to person, place, time, situation. Impaired recall and attention.    -Language: Speech dysarthric, repetition and naming intact  -Cranial nerves: +Left facial droop. VFF, PERRL, EOMI, Facial sensation intact, Hearing intact, Palate elevation symmetric, Shoulder shrug intact. Tongue midline.   -Sensation intact to light touch.    -Motor examination reveals strength 5/5 RUE/RLE and 4/5 LUE/LLE except 3/5 left hip flexion.   -No abnormalities with finger/nose noted on the right, impaired coordination on the left but no dysmetria. -Reflexes 2+ and symmetric. Negative Irena  Psych: Stable mood, normal judgement, normal affect     Lab Results   Component Value Date    WBC 6.0 04/07/2019    HGB 13.7 04/07/2019    HCT 40.8 04/07/2019    MCV 88.2 04/07/2019     04/07/2019     No results found for: INR, PROTIME  Lab Results   Component Value Date    CREATININE 0.7 (L) 04/09/2019    BUN 16 04/09/2019     04/09/2019    K 4.0 04/09/2019     04/09/2019    CO2 26 04/09/2019     Lab Results   Component Value Date    ALT 18 12/03/2018    AST 22 12/03/2018    ALKPHOS 74 12/03/2018    BILITOT 0.9 12/03/2018       Available laboratory, medical testing, and imaging data from [facility] has been reviewed. WBC 4.7, hemoglobin 13.4, platelets 573  Sodium 141, potassium 4.6, chloride 108, CO2 24, BUN 19, creatinine 1.11  BNP 1643  Troponin 0.05-->0.06-->0.05-->  <0.04    EKG  NORMAL SINUS RHYTHM ^ LEFT ATRIAL ENLARGEMENT ^ POSSIBLE ANTEROSEPTAL INFARCT (CITED ON OR BEFORE 17-JAN-2020) ^ LOW VOLTAGE QRS ^ ABNORMAL ECG ^ COMPARED TO THE ECG OF 14-JAN-2020 11:48, ^ Jeanne Holliday ^ Confirmed by   Vinayak Betts ([id number]) on 1/17/2020 9:48:13 AM      TTE  - Left ventricle: The cavity size is normal. Wall thickness was    increased in a pattern of mild LVH. Systolic function was    severely reduced. The estimated ejection fraction was in the  Jeremy of 15% to 20%. Severe diffuse hypokinesis. Doppler    parameters are consistent with a reversible restrictive pattern,    indicative of decreased left ventricular diastolic compliance    and/or increased left atrial pressure (grade 3 diastolic    dysfunction). There is no evidence of a thrombus revealed by    acoustic contrast opacification.  - Right ventricle: Systolic function was reduced by visual    assessment.   - Atrial septum: A shunt cannot moderately enlarged.  The Qp/Qs is 1.1 by phase contrast imaging and is consistent with no evidence of shunt.  A patent foramen ovale is visualized.    5. Velocity-encoded phase contrast Imaging in (2D/4D) was performed to assess valvular function. Peak velocity at the aortic valve is 0.41 corresponding to a peak gradient of 0.672 mmHg. There is mild aortic regurgitation. The calculated aortic regurgitant fraction is 17.21 %. There is moderate mitral regurgitation. The calculated mitral regurgitant fraction is 33.6 %. There is severe tricuspid regurgitation. The calculated tricuspid regurgitant fraction is 47.89 %. Peak velocity at pulmonary valve is 0.47 corresponding to a peak gradient of 0.883 mmHg. The calculated pulmonic regurgitant fraction is 1.86 %. 6. There is a small pericardial effusion.  Ascites is also visualized. 7. The thoracic aorta is dilated and tortuous.  The coronary arteries have normal origins and proximal courses.  The ascending aorta is dilated measuring 37.0 mm. The descending aorta is dilated measuring 32.5 mm.  8. The pulmonary artery is dilated.  The IVC and coronary sinus are dilated.        Body mass index is 30.83 kg/m². POST ADMISSION PHYSICIAN EVALUATION  The patient has agreed to being admitted to our comprehensive inpatient rehabilitation facility and can tolerate the intensity of service consisting of at least:  --180 minutes of therapy a day, 5 out of 7 days a week. OR  --15 hours of intensive therapy within a 7 consecutive day period. The patient/family has a good understanding of our discharge process and will benefit from an interdisciplinary inpatient rehabilitation program. The patient has potential to make improvement and is in need of at least two of the following multidisciplinary therapies including but not limited to physical, occupational, respiratory, and speech, nutritional services, wound care, and prosthetics and orthotics.  Given the patients complex

## 2020-01-22 NOTE — PLAN OF CARE
Problem: Falls - Risk of:  Goal: Will remain free from falls  Description  Will remain free from falls  Outcome: Ongoing  Note:   Patient call light and personal belongings within reach;  patient fall precautions in place; patient rounded on  Goal: Absence of physical injury  Description  Absence of physical injury  Outcome: Ongoing     Problem: OXYGENATION/RESPIRATORY FUNCTION  Goal: Patient will maintain patent airway  Outcome: Ongoing  Goal: Patient will achieve/maintain normal respiratory rate/effort  Description  Respiratory rate and effort will be within normal limits for the patient  Outcome: Ongoing     Problem: HEMODYNAMIC STATUS  Goal: Patient has stable vital signs and fluid balance  Outcome: Ongoing     Problem: FLUID AND ELECTROLYTE IMBALANCE  Goal: Fluid and electrolyte balance are achieved/maintained  Outcome: Ongoing     Problem: ACTIVITY INTOLERANCE/IMPAIRED MOBILITY  Goal: Mobility/activity is maintained at optimum level for patient  Outcome: Ongoing     Problem: Mobility - Impaired:  Goal: Mobility will improve  Description  Mobility will improve  Outcome: Ongoing

## 2020-01-22 NOTE — PROGRESS NOTES
4 Eyes Skin Assessment     The patient is being assess for   Admission    I agree that 2 RN's have performed a thorough Head to Toe Skin Assessment on the patient. ALL assessment sites listed below have been assessed. Areas assessed by both nurses:   [x]   Head, Face, and Ears   [x]   Shoulders, Back, and Chest, Abdomen  [x]   Arms, Elbows, and Hands   [x]   Coccyx, Sacrum, and Ischium  [x]   Legs, Feet, and Heels        Scattered bruising and abrasions dry flaky skin    **SHARE this note so that the co-signing nurse is able to place an eSignature**    Co-signer eSignature: Electronically signed by Jj Palmer RN on 1/22/20 at 4:47 PM    Does the Patient have Skin Breakdown?   No          Omega Prevention initiated:  Yes   Wound Care Orders initiated:  No      [facility] nurse consulted for Pressure Injury (Stage 3,4, Unstageable, DTI, NWPT, Complex wounds)and New or Established Ostomies:  No      Primary Nurse eSignature: Electronically signed by Courtney Arizmendi RN on 1/22/20 at 3:36 PM

## 2020-01-23 LAB
ANION GAP SERPL CALCULATED.3IONS-SCNC: 13 MMOL/L (ref 3–16)
BASOPHILS ABSOLUTE: 0 K/UL (ref 0–0.2)
BASOPHILS RELATIVE PERCENT: 1.1 %
BUN BLDV-MCNC: 16 MG/DL (ref 7–20)
CALCIUM SERPL-MCNC: 8.3 MG/DL (ref 8.3–10.6)
CHLORIDE BLD-SCNC: 106 MMOL/L (ref 99–110)
CO2: 20 MMOL/L (ref 21–32)
CREAT SERPL-MCNC: 1 MG/DL (ref 0.9–1.3)
EOSINOPHILS ABSOLUTE: 0.1 K/UL (ref 0–0.6)
EOSINOPHILS RELATIVE PERCENT: 3 %
GFR AFRICAN AMERICAN: >60
GFR NON-AFRICAN AMERICAN: >60
GLUCOSE BLD-MCNC: 118 MG/DL (ref 70–99)
GLUCOSE BLD-MCNC: 179 MG/DL (ref 70–99)
GLUCOSE BLD-MCNC: 218 MG/DL (ref 70–99)
GLUCOSE BLD-MCNC: 83 MG/DL (ref 70–99)
GLUCOSE BLD-MCNC: 85 MG/DL (ref 70–99)
HCT VFR BLD CALC: 40.3 % (ref 40.5–52.5)
HEMOGLOBIN: 12.9 G/DL (ref 13.5–17.5)
LYMPHOCYTES ABSOLUTE: 2 K/UL (ref 1–5.1)
LYMPHOCYTES RELATIVE PERCENT: 41.5 %
MCH RBC QN AUTO: 29.7 PG (ref 26–34)
MCHC RBC AUTO-ENTMCNC: 32 G/DL (ref 31–36)
MCV RBC AUTO: 92.7 FL (ref 80–100)
MONOCYTES ABSOLUTE: 0.4 K/UL (ref 0–1.3)
MONOCYTES RELATIVE PERCENT: 8 %
NEUTROPHILS ABSOLUTE: 2.2 K/UL (ref 1.7–7.7)
NEUTROPHILS RELATIVE PERCENT: 46.4 %
PDW BLD-RTO: 14.7 % (ref 12.4–15.4)
PERFORMED ON: ABNORMAL
PERFORMED ON: ABNORMAL
PERFORMED ON: NORMAL
PERFORMED ON: NORMAL
PLATELET # BLD: 195 K/UL (ref 135–450)
PMV BLD AUTO: 9.3 FL (ref 5–10.5)
POTASSIUM REFLEX MAGNESIUM: 4.3 MMOL/L (ref 3.5–5.1)
PREALBUMIN: 10.8 MG/DL (ref 20–40)
RBC # BLD: 4.35 M/UL (ref 4.2–5.9)
SODIUM BLD-SCNC: 139 MMOL/L (ref 136–145)
WBC # BLD: 4.7 K/UL (ref 4–11)

## 2020-01-23 PROCEDURE — 97535 SELF CARE MNGMENT TRAINING: CPT

## 2020-01-23 PROCEDURE — 80048 BASIC METABOLIC PNL TOTAL CA: CPT

## 2020-01-23 PROCEDURE — 85025 COMPLETE CBC W/AUTO DIFF WBC: CPT

## 2020-01-23 PROCEDURE — 97110 THERAPEUTIC EXERCISES: CPT

## 2020-01-23 PROCEDURE — 92523 SPEECH SOUND LANG COMPREHEN: CPT

## 2020-01-23 PROCEDURE — 97162 PT EVAL MOD COMPLEX 30 MIN: CPT

## 2020-01-23 PROCEDURE — 1280000000 HC REHAB R&B

## 2020-01-23 PROCEDURE — 6370000000 HC RX 637 (ALT 250 FOR IP): Performed by: PHYSICAL MEDICINE & REHABILITATION

## 2020-01-23 PROCEDURE — 97166 OT EVAL MOD COMPLEX 45 MIN: CPT

## 2020-01-23 PROCEDURE — 84134 ASSAY OF PREALBUMIN: CPT

## 2020-01-23 PROCEDURE — 36415 COLL VENOUS BLD VENIPUNCTURE: CPT

## 2020-01-23 PROCEDURE — 97116 GAIT TRAINING THERAPY: CPT

## 2020-01-23 PROCEDURE — 97112 NEUROMUSCULAR REEDUCATION: CPT

## 2020-01-23 RX ORDER — ALBUTEROL SULFATE 90 UG/1
2 AEROSOL, METERED RESPIRATORY (INHALATION) EVERY 6 HOURS PRN
Status: ON HOLD | COMMUNITY
End: 2020-01-31 | Stop reason: HOSPADM

## 2020-01-23 RX ORDER — HYDROXYZINE HYDROCHLORIDE 10 MG/1
10 TABLET, FILM COATED ORAL EVERY 8 HOURS PRN
Status: ON HOLD | COMMUNITY
End: 2020-01-31 | Stop reason: HOSPADM

## 2020-01-23 RX ORDER — METOPROLOL SUCCINATE 100 MG/1
100 TABLET, EXTENDED RELEASE ORAL DAILY
Status: ON HOLD | COMMUNITY
End: 2020-01-31 | Stop reason: SDUPTHER

## 2020-01-23 RX ADMIN — HYDRALAZINE HYDROCHLORIDE 10 MG: 10 TABLET, FILM COATED ORAL at 12:25

## 2020-01-23 RX ADMIN — METFORMIN HYDROCHLORIDE 1000 MG: 500 TABLET ORAL at 07:35

## 2020-01-23 RX ADMIN — GLIPIZIDE 10 MG: 5 TABLET ORAL at 07:35

## 2020-01-23 RX ADMIN — FUROSEMIDE 20 MG: 20 TABLET ORAL at 07:36

## 2020-01-23 RX ADMIN — DOCUSATE SODIUM 100 MG: 100 CAPSULE, LIQUID FILLED ORAL at 07:35

## 2020-01-23 RX ADMIN — ATORVASTATIN CALCIUM 40 MG: 40 TABLET, FILM COATED ORAL at 21:36

## 2020-01-23 RX ADMIN — ISOSORBIDE MONONITRATE 60 MG: 60 TABLET, EXTENDED RELEASE ORAL at 07:36

## 2020-01-23 RX ADMIN — ALLOPURINOL 100 MG: 100 TABLET ORAL at 07:35

## 2020-01-23 RX ADMIN — HYDRALAZINE HYDROCHLORIDE 10 MG: 10 TABLET, FILM COATED ORAL at 06:00

## 2020-01-23 RX ADMIN — INSULIN LISPRO 1 UNITS: 100 INJECTION, SOLUTION INTRAVENOUS; SUBCUTANEOUS at 07:36

## 2020-01-23 RX ADMIN — HYDRALAZINE HYDROCHLORIDE 25 MG: 25 TABLET, FILM COATED ORAL at 12:01

## 2020-01-23 RX ADMIN — RIVAROXABAN 20 MG: 20 TABLET, FILM COATED ORAL at 17:01

## 2020-01-23 RX ADMIN — LOSARTAN POTASSIUM 50 MG: 50 TABLET, FILM COATED ORAL at 07:34

## 2020-01-23 RX ADMIN — HYDRALAZINE HYDROCHLORIDE 25 MG: 25 TABLET, FILM COATED ORAL at 17:01

## 2020-01-23 RX ADMIN — SPIRONOLACTONE 25 MG: 25 TABLET ORAL at 07:35

## 2020-01-23 RX ADMIN — VITAMIN D, TAB 1000IU (100/BT) 1000 UNITS: 25 TAB at 10:47

## 2020-01-23 RX ADMIN — ASPIRIN 81 MG: 81 TABLET, COATED ORAL at 07:36

## 2020-01-23 RX ADMIN — HYDRALAZINE HYDROCHLORIDE 10 MG: 10 TABLET, FILM COATED ORAL at 17:24

## 2020-01-23 RX ADMIN — HYDRALAZINE HYDROCHLORIDE 10 MG: 10 TABLET, FILM COATED ORAL at 23:55

## 2020-01-23 RX ADMIN — METOPROLOL SUCCINATE 100 MG: 50 TABLET, EXTENDED RELEASE ORAL at 07:35

## 2020-01-23 RX ADMIN — METFORMIN HYDROCHLORIDE 1000 MG: 500 TABLET ORAL at 17:01

## 2020-01-23 RX ADMIN — DOCUSATE SODIUM 100 MG: 100 CAPSULE, LIQUID FILLED ORAL at 21:36

## 2020-01-23 ASSESSMENT — 9 HOLE PEG TEST
TESTTIME_SECONDS: 28.22
TEST_RESULT: IMPAIRED
TEST_RESULT: IMPAIRED

## 2020-01-23 ASSESSMENT — PAIN SCALES - GENERAL
PAINLEVEL_OUTOF10: 0
PAINLEVEL_OUTOF10: 0

## 2020-01-23 NOTE — PLAN OF CARE
Problem: Falls - Risk of:  Goal: Will remain free from falls  Description  Will remain free from falls  1/23/2020 1011 by Moy Mcdonald RN  Outcome: Ongoing    Outcome: Ongoing  Note:   Pt educated on falls precautions and safety. Call light and personal belongings within reach at all times. Non skid socks in use when up. Hourly rounding and alarms active. Goal: Absence of physical injury  Description  Absence of physical injury  1/23/2020 1011 by Moy Mcdonald RN  Outcome: OngoingOutcome: Ongoing     Problem: OXYGENATION/RESPIRATORY FUNCTION  Goal: Patient will maintain patent airway  1/23/2020 1011 by Moy Mcdonald RN  Outcome: Ongoing    Outcome: Ongoing  Note:   Respiratory assessment every shift. Goal: Patient will achieve/maintain normal respiratory rate/effort  Description  Respiratory rate and effort will be within normal limits for the patient  1/23/2020 1011 by Moy Mcdonald RN  Outcome: Ongoing    Outcome: Ongoing     Problem: HEMODYNAMIC STATUS  Goal: Patient has stable vital signs and fluid balance  1/23/2020 1011 by Moy Mcdonald RN  Outcome: Ongoing    Outcome: Ongoing     Problem: FLUID AND ELECTROLYTE IMBALANCE  Goal: Fluid and electrolyte balance are achieved/maintained  1/23/2020 1011 by Moy Mcdonald RN  Outcome: Ongoing    Outcome: Ongoing     Problem: ACTIVITY INTOLERANCE/IMPAIRED MOBILITY  Goal: Mobility/activity is maintained at optimum level for patient  1/23/2020 1011 by Moy Mcdonald RN  Outcome: Ongoing    Outcome: Ongoing     Problem: Mobility - Impaired:  Goal: Mobility will improve  Description  Mobility will improve  1/23/2020 1011 by Moy Mcdonald RN  Outcome: Ongoing  1/23/2020 0117 by Kristian Martin RN  Outcome: Ongoing  Note:   Tranfers with a walker x1.    Electronically signed by Moy Mcdonald RN on 1/23/2020 at 10:12 AM

## 2020-01-23 NOTE — PROGRESS NOTES
Patient admitted to rehab with a CVA. Patient is alert and oriented x 4 continent of bowel and bladder. LBM 1/20. Patient takes medications whole with water. Patient is contact guard assist. Shift assessment complete and morning medications given see MAR.  Electronically signed by Nydia Fothergill, RN on 1/23/2020 at 10:17 AM

## 2020-01-23 NOTE — PLAN OF CARE
NURSING:  Erich Level while on this unit will:     [x] Be continent of bowel and bladder     [x] Have an adequate number of bowel movements  [x] Urinate with no urinary retention >300ml in bladder  [] Complete bladder protocol with [place] removal  [x] Maintain O2 SATs at 92%  [x] Have pain managed while on ARU       [] Be pain free by discharge   [x] Have no skin breakdown while on ARU  [] Have improved skin integrity via wound measurements  [] Have no signs/symptoms of infection at the wound site  [x] Be free from injury during hospitalization   [x] Complete education with patient/family with understanding demonstrated for:  [x] Adjustment   [x] Other: CVA, CHF, Life vest education, diabetic needs, diet and fluid restriction, smoking cessation. Nursing interventions may include bowel/bladder training, education for medical assistive devices, medication education, O2 saturation management, energy conservation, stress management techniques, fall prevention, alarms protocol, seating and positioning, skin/wound care, pressure relief instruction,dressing changes,  infection protection, DVT prophylaxis, and/or assistance with in room safety with transfers to bed, toilet, wheelchair, shower as well as bathroom activities and hygiene.      Patient/caregiver education for:   [x] Disease/sustained injury/management      [x] Medication Use   [] Surgical intervention   [x] Safety   [x] Body mechanics and or joint protection   [x] Health maintenance         PHYSICAL THERAPY:  Goals:                  Short term goals  Time Frame for Short term goals: 1 week  Short term goal 1: trasnfers S  Short term goal 2: amb 76' with no AD S  Short term goal 3: 12 steps B rails SBA min SOB            Long term goals  Time Frame for Long term goals : 10 to 14 days  Long term goal 1: all transfers modif I  Long term goal 2: amb 150' with no AD modif I  Long term goal 3: 24 steps L rail modif I  Long term goal 4: car transfer S  These goals were reviewed with this patient at the time of assessment and Nunu Hopkins is in agreement. Plan of Care: Pt to be seen 90   mins per day for 5 to 6 day/week 1 to 2 weeks. Current Treatment Recommendations: Balance Training, Transfer Training, Functional Mobility Training, Endurance Training, Gait Training      OCCUPATIONAL THERAPY:  Goals:             Short term goals  Time Frame for Short term goals: 1 week from 1/23/20  Short term goal 1: Pt will bathe SBA/supervision with DME. Short term goal 2: Pt will dress with SBA, except min A for ephraim hose. Short term goal 3: Pt will toilet with supervision. Short term goal 4: Pt will perform functional transfers SBA with DME/AD. Short term goal 5: Pt will perform IADL tasks SBA, to CGA during reach with AD and demo improved safety awareness/problem solving, standing for 5-6 minutes. Short term goal 6: Pt will perform neuro re-ed/HEP mod ind to increase LUE strength/cood to use more effectively for ADL/IADL bilateral and FM tasks with increased accuracy and speed.(evidence by test measures) :  Long term goals  Time Frame for Long term goals : 2 weeks from 1/23/20  Long term goal 1: Pt will bathe mod ind. with DME. Long term goal 2: Pt willl dress mod ind. Long term goal 3: Pt will toilet mod ind. Long term goal 4: Pt will perform functional transfers mod ind with AD. Long term goal 5: Pt will perform IADL tasks mod ind with AD and demo good safety awareness/problem solving/standing for 7-8 minutes. Long term goals 6: Pt will perform neuro re-ed/HEP to increase LUE strength/cood to use effectively for ADL/IADL tasks in decreased time, w/o dropping objects. :    These goals were reviewed with this patient at the time of assessment and Nunu Hopkins is in agreement    Plan of Care:  Pt to be seen 90  mins per day for 5 day/week 2 weeks.            SPEECH THERAPY: Goals will be left blank if speech is not following this complicated by shortness of breath and productive cough. Discharging services not feel this was due to infection but rather, pulmonary hypertension, right-sided heart failure, and tobacco use. Patient now presents to ARU with impairments including: left hemiparesis, neglect, balance, endurance, dysarthria, dysphagia, cognition. He requires comprehensive inpatient rehab program in order to return to community setting. I have reviewed this initial plan of care and agree with its contents:    Title   Name    Date    Time    Physician: Carolyn Wayne MD 1/24/2020, 3:15 PM      [facility]   [phone]    1/24/2020  1:39 PM      OT: Electronically signed by Surendra Adler OT on 1/23/2020 at 1:30 PM    PT:Electronically signed by Jose Alanis PT on 1/23/20 at 12:42 PM    RN: Kamran Hanson RN, CRRN 01/23/2020 5:02 pm    ST: Martín Stein. Sarahi,MS,CCC,SLP [facility] Speech and Language Pathologist signed 1/23/2020 at [phone]      :  ELVIS Hutson  1/24/2020  [id number]    Other:

## 2020-01-23 NOTE — PLAN OF CARE
Problem: Falls - Risk of:  Goal: Will remain free from falls  Description  Will remain free from falls  1/23/2020 0117 by Hitesh Moreno RN  Outcome: Ongoing  Note:   Pt educated on falls precautions and safety. Call light and personal belongings within reach at all times. Non skid socks in use when up. Hourly rounding and alarms active. Problem: Mobility - Impaired:  Goal: Mobility will improve  Description  Mobility will improve  1/23/2020 0117 by Hitesh Moreno RN  Outcome: Ongoing  Note:   Tranfers with a walker x1. Problem: OXYGENATION/RESPIRATORY FUNCTION  Goal: Patient will maintain patent airway  1/23/2020 0117 by Hitesh Moreno RN  Outcome: Ongoing  Note:   Respiratory assessment every shift.

## 2020-01-23 NOTE — CONSULTS
Nutrition Assessment    Type and Reason for Visit: Initial    Nutrition Recommendations:   Continue current diet. Consume >50% of meals. Monitor nutrition-related lab values. Nutrition Assessment: Patient w/ elevated blood glucose and blood pressure. CBW is 248#. Patient reported having a good appetite with no nutrition related concerns. Nutrition education offered, however patient stated he understood the parameters of his diet. Malnutrition Assessment:  · Malnutrition Status: No malnutrition  · Context: Chronic illness  · Findings of the 6 clinical characteristics of malnutrition (Minimum of 2 out of 6 clinical characteristics is required to make the diagnosis of moderate or severe Protein Calorie Malnutrition based on AND/ASPEN Guidelines):  1. Energy Intake-Greater than 75% of estimated energy requirement, Greater than or equal to 1 month    2. Weight Loss-No significant weight loss,    3. Fat Loss-No significant subcutaneous fat loss,    4. Muscle Loss-No significant muscle mass loss,    5. Fluid Accumulation- ,    6.   Strength-Not measured    Nutrition Risk Level: Low    Nutrient Needs:  · Estimated Daily Total Kcal: 1680kcal - 2016kcal (15 - 18kcal/kg)  · Estimated Daily Protein (g): 107g - 178g (1.2 - 2g/kg IBW)  · Estimated Daily Total Fluid (ml/day): 1680ml - 2016ml (1ml/kcal)     Nutrition Diagnosis:   · Problem: No nutrition diagnosis at this time    Objective Information:  · Nutrition-Focused Physical Findings: no signs of muscle/fat wasting   · Current Nutrition Therapies:  · Oral Diet Orders: Carb Control 5 Carbs/Meal, 2gm Sodium   · Oral Diet intake: %  · Oral Nutrition Supplement (ONS) Orders: None  · Anthropometric Measures:  · Ht: 6' 3\" (190.5 cm)   · Current Body Wt: 248 lb (112.5 kg)  · Admission Body Wt: 246 lb (111.6 kg)     · Ideal Body Wt: 196 lb (88.9 kg), % Ideal Body 126%  · BMI Classification: BMI 30.0 - 34.9 Obese Class I    Nutrition Interventions:   Continue current diet  Continued Inpatient Monitoring    Nutrition Evaluation:   · Evaluation: Goals set   · Goals: Tolerate current diet and consume <50% of meals.      · Monitoring: Meal Intake, Weight, Pertinent Labs      Electronically signed by Nayana Cyr RD, REAGAN on 1/23/20 at 2:08 PM    Contact Number: [phone]

## 2020-01-23 NOTE — PROGRESS NOTES
Physical Therapy    Facility/Department: [address] IP REHAB  Initial Assessment    NAME: Javier Ford  : 1965  MRN: [id number]    Date of Service: 2020    Discharge Recommendations:  Continue to assess pending progress, Home with assist PRN, Home with Home health PT   PT Equipment Recommendations  Equipment Needed: No    Assessment   Body structures, Functions, Activity limitations: Decreased functional mobility ; Decreased endurance  Assessment: Pt presents today below priol level of I with all home activities. Pt with CGA and R CVA with L ibrahima. Today he fatigues easily. Bed mobility is S, trasnfers CGA, walking limited to 30' and steps to 4 (has several flights at home.)  Pt will benefit from continued rehab to increase strength and safety. Treatment Diagnosis: decreased mobility following CHF and CVA  Specific instructions for Next Treatment: increase gt and exercises  Prognosis: Good  Decision Making: Medium Complexity  History: see above  Exam: see above  Clinical Presentation: evolving  Barriers to Learning: slow response time  REQUIRES PT FOLLOW UP: Yes  Activity Tolerance  Activity Tolerance: Patient Tolerated treatment well;Patient limited by endurance; Patient limited by fatigue       Patient Diagnosis(es): There were no encounter diagnoses. has a past medical history of CHF (congestive heart failure) ([address] Diabetes mellitus ([address] Hyperlipidemia, Hypertension, and Vitamin D deficiency. has a past surgical history that includes Diagnostic Cardiac Cath Lab Procedure (2018). Restrictions  Restrictions/Precautions  Restrictions/Precautions: Fall Risk  Position Activity Restriction  Other position/activity restrictions: Life Vest. may remove vest for shower, DIET CARDIAC; Carb Control: 5 carb choices (75 gms)/meal; Low Sodium (2 GM);  Daily Fluid Restriction: 2000 ml   Vision/Hearing  Vision: Impaired  Vision Exceptions: Wears glasses for reading  Hearing: Within functional limits Timed Code Treatment Minutes: 45 Minutes      PM session: pt with  No c/o. Sitting AP, THE, hip flex, add sets, red theraband abduct x 15 with rest breaks between, performed slowly. HR before ex = 78, O2 = 99%. After ex HR = 70, O2 = 99%. Transfer sit to stand SBA. Amb 5' with no AD CGA, no LOB, slow pace. After walk HR  = 82, O2 = 98%. NuStep level 2, seat position 14, hand position 13 x 4 min 40 sec with fatigue but O2 = 98%, HR = 82. Jenifer well. Gave pt a cushion for his w/c due to pt being very tall.   Second Session Therapy Time     Individual Co-treatment   Time In [phone]     Time Out 1417     Minutes [address] [facility], [address]

## 2020-01-23 NOTE — PROGRESS NOTES
17.5 g/dL    Hematocrit 40.3 (L) 40.5 - 52.5 %    MCV 92.7 80.0 - 100.0 fL    MCH 29.7 26.0 - 34.0 pg    MCHC 32.0 31.0 - 36.0 g/dL    RDW 14.7 12.4 - 15.4 %    Platelets 716 328 - 563 K/uL    MPV 9.3 5.0 - 10.5 fL    Neutrophils % 46.4 %    Lymphocytes % 41.5 %    Monocytes % 8.0 %    Eosinophils % 3.0 %    Basophils % 1.1 %    Neutrophils Absolute 2.2 1.7 - 7.7 K/uL    Lymphocytes Absolute 2.0 1.0 - 5.1 K/uL    Monocytes Absolute 0.4 0.0 - 1.3 K/uL    Eosinophils Absolute 0.1 0.0 - 0.6 K/uL    Basophils Absolute 0.0 0.0 - 0.2 K/uL   Basic Metabolic Panel w/ Reflex to MG    Collection Time: 01/23/20  6:51 AM   Result Value Ref Range    Sodium 139 136 - 145 mmol/L    Potassium reflex Magnesium 4.3 3.5 - 5.1 mmol/L    Chloride 106 99 - 110 mmol/L    CO2 20 (L) 21 - 32 mmol/L    Anion Gap 13 3 - 16    Glucose 218 (H) 70 - 99 mg/dL    BUN 16 7 - 20 mg/dL    CREATININE 1.0 0.9 - 1.3 mg/dL    GFR Non-African American >60 >60    GFR African American >60 >60    Calcium 8.3 8.3 - 10.6 mg/dL   POCT Glucose    Collection Time: 01/23/20  7:10 AM   Result Value Ref Range    POC Glucose 179 (H) 70 - 99 mg/dl    Performed on ACCU-CHEK        Therapy progress:  PT  Position Activity Restriction  Other position/activity restrictions: Life Vest. may remove vest for shower, DIET CARDIAC; Carb Control: 5 carb choices (75 gms)/meal; Low Sodium (2 GM); Daily Fluid Restriction: 2000 ml   Objective           OT     Equipment Used: Standard toilet(L grab bar only)  Toilet Transfers Comments: RW >< comfort ht toilet(his toilet is low) L grab bar as uses tub on L to push himself up. Discussed rec to get TSF. Assessment        SLP          Body mass index is 31 kg/m².     Assessment and Plan:  Impairments: left hemiparesis, neglect, balance, endurance, dysarthria, dysphagia, cognition     Acute ischemic right MCA stroke  -Etiology likely cardioembolic  -Secondary ppx with Xarelto, statin  -PT/OT/SLP     Nonischemic cardiomyopathy  -Etiology

## 2020-01-23 NOTE — PROGRESS NOTES
Speech Language Pathology  Facility/Department: [facility]  Initial Speech/Language/Cognitive Assessment    NAME: Amanda Lisa  : 1965   MRN: [id number]  ADMISSION DATE: 2020   Date of Eval: 2020   Evaluating Therapist: Nisha Mattson, SLP  ADMITTING DIAGNOSIS: s/p CVA  ·  has Essential hypertension; Hyperlipidemia; DMII (diabetes mellitus, type 2) ([address] Nonischemic cardiomyopathy ([facility] Tobacco abuse; CAD in native artery; Chronic systolic HF (heart failure) (HCC); CHF (congestive heart failure), NYHA class III, acute, systolic ([facility] Chest pain; Knee effusion, left; Right wrist pain; and Acute ischemic right MCA stroke ([facility] on their problem list.  DATE ONSET: 2020    Chart Review   · MD History and Physical Documentation revealed:   History of Present Illness/Hospital Course:  Mr. Amanda Lisa is a 54 yo right handed M with pmh nonischemic cardiomyopathy, HTN, DM2 who initially presented to [facility] on 2020 with slurred speech and left side weakness. Imaging revealed acute ischemic right MCA stroke and M2 occlusion. He therefore underwent thrombectomy (). Of note, patient had not been taking his medications for several months. Etiology of stroke, thought to be cardioembolic. TTE revealed LVH, severe diffuse hypokinesis, with an EF of 15-20%. Cardiac MRI was negative for thrombus. He was started on Xarelto for secondary prevention. Cardiology was consulted for possible AICD placement, however, recommended optimizing medical management and using LifeVest for 3 months. He did have elevated troponin upon admission (peak 0.06) which later normalized. Course was complicated by shortness of breath and productive cough. Discharging services not feel this was due to infection but rather, pulmonary hypertension, right-sided heart failure, and tobacco use. Patient now presents to ARU with impaired mobility and self-care below his baseline.   Currently, he reports ongoing slurred speech and left side weakness which is starting to improve. He denies headache, vision changes, tingling/numbness, vertigo. He does feel he is having some difficulties with memory and concentration. He has shortness of breath with exertion and with lying flat. He denies chest pain, palpitations, lightheadedness. He is motivated to start inpatient rehab program.   · Pt has a Life Vest  · MRI Cardiac Without contrast: 1/21/2020    · RECENT RESULTS  CT OF HEAD: 1/14/2020  IMPRESSION:  1.  Acute, right MCA territory infarct involving the right insular region,  right frontal operculum and anterior right temporal lobe. ASPECTS SCORE: 7 Preliminary evaluation of the CTA images demonstrates a proximal right M2 branch occlusion. Full CTA report to follow. 2.  No significant associated mass effect or intracranial hemorrhage. · RECENT RESULTS  MRI Brain: 1/16/2020  IMPRESSION:   Expected evolution of right M2 distribution infarction of the right frontal lobe with associated hemosiderin staining. No intracranial hemorrhage. ·  has a past medical history of CHF (congestive heart failure) ([facility] Diabetes mellitus ([facility] Hyperlipidemia, Hypertension, and Vitamin D deficiency. Primary Complaint:  · Speech/voice problems; drooling of saliva        Assessment Impressions:  1. Cognitive Diagnosis: Mild ot moderate deficits in domains of attention; working memory; complex processing; math language. Will initiate therapy with ongoing assessment of executive function as pt does live alone and was reportedly IND prior to onset for home/med management. 2.  Speech Diagnosis: Mild Dysarthria features characterized by phonemic distortions, reduced respiratory control for phonation 2/2 to left oral motor weakness and drooling. Will initiate therapy. Pt becomes SOB during connected speech. 3.  Communication Diagnosis: Nikolai Language skills were intact. Pt with breakdown with complex auditory processing.  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  Additional Comments: Pt's 3 sisters supportive, drive pt to job[employer]store        Objective:  Assessment included Oral Motor Speech /Voice Mechanism Exam; Portions of NCSE and MC CAT; Portions of Cognitive - Linguistic Exam  Pain:  Pain Assessment  Pain Assessment: 0-10  Pain Level: 0  Patient's Stated Pain Goal: No pain    Vision  Vision: Impaired  Vision Exceptions: Wears glasses for reading  Hearing  Hearing: Within functional limits    Auditory Comprehension  Comprehension: Exceptions  Basic Questions: (intact on testing)  Complex Questions: Mild  One Step Basic Commands: (intact on testing)  Two Step Basic Commands: (intact on testing)  Multistep Basic Commands: (intact on testing; repetition PRN)  Complex/Abstract Commands: Mild  L/R Discrimination: (intact on testing)  Interfering Components: Processing speed; Attention to detail; Working memory  Effective Techniques: Stressing words; Extra processing time;Repetition    Reading Comprehension  Reading Status: Exceptions to [facility]  Sentence Impairment Severity: (intact on testing)  Interfering Components: (extra time/re-reading)  Effective Techniques: Large print    Expression  Primary Mode of Expression: Verbal  Verbal Expression  Verbal Expression: Exceptions to functional limits  Confrontation: (intact on testing)  Conversation: (Functional for concrete concept/event description at multiple word utterance level.  Pt with occasional word finding/thought organization for complex concept/event description)    Written (ongoing assessment)  Flexibility of Thought: (slow rate of processing; ongoing assessment)    Prognosis:  Speech Therapy Prognosis  Prognosis: Good  Prognosis Considerations: (medical co morbidities)  Individuals consulted  Consulted and agree with results and recommendations: Patient    Education:  Patient Education Response: Verbalizes understanding;Demonstrated understanding  Safety Devices in place: Yes  Type of devices: All fall risk precautions in place    Therapy Time:   Individual   Time In 0945   Time Out 1030   Minutes [address] [facility] MARSHALL McclainMS,CCC,SLP 7410  Speech and Language Pathologist  1/23/2020 2:25 PM

## 2020-01-23 NOTE — PROGRESS NOTES
weakness. Imaging revealed acute ischemic right MCA stroke and M2 occlusion. He therefore underwent thrombectomy (1/14). Of note, patient had not been taking his medications for several months. Etiology of stroke, thought to be cardioembolic. TTE revealed LVH, severe diffuse hypokinesis, with an EF of 15-20%. Cardiac MRI was negative for thrombus. He was started on Xarelto for secondary prevention. Cardiology was consulted for possible AICD placement, however, recommended optimizing medical management and using LifeVest for 3 months. He did have elevated troponin upon admission (peak 0.06) which later normalized. Course was complicated by shortness of breath and productive cough. Discharging joellen who initially presented to  on 1/14/2020 with slurred speech and left side weakness. Imaging revealed acute ischemic right MCA stroke and M2 occlusion. He therefore underwent thrombectomy (1/14). Of note, patient had not been taking his medications for several months. Etiology of stroke, thought to be cardioembolic. TTE revealed LVH, severe diffuse hypokinesis, with an EF of 15-20%. Cardiac MRI was negative for thrombus. He was started on Xarelto for secondary prevention. Cardiology was consulted for possible AICD placement, however, recommended optimizing medical management and using LifeVest for 3 months. He did have elevated troponin upon admission (peak 0.06) which later normalized. Course was complicated by shortness of breath and productive cough. Discharging services not feel this was due to infection but rather, pulmonary hypertension, right-sided heart failure, and tobacco use. Patient now presents to ARU with impaired mobility and self-care below his baseline. Currently, he reports ongoing slurred speech and left side weakness which is starting to improve. He denies headache, vision changes, tingling/numbness, vertigo. He does feel he is having some difficulties with memory and concentration.  He has Social/Functional History  Social/Functional History  Lives With: Alone  Type of Home: Apartment  Home Layout: Performs ADL's on one level  Home Access: Stairs to enter with rails  Entrance Stairs - Number of Steps: 3 flights of steps to enter going up L rail &  R wall   Entrance Stairs - Rails: Left  Bathroom Shower/Tub: Tub/Shower unit  Bathroom Toilet: Standard(low toilet, tub on L, vanity R but not strong enough to use for support, towel rack in front, uses @ times)  Bathroom Accessibility: Walker accessible  ADL Assistance: Independent  Homemaking Assistance: Independent  Homemaking Responsibilities: (was ind for all homemaking, finances, meds management)  Ambulation Assistance: Independent  Transfer Assistance: Independent  Active : No  Patient's  Info: per pt: license suspended D/T not making child support payment  Occupation: Other(comment)  Type of occupation: recently worked prn work as an , 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  Additional Comments: Pt's 3 sisters supportive, drive pt to [employer]store       Objective   Vision: Impaired(needs reading glasses)  Hearing: Within functional limits    Orientation  Overall Orientation Status: Within Functional Limits     Balance  Sitting Balance: Supervision  Standing Balance: Contact guard assistance  Functional Mobility  Functional - Mobility Device: Rolling Walker  Activity: To/from bathroom  Assist Level: Contact guard assistance  Functional Mobility Comments: cues for walker safety, tends to put to side when approaching surface to sit  Toilet Transfers  Equipment Used: Standard toilet(L grab bar only)  Toilet Transfer: Contact guard assistance  Toilet Transfers Comments: RW >< comfort ht toilet(his toilet is low) L grab bar as uses tub on L to push himself up. Discussed rec to get TSF. Shower Transfers  Shower - Transfer From: [employer])  Shower - Transfer Type:  To and From  Shower - Transfer To: Shower seat with back  Shower - Technique: Ambulating  Shower Transfers: Contact Guard  Shower Transfers Comments: RW >< shower chair in stall, cues for technique, CGA  used 1 grab bar. Wheelchair Bed Transfers  Wheelchair/Bed - Technique: Ambulating(RW)  Equipment Used: Other; Wheelchair(recliner)  Level of Asssistance: Contact guard assistance  Wheelchair Transfers Comments: >< recliner and w/c CGA + min safety cues with RW  ADL  Feeding: Modified independent (difficulty to open packages but able to do)  Grooming: Stand by assistance(stood @ sink for teeth, sat in shower for face/hands, family trimmed beard last night, rarely does beard)  UE Bathing: Stand by assistance;Setup(seated on shower chair/hand held shower(HHS)(ed in use of HHS))  LE Bathing: Minimal assistance(min A to rinse buttocks in stance/held grab bar, & to dry between toes)  UE Dressing: Moderate assistance;Minimal assistance(doffed vest min A & donned vest mod A, T-shirt SBA)  LE Dressing: Minimal assistance; Moderate assistance(OT donned ephraim hose & footies(no shoes here), rest CGA(tried footies but unable to bend over got SOB)  Toileting: (declined need, anticipate CGA with grab bar)  Additional Comments: Pt left seated in recliner, needs in reach. Transport arrived to take pt to speech, OT assisted pt to W/C. Tone RUE  RUE Tone: Normotonic  Tone LUE  LUE Tone: Normotonic  Coordination  Movements Are Fluid And Coordinated: No  Coordination and Movement description: Fine motor impairments;Decreased speed;Decreased accuracy; Left UE              Cognition  Overall Cognitive Status: Exceptions  Following Commands:  Follows all commands without difficulty  Attention Span: Difficulty dividing attention  Safety Judgement: Decreased awareness of need for safety  Cognition Comment: Noted slight slowed processing, drooling occassionally, slight difficulty with word finding but very mild        Sensation  Overall Sensation Status: WNL(lt touch and perform IADL tasks SBA, to CGA during reach with AD and demo improved safety awareness/problem solving, standing for 5-6 minutes. Short term goal 6: Pt will perform neuro re-ed/HEP mod ind to increase LUE strength/cood to use more effectively for ADL/IADL bilateral and FM tasks with increased accuracy and speed.(evidence by test measures)  Long term goals  Time Frame for Long term goals : 2 weeks from 1/23/20  Long term goal 1: Pt will bathe mod ind. with DME. Long term goal 2: Pt willl dress mod ind. Long term goal 3: Pt will toilet mod ind. Long term goal 4: Pt will perform functional transfers mod ind with AD. Long term goal 5: Pt will perform IADL tasks mod ind with AD and demo good safety awareness/problem solving/standing for 7-8 minutes. Long term goals 6: Pt will perform neuro re-ed/HEP to increase LUE strength/cood to use effectively for ADL/IADL tasks in decreased time, w/o dropping objects. Patient Goals   Patient goals : Pt stated he wants to go home as soon as possible, get L arm working better/stop dropping things.        Therapy Time   Individual Concurrent Group Co-treatment   Time In 0800         Time Out 0945         Minutes 105         Timed Code Treatment Minutes: 90 Minutes+ 15 rita Arceo OT   Electronically signed by LANE Saucedo/DAISY  License # [id number]

## 2020-01-24 LAB
GLUCOSE BLD-MCNC: 102 MG/DL (ref 70–99)
GLUCOSE BLD-MCNC: 127 MG/DL (ref 70–99)
GLUCOSE BLD-MCNC: 71 MG/DL (ref 70–99)
GLUCOSE BLD-MCNC: 95 MG/DL (ref 70–99)
GLUCOSE BLD-MCNC: 99 MG/DL (ref 70–99)
PERFORMED ON: ABNORMAL
PERFORMED ON: ABNORMAL
PERFORMED ON: NORMAL

## 2020-01-24 PROCEDURE — 92610 EVALUATE SWALLOWING FUNCTION: CPT

## 2020-01-24 PROCEDURE — 97110 THERAPEUTIC EXERCISES: CPT

## 2020-01-24 PROCEDURE — 6370000000 HC RX 637 (ALT 250 FOR IP): Performed by: PHYSICAL MEDICINE & REHABILITATION

## 2020-01-24 PROCEDURE — 97116 GAIT TRAINING THERAPY: CPT

## 2020-01-24 PROCEDURE — 92526 ORAL FUNCTION THERAPY: CPT

## 2020-01-24 PROCEDURE — 97129 THER IVNTJ 1ST 15 MIN: CPT

## 2020-01-24 PROCEDURE — 97530 THERAPEUTIC ACTIVITIES: CPT

## 2020-01-24 PROCEDURE — 1280000000 HC REHAB R&B

## 2020-01-24 PROCEDURE — 97535 SELF CARE MNGMENT TRAINING: CPT

## 2020-01-24 PROCEDURE — 97112 NEUROMUSCULAR REEDUCATION: CPT

## 2020-01-24 RX ORDER — HYDRALAZINE HYDROCHLORIDE 25 MG/1
25 TABLET, FILM COATED ORAL EVERY 6 HOURS SCHEDULED
Status: DISCONTINUED | OUTPATIENT
Start: 2020-01-24 | End: 2020-02-03 | Stop reason: HOSPADM

## 2020-01-24 RX ORDER — GLIPIZIDE 5 MG/1
5 TABLET ORAL
Status: DISCONTINUED | OUTPATIENT
Start: 2020-01-25 | End: 2020-01-28

## 2020-01-24 RX ORDER — FUROSEMIDE 40 MG/1
40 TABLET ORAL DAILY
Status: DISCONTINUED | OUTPATIENT
Start: 2020-01-25 | End: 2020-01-27

## 2020-01-24 RX ADMIN — METOPROLOL SUCCINATE 100 MG: 50 TABLET, EXTENDED RELEASE ORAL at 08:32

## 2020-01-24 RX ADMIN — ALLOPURINOL 100 MG: 100 TABLET ORAL at 08:32

## 2020-01-24 RX ADMIN — METFORMIN HYDROCHLORIDE 1000 MG: 500 TABLET ORAL at 08:32

## 2020-01-24 RX ADMIN — HYDRALAZINE HYDROCHLORIDE 10 MG: 10 TABLET, FILM COATED ORAL at 05:43

## 2020-01-24 RX ADMIN — RIVAROXABAN 20 MG: 20 TABLET, FILM COATED ORAL at 17:21

## 2020-01-24 RX ADMIN — FUROSEMIDE 20 MG: 20 TABLET ORAL at 12:59

## 2020-01-24 RX ADMIN — HYDRALAZINE HYDROCHLORIDE 10 MG: 10 TABLET, FILM COATED ORAL at 11:47

## 2020-01-24 RX ADMIN — ASPIRIN 81 MG: 81 TABLET, COATED ORAL at 08:32

## 2020-01-24 RX ADMIN — GLIPIZIDE 10 MG: 5 TABLET ORAL at 08:32

## 2020-01-24 RX ADMIN — HYDRALAZINE HYDROCHLORIDE 25 MG: 25 TABLET, FILM COATED ORAL at 18:08

## 2020-01-24 RX ADMIN — LOSARTAN POTASSIUM 50 MG: 50 TABLET, FILM COATED ORAL at 08:32

## 2020-01-24 RX ADMIN — VITAMIN D, TAB 1000IU (100/BT) 1000 UNITS: 25 TAB at 08:32

## 2020-01-24 RX ADMIN — ISOSORBIDE MONONITRATE 60 MG: 60 TABLET, EXTENDED RELEASE ORAL at 08:32

## 2020-01-24 RX ADMIN — HYDRALAZINE HYDROCHLORIDE 25 MG: 25 TABLET, FILM COATED ORAL at 17:21

## 2020-01-24 RX ADMIN — MELATONIN 3 MG: at 01:35

## 2020-01-24 RX ADMIN — MELATONIN 3 MG: at 20:47

## 2020-01-24 RX ADMIN — SPIRONOLACTONE 25 MG: 25 TABLET ORAL at 08:32

## 2020-01-24 RX ADMIN — METFORMIN HYDROCHLORIDE 1000 MG: 500 TABLET ORAL at 17:21

## 2020-01-24 RX ADMIN — DOCUSATE SODIUM 100 MG: 100 CAPSULE, LIQUID FILLED ORAL at 08:32

## 2020-01-24 RX ADMIN — ATORVASTATIN CALCIUM 40 MG: 40 TABLET, FILM COATED ORAL at 20:47

## 2020-01-24 ASSESSMENT — PAIN SCALES - GENERAL: PAINLEVEL_OUTOF10: 0

## 2020-01-24 NOTE — PROGRESS NOTES
Department of Physical Medicine & Rehabilitation  Progress Note    Patient Identification:  Joey Merida  [id number]  : 1965  Admit date: 2020    Chief Complaint: Acute ischemic right MCA stroke ([address]    Subjective:   No acute events overnight. Patient seen this am, sitting up in room. He reports poor sleep due to worrying. Provided reassurance and encouraged him to try melatonin. Family members updated at the bedside. ROS: No f/c, n/v, cp     Objective:  Patient Vitals for the past 24 hrs:   BP Temp Temp src Pulse Resp SpO2 Weight   20 1243 (!) 142/92 97.8 °F (36.6 °C) Oral 71 16 94 % --   20 1147 (!) 141/104 -- -- -- -- -- --   20 0651 (!) 157/98 -- -- 75 16 -- --   20 0542 (!) 143/96 98.2 °F (36.8 °C) Oral 74 16 97 % 249 lb 1.9 oz (113 kg)   20 2130 (!) 153/109 97.2 °F (36.2 °C) Oral 78 16 -- --   20 1515 (!) 142/92 97.8 °F (36.6 °C) Oral 73 16 98 % --     Const: Alert. No distress, pleasant. HEENT: Normocephalic, atraumatic. Normal sclera/conjunctiva. MMM. CV: Regular rate and rhythm. LifeVest in place. Resp: No respiratory distress. Lungs CTAB. Abd: Soft, nontender, nondistended, NABS+   Ext: Trace edema. Neuro: Alert, oriented, appropriately interactive. Mild left facial droop with dysarthria. Left hemiparesis overall 4/5. Psych: Cooperative, appropriate mood and affect    Laboratory data: Available via EMR.    Last 24 hour lab  Recent Results (from the past 24 hour(s))   POCT Glucose    Collection Time: 20  4:07 PM   Result Value Ref Range    POC Glucose 118 (H) 70 - 99 mg/dl    Performed on ACCU-CHEK    POCT Glucose    Collection Time: 20  8:23 PM   Result Value Ref Range    POC Glucose 85 70 - 99 mg/dl    Performed on ACCU-CHEK    POCT Glucose    Collection Time: 20  2:06 AM   Result Value Ref Range    POC Glucose 102 (H) 70 - 99 mg/dl    Performed on ACCU-CHEK    POCT Glucose    Collection Time: 20  7:04 AM Result Value Ref Range    POC Glucose 99 70 - 99 mg/dl    Performed on ACCU-CHEK    POCT Glucose    Collection Time: 01/24/20 11:31 AM   Result Value Ref Range    POC Glucose 95 70 - 99 mg/dl    Performed on ACCU-CHEK        Therapy progress:  PT  Position Activity Restriction  Other position/activity restrictions: Life Vest. may remove vest for shower, DIET CARDIAC; Carb Control: 5 carb choices (75 gms)/meal; Low Sodium (2 GM); Daily Fluid Restriction: 2000 ml   Objective     Sit to Stand: Contact guard assistance  Stand to sit: Contact guard assistance  Bed to Chair: Contact guard assistance(without device)  Device: No Device  Assistance: Contact guard assistance  Distance: 200' with three turns, 210', 220'  OT  PT Equipment Recommendations  Equipment Needed: No  Equipment Used: Standard toilet(L grab bar only)  Toilet Transfers Comments: RW >< comfort ht toilet(his toilet is low) L grab bar as uses tub on L to push himself up. Discussed rec to get TSF. Assessment        SLP  Diet Solids Recommendation: Regular  Liquid Consistency Recommendation: Thin    Body mass index is 31.14 kg/m². Assessment and Plan:  Impairments: left hemiparesis, neglect, balance, endurance, dysarthria, dysphagia, cognition     Acute ischemic right MCA stroke  -Etiology likely cardioembolic  -Secondary ppx with Xarelto, statin  -PT/OT/SLP     Nonischemic cardiomyopathy  -Etiology thought to be hypertensive  -LifeVest in place x 3 months  -Daily wt, fluid restriction  -Lasix, losartan, metoprolol, spironolactone     CAD  -[facility] 2018 with non-obstructive disease  -Trop elevated on admission to  then stabilized  -ASA, statin, bb, imdur     HTN - BPs elevated (particularly diastolic). Increase lasix and hydralazine, continue imdur, losartan, metoprolol, spironolactone.     Pulmonary HTN/Right heart failure - Lasix, albuterol prn     LIBRADO - avoid nephrotoxins. Monitor renal function intermittently.     DM2 - A1C 7.4%.  glipizide (decrease

## 2020-01-24 NOTE — PROGRESS NOTES
Occupational Therapy  Facility/Department: [address] IP REHAB  Daily Treatment Note  AM and PM Sessions:  NAME: Gorge Mcdowell  : 1965  MRN: [id number]    Date of Service: 2020    Discharge Recommendations:  Home with Home health OT, Home with assist PRN  OT Equipment Recommendations  Other: continue to assess: likely TSF, shower chair vs. TTB, &/or grab bar, hand held shower, non skid mat/decals for shower floor, reacher for object retrieval    Assessment   Performance deficits / Impairments: Decreased functional mobility ; Decreased high-level IADLs;Decreased cognition;Decreased ADL status; Decreased endurance;Decreased coordination;Decreased strength;Decreased balance;Decreased safe awareness;Decreased fine motor control  Assessment: Pt made great progress from yesterday with ADL tasks performing shower @ SBA level with use of DME and dressed with min A. Did require rests breaks as became SOB @ times, brenton. when bending over to reach feet. OT ed pt to use crossing legs method vs bending over but difficult with LLE. Pt transferred/amb with CGA/SBA no device. Pt not at baseline of ind, not ready for home alone yet. Plan to cont tx per POC. Treatment Diagnosis: Impaired: ADL/IADL function, mobility/transfers, balance, strength, coordination, activity tolerance, higher level cognition  Prognosis: Good  OT Education: Plan of Care;ADL Adaptive Strategies; Equipment;Transfer Training  REQUIRES OT FOLLOW UP: Yes  Activity Tolerance  Activity Tolerance: Patient limited by fatigue;Patient Tolerated treatment well  Activity Tolerance: pt required a few rest during session after mob/dressing tasks/standing to groom  Safety Devices  Safety Devices in place: Yes  Type of devices: Patient at risk for falls; Left in chair;Call light within reach; Chair alarm in place;Nurse notified;Gait belt         Patient Diagnosis(es): There were no encounter diagnoses.       has a past medical history of CHF (congestive heart He has shortness of breath with exertion and with lying flat. He denies chest pain, palpitations, lightheadedness. He is motivated to start inpatient rehab program.\" Copied per Dr Holly Landa  1/22/20 admit note. Family / Caregiver Present: No  Referring Practitioner: Dr Jessica Styles  Diagnosis: CVA  Subjective  Subjective: Pt met in room agreeable to OT tx for ADL session for shower. Pt denied pain. Orientation  Orientation  Overall Orientation Status: Within Functional Limits  Objective    ADL  Feeding: Modified independent (difficulty to open packages but able to do, tray re-heated)  Grooming: Stand by assistance(stood @ sink for teeth, sat in shower for face/hands, rarely does beard)  UE Bathing: Stand by assistance;Setup(seated on shower chair/hand held shower(HHS)(ed in use of HHS))  LE Bathing: Stand by assistance(used grab bar in stance part, cues to dry between toes & became SOB)  UE Dressing: Stand by assistance;Minimal assistance(doffed/donned vest in stance SBA, assist to don new battery in vest)  LE Dressing: Minimal assistance(OT donned ephraim hose & pt able to complete rest SBA w/rests D/T SOB, including gym shoes)  Toileting: (declined need)  Additional Comments: Pt left seated in recliner, needs in reach. Balance  Sitting Balance: Modified independent   Standing Balance: Stand by assistance  Functional Mobility  Functional - Mobility Device: No device  Activity: To/from bathroom;Transport items; Retrieve items  Assist Level: Contact guard assistance  Functional Mobility Comments: recliner>< closet>< bathroom, in bathroom for ADL tasks CGA during reach, SBA/CGA for rest.  Shower Transfers  Shower - Transfer From: Other(no device)  Shower - Transfer Type: To and From  Shower - Transfer To: Shower seat with back  Shower - Technique: Ambulating  Shower Transfers: Stand by assistance;Contact Guard  Shower Transfers Comments: >< shower chair in stallmCGA/SBA used 1 grab bar.   Wheelchair Bed Transfers  Wheelchair/Bed - Technique: Ambulating(no device)  Equipment Used: Other(recliner , arm chair @ sink)  Wheelchair Transfers Comments: >< recliner and w/c SBA no device                             Cognition  Overall Cognitive Status: Exceptions  Following Commands: Follows all commands without difficulty  Attention Span: Difficulty dividing attention  Memory: Decreased short term memory  Safety Judgement: Decreased awareness of need for safety  Cognition Comment: Noted slight slowed processing, decreased recall of specifics     Second Session:    Met in dept after PT agreeable to tx. Pt denied pain. He stated he is very tired. Closing eyes @ times during session. There ex/HEP: Pt performed AROM there ex for 5-10 reps for each ex following HEP hand out. Pt did not use wt except for bicep curls and pronation/supination x 10 reps. Performed 8 ex following hand out and did require rests after each set + cues not to over do & cues to contine with next ex D/T lethargy. OT issued copy of AROM ex & instructed pt to do in room over the weekend but to stop if causes any pain or dizziness. Coral T-putty ex HEP performed for 5 ex following hand out, cues/demo to do ex correctly. OT issued putty & copy of ex to do in room over the weekend. Pt verbalized understanding of HEP. IADL: laundry task:  Pt amb short distance >< washer carrying laundry bag CGA. Pt stood to load washer, poured detergent & set controls with verbal cues, as unfamiliar with washer. Pt wrote room number/time on delilah. Pt left in care of transporter. Assessment:  Pt very limited by fatigue this PM, difficulty with attn D/T this. (Was seen after PT. )Not ready for home alone yet. Plan to cont tx per POC.      Safety Device - Type of devices:  []  All fall risk precautions in place [] Bed alarm in place  [] Call light within reach [] Chair alarm in place [] Positioning belt [x] Gait belt [] Patient at risk for falls [] Left in bed [x] Left in chair [] Telesitter in use [] Sitter present [] Nurse notified []  None                                           Plan   Plan  Times per week: 5-6  Times per day: Twice a day  Plan weeks: 2 weeks  Current Treatment Recommendations: Strengthening, Safety Education & Training, Balance Training, Patient/Caregiver Education & Training, Self-Care / ADL, Cognitive/Perceptual Training, Functional Mobility Training, Neuromuscular Re-education, Equipment Evaluation, Education, & procurement, Home Management Training, Endurance Training  Plan Comment: Monday conference          Goals  Short term goals  Time Frame for Short term goals: 1 week from 1/23/20  Short term goal 1: Pt will bathe SBA/supervision with DME. Short term goal 2: Pt will dress with SBA, except min A for ephraim hose. Short term goal 3: Pt will toilet with supervision. Short term goal 4: Pt will perform functional transfers SBA with DME/AD. Short term goal 5: Pt will perform IADL tasks SBA, to CGA during reach with AD and demo improved safety awareness/problem solving, standing for 5-6 minutes. Short term goal 6: Pt will perform neuro re-ed/HEP mod ind to increase LUE strength/cood to use more effectively for ADL/IADL bilateral and FM tasks with increased accuracy and speed.(evidence by test measures)  Long term goals  Time Frame for Long term goals : 2 weeks from 1/23/20  Long term goal 1: Pt will bathe mod ind. with DME. Long term goal 2: Pt willl dress mod ind. Long term goal 3: Pt will toilet mod ind. Long term goal 4: Pt will perform functional transfers mod ind with AD. Long term goal 5: Pt will perform IADL tasks mod ind with AD and demo good safety awareness/problem solving/standing for 7-8 minutes. Long term goals 6: Pt will perform neuro re-ed/HEP to increase LUE strength/cood to use effectively for ADL/IADL tasks in decreased time, w/o dropping objects.   Patient Goals   Patient goals : Pt stated he wants to go home as soon as

## 2020-01-24 NOTE — CARE COORDINATION
LSW reviewed chart. LSW met with patient to introduce  role, initiate discussion regarding DC planning and to inform of weeklyTeam Conferences on Mondays to review his progress. He prefers to be called Debbie Hyde. SOCIAL WORK ASSESSMENT      GOAL:   To get strong enough to get to work. HOME SITUATION:    Patient lives alone, apartmnet with three flights of steps to enter. He does not have a steady job; goes to the [employer] [facility] [employer] daily to wait to see if he will be granted work on that day. He has some assistance from his sisters but is already borrowing money from them to stay afloat. He does not drive as he reports he had his license taken away due to lack of payment to his children's monthly support. He had no DME at home. His rent is due on February 1 and he will not have any money to cover this. LSW informed him of possible support from [employer]. He reports he already go this from  . LSW informed him of [facility] for possible assistance for rent. He states he is already aware of this program as he used to work there and he states they typically run out of resources early in the month. He is aware he would need to do a face to face to apply. He is concerned about getting better on rehab because he will have NO Where to live. He reports he would rather go to the [facility] rather than rely on his family for more support or a place to live. Patient has no PCP. PRIOR LEVEL OF FUNCTIONING:       PERSONAL CARE:    Totally indepdnetn                                                                       DRIVES:   No   Lost his license                                                                     FINANCES:   No income regularly.                                                                    MEALS:  Goes to food pantries in his area                               GROCERY SHOPS:      DME CURRENTLY AT HOME:    None. CURRENT HOME CARE/SERVICES:   None. LSW informed him of possible post acute services such as home care or outpatient services to continue his progress. PREFERRED HOME CARE:  To be determined. TEAM CONFERENCE DAY:   Mondays. LSW informed him of weekly Team reviews during Team Conferences where Team will review progress, DME recommendations and DC date. This worker will return to give this update and plan for dc needs. He gives permission to speak with his sister, Ike Church  [phone] to review dc planning needs and updates. LSW informed patient of preferred  time on date of discharge which is between 10 - 12 noon. LSW informed patient of recommendation for PCP visit within 7 days post discharge. LSW informed patient of after visit phone calls from [address] [facility] [address] on days 2, 7, and 60 for assessment of needs.     [place], [place]     Case Management   [phone]    1/24/2020  2:06 PM

## 2020-01-24 NOTE — PROGRESS NOTES
going up L rail &  R wall   Entrance Stairs - Rails: Left  Bathroom Shower/Tub: Tub/Shower unit  Bathroom Toilet: Standard(low toilet, tub on L, vanity R but not strong enough to use for support, towel rack in front, uses @ times)  Bathroom Accessibility: Walker accessible  ADL Assistance: [address]: Independent  Homemaking Responsibilities: (was ind for all homemaking, finances, meds management)  Ambulation Assistance: Independent  Transfer Assistance: Independent  Active : No  Patient's  Info: per pt: license suspended D/T not making child support payment  Occupation: Other(comment)  Type of occupation: recently worked prn work as an , 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 may remove vest for shower, DIET CARDIAC; Carb Control: 5 carb choices (75 gms)/meal; Low Sodium (2 GM); Daily Fluid Restriction: 2000 ml      Subjective   General  Chart Reviewed: Yes  Additional Pertinent Hx: per Dr. Clifford Leigh note, \"52 yo right handed M with pmh nonischemic cardiomyopathy, HTN, DM2 who initially presented to [facility] on 1/14/2020 with slurred speech and left side weakness. Imaging revealed acute ischemic right MCA stroke and M2 occlusion. He therefore underwent thrombectomy (1/14). Of note, patient had not been taking his medications for several months. Etiology of stroke, thought to be cardioembolic. TTE revealed LVH, severe diffuse hypokinesis, with an EF of 15-20%. Cardiac MRI was negative for thrombus. He was started on Xarelto for secondary prevention. Cardiology was consulted for possible AICD placement, however, recommended optimizing medical management and using LifeVest for 3 months.  He did have elevated troponin upon admission (peak 0.06) which later normalized. Course was complicated by shortness of breath and productive cough. Discharging services not feel this was due to infection but rather, pulmonary hypertension, right-sided heart failure, and tobacco use. \"  Response To Previous Treatment: Patient with no complaints from previous session. Family / Caregiver Present: No  Referring Practitioner: Grayson Vann  Subjective  Subjective: Patient reports he couldn't sleep well last night and he feels this was from his CHF. He states that he had difficulty at home prior to this event and would sleep in a recliner on bad nights. No complaints of pain at this time. Pain Screening  Patient Currently in Pain: Denies  Vital Signs  Patient Currently in Pain: Denies         Objective   Bed mobility  Supine to Sit: Supervision  Sit to Supine: Supervision  Scooting: Supervision  Comment: bed mobility done on flat therapy mat. Patient quickly fell asleep secondary to fatigue from lack of sleep over night. Transfers  Sit to Stand: Contact guard assistance  Stand to sit: Contact guard assistance  Bed to Chair: Contact guard assistance(without device)  Car Transfer: Contact guard assistance(mock car done without device. Patient has difficulty lifting left LE in and out of car, but able to complete with only assistance from bilateral UE)    Ambulation  Ambulation?: Yes  More Ambulation?: No  Ambulation 1  Surface: level tile  Device: No Device  Assistance: Contact guard assistance  Quality of Gait: slightly increased MITUL, intermittent drag of left foot, completely absent left arm swing  Gait Deviations: Slow Aubrie;Decreased step length;Decreased step height;Decreased arm swing  Distance: 200' with three turns, 210', 220'  Comments: intermittent LOB that patient recovers with CGA after he catches toes of left foot or sole of shoe.      Stairs/Curb  Stairs?: Yes  Stairs  # Steps : 8  Stairs Height: 6\"  Rails: Bilateral  Device: No Device  Assistance: Contact guard assistance  Comment: Patient ascended with very slow reciprocal pattern and descended with non-reciprocal pattern. Very slow on the stairs but able to clear each foot. Slow very purposeful advancement of left LE when ascending. Wheezing noted on steps, but patient reports no SOB. Curbs: 6\"(without device and CGA-min assist.  patient hesitant to complete and reaching out for therapists arm. Difficulty clearing left foot when descending the step.)    PM Session  Patient reports he continues to feel very tired as he wasn't able to nap. He states he has a lot of things he is thinking about that make it harder to fall asleep. Sit<>stand with SBA  Ambulated down hallway chest passing ball to therapy aide with CGA from PT. Patient uses very slow pace with increased MITUL and increased lateral sway. He has intermittent FRANCOIS, but her reports he does not feel SOB. Patient then switched to over head passing using bilateral UE to pass the ball. Patient with improved upright, but pace slows further. Patient performed each for approx 60'. He stood leaning on the wall for a short rest break, then ambulated 150' without device with SBA-CGA. One bought of 30', patient was encouraged to ambulate as fast as he could. Patient then was able to improve bilateral step length, achieved better arm swing with right UE, but left extends back. Patient transferred onto [facility] with SBA. Performed 8 minutes on NuStep with resistance at 2, seat at 15, bilateral UE at 15, and average SPM at 22. Patient required intermittent cueing to ensure he was achieving full extension with left LE. Patient with intermittent SOB, but does not continue. Short seated rest break given. Stood without UE support and performed alternating foot taps on 6\" box. Patient with hesitation initially and requested single HHA. He was CGA for that one rep, then felt comfortable letting go.  Patient completed 8 reps with CGA and no HHA. Patient with increased lateral sway during weight shifting. 5 reps alternating step up with transition to high knee march on opposite knee before returning back to floor. Patient was min assist when left LE stepping up and CGA-minor min assist when right LE stepping up. Patient used right HHA during activity. Using Gio RPE scale, patient rated activity a 13/20 (somewhat hard). Patient reporting it wasn't very hard, it was more of a focus to maintain his balance. AFter seated rest break, patient requested to try without UE support. Patient completed 3 reps at 6\" box. Patient able to complete with CGA for balance though very slow and patient required increased time to coordinate activity and ensure balance was kept. He kept right UE in very guarded position. Patient then increase RPE to 15/20. Patient with improving balance but continues to have slow problem solving and slow motion with all functional activity. Care transitioned to OT, [address] Goals  Short term goals  Time Frame for Short term goals: 1 week  Short term goal 1: trasnfers S  Short term goal 2: amb 76' with no AD S  Short term goal 3: 12 steps B rails SBA min SOB  Long term goals  Time Frame for Long term goals : 10 to 14 days  Long term goal 1: all transfers modif I  Long term goal 2: amb 150' with no AD modif I  Long term goal 3: 24 steps L rail modif I  Long term goal 4: car transfer S  Patient Goals   Patient goals : go home    Plan    Plan  Times per week: 5 to 6  Times per day: Twice a day  Plan weeks: 1 to 2  Specific instructions for Next Treatment: increase gt and exercises  Current Treatment Recommendations: Balance Training, Transfer Training, Functional Mobility Training, Endurance Training, Gait Training  Safety Devices  Type of devices:  All fall risk precautions in place, Gait belt, Left in chair(in wheelchair to return to room with transportation. )     Therapy Time   Individual Concurrent Group Co-treatment   Time In 0945         Time Out 1030         Minutes 39                Second Session Therapy Time     Individual Co-treatment   Time In 1345     Time Out 1430     Minutes 45            Electronically signed by Kory Ridley PT on 1/24/2020 at 10:31 AM

## 2020-01-24 NOTE — PLAN OF CARE
Problem: Falls - Risk of:  Goal: Will remain free from falls  Description  Will remain free from falls  1/24/2020 0918 by Colton Hair RN  Outcome: Ongoing    Outcome: Ongoing  Note:   Fall risk band on patient. Orange light on outside of room. Non skid footwear in place. Alarms used appropriately. Patient instructed to call and wait for staff before getting up. Rounding done to anticipate needs. Appropriate safety devices used for transfers.    Goal: Absence of physical injury  Description  Absence of physical injury  1/24/2020 1400 by Colton Hair RN  Outcome: Ongoing    Outcome: Ongoing     Problem: OXYGENATION/RESPIRATORY FUNCTION  Goal: Patient will maintain patent airway  1/24/2020 0918 by Colton Hair RN  Outcome: Ongoing    Outcome: Ongoing  Goal: Patient will achieve/maintain normal respiratory rate/effort  Description  Respiratory rate and effort will be within normal limits for the patient  1/24/2020 1354 by Colton Hair RN  Outcome: Ongoing    Outcome: Ongoing     Problem: HEMODYNAMIC STATUS  Goal: Patient has stable vital signs and fluid balance  1/24/2020 0918 by Colton Hair RN  Outcome: Ongoing    Outcome: Ongoing     Problem: FLUID AND ELECTROLYTE IMBALANCE  Goal: Fluid and electrolyte balance are achieved/maintained  1/24/2020 0918 by Colton Hair RN  Outcome: Ongoing    Outcome: Ongoing     Problem: ACTIVITY INTOLERANCE/IMPAIRED MOBILITY  Goal: Mobility/activity is maintained at optimum level for patient  1/24/2020 9661 by Colton Hair RN  Outcome: Ongoing    Outcome: Ongoing     Problem: Mobility - Impaired:  Goal: Mobility will improve  Description  Mobility will improve  1/24/2020 0918 by Colton Hair RN  Outcome: Ongoing    Outcome: Ongoing     Problem: Neurological  Goal: Maximum potential motor/sensory/cognitive function  1/24/2020 0918 by Colton Hair RN  Outcome: Ongoing    Outcome: Ongoing

## 2020-01-24 NOTE — PROGRESS NOTES
Speech Language Pathology  ACUTE REHAB UNIT  SPEECH/LANGUAGE PATHOLOGY      [x] Daily  [x] Weekly Care Conference Note  [] Discharge    Patient:Refugio Dawkins      :1965  GUSTAVO  Rehab Dx/Hx: Acute ischemic right MCA stroke ([facility] [address] [I63.511]    Precautions: Falls; Medical;   Home situation: Lives alone. Does have family support  ST Dx: [] Aphasia  [x] Dysarthria  [] Apraxia   [x] Oropharyngeal dysphagia [x] Cognitive   Impairment  [] Other:   Initial Speech Therapy Assessment Diagnosis:   1. Cognitive Diagnosis: Mild ot moderate deficits in domains of attention; working memory; complex processing; math language. Will initiate therapy with ongoing assessment of executive function as pt does live alone and was reportedly IND prior to onset for home/med management. 2. Speech Diagnosis: Mild Dysarthria features characterized by phonemic distortions, reduced repiratory control for phonation 2/2 to left oral motor weakness and drooling. Will initiate therapy. Pt becomes SOB during connected speech. 3. Communication Diagnosis: [place] Language skills were intact. Pt wtih breakdown with complex auditory processing. Pt's visual language processing was more optimal than auditory processing. . Question if working memory /attention / processing speech and working memory impacts auditory processing. 4. Dysphagia DX: Mild to Moderate Oropharyngeal Dysphagia characterized by left oral motor weakness and sensory deficits which impact rate of mastication, bolus formation and cohesion, and rate of A-P oral transit; and delayed initiation of swallow. Pt with oral pocketing left >right.  Pt with fatigue with increase SOB which can exacerbate risks of aspiration within the meal.  Will initiate training and ed  Date of Admit: 2020  Room #: L3M-9413/3263-01  Date: 2020       Current functional status (updated daily):         Current Diet Order:DIET CARDIAC; Carb Control: 5 carb choices (75 gms)/meal; Low Sodium (2 GM); Daily Fluid Restriction: 2000 ml   Behavior: [x] Alert  [x] Cooperative  [x]  Pleasant  [] Confused  [] Agitated  [] Uncooperative  [] Distractible [] Motivated  [] Self-Limiting [] Anxious  [] Other:  Endurance:  [] Adequate for participation in SLP sessions  [x] Reduced overall  [] Lethargic  [] Other:  Safety: [] No concerns at this time  [] Reduced insight into deficits  []  Reduced safety awareness [] Not following call light procedures  [] Unable to Assess  [] Other:  Swallowing Precautions: urpight for all meals; reduced rate ; small bites/sips; clearance swallow between presentations to clear oral residue. Rest breaks due to fatigue and SOB. Meds with puree. If with H20 close monitor  Barriers toward progress: caregiver support and medical issues may be barriers           Date: 1/24/2020      Tx session 1 Tx session 2   Total Timed Code Min   SHABNAM completed    SLP Individual Minutes  Time In: 9515  Time Out: 3117  Minutes: 21  Coded treatment time  21   Group Treatment Minutes 0 0   Co-Treat Minutes 0 0   Variance/Reason:      Pain     Pain Intervention [] RN notified  [] Repositioned  [] Intervention offered and patient declined  [] N/A  [] Other: [] RN notified  [] Repositioned  [] Intervention offered and patient declined  [] N/A  [] Other:   Subjective     Pt was upright in a chair for SHABNAM exam Seen in treatment office   Objective:  Goals       Dysphagia   Goals: PT goal is to remain on regular diet; but to have less problems and hve less oral drooling of saliva     New goal Not targeted     Dysphagia Goals:   1. The patient will tolerate regular food with thin liquid diet without observed clinical signs of aspiration,  New goal Not targeted   2. The patient/caregiver will demonstrate understanding of compensatory strategies for improved swallowing safety. ,  New goal Not targeted   3.  The patient will improve oral preparation phase via bolus control/manipulation exercises to 5/5 each

## 2020-01-24 NOTE — PATIENT CARE CONFERENCE
[facility]  Inpatient Rehabilitation  Weekly Team Conference Note      Date: 2020  Patient Name:  Clare Mccullough    MRN: [id number]  : 1965  Gender: male  Physician: Dr Aneta Siemens  Diagnosis: Acute ischemic right MCA stroke [facility]) [I63.511]    CASE MANAGEMENT  Assessment:    Goal is home and agreeable to home care services. PHYSICAL THERAPY    Bed mobility  Rolling to Left: Supervision  Rolling to Right: Supervision  Supine to Sit: Supervision  Sit to Supine: Supervision  Scooting: Supervision  Comment: bed mobility done on flat therapy mat. Patient quickly fell asleep secondary to fatigue from lack of sleep over night. Transfers:  Sit to Stand: Contact guard assistance  Stand to sit: Contact guard assistance  Bed to Chair: Contact guard assistance(without device)    Ambulation 1  Surface: level tile  Device: No Device  Assistance: Contact guard assistance  Quality of Gait: slightly increased MITUL, intermittent drag of left foot, completly absent left arm swing  Gait Deviations: Slow Aubrie, Decreased step length, Decreased step height, Decreased arm swing  Distance: [address] with three turns, 210', 220'  Comments: intermittent LOB that patient recovers with CGA after he catches toes of left foot or sole of shoe. Stairs  # Steps : 8  Stairs Height: 6\"  Rails: Bilateral  Device: No Device  Assistance: Contact guard assistance  Comment: Patient ascended with very slow reciprocal pattern and descended with non-reciprocal pattern. Very slow on the stairs but able to clear each foot. Slow very purposeful advancement of left LE when ascending. Wheezing noted on steps, but patient reports no SOB. Curbs: 6\"(without device and CGA-min assist.  patient hesitant to complete and reaching out for therapists arm. Difficulty clearing left foot when descending the step.)    Car Transfer: Contact guard assistance(mock car done without device.   Patient has difficulty lifting left LE in and out of car, but able to complete with only assistance from bilateral UE)      Assessment: patient with improving activity tolerance though he has audible wheezing with increased activity. He has intermittent catching of left foot during ambulation and requires CGA to recover, without an assistive device. Patient with decreased reg with ambulation and on stairs. He has slight increased right sway to compensate for weakness on left LE. Patient will benefit from continued skilled therapy for strengthening, gait training, and balance training to allow for improved independence. SPEECH THERAPY (intentionally left blank if not actively being seen by this service):  Assessment/Progress: Pt was seen for Clinical Evaluation of Swallowing and Oral Motor Speech/Voice Mechanism and SLP evaluation. Therapy currently addressing Oropharyngeal dysphagia characterized by reduced bolus control; prolonged mastication and delayed initiation of swallow which is exacerbated by fatigue and SOB 2/2 to left oral motor muscular weakness and sensory deficits left and delayed initiation of swallow with concern for reduced airway protection prior to swallow (as pt fatigues). Pt with variable drooling of oral secretions and oral pocketing left. Pt with Dysarthria which is characterized by phonemic distortions and variable syllable reduction errors. Again as pt fatigues note SOB and occasional dysphonia. Pt with mild to moderate deficits in cognitive-linguistic domains of attention; working memory; complex processing; math language which can impact safety. Will continue therapy while on the Rehab unit. Additional recommendations at d/c to be determined. Dana Mcclain,MS,CCC,SLP [id number]  Speech and Language Pathologist      OCCUPATIONAL THERAPY    ADL  Feeding: Modified independent (difficulty to open packages but able to do, tray re-heated)  Grooming: Stand by assistance(stood @ sink for teeth, sat in shower for with use of DME and dressed with min A. Did require rests breaks as became SOB @ times, brenton. when bending over to reach feet. OT ed pt to use crossing legs method vs bending over but difficult with LLE. Pt transferred/amb with CGA/SBA no device. Pt not at baseline of ind, not ready for home alone yet. Plan to cont tx per POC. NUTRITION  Most recent weightWeight: 249 lb 1.9 oz (113 kg)  BSA (Calculated - sq m): 2.43 sq meters  BMI (Calculated): 31.2   Diet Order: DIET CARDIAC; Carb Control: 5 carb choices (75 gms)/meal; Low Sodium (2 GM); Daily Fluid Restriction: 2000 ml  PO Meals Eaten (%): 76 - 100%  Please see nutrition note for details. NURSING  Continent of bladder and bowel. Monitor and maintain skin integrity, puncture site. Family Education: Patient education: CVA, CHF, diabetic needs, diet, fluid restriction, medications, safety and fall prevention, pain control as needed, skin care and prevention, Life Vest education, medication compliance. MEDICAL  Hypoglycemia improved with decreased glipizide. Working on BP regimen to reduce BPs    TEAM SUMMARY AND DISCHARGE PLAN  Estimated Length of Stay:dc 1/31/20  Destination: home with home care- patient is reluctant to ask family for assistance, however family is very supportive   · Anticipated Services at Discharge:    [x] OT  [x] PT   [x] SLP    [x] RN   [] Home Health aide []   Community Resources: _______________________________  Equipment recommendations:  [] Hospital bed [] Tub bench  [x] Shower chair [x] Hand held shower   [] Raised toilet seat [x] Toilet safety frame [] Bedside commode   [] W/C: _____  [] Rolling Walker [] Standard walker [] Gait belt [] cane: _________  [] Sliding board [] Alternate seating/furniture [] O2 [] Hip Kit: _______  [] Life Line [] Other: __grab bar in shower_____  Factors facilitating achievement of predicted outcomes:  Motivated and Cooperative  Barriers to the achievement of predicted outcomes/Interventions: lives alone with many steps - continue to work on strength and mobility with therapies and work to achieve a safe discharge. Interdisciplinary Individualized Plan of Care Review:    · Continue Current Plan of Care: Yes    · Modifications:_____________________________    Special Needs in the Upcoming Week :    [] Family/Caregiver Education  [] Home visit  []Therapeutic Pass   [] Consults:_______    [] Other;_______    Patient Rehab Team Goals for the Upcoming Week:  1. Functional mobility in room without a device with supervision with all disciplines  2. Pt will perform ADL tasks supervsion across disciplines  3. Pt will tolerate current diet with use of compensatory strategies PRN without complications  4. Pt will demonstrate audible ; intelligible connected speech without oral drooling of secretions  5. Pt will demonstrate improved insight/VPS/PS and recall of new learning safety strategies for basic DL on the unit and participation with adv DL for d/c planning with set up and < mild assist  6. Patient goals : Pt stated he wants to go home as soon as possible, get L arm working better/stop dropping things      Team Members Present at Conference:  Physician: Dr Eliseo Yan  : [place], [place]    Occupational Therapist:  Chad Loya OTR/L  Physical [address] [place] [address], [facility] [id number]  Speech Therapist: Mark Arteaga. Sarahi,MS,CCC,SLP [phone]  Nurse: Myrna Velez RN, CRRN  Dietician: Yumiko Norman RD,LD   Clay Ayala, [facility]      I led this team conference and I approve the established interdisciplinary plan of care as [facility] the [facility]. MD: Sonam Phillip.  Eliseo Yan MD 1/27/2020, 10:50 AM

## 2020-01-24 NOTE — PROGRESS NOTES
and delayed initiation of swallow. Pt with oral pocketing post swallow left > right. Pt can clear majority with minimal residue left. 2. Pt fatigues easily with increase in SOB which further exacerbates risks for penetration/aspiration within a meal. Will initiate therapy and pt ed and training in compensatory strategies. Pt declined need for temporary downgrade to soft/bites size food consistencies. SLP did provide some ed in varying types of items per meal.    3. Pt did have a FEES at the other facility and pt's diet was upgraded to regular consistency food with thin liquids with skilled SLP Dysphagia therapy recommended. Dysphagia Outcome Severity Scale: Level 5: Mild dysphagia- Distant supervision. May need one diet consistency restricted     Treatment Plan  Requires SLP Intervention: Yes  Duration/Frequency of Treatment: 5 times a week while on acute rehab. additional recommendation at d/c  D/C Recommendations: To be determined       Recommended Diet and Intervention  Diet Solids Recommendation: Regular  Liquid Consistency Recommendation: Thin  Recommended Form of Meds: (close monitor iwth thin liquids; may need with puree)  Recommendations: Dysphagia treatment  Therapeutic Interventions: Bolus control exercises; Patient/Family education; Laryngeal exercises; Therapeutic PO trials with SLP    Compensatory Swallowing Strategies  Compensatory Swallowing Strategies: Upright as possible for all oral intake;Swallow 2 times per bite/sip to clear oral residue;Small bites/sips; rest breaks; oral care; may do well with frequent small meals versus 6 large meals    Treatment/Goals  Short-term Goals  PT goal is to remain on regular diet; but to have less problems and have less oral drooling of saliva  Dysphagia Goals: The patient will tolerate recommended diet without observed clinical signs of aspiration;   The patient/caregiver will demonstrate understanding of compensatory strategies for improved swallowing safety.;  The patient will improve oral preparation phase via bolus control/manipulation exercises to 5/5 each trial.  Pt will complete lingual and laryngeal ex 15/15 to assist with improved bolus control and laryngeal functional during the swallow    General  Chart Reviewed: Yes  Respiratory Status: Room air  Follows Directions: Simple  Dentition: Adequate  Prior Dysphagia History: assessment at other facility. P:t reports was on a modified diet early on    Patient Positioning: Upright in chair    Consistencies Administered: Reg solid; Dysphagia Soft and Bite-Sized (Dysphagia III); Dysphagia Minced and Moist (Dysphagia II); Dysphagia Pureed (Dysphagia I); Nectar - cup; Thin - cup    Pain: denied    Vision/Hearing  Vision  Vision: Impaired  Vision Exceptions: Wears glasses for reading  Hearing  Hearing: Within functional limits    Oral Motor Deficits  Oral/Motor  Oral Motor: Exceptions to [facility]  Labial ROM: Reduced left  Labial Symmetry: Abnormal symmetry left  Labial Strength: Reduced((left weak)  Labial Coordination: Reduced  Lingual Symmetry: ((fairly symmetrical; movements slow)  Lingual Strength: ((generalized weakness)  Lingual Coordination: Reduced  Velum: ((slight asym)  Gag: Absent  Vocal Quality: Weak  Volitional Cough: Weak  Volitional Swallow: Delayed    Oral Phase Dysfunction  Oral Phase  Oral Phase: Exceptions  Oral Phase Dysfunction  Impaired Mastication: Reg Solid(Prolonged )  Pocketing Left: Reg solid; Soft solid  Suspected Premature Bolus Loss: (suspected with liquids and during prolonged mastication; and as pt fatigues)  Lingual/Palatal Residue: Reg solid; Soft solid(left >right lingual)  Oral Phase  Oral Phase - Comment: Pt can clear oral residue with clearance swallow     Indicators of Pharyngeal Phase Dysfunction   Pharyngeal Phase  Pharyngeal Phase: Exceptions  Indicators of Pharyngeal Phase Dysfunction  Delayed Swallow:  All  Decreased Laryngeal Elevation: (concern as pt fatigues)  Throat Clearing -

## 2020-01-24 NOTE — PROGRESS NOTES
Patient admitted to rehab with CVA. A/A/O x 4. Transfers with CGA no device. On Carb control diet, tolerating well. Medications taken whole in water. On Xarelto for DVT prophylaxis. Skin with scattered bruising and abrasions; puncture site to right groin healing; dry flaky skin bilateral feet. On room air with PRN, at night O2 at 1 L. Has been continent of bowel and bladder. LBM 1/20/20. Chair/bed alarms in use and call light in reach. Will monitor for safety.

## 2020-01-25 LAB
GLUCOSE BLD-MCNC: 103 MG/DL (ref 70–99)
GLUCOSE BLD-MCNC: 139 MG/DL (ref 70–99)
GLUCOSE BLD-MCNC: 156 MG/DL (ref 70–99)
GLUCOSE BLD-MCNC: 86 MG/DL (ref 70–99)
PERFORMED ON: ABNORMAL
PERFORMED ON: NORMAL

## 2020-01-25 PROCEDURE — 97530 THERAPEUTIC ACTIVITIES: CPT

## 2020-01-25 PROCEDURE — 97110 THERAPEUTIC EXERCISES: CPT

## 2020-01-25 PROCEDURE — 97116 GAIT TRAINING THERAPY: CPT

## 2020-01-25 PROCEDURE — 1280000000 HC REHAB R&B

## 2020-01-25 PROCEDURE — 97535 SELF CARE MNGMENT TRAINING: CPT

## 2020-01-25 PROCEDURE — 6370000000 HC RX 637 (ALT 250 FOR IP): Performed by: PHYSICAL MEDICINE & REHABILITATION

## 2020-01-25 RX ADMIN — MELATONIN 3 MG: at 20:36

## 2020-01-25 RX ADMIN — ALLOPURINOL 100 MG: 100 TABLET ORAL at 07:24

## 2020-01-25 RX ADMIN — HYDRALAZINE HYDROCHLORIDE 25 MG: 25 TABLET, FILM COATED ORAL at 06:17

## 2020-01-25 RX ADMIN — DOCUSATE SODIUM 100 MG: 100 CAPSULE, LIQUID FILLED ORAL at 07:23

## 2020-01-25 RX ADMIN — INSULIN LISPRO 1 UNITS: 100 INJECTION, SOLUTION INTRAVENOUS; SUBCUTANEOUS at 07:28

## 2020-01-25 RX ADMIN — HYDRALAZINE HYDROCHLORIDE 25 MG: 25 TABLET, FILM COATED ORAL at 11:52

## 2020-01-25 RX ADMIN — METOPROLOL SUCCINATE 100 MG: 50 TABLET, EXTENDED RELEASE ORAL at 07:23

## 2020-01-25 RX ADMIN — LOSARTAN POTASSIUM 50 MG: 50 TABLET, FILM COATED ORAL at 07:23

## 2020-01-25 RX ADMIN — ASPIRIN 81 MG: 81 TABLET, COATED ORAL at 07:24

## 2020-01-25 RX ADMIN — HYDRALAZINE HYDROCHLORIDE 25 MG: 25 TABLET, FILM COATED ORAL at 00:08

## 2020-01-25 RX ADMIN — GLIPIZIDE 5 MG: 5 TABLET ORAL at 07:24

## 2020-01-25 RX ADMIN — METFORMIN HYDROCHLORIDE 1000 MG: 500 TABLET ORAL at 16:49

## 2020-01-25 RX ADMIN — SPIRONOLACTONE 25 MG: 25 TABLET ORAL at 07:23

## 2020-01-25 RX ADMIN — VITAMIN D, TAB 1000IU (100/BT) 1000 UNITS: 25 TAB at 07:23

## 2020-01-25 RX ADMIN — HYDRALAZINE HYDROCHLORIDE 25 MG: 25 TABLET, FILM COATED ORAL at 16:49

## 2020-01-25 RX ADMIN — ISOSORBIDE MONONITRATE 60 MG: 60 TABLET, EXTENDED RELEASE ORAL at 07:24

## 2020-01-25 RX ADMIN — METFORMIN HYDROCHLORIDE 1000 MG: 500 TABLET ORAL at 07:23

## 2020-01-25 RX ADMIN — ATORVASTATIN CALCIUM 40 MG: 40 TABLET, FILM COATED ORAL at 20:36

## 2020-01-25 RX ADMIN — RIVAROXABAN 20 MG: 20 TABLET, FILM COATED ORAL at 16:49

## 2020-01-25 RX ADMIN — FUROSEMIDE 40 MG: 40 TABLET ORAL at 07:23

## 2020-01-25 ASSESSMENT — PAIN SCALES - GENERAL
PAINLEVEL_OUTOF10: 0
PAINLEVEL_OUTOF10: 0

## 2020-01-25 NOTE — PROGRESS NOTES
Physical Therapy  Facility/Department: [address] IP REHAB  Daily Treatment Note  NAME: Srinivasan Miller  : 1965  MRN: [id number]    Date of Service: 2020    Discharge Recommendations:  Continue to assess pending progress, Home with assist PRN, Home with Home health PT   PT Equipment Recommendations  Equipment Needed: No    Assessment   Body structures, Functions, Activity limitations: Decreased functional mobility ; Decreased endurance  Assessment: Patient with continuing progress in regards to balance and endurance. He is experiencing less FRANCOIS and no wheezing noted this AM.  Patient's reg and foot clearance improving with gait and less cues required to maintain safety. He was able to complete 24 steps this AM with one seated rest break after 12 steps. He will benefit from continued skilled therapy for strengthening, gait training, and neuro-muscular re-education to allow for return to independence. Treatment Diagnosis: decreased mobility following CHF and CVA  Specific instructions for Next Treatment: increase gt and exercises  Prognosis: Good  PT Education: Plan of Care; Functional Mobility Training;Gait Training  Patient Education: current progress  REQUIRES PT FOLLOW UP: Yes  Activity Tolerance  Activity Tolerance: Patient Tolerated treatment well;Patient limited by endurance  Activity Tolerance: Improved breathing and no wheezing occured but did get SOB towards end of session. Patient Diagnosis(es): There were no encounter diagnoses. has a past medical history of CHF (congestive heart failure) ([address] Diabetes mellitus ([address] Hyperlipidemia, Hypertension, and Vitamin D deficiency. has a past surgical history that includes Diagnostic Cardiac Cath Lab Procedure (2018).     Social/Functional History  Lives With: Alone  Type of Home: Apartment  Home Layout: Performs ADL's on one level  Home Access: Stairs to enter with rails  Entrance Stairs - Number of Steps: 3 flights of steps to enter going up L rail &  R wall   Entrance Stairs - Rails: Left  Bathroom Shower/Tub: Tub/Shower unit  Bathroom Toilet: Standard(low toilet, tub on L, vanity R but not strong enough to use for support, towel rack in front, uses @ times)  Bathroom Accessibility: Walker accessible  ADL Assistance: [facility] [address]: Independent  Homemaking Responsibilities: (was ind for all homemaking, finances, meds management)  Ambulation Assistance: Independent  Transfer Assistance: Independent  Active : No  Patient's  Info: per pt: license suspended D/T not making child support payment  Occupation: Other(comment)  Type of occupation: recently worked prn work as an , 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 may remove vest for shower, DIET CARDIAC; Carb Control: 5 carb choices (75 gms)/meal; Low Sodium (2 GM); Daily Fluid Restriction: 2000 ml      Subjective   General  Chart Reviewed: Yes  Additional Pertinent Hx: per Dr. Katie Brasher note, \"52 yo right handed M with pmh nonischemic cardiomyopathy, HTN, DM2 who initially presented to [facility] on 1/14/2020 with slurred speech and left side weakness. Imaging revealed acute ischemic right MCA stroke and M2 occlusion. He therefore underwent thrombectomy (1/14). Of note, patient had not been taking his medications for several months. Etiology of stroke, thought to be cardioembolic. TTE revealed LVH, severe diffuse hypokinesis, with an EF of 15-20%. Cardiac MRI was negative for thrombus. He was started on Xarelto for secondary prevention. Cardiology was consulted for possible AICD placement, however, recommended optimizing medical management and using LifeVest for 3 months.  He did have elevated

## 2020-01-25 NOTE — PROGRESS NOTES
Subjective   General  Chart Reviewed: Yes, Orders, History and Physical, Previous Admission, Progress Notes  Additional Pertinent Hx: \"Mr. Juana Emerson is a 54 yo right handed M with pmh nonischemic cardiomyopathy, HTN, DM2 who initially presented to  on 1/14/2020 with slurred speech and left side weakness. Imaging revealed acute ischemic right MCA stroke and M2 occlusion. He therefore underwent thrombectomy (1/14). Of note, patient had not been taking his medications for several months. Etiology of stroke, thought to be cardioembolic. TTE revealed LVH, severe diffuse hypokinesis, with an EF of 15-20%. Cardiac MRI was negative for thrombus. He was started on Xarelto for secondary prevention. Cardiology was consulted for possible AICD placement, however, recommended optimizing medical management and using LifeVest for 3 months. He did have elevated troponin upon admission (peak 0.06) which later normalized. Course was complicated by shortness of breath and productive cough. Discharging services not feel this was due to infection but rather, pulmonary hypertension, right-sided heart failure, and tobacco use. Patient now presents to ARU with impaired mobility and self-care below his baseline. Currently, he reports ongoing slurred speech and left side weakness which is starting to improve. He denies headache, vision changes, tingling/numbness, vertigo. He does feel he is having some difficulties with memory and concentration. He has shortness of breath with exertion and with lying flat. He denies chest pain, palpitations, lightheadedness. He is motivated to start inpatient rehab program.\" Copied per Dr Holly Landa  1/22/20 admit note. Family / Caregiver Present: No  Referring Practitioner: Dr Jessica Styles  Diagnosis: CVA  Subjective  Subjective: Seen in room, requesting shower.   No complaints of pain      Orientation  Orientation  Overall Orientation Status: Within Functional Limits  Objective    ADL  Feeding: Modified independent (ate chicken from fork without cutting. reported difficulty with small packages at breakfast)  Grooming: Stand by assistance  UE Bathing: Stand by assistance;Setup(sat in shower chair in shower stall. pt removed life vest without assist)  LE Bathing: Stand by assistance  Toileting: Stand by assistance  Additional Comments: Pt completed shower with SBA, dressed LB with SBA, close supervision with fine motor skills for life vest but did not require assist.          Balance  Sitting Balance: Modified independent   Standing Balance: Stand by assistance  Functional Mobility  Functional - Mobility Device: No device  Activity: To/from bathroom  Assist Level: Stand by assistance  Functional Mobility Comments: No LOB, found up in room without assist, educated on calling for nurses (RN aware that pt removing chair alarm)  Shower Transfers  Shower - Transfer From: (no device)  Shower - Transfer Type: To and From  Shower - Transfer To: Shower seat with back  Shower - Technique: Ambulating  Shower Transfers: Stand by assistance            Second Session: Patient seen for second session. Seen in room for Fine Motor and UE ex. Patient completed hand gripper x 20 reps; completed finger flex x 10 reps; completed coral theraputty HEP with good effort; Completed Forward Pass x 5 min with good effort. Patient with difficulty with Quentin Bays activity due to FM difficulty. Patient then donned and tied own shoes without assist.  Family in to visit at end of session.            Plan   Plan  Times per week: 5-6  Times per day: Twice a day  Plan weeks: 2 weeks  Current Treatment Recommendations: Strengthening, Safety Education & Training, Balance Training, Patient/Caregiver Education & Training, Self-Care / ADL, Cognitive/Perceptual Training, Functional Mobility Training, Neuromuscular Re-education, Equipment Evaluation, Education, & procurement, Home Management Training, Endurance Training  Plan Comment: Monday

## 2020-01-25 NOTE — PLAN OF CARE
Problem: Falls - Risk of:  Goal: Will remain free from falls  Description  Will remain free from falls  1/25/2020 0808 by Erin Mckeon RN  Note:   Fall risk band on patient. Yellow light on outside of room. Non skid footwear in place. Alarms used appropriately. Patient instructed to call and wait for staff before getting up. Rounding done to anticipate needs. Appropriate safety devices used for transfers. Problem: FLUID AND ELECTROLYTE IMBALANCE  Goal: Fluid and electrolyte balance are achieved/maintained  1/25/2020 2562 by Erin Mckeon RN  Note:   Remain on 2L/day fluid restriction. Lab values reviewed, WNL. Problem: ACTIVITY INTOLERANCE/IMPAIRED MOBILITY  Goal: Mobility/activity is maintained at optimum level for patient  1/25/2020 2018 by Erin Mckeon RN  Note:   Patient working with therapy at least 3 hours/day to obtain maximal mobility. Safety devices used.

## 2020-01-25 NOTE — PROGRESS NOTES
Patient admitted to rehab with CVA. A/A/O x 4. Transfers with CGA no device. On Carb control diet, tolerating well. Medications taken whole in water. On Xarelto for DVT prophylaxis. Skin with scattered bruising and abrasions; puncture site to right groin healing; dry flaky skin bilateral feet. On room air with PRN, at night O2 at 1 L. Has been continent of bowel and bladder. LBM 1/23/20. Chair/bed alarms in use and call light in reach. Will monitor for safety.

## 2020-01-26 LAB
GLUCOSE BLD-MCNC: 122 MG/DL (ref 70–99)
GLUCOSE BLD-MCNC: 126 MG/DL (ref 70–99)
GLUCOSE BLD-MCNC: 151 MG/DL (ref 70–99)
GLUCOSE BLD-MCNC: 164 MG/DL (ref 70–99)
GLUCOSE BLD-MCNC: 87 MG/DL (ref 70–99)
PERFORMED ON: ABNORMAL
PERFORMED ON: NORMAL

## 2020-01-26 PROCEDURE — 1280000000 HC REHAB R&B

## 2020-01-26 PROCEDURE — 94760 N-INVAS EAR/PLS OXIMETRY 1: CPT

## 2020-01-26 PROCEDURE — 6370000000 HC RX 637 (ALT 250 FOR IP): Performed by: PHYSICAL MEDICINE & REHABILITATION

## 2020-01-26 RX ADMIN — ISOSORBIDE MONONITRATE 60 MG: 60 TABLET, EXTENDED RELEASE ORAL at 07:57

## 2020-01-26 RX ADMIN — METFORMIN HYDROCHLORIDE 1000 MG: 500 TABLET ORAL at 16:44

## 2020-01-26 RX ADMIN — LOSARTAN POTASSIUM 50 MG: 50 TABLET, FILM COATED ORAL at 07:57

## 2020-01-26 RX ADMIN — GLIPIZIDE 5 MG: 5 TABLET ORAL at 07:57

## 2020-01-26 RX ADMIN — HYDRALAZINE HYDROCHLORIDE 25 MG: 25 TABLET, FILM COATED ORAL at 16:44

## 2020-01-26 RX ADMIN — HYDRALAZINE HYDROCHLORIDE 25 MG: 25 TABLET, FILM COATED ORAL at 11:18

## 2020-01-26 RX ADMIN — METFORMIN HYDROCHLORIDE 1000 MG: 500 TABLET ORAL at 07:57

## 2020-01-26 RX ADMIN — HYDRALAZINE HYDROCHLORIDE 25 MG: 25 TABLET, FILM COATED ORAL at 00:01

## 2020-01-26 RX ADMIN — VITAMIN D, TAB 1000IU (100/BT) 1000 UNITS: 25 TAB at 07:58

## 2020-01-26 RX ADMIN — RIVAROXABAN 20 MG: 20 TABLET, FILM COATED ORAL at 16:44

## 2020-01-26 RX ADMIN — INSULIN LISPRO 1 UNITS: 100 INJECTION, SOLUTION INTRAVENOUS; SUBCUTANEOUS at 07:58

## 2020-01-26 RX ADMIN — FUROSEMIDE 40 MG: 40 TABLET ORAL at 07:57

## 2020-01-26 RX ADMIN — MELATONIN 3 MG: at 22:00

## 2020-01-26 RX ADMIN — ATORVASTATIN CALCIUM 40 MG: 40 TABLET, FILM COATED ORAL at 22:00

## 2020-01-26 RX ADMIN — DOCUSATE SODIUM 100 MG: 100 CAPSULE, LIQUID FILLED ORAL at 22:00

## 2020-01-26 RX ADMIN — INSULIN LISPRO 1 UNITS: 100 INJECTION, SOLUTION INTRAVENOUS; SUBCUTANEOUS at 11:19

## 2020-01-26 RX ADMIN — HYDRALAZINE HYDROCHLORIDE 25 MG: 25 TABLET, FILM COATED ORAL at 06:03

## 2020-01-26 RX ADMIN — ALLOPURINOL 100 MG: 100 TABLET ORAL at 07:58

## 2020-01-26 RX ADMIN — ASPIRIN 81 MG: 81 TABLET, COATED ORAL at 07:57

## 2020-01-26 RX ADMIN — METOPROLOL SUCCINATE 100 MG: 50 TABLET, EXTENDED RELEASE ORAL at 07:57

## 2020-01-26 RX ADMIN — SPIRONOLACTONE 25 MG: 25 TABLET ORAL at 07:57

## 2020-01-26 ASSESSMENT — PAIN SCALES - GENERAL
PAINLEVEL_OUTOF10: 0

## 2020-01-26 NOTE — PLAN OF CARE
Problem: FLUID AND ELECTROLYTE IMBALANCE  Goal: Fluid and electrolyte balance are achieved/maintained  Note:   Did review fluid restriction, states CHF past year, vague with management at home.

## 2020-01-26 NOTE — PROGRESS NOTES
Upset about alarm, declined. States will not get up, did ambulate to bathroom, balance good. Reviewed CHF education.

## 2020-01-26 NOTE — PROGRESS NOTES
Patient admitted to rehab [address].  A/A/O x 4. Transfers with CGA no device. On Carb control diet, tolerating well. Medications taken whole in water. Yip Buzzard DVT prophylaxis.   Skin with scattered bruising and abrasions; puncture site to right groin healing; dry flaky skin bilateral feet.  On room air with PRN, at night O2 at 1 L. Has been continent of bowel and bladder. LBM 1/25. Chair/bed alarms in use and call light in reach. Patient has remained in chair with alarm attached this shift. Will monitor for safety.

## 2020-01-27 LAB
ANION GAP SERPL CALCULATED.3IONS-SCNC: 15 MMOL/L (ref 3–16)
BASOPHILS ABSOLUTE: 0 K/UL (ref 0–0.2)
BASOPHILS RELATIVE PERCENT: 0.5 %
BUN BLDV-MCNC: 16 MG/DL (ref 7–20)
CALCIUM SERPL-MCNC: 9.2 MG/DL (ref 8.3–10.6)
CHLORIDE BLD-SCNC: 104 MMOL/L (ref 99–110)
CO2: 24 MMOL/L (ref 21–32)
CREAT SERPL-MCNC: 1 MG/DL (ref 0.9–1.3)
EOSINOPHILS ABSOLUTE: 0.2 K/UL (ref 0–0.6)
EOSINOPHILS RELATIVE PERCENT: 3.1 %
GFR AFRICAN AMERICAN: >60
GFR NON-AFRICAN AMERICAN: >60
GLUCOSE BLD-MCNC: 113 MG/DL (ref 70–99)
GLUCOSE BLD-MCNC: 120 MG/DL (ref 70–99)
GLUCOSE BLD-MCNC: 126 MG/DL (ref 70–99)
GLUCOSE BLD-MCNC: 65 MG/DL (ref 70–99)
GLUCOSE BLD-MCNC: 71 MG/DL (ref 70–99)
GLUCOSE BLD-MCNC: 93 MG/DL (ref 70–99)
GLUCOSE BLD-MCNC: 97 MG/DL (ref 70–99)
HCT VFR BLD CALC: 45.3 % (ref 40.5–52.5)
HEMOGLOBIN: 14.6 G/DL (ref 13.5–17.5)
LYMPHOCYTES ABSOLUTE: 2.6 K/UL (ref 1–5.1)
LYMPHOCYTES RELATIVE PERCENT: 48.8 %
MCH RBC QN AUTO: 29.7 PG (ref 26–34)
MCHC RBC AUTO-ENTMCNC: 32.2 G/DL (ref 31–36)
MCV RBC AUTO: 92.4 FL (ref 80–100)
MONOCYTES ABSOLUTE: 0.4 K/UL (ref 0–1.3)
MONOCYTES RELATIVE PERCENT: 7.3 %
NEUTROPHILS ABSOLUTE: 2.1 K/UL (ref 1.7–7.7)
NEUTROPHILS RELATIVE PERCENT: 40.3 %
PDW BLD-RTO: 14.8 % (ref 12.4–15.4)
PERFORMED ON: ABNORMAL
PERFORMED ON: NORMAL
PLATELET # BLD: 226 K/UL (ref 135–450)
PMV BLD AUTO: 8.7 FL (ref 5–10.5)
POTASSIUM REFLEX MAGNESIUM: 4.2 MMOL/L (ref 3.5–5.1)
RBC # BLD: 4.9 M/UL (ref 4.2–5.9)
SODIUM BLD-SCNC: 143 MMOL/L (ref 136–145)
WBC # BLD: 5.2 K/UL (ref 4–11)

## 2020-01-27 PROCEDURE — 97530 THERAPEUTIC ACTIVITIES: CPT

## 2020-01-27 PROCEDURE — 36415 COLL VENOUS BLD VENIPUNCTURE: CPT

## 2020-01-27 PROCEDURE — 97110 THERAPEUTIC EXERCISES: CPT

## 2020-01-27 PROCEDURE — 97129 THER IVNTJ 1ST 15 MIN: CPT

## 2020-01-27 PROCEDURE — 80048 BASIC METABOLIC PNL TOTAL CA: CPT

## 2020-01-27 PROCEDURE — 92507 TX SP LANG VOICE COMM INDIV: CPT

## 2020-01-27 PROCEDURE — 6370000000 HC RX 637 (ALT 250 FOR IP): Performed by: PHYSICAL MEDICINE & REHABILITATION

## 2020-01-27 PROCEDURE — 85025 COMPLETE CBC W/AUTO DIFF WBC: CPT

## 2020-01-27 PROCEDURE — 97535 SELF CARE MNGMENT TRAINING: CPT

## 2020-01-27 PROCEDURE — 1280000000 HC REHAB R&B

## 2020-01-27 PROCEDURE — 97116 GAIT TRAINING THERAPY: CPT

## 2020-01-27 PROCEDURE — 92526 ORAL FUNCTION THERAPY: CPT

## 2020-01-27 RX ORDER — LOSARTAN POTASSIUM 100 MG/1
100 TABLET ORAL DAILY
Status: DISCONTINUED | OUTPATIENT
Start: 2020-01-28 | End: 2020-02-03 | Stop reason: HOSPADM

## 2020-01-27 RX ADMIN — GLIPIZIDE 5 MG: 5 TABLET ORAL at 08:17

## 2020-01-27 RX ADMIN — HYDRALAZINE HYDROCHLORIDE 25 MG: 25 TABLET, FILM COATED ORAL at 16:59

## 2020-01-27 RX ADMIN — ATORVASTATIN CALCIUM 40 MG: 40 TABLET, FILM COATED ORAL at 20:06

## 2020-01-27 RX ADMIN — HYDRALAZINE HYDROCHLORIDE 25 MG: 25 TABLET, FILM COATED ORAL at 13:25

## 2020-01-27 RX ADMIN — METFORMIN HYDROCHLORIDE 1000 MG: 500 TABLET ORAL at 16:59

## 2020-01-27 RX ADMIN — HYDRALAZINE HYDROCHLORIDE 25 MG: 25 TABLET, FILM COATED ORAL at 00:21

## 2020-01-27 RX ADMIN — METFORMIN HYDROCHLORIDE 1000 MG: 500 TABLET ORAL at 08:17

## 2020-01-27 RX ADMIN — VITAMIN D, TAB 1000IU (100/BT) 1000 UNITS: 25 TAB at 08:17

## 2020-01-27 RX ADMIN — HYDRALAZINE HYDROCHLORIDE 25 MG: 25 TABLET, FILM COATED ORAL at 23:36

## 2020-01-27 RX ADMIN — DOCUSATE SODIUM 100 MG: 100 CAPSULE, LIQUID FILLED ORAL at 08:17

## 2020-01-27 RX ADMIN — HYDRALAZINE HYDROCHLORIDE 25 MG: 25 TABLET, FILM COATED ORAL at 05:47

## 2020-01-27 RX ADMIN — SPIRONOLACTONE 25 MG: 25 TABLET ORAL at 08:17

## 2020-01-27 RX ADMIN — FUROSEMIDE 40 MG: 40 TABLET ORAL at 08:17

## 2020-01-27 RX ADMIN — ALLOPURINOL 100 MG: 100 TABLET ORAL at 08:18

## 2020-01-27 RX ADMIN — RIVAROXABAN 20 MG: 20 TABLET, FILM COATED ORAL at 16:59

## 2020-01-27 RX ADMIN — LOSARTAN POTASSIUM 50 MG: 50 TABLET, FILM COATED ORAL at 08:18

## 2020-01-27 RX ADMIN — ISOSORBIDE MONONITRATE 60 MG: 60 TABLET, EXTENDED RELEASE ORAL at 08:18

## 2020-01-27 RX ADMIN — ASPIRIN 81 MG: 81 TABLET, COATED ORAL at 08:17

## 2020-01-27 RX ADMIN — METOPROLOL SUCCINATE 100 MG: 50 TABLET, EXTENDED RELEASE ORAL at 08:18

## 2020-01-27 ASSESSMENT — PAIN SCALES - GENERAL
PAINLEVEL_OUTOF10: 0
PAINLEVEL_OUTOF10: 0

## 2020-01-27 NOTE — PROGRESS NOTES
FM tasks with increased accuracy and speed.(evidence by test measures)  Long term goals  Time Frame for Long term goals : 2 weeks from 1/23/20  Long term goal 1: Pt will bathe mod ind. with DME. Long term goal 2: Pt willl dress mod ind. Long term goal 3: Pt will toilet mod ind. Long term goal 4: Pt will perform functional transfers mod ind with AD. Long term goal 5: Pt will perform IADL tasks mod ind with AD and demo good safety awareness/problem solving/standing for 7-8 minutes. Long term goals 6: Pt will perform neuro re-ed/HEP to increase LUE strength/cood to use effectively for ADL/IADL tasks in decreased time, w/o dropping objects. Patient Goals   Patient goals : Pt stated he wants to go home as soon as possible, get L arm working better/stop dropping things.        Therapy Time   Individual Concurrent Group Co-treatment   Time In 0845         Time Out 0930         Minutes 45         Timed Code Treatment Minutes: 812 [facility], OT

## 2020-01-27 NOTE — PROGRESS NOTES
Speech Language Pathology  ACUTE REHAB UNIT  SPEECH/LANGUAGE PATHOLOGY      [x] Daily  [] Weekly Care Conference Note  [] Discharge    Patient:Refugio Dawkins      :1965  [facility]:[id number]  Rehab Dx/Hx: Acute ischemic right MCA stroke ([facility] [address] [I63.511]    Precautions: Falls; Medical;   Home situation: Lives alone. Does have family support  ST Dx: [] Aphasia  [x] Dysarthria  [] Apraxia   [x] Oropharyngeal dysphagia [x] Cognitive   Impairment  [] Other:   Initial Speech Therapy Assessment Diagnosis:   1. Cognitive Diagnosis: Mild ot moderate deficits in domains of attention; working memory; complex processing; math language. Will initiate therapy with ongoing assessment of executive function as pt does live alone and was reportedly IND prior to onset for home/med management. 2. Speech Diagnosis: Mild Dysarthria features characterized by phonemic distortions, reduced repiratory control for phonation 2/2 to left oral motor weakness and drooling. Will initiate therapy. Pt becomes SOB during connected speech. 3. Communication Diagnosis: Wesley Language skills were intact. Pt wtih breakdown with complex auditory processing. Pt's visual language processing was more optimal than auditory processing. . Question if working memory /attention / processing speech and working memory impacts auditory processing. 4. Dysphagia DX: Mild to Moderate Oropharyngeal Dysphagia characterized by left oral motor weakness and sensory deficits which impact rate of mastication, bolus formation and cohesion, and rate of A-P oral transit; and delayed initiation of swallow. Pt with oral pocketing left >right.  Pt with fatigue with increase SOB which can exacerbate risks of aspiration within the meal.  Will initiate training and ed  Date of Admit: 2020  Room #: N1Z-1313/3263-01  Date: 2020       Current functional status (updated daily):         Current Diet Order:DIET CARDIAC; Carb Control: 5 carb choices (75 gms)/meal; participate in further assessmennt of VPS/PS/reasoning and organization skills for executive functino with additional goals to follow PS using 2 part rule application: set up; conditioning to task; concrete 100% IND    Mild complex PS/VPS using deductive and inductive reasoning for sequential organization: initiated; will complete 1/28/2020. · Pt required extra time; conditioning to task and practice; and moderate external assist. Will continue 1/28/2020   n/a       Goal 5: Pt will demonstrate functional working memory and concrete alternating/divided attention for rule application for 15 task trials and/or 3 minute tasks with set up ; use of compensatory strategy and < mild assist     Alternating attention for concrete rule application exercise (simple 'x' calc and color coding rules regarding amounts) : set up; conditioning to ex and  100% IND      Focused/selective attention for category inclusion/exclusion: continued from 1/24/2020; set up review; conditioning to task and >85% accurate/IND. Improved to 100% with minimal prompts  n/a   Other areas targeted: REcall of new learning:     Education:   Pt ed completed (refer to [facility] documentation    Safety Devices: [] Call light within reach  [] Chair alarm activated  [] Bed alarm activated  [x] Other: transport returned pt t room [] Call light within reach  [] Chair alarm activated  [] Bed alarm activated  [] Other:    Progress Assessment: 1/24/2020: Continued treatment POC. Incidental note: during table top cognitive-linguistic tasks : mild drooling or oral secretions. This SLP discussed sensory treatments one of which was use of NMES vital stimulation which would require pt to shave. Pt declined this type of treatment at this time. Plan: Continue as per plan of care.    Continued Tx Upon Discharge: ?    [x] Yes [] No [] TBD based on progress while on ARU [] Vital Stim indicated [] Other:   Estimated discharge date: TBD   Discharge recommendations:   [] Home

## 2020-01-27 NOTE — PROGRESS NOTES
OCCUPATIONAL THERAPY  Progress Note   Second Session    Patient Name: Johnny Gupta  Medical Record Number: [id number]    Treatment Diagnosis: decreased mobility following CHF and CVA    General  Chart Reviewed: Yes, Orders, History and Physical, Previous Admission, Progress Notes  Additional Pertinent Hx: \"Mr. Johnny Gupta is a 52 yo right handed M with pmh nonischemic cardiomyopathy, HTN, DM2 who initially presented to  on 1/14/2020 with slurred speech and left side weakness. Imaging revealed acute ischemic right MCA stroke and M2 occlusion. He therefore underwent thrombectomy (1/14). Of note, patient had not been taking his medications for several months. Etiology of stroke, thought to be cardioembolic. TTE revealed LVH, severe diffuse hypokinesis, with an EF of 15-20%. Cardiac MRI was negative for thrombus. He was started on Xarelto for secondary prevention. Cardiology was consulted for possible AICD placement, however, recommended optimizing medical management and using LifeVest for 3 months. He did have elevated troponin upon admission (peak 0.06) which later normalized. Course was complicated by shortness of breath and productive cough. Discharging services not feel this was due to infection but rather, pulmonary hypertension, right-sided heart failure, and tobacco use. Patient now presents to ARU with impaired mobility and self-care below his baseline. Currently, he reports ongoing slurred speech and left side weakness which is starting to improve. He denies headache, vision changes, tingling/numbness, vertigo. He does feel he is having some difficulties with memory and concentration. He has shortness of breath with exertion and with lying flat. He denies chest pain, palpitations, lightheadedness. He is motivated to start inpatient rehab program.\" Copied per Dr Elio Box  1/22/20 admit note.   Family / Caregiver Present: No  Referring Practitioner: Dr Roshni Laughlin  Diagnosis: CVA

## 2020-01-27 NOTE — PROGRESS NOTES
Patient admitted to rehab [address].  A/A/O x 4. Transfers with CGA no device. On Carb control diet, tolerating well. Medications taken whole in water. Pat N[place] DVT prophylaxis.   Skin with scattered bruising and abrasions; puncture site to right groin healing; dry flaky skin bilateral feet.  On room air with PRN, at night O2 at 1 L. Has been continent of bowel and bladder. LBM 1/25. Chair/bed alarms in use and call light in reach. Patient has remained in chair with alarm attached this shift. Will monitor for safety.

## 2020-01-27 NOTE — PROGRESS NOTES
Physical Therapy  Facility/Department: [address] IP REHAB  Daily Treatment Note  NAME: Monisha Baum  : 1965  MRN: [id number]    Date of Service: 2020    Discharge Recommendations:  Continue to assess pending progress, Home with assist PRN, Home with Home health PT   PT Equipment Recommendations  Equipment Needed: No    Assessment   Body structures, Functions, Activity limitations: Decreased functional mobility ; Decreased endurance  Assessment: Patient with limitations with balance during SLS resulting in small LOB. He uses stepping strategy well to recover. Patient limited with endurance on stairs and he has three flights of steps to get into his apartment. patient was able to complete 24 steps with bilateral rails but had some FRANCOIS and could not do further steps. Patient will benefit from continued skilled therapy for strengthening, gait training, balance training, and endurance training to allow for safe return home with independence. Treatment Diagnosis: decreased mobility following CHF and CVA  Specific instructions for Next Treatment: increase gt and exercises  Prognosis: Good  PT Education: Transfer Training;General Safety;Gait Training  REQUIRES PT FOLLOW UP: Yes  Activity Tolerance  Activity Tolerance: Patient Tolerated treatment well     Patient Diagnosis(es): There were no encounter diagnoses. has a past medical history of CHF (congestive heart failure) ([address] Diabetes mellitus ([facility] [address] Hyperlipidemia, Hypertension, and Vitamin D deficiency. has a past surgical history that includes Diagnostic Cardiac Cath Lab Procedure (2018).     Social/Functional History  Lives With: Alone  Type of Home: Apartment  Home Layout: Performs ADL's on one level  Home Access: Stairs to enter with rails  Entrance Stairs - Number of Steps: 3 flights of steps to enter going up L rail &  R wall   Entrance Stairs - Rails: Left  Bathroom Shower/Tub: Tub/Shower unit  Bathroom Toilet: Standard(low toilet, tub not feel this was due to infection but rather, pulmonary hypertension, right-sided heart failure, and tobacco use. \"  Response To Previous Treatment: Patient with no complaints from previous session. Family / Caregiver Present: No  Referring Practitioner: Christian Wilcox  Subjective  Subjective: Patient reporting he continues to have difficulty sleeping. He states he starts to have difficulty breathing and then he starts to think of everything it could be. This makes it worse and then he can't sleep. No complaints of pain. General Comment  Comments: Patient with LifeVest on and able to manage this on his own. Pain Screening  Patient Currently in Pain: Denies  Vital Signs  Patient Currently in Pain: Denies         Objective      Transfers  Sit to Stand: Supervision;Stand by assistance  Stand to sit: Supervision;Stand by assistance    Ambulation  Ambulation?: Yes  More Ambulation?: No  Ambulation 1  Surface: level tile;carpet  Device: No Device  Assistance: Contact guard assistance  Quality of Gait: slightly increased MITUL, improved clearance of left foot, completely absent left arm swing. improving reg  Gait Deviations: Slow Reg;Decreased step length;Decreased step height;Decreased arm swing  Distance: 220' with multiple turns   Comments: no LOB this date. transitions on and off the carpet well. Patient instructed on bouts of fast reg. Patient with slight increase in FRANCOIS but demonstrates increased foot clearance and step length bilaterally. He reports increased anxiety with increasing the reg, but no LOB occurs. Stairs/Curb  Stairs?: Yes  Stairs  # Steps : 24  Stairs Height: 6\"  Rails: Bilateral  Device: No Device  Assistance: Stand by assistance;Supervision  Comment: Patient with began with good reg but slowed with increased steps this date. He was able to complete 24 stairs this date using a reciprocal pattern for entirety of activity.   Patient with slight SOB with last four steps but resolved with seated rest break. Patient used bilateral rails for support, but does not rely heavily on use of the rails. Patient began to have some slowing and increased lateral sway as fatigue increased. Exercises  Hip Flexion: x10 marching  Ankle Pumps: x10 heel/toe raises  Comments: bilateral LE ther ex done in standing with no UE support and CGA-SBA from PT  Other exercises  Other exercises?: Yes  Other exercises 1: performed 10 reps alternating step ups onto 6\" box with high knee march on opposite side with each rep. Patient given HHA at right UE. He began as CGA but became unstable with increasing repetitions and required min assist to maintain balance with last 2 reps on each LE. Patient with increased FRANCOIS, HR 89 and SpO2 was 98% after activity. Patient quickly recovers with seated rest break. PM Session  Patient reporting he has \"too much going on to handle. \" He does not want to elaborate on this. Patient has no complaints of pain at this time. PT set up set of alternating cups on floor to allow for tap step over, five cups for each foot. Patient initially with difficulty maintaining balance in SLS to step over each cone and he was taking extra small steps between each cone. Patient was then able to advance to [facility] and could step over, but required cueing to slow pace. On third trial, patient able to control pace and balance over each cone with CGA and minor LOB that he recovers from with no more than CGA. He did require seated rest break. Stood on [employer] with aide. SBA with wider MITUL and no difficulties. Patient able to use both UE to bat beach ball. Then completed with narrow MITUL (approx 1.5\" between feet) and patient with intermittent small LOB, but patient was able to recover from all with CGA. Short seated rest break given. Ambulated down hallway tossing two soft pliable balls back and forth with aide.   Patient with great difficulty tossing one ball while preparing to catch with the other hand. Also had great difficulty continuing to ambulate while tossing. He required frequent reminders to keep walking. CGA for balance. Changed activity to ambulating tossing one ball back and forth and attempted naming sports teams, but patient didn't agree. Named several other categories, but patient reporting he had too much on his mind to think. He then determined he could name brands of sneakers. Patient was then able to dual task with SBA. Patient reporting that added a cognitive tasks increased the difficulty of the mobility. Patient demonstrates difficulty with dual tasks and requires encouragement to challenge himself. Patient with clear distraction, but he only states he has a lot on his mind right now. Care transitioned to OTJoan. Goals  Short term goals  Time Frame for Short term goals: 1 week  Short term goal 1: transfers S  Short term goal 2: amb 76' with no AD S  Short term goal 3: 12 steps B rails SBA min SOB  Long term goals  Time Frame for Long term goals : 10 to 14 days  Long term goal 1: all transfers modif I  Long term goal 2: amb 150' with no AD modif I  Long term goal 3: 24 steps L rail modif I  Long term goal 4: car transfer S  Patient Goals   Patient goals : go home    Plan    Plan  Times per week: 5 to 6  Times per day: Twice a day  Plan weeks: 1 to 2  Specific instructions for Next Treatment: increase gt and exercises  Current Treatment Recommendations: Balance Training, Transfer Training, Functional Mobility Training, Endurance Training, Gait Training  Safety Devices  Type of devices:  All fall risk precautions in place, Gait belt, Left in chair(in wheelchair to return to room with transportation. )     Therapy Time   Individual Concurrent Group Co-treatment   Time In 0945         Time Out 1030         Minutes 45                Second Session Therapy Time     Individual Co-treatment   Time In 1430     Time Out 1515     Minutes 45            Electronically signed by Francisco Javier Keith PT on 1/27/2020 at 10:48 AM

## 2020-01-27 NOTE — PROGRESS NOTES
Collection Time: 01/27/20  6:23 AM   Result Value Ref Range    WBC 5.2 4.0 - 11.0 K/uL    RBC 4.90 4.20 - 5.90 M/uL    Hemoglobin 14.6 13.5 - 17.5 g/dL    Hematocrit 45.3 40.5 - 52.5 %    MCV 92.4 80.0 - 100.0 fL    MCH 29.7 26.0 - 34.0 pg    MCHC 32.2 31.0 - 36.0 g/dL    RDW 14.8 12.4 - 15.4 %    Platelets 348 435 - 830 K/uL    MPV 8.7 5.0 - 10.5 fL    Neutrophils % 40.3 %    Lymphocytes % 48.8 %    Monocytes % 7.3 %    Eosinophils % 3.1 %    Basophils % 0.5 %    Neutrophils Absolute 2.1 1.7 - 7.7 K/uL    Lymphocytes Absolute 2.6 1.0 - 5.1 K/uL    Monocytes Absolute 0.4 0.0 - 1.3 K/uL    Eosinophils Absolute 0.2 0.0 - 0.6 K/uL    Basophils Absolute 0.0 0.0 - 0.2 K/uL   Basic Metabolic Panel w/ Reflex to MG    Collection Time: 01/27/20  6:23 AM   Result Value Ref Range    Sodium 143 136 - 145 mmol/L    Potassium reflex Magnesium 4.2 3.5 - 5.1 mmol/L    Chloride 104 99 - 110 mmol/L    CO2 24 21 - 32 mmol/L    Anion Gap 15 3 - 16    Glucose 120 (H) 70 - 99 mg/dL    BUN 16 7 - 20 mg/dL    CREATININE 1.0 0.9 - 1.3 mg/dL    GFR Non-African American >60 >60    GFR African American >60 >60    Calcium 9.2 8.3 - 10.6 mg/dL   POCT Glucose    Collection Time: 01/27/20  7:35 AM   Result Value Ref Range    POC Glucose 113 (H) 70 - 99 mg/dl    Performed on ACCU-CHEK        Therapy progress:  PT  Position Activity Restriction  Other position/activity restrictions: Life Vest. may remove vest for shower, DIET CARDIAC; Carb Control: 5 carb choices (75 gms)/meal; Low Sodium (2 GM);  Daily Fluid Restriction: 2000 ml   Objective     Sit to Stand: Supervision, Stand by assistance  Stand to sit: Supervision, Stand by assistance  Bed to Chair: Stand by assistance(without a device)  Device: No Device  Assistance: Contact guard assistance  Distance: 26' with multiple turns   OT  PT Equipment Recommendations  Equipment Needed: No  Equipment Used: Standard toilet  Toilet Transfers Comments: RW >< comfort ht toilet(his toilet is low) L grab bar as uses tub on L to push himself up. Discussed rec to get TSF. Assessment        SLP  Diet Solids Recommendation: Regular  Liquid Consistency Recommendation: Thin    Body mass index is 31.03 kg/m². Assessment and Plan:  Impairments: left hemiparesis, neglect, balance, endurance, dysarthria, dysphagia, cognition     Acute ischemic right MCA stroke  -Etiology likely cardioembolic  -Secondary ppx with Xarelto, statin  -PT/OT/SLP     Nonischemic cardiomyopathy  -Etiology thought to be hypertensive  -LifeVest in place x 3 months  -Daily wt, fluid restriction  -Lasix (increase dose), losartan, metoprolol, spironolactone     CAD  -[facility] 2018 with non-obstructive disease  -Trop elevated on admission to  then stabilized  -ASA, statin, bb, imdur     HTN - BPs remain elevated (particularly diastolic). Increase losartan, continue lasix (increased),  Hydralazine (increased), imdur, losartan, metoprolol, spironolactone.     Pulmonary HTN/Right heart failure - Lasix, albuterol prn     LIBRADO - avoid nephrotoxins. Monitor renal function intermittently.     DM2 - A1C 7.4%. glipizide (decreased), metformin, ISS.     Gout - allopurinol     Tobacco use - nicotine patch offered. Cessation counseling.     Anxiety - hydroxyzine prn     Dysphagia - Dietitian and SLP consults. Currently on regular + thins with aspiration precautions.      Bladder - high risk retention - Monitor PVRs, SC prn >300cc     Bowel - high risk constipation - colace BID, PRN miralax and MoM. follow bowel movements. Enema or suppository if needed.      Safety - fall and aspiration precautions     DVT ppx - Xarelto    Rehab Progress: Interdisciplinary team conference was held today with entire rehab treatment team including PT, OT, SLP, Dietician, RN, and SW. Discussion focused on progress toward rehab goals and discharge planning. Making good progress with left side strength/coordination, balance, dysarthria, dysphagia.  Also working on endurance in setting of

## 2020-01-27 NOTE — CARE COORDINATION
Team Conference held today. Team reviewed progress and goals. Team reports he will need to continue with his LifeVest.  Team reports he is working in all three therapies:   SLP, Pt.OT  DME recommendations:  shwoer chair, TSF. Insurance update is Wednesday. Team recommends DC to home on Friday, 1- with home care orders for SN/PT/OT/SP. LSW informed Team he is basically homeless unless family is willing to take him into their homes. LSW met with patient to review. He reports going back to his house is finished. His family has begun moving him. He reports he cannot afford to stay there. LSW reviewed progress. LSW reviewed follow up orders for home care and offered to have him go as outpatient if desired. He reports he will be going to the homeless shelter if nothing comes up before that. He gives permission to call his sister, Tristin Rowan to give this update. She reports, \"I cannot even image you are sending him to a homeless shelter. Are you saying that he will be able to have even a glass of water to take his medications. What you are saying is that he is homeless! \"     She states, 'I will NOT pick him up on Friday. \"  She reports she will think of something else they can do. KINZA informed her of [address] [facility], [address] [place] [address] options. She reports she is aware of these but they have waiting lists.   [facility]     Case Management   [phone]    1/27/2020  3:14 PM

## 2020-01-28 LAB
GLUCOSE BLD-MCNC: 112 MG/DL (ref 70–99)
GLUCOSE BLD-MCNC: 122 MG/DL (ref 70–99)
GLUCOSE BLD-MCNC: 128 MG/DL (ref 70–99)
GLUCOSE BLD-MCNC: 87 MG/DL (ref 70–99)
GLUCOSE BLD-MCNC: 88 MG/DL (ref 70–99)
PERFORMED ON: ABNORMAL
PERFORMED ON: NORMAL
PERFORMED ON: NORMAL

## 2020-01-28 PROCEDURE — 97530 THERAPEUTIC ACTIVITIES: CPT

## 2020-01-28 PROCEDURE — 97130 THER IVNTJ EA ADDL 15 MIN: CPT

## 2020-01-28 PROCEDURE — 97116 GAIT TRAINING THERAPY: CPT

## 2020-01-28 PROCEDURE — 97129 THER IVNTJ 1ST 15 MIN: CPT

## 2020-01-28 PROCEDURE — 97110 THERAPEUTIC EXERCISES: CPT

## 2020-01-28 PROCEDURE — 1280000000 HC REHAB R&B

## 2020-01-28 PROCEDURE — 6370000000 HC RX 637 (ALT 250 FOR IP): Performed by: PHYSICAL MEDICINE & REHABILITATION

## 2020-01-28 PROCEDURE — 97535 SELF CARE MNGMENT TRAINING: CPT

## 2020-01-28 PROCEDURE — 92526 ORAL FUNCTION THERAPY: CPT

## 2020-01-28 RX ORDER — FUROSEMIDE 20 MG/1
20 TABLET ORAL ONCE
Status: COMPLETED | OUTPATIENT
Start: 2020-01-28 | End: 2020-01-28

## 2020-01-28 RX ORDER — FUROSEMIDE 40 MG/1
80 TABLET ORAL DAILY
Status: DISCONTINUED | OUTPATIENT
Start: 2020-01-29 | End: 2020-02-03 | Stop reason: HOSPADM

## 2020-01-28 RX ADMIN — FUROSEMIDE 20 MG: 20 TABLET ORAL at 14:40

## 2020-01-28 RX ADMIN — LOSARTAN POTASSIUM 100 MG: 100 TABLET, FILM COATED ORAL at 08:50

## 2020-01-28 RX ADMIN — HYDRALAZINE HYDROCHLORIDE 25 MG: 25 TABLET, FILM COATED ORAL at 12:00

## 2020-01-28 RX ADMIN — ISOSORBIDE MONONITRATE 60 MG: 60 TABLET, EXTENDED RELEASE ORAL at 08:50

## 2020-01-28 RX ADMIN — METFORMIN HYDROCHLORIDE 1000 MG: 500 TABLET ORAL at 08:50

## 2020-01-28 RX ADMIN — VITAMIN D, TAB 1000IU (100/BT) 1000 UNITS: 25 TAB at 08:50

## 2020-01-28 RX ADMIN — ATORVASTATIN CALCIUM 40 MG: 40 TABLET, FILM COATED ORAL at 21:09

## 2020-01-28 RX ADMIN — METOPROLOL SUCCINATE 100 MG: 50 TABLET, EXTENDED RELEASE ORAL at 08:49

## 2020-01-28 RX ADMIN — METFORMIN HYDROCHLORIDE 1000 MG: 500 TABLET ORAL at 16:41

## 2020-01-28 RX ADMIN — HYDRALAZINE HYDROCHLORIDE 25 MG: 25 TABLET, FILM COATED ORAL at 23:34

## 2020-01-28 RX ADMIN — HYDRALAZINE HYDROCHLORIDE 25 MG: 25 TABLET, FILM COATED ORAL at 17:50

## 2020-01-28 RX ADMIN — SPIRONOLACTONE 25 MG: 25 TABLET ORAL at 08:50

## 2020-01-28 RX ADMIN — GLIPIZIDE 5 MG: 5 TABLET ORAL at 07:55

## 2020-01-28 RX ADMIN — HYDRALAZINE HYDROCHLORIDE 25 MG: 25 TABLET, FILM COATED ORAL at 06:10

## 2020-01-28 RX ADMIN — FUROSEMIDE 60 MG: 40 TABLET ORAL at 08:49

## 2020-01-28 RX ADMIN — ALLOPURINOL 100 MG: 100 TABLET ORAL at 08:50

## 2020-01-28 RX ADMIN — ASPIRIN 81 MG: 81 TABLET, COATED ORAL at 08:50

## 2020-01-28 RX ADMIN — RIVAROXABAN 20 MG: 20 TABLET, FILM COATED ORAL at 16:40

## 2020-01-28 ASSESSMENT — PAIN SCALES - GENERAL: PAINLEVEL_OUTOF10: 0

## 2020-01-28 NOTE — PROGRESS NOTES
Department of Physical Medicine & Rehabilitation  Progress Note    Patient Identification:  Elizabeth Garcia  [id number]  : 1965  Admit date: 2020    Chief Complaint: Acute ischemic right MCA stroke ([address]    Subjective:   No acute events overnight. Patient seen this am sitting up in room. Had some increased SOB with PT today. Weight down trending and lungs remain clear. Patient denies increased leg swelling. ROS: No f/c, n/v, cp     Objective:  Patient Vitals for the past 24 hrs:   BP Temp Temp src Pulse Resp SpO2 Weight   20 0848 134/86 -- -- 72 -- -- --   20 0847 -- -- -- -- -- 100 % --   20 0600 -- -- -- -- -- -- 244 lb 4.3 oz (110.8 kg)   20 0551 135/72 97.1 °F (36.2 °C) Oral 72 16 98 % --   20 2336 (!) 145/80 -- -- 75 -- -- --   20 2336 (!) 145/80 -- -- -- -- -- --   20 1657 (!) 157/99 97.8 °F (36.6 °C) Oral 78 14 96 % --     Const: Alert. No distress, pleasant. HEENT: Normocephalic, atraumatic. Normal sclera/conjunctiva. MMM. CV: Regular rate and rhythm. LifeVest in place. Resp: No respiratory distress. Lungs CTAB. Abd: Soft, nontender, nondistended, NABS+   Ext: Trace edema. Neuro: Alert, oriented, appropriately interactive. Mild left facial droop with dysarthria. Left hemiparesis overall 4+/5. Psych: Cooperative, appropriate mood and affect    Laboratory data: Available via EMR.    Last 24 hour lab  Recent Results (from the past 24 hour(s))   POCT Glucose    Collection Time: 20 12:21 PM   Result Value Ref Range    POC Glucose 65 (L) 70 - 99 mg/dl    Performed on ACCU-CHEK    POCT Glucose    Collection Time: 20 12:46 PM   Result Value Ref Range    POC Glucose 71 70 - 99 mg/dl    Performed on ACCU-CHEK    POCT Glucose    Collection Time: 20  1:22 PM   Result Value Ref Range    POC Glucose 97 70 - 99 mg/dl    Performed on ACCU-CHEK    POCT Glucose    Collection Time: 20  4:32 PM   Result Value Ref Range    POC Glucose 93 70 - 99 mg/dl    Performed on ACCU-CHEK    POCT Glucose    Collection Time: 01/27/20  7:58 PM   Result Value Ref Range    POC Glucose 126 (H) 70 - 99 mg/dl    Performed on ACCU-CHEK    POCT Glucose    Collection Time: 01/28/20  2:07 AM   Result Value Ref Range    POC Glucose 128 (H) 70 - 99 mg/dl    Performed on ACCU-CHEK    POCT Glucose    Collection Time: 01/28/20  7:19 AM   Result Value Ref Range    POC Glucose 112 (H) 70 - 99 mg/dl    Performed on ACCU-CHEK        Therapy progress:  PT  Position Activity Restriction  Other position/activity restrictions: Life Vest. may remove vest for shower, DIET CARDIAC; Carb Control: 5 carb choices (75 gms)/meal; Low Sodium (2 GM); Daily Fluid Restriction: 2000 ml   Objective     Sit to Stand: Supervision  Stand to sit: Supervision  Bed to Chair: Supervision(without a device)  Device: No Device  Assistance: Contact guard assistance  Distance: 220'  OT  PT Equipment Recommendations  Equipment Needed: No  Equipment Used: Standard toilet  Toilet Transfers Comments: RW >< comfort ht toilet(his toilet is low) L grab bar as uses tub on L to push himself up. Discussed rec to get TSF. Assessment        SLP  Diet Solids Recommendation: Regular  Liquid Consistency Recommendation: Thin    Body mass index is 30.53 kg/m². Assessment and Plan:  Impairments: left hemiparesis, neglect, balance, endurance, dysarthria, dysphagia, cognition     Acute ischemic right MCA stroke  -Etiology likely cardioembolic  -Secondary ppx with Xarelto, statin  -PT/OT/SLP     Nonischemic cardiomyopathy  -Etiology thought to be hypertensive  -LifeVest in place x 3 months  -Daily wt, fluid restriction  -Lasix (increase dose to 80 mg daily), losartan, metoprolol, spironolactone     CAD  -[facility] 2018 with non-obstructive disease  -Trop elevated on admission to  then stabilized  -ASA, statin, bb, imdur     HTN - BPs initially elevated (particularly diastolic), now improving with adjustments.  Continue lasix

## 2020-01-28 NOTE — PROGRESS NOTES
wall   Entrance Stairs - Rails: Left  Bathroom Shower/Tub: Tub/Shower unit  Bathroom Toilet: Standard(low toilet, tub on L, vanity R but not strong enough to use for support, towel rack in front, uses @ times)  Bathroom Accessibility: Walker accessible  ADL Assistance: [facility]: Independent  Homemaking Responsibilities: (was ind for all homemaking, finances, meds management)  Ambulation Assistance: Independent  Transfer Assistance: Independent  Active : No  Patient's  Info: per pt: license suspended D/T not making child support payment  Occupation: Other(comment)  Type of occupation: recently worked prn work as an , 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 may remove vest for shower, DIET CARDIAC; Carb Control: 5 carb choices (75 gms)/meal; Low Sodium (2 GM); Daily Fluid Restriction: 2000 ml      Subjective   General  Chart Reviewed: Yes  Additional Pertinent Hx: per Dr. Patience Cantrell note, \"54 yo right handed M with pmh nonischemic cardiomyopathy, HTN, DM2 who initially presented to [facility] on 1/14/2020 with slurred speech and left side weakness. Imaging revealed acute ischemic right MCA stroke and M2 occlusion. He therefore underwent thrombectomy (1/14). Of note, patient had not been taking his medications for several months. Etiology of stroke, thought to be cardioembolic. TTE revealed LVH, severe diffuse hypokinesis, with an EF of 15-20%. Cardiac MRI was negative for thrombus. He was started on Xarelto for secondary prevention. Cardiology was consulted for possible AICD placement, however, recommended optimizing medical management and using LifeVest for 3 months.  He did have elevated troponin upon admission (peak 0.06) which later normalized. Course was complicated by shortness of breath and productive cough. Discharging services not feel this was due to infection but rather, pulmonary hypertension, right-sided heart failure, and tobacco use. \"  Response To Previous Treatment: Patient with no complaints from previous session. Family / Caregiver Present: No  Referring Practitioner: Jacki Patel  Subjective  Subjective: patient reporting he has had more SOB this AM, but no other complaints. He was asleep in recliner upon PT's arrival, but woke easily and was agreeable to therapy. General Comment  Comments: Patient with LifeVest on and able to manage this on his own. Pain Screening  Patient Currently in Pain: Denies    Objective      Transfers  Sit to Stand: Supervision  Stand to sit: Supervision  Bed to Chair: Supervision(without a device)    Ambulation  Ambulation?: Yes  More Ambulation?: No  Ambulation 1  Surface: level tile;carpet  Device: No Device  Assistance: Contact guard assistance  Quality of Gait: wide MITUL this AM, good foot clearance, slower reg this AM.   Gait Deviations: Slow Reg;Decreased step length;Decreased step height;Decreased arm swing  Distance: 220' x2  Comments: Patient with SOB, but recovers with rest.  He also has bouts of SOB at rest also. Stairs/Curb  Stairs?: Yes  Stairs  # Steps : 20  Stairs Height: 6\"  Rails: Bilateral  Device: No Device  Assistance: Stand by assistance;Supervision  Comment: Patient with began with good reg but slowed with increased steps this date. He was able to complete 24 stairs this date using a reciprocal pattern for entirety of activity. Patient with slight SOB with last four steps but resolved with seated rest break. Patient used bilateral rails for support, but does not rely heavily on use of the rails. Patient began to have some slowing and increased lateral sway as fatigue increased.                     Comment: patient requested use of bathroom during session. patient stood to urinate and was supervision - modified independent in bathroom. Some SOB, but recovers    PM Session  Patient reporting he continues to have increased SOB. He also states he \"really doesn't want to do anything. \" PT provided support and encouragement. Sit<>stand with SBA  Performed 7 reps step up to high knee march on opposite leg, alternating legs. Patient required intermittent HHA. He has increased difficulty maintaining balance on left LE. He reported fatigue and requested to sit after 7 reps. Stood with right LE on 6\" box and performed ball toss with soft pliable ball. Patient with difficulty catching with left UE and always tosses with right UE. Patient required CGA. He had two LOB to the right, but recovered with only CGA. Patient set up on NuStep and completed 10 minutes with seat at 15, bilateral UE at 15, resistance at 4, and average SPM at 36  Patient requested use of the bathroom secondary to increased need to urinate with increased lasix. Patient was able to ambulate into and out of bathroom with supervision. He stood and urinated at toilet with independence. Patient with decreased reg and increased lateral sway with increased MITUL when ambulating back to wheelchair. Patient with decreased motivation this PM and required encouragement to participate with therapy.    Safety Device - Type of devices:  [x]  All fall risk precautions in place [] Bed alarm in place  [] Call light within reach [] Chair alarm in place [] Positioning belt [x] Gait belt [] Patient at risk for falls [] Left in bed [x] Left in chair (in wheelchair to return to room with transportation) [] Telesitter in use [] Sitter present [] Nurse notified []  None        Goals  Short term goals  Time Frame for Short term goals: 1 week  Short term goal 1: transfers S  Short term goal 2: amb 76' with no AD S  Short term goal 3: 12 steps B rails SBA min SOB  Long term goals  Time Frame for Long term goals : 10 to 14 days  Long term goal 1: all transfers modif I  Long term goal 2: amb 150' with no AD modif I  Long term goal 3: 24 steps L rail modif I  Long term goal 4: car transfer S  Patient Goals   Patient goals : go home    Plan    Plan  Times per week: 5 to 6  Times per day: Twice a day  Plan weeks: 1 to 2  Specific instructions for Next Treatment: increase gt and exercises  Current Treatment Recommendations: Balance Training, Transfer Training, Functional Mobility Training, Endurance Training, Gait Training  Safety Devices  Type of devices:  All fall risk precautions in place, Gait belt, Left in chair, Nurse notified, Call light within reach(with PCACristal aware and OTMayra, called for patient's request to take a shower at this time.  )     Therapy Time   Individual Concurrent Group Co-treatment   Time In 1005         Time Out 1035         Minutes 30                Second Session Therapy Time     Individual Co-treatment   Time In 1430     Time Out 1515     Minutes 45            Electronically signed by Flor Servin PT on 1/28/2020 at 11:54 AM

## 2020-01-28 NOTE — PROGRESS NOTES
Low Sodium (2 GM); Daily Fluid Restriction: 2000 ml   Behavior: [x] Alert  [x] Cooperative  [x]  Pleasant  [] Confused  [] Agitated  [] Uncooperative  [] Distractible [] Motivated  [] Self-Limiting [] Anxious  [] Other:  Endurance:  [] Adequate for participation in SLP sessions  [x] Reduced overall  [] Lethargic  [] Other:  Safety: [] No concerns at this time  [] Reduced insight into deficits  []  Reduced safety awareness [] Not following call light procedures  [] Unable to Assess  [] Other:  Swallowing Precautions: urpight for all meals; reduced rate ; small bites/sips; clearance swallow between presentations to clear oral residue. Rest breaks due to fatigue and SOB. Meds with puree. If with H20 close monitor  Barriers toward progress: caregiver support and medical issues may be barriers           Date: 1/28/2020      Tx session 1 Tx session 2   Total Timed Code Min SLP Individual Minutes  Time In: 0815  Time Out: 0890  Minutes: 43  Coded treatment time  25   n/a   Group Treatment Minutes 0 0   Co-Treat Minutes 0 0   Variance/Reason:  n/a    Pain denied    Pain Intervention [] RN notified  [] Repositioned  [] Intervention offered and patient declined  [] N/A  [] Other: [] RN notified  [] Repositioned  [] Intervention offered and patient declined  [] N/A  [] Other:   Subjective     Seen bedside  Pt was in recliner; just finished OT  Pt was oriented to team meeting today and tentative d/c date    Objective:  Goals       Dysphagia   Goals: PT goal is to remain on regular diet; but to have less problems and hve less oral drooling of saliva     Therapy targeted Not targeted     Dysphagia Goals:   1. The patient will tolerate regular food with thin liquid diet without observed clinical signs of aspiration,  Direct Dysphagia f/u  -thin liquids by cup:unremarkable  -thin liquids by straw: unremarkable  -soft/bite size: good labial/buccal muscular engagement; no anterior loss and no drooling.  No overt clinical s/s post exchange    Goal 3: Pt will demonstrate improved math language and numeric reasoning for mild complex PS tasks with set up; use of compensatory strategies and <mild assist   Math Language:   -time measurements:   [place]: IND  Mild complex: 0- mild assist n/a   Goal 4: Pt will participate in further assessmennt of VPS/PS/reasoning and organization skills for executive functino with additional goals to follow Mild complex PS/VPS using deductive and inductive reasoning for sequential organization: Unable to complete this date. Will continue 1/29/2020      VPS/PS and discharge planning: Pt voiced frustration with current living situation and thus frustration with medical needs. n/a       Goal 5: Pt will demonstrate functional working memory and concrete alternating/divided attention for rule application for 15 task trials and/or 3 minute tasks with set up ; use of compensatory strategy and < mild assist     Working memory  -Variable need for increase in external cues this date/time as pt voiced distraction overal current medical and living situations           n/a   Other areas targeted: Recall of daily therapies: 3/3 by label and general description    Recall of current medications: 50%    Recall of tentative d/c date: IND    Education:   ongoign pt ed    Safety Devices: [x] Call light within reach  [x] Chair alarm activated  [] Bed alarm activated  [] Other:  [] Call light within reach  [] Chair alarm activated  [] Bed alarm activated  [] Other:    Progress Assessment: 1/24/2020: Continued treatment POC. Incidental note: during table top cognitive-linguistic tasks : mild drooling or oral secretions. This SLP discussed sensory treatments one of which was use of NMES vital stimulation which would require pt to shave. Pt declined this type of treatment at this time. 1/28/2020:  Pt voiced frustration with current living situation and thus frustration with medical needs making it hard to concentrate on ex.    Plan: Continue as per plan of care. Continued Tx Upon Discharge: ?    [x] Yes [] No [] TBD based on progress while on ARU [] Vital Stim indicated [] Other:   Estimated discharge date: TBD   Discharge recommendations:   [] Home independently  [x] Home with assistance []  24 hour supervision  [] ECF [] Other:     Additional information:     Interventions used during Rehab Stay:  [] Speech/Language Treatment  [] Instruction in HEP [] Group [x] Dysphagia Treatment [x] Cognitive Treatment   [x] Other: Oral Motor Speech  Adverse Reactions to Treatment/Significant Change in Status:       Electronically Signed by    [facility] Shashank. Flum,MS,CCC,SLP 2168  Speech and Language Pathologist

## 2020-01-28 NOTE — PROGRESS NOTES
Admitted with CVA, alert and oriented x 4. LUE and LLE slightly weaker than right. Transfers with CGA , no device, gait steady. Apical pulse regular, Life Vest on: he states he changes the battery every morning. Lungs clear, CHF education reviewed. Denies pain. Sleeping up in recliner chair per his request. Intentional rounding to anticipate needs.  Manisha Clark RN

## 2020-01-28 NOTE — CARE COORDINATION
OMERW spoke with Dr Beatris Murray who wants him to see his cardiologist.  [place] arranged for appt on Friday, 1- at 10:40 am. In the Medical Office Building.   [address] [place] [address]     Case Management   [phone]    1/28/2020  2:11 PM

## 2020-01-28 NOTE — PROGRESS NOTES
Occupational Therapy  Facility/Department: [address] IP REHAB  Daily Treatment Note    AM and PM Sessions:  NAME: Yaw Taylor  : 1965  MRN: [id number]    Date of Service: 2020    Discharge Recommendations:  Home with Home health OT, Home with assist PRN  OT Equipment Recommendations  Equipment Needed: Yes  Other: continue to assess: likely TSF, shower chair, hand held shower, non skid mat/decals for shower floor    Assessment   Performance deficits / Impairments: Decreased functional mobility ; Decreased high-level IADLs;Decreased cognition;Decreased ADL status; Decreased endurance;Decreased coordination;Decreased strength;Decreased balance;Decreased safe awareness;Decreased fine motor control  Assessment: Pt performed shower ADL session with supervision but continues to get SOB @ times, especially during dressing tasks. D/C targeted for 20 with home OT/PT. Plan to continut tx per POC. Treatment Diagnosis: Impaired: ADL/IADL function, mobility/transfers, balance, strength, coordination, activity tolerance, higher level cognition  Prognosis: Good  OT Education: Plan of Care;ADL Adaptive Strategies; Equipment;Transfer Training  REQUIRES OT FOLLOW UP: Yes  Safety Devices  Safety Devices in place: Yes  Type of devices: Patient at risk for falls; Left in chair;Call light within reach;Nurse notified;Gait belt         Patient Diagnosis(es): There were no encounter diagnoses. has a past medical history of CHF (congestive heart failure) ([facility] [address] Diabetes mellitus ([facility] [address] Hyperlipidemia, Hypertension, and Vitamin D deficiency. has a past surgical history that includes Diagnostic Cardiac Cath Lab Procedure (2018). Restrictions  Restrictions/Precautions  Restrictions/Precautions: Fall Risk  Position Activity Restriction  Other position/activity restrictions: Life Vest. may remove vest for shower, DIET CARDIAC; Carb Control: 5 carb choices (75 gms)/meal; Low Sodium (2 GM);  Daily Fluid present [] Nurse notified []  None                                        Plan   Plan  Times per week: 5-6  Times per day: Twice a day  Plan weeks: 2 weeks  Current Treatment Recommendations: Strengthening, Safety Education & Training, Balance Training, Patient/Caregiver Education & Training, Self-Care / ADL, Cognitive/Perceptual Training, Functional Mobility Training, Neuromuscular Re-education, Equipment Evaluation, Education, & procurement, Home Management Training, Endurance Training  Plan Comment: Monday conference          Goals  Short term goals  Time Frame for Short term goals: 1 week from 1/23/20  Short term goal 1: Pt will bathe SBA/supervision with DME. MET  Short term goal 2: Pt will dress with SBA, except min A for ephraim hose. MET  Short term goal 3: Pt will toilet with supervision. MET  Short term goal 4: Pt will perform functional transfers SBA with DME/AD. MET  Short term goal 5: Pt will perform IADL tasks SBA, to CGA during reach with AD and demo improved safety awareness/problem solving, standing for 5-6 minutes. MET  Short term goal 6: Pt will perform neuro re-ed/HEP mod ind to increase LUE strength/cood to use more effectively for ADL/IADL bilateral and FM tasks with increased accuracy and speed.(evidence by test measures) MET  Long term goals  Time Frame for Long term goals : 2 weeks from 1/23/20  Long term goal 1: Pt will bathe mod ind. with DME. Long term goal 2: Pt willl dress mod ind. Long term goal 3: Pt will toilet mod ind. Long term goal 4: Pt will perform functional transfers mod ind with AD. Long term goal 5: Pt will perform IADL tasks mod ind with AD and demo good safety awareness/problem solving/standing for 7-8 minutes. Long term goals 6: Pt will perform neuro re-ed/HEP to increase LUE strength/cood to use effectively for ADL/IADL tasks in decreased time, w/o dropping objects.   Patient Goals   Patient goals : Pt stated he wants to go home as soon as possible, get L arm working better/stop dropping things.        Therapy Time   Individual Concurrent Group Co-treatment   Time In 0715         Time Out 0815         Minutes 60         Timed Code Treatment Minutes: [facility] Time     Individual Co-treatment   Time In 56     Time Out 5     Minutes 7 Vivi Og, OT   Electronically signed by LANE Pineda/DAISY  License # [id number]

## 2020-01-28 NOTE — CARE COORDINATION
OMERW met with Mo Alarcon and Sid Patricia to review this case for his discharge on Friday. Heber Bourne asked this worker to call the sister, Brendon Hoffman, to inform her we have spoken and he will speak with the patient later today. Call placed to Bob Park, to inform her that Mr Alyssa Morales and I have spoken this morning and that he will speak with his brother this afternoon. She states  She  appreciated the call. KINZA secured both a cardiology and PCP appt for this patient. His last appt with [facility] was a no show. LSW called multiple places to get him in but they were later in March. LSW called [id number] and got this one that fit the time frame of within one month so his medications won't run out.   [facility]     Case Management   [phone]    1/28/2020  2:57 PM

## 2020-01-29 LAB
GLUCOSE BLD-MCNC: 112 MG/DL (ref 70–99)
GLUCOSE BLD-MCNC: 115 MG/DL (ref 70–99)
GLUCOSE BLD-MCNC: 116 MG/DL (ref 70–99)
GLUCOSE BLD-MCNC: 131 MG/DL (ref 70–99)
GLUCOSE BLD-MCNC: 146 MG/DL (ref 70–99)
PERFORMED ON: ABNORMAL

## 2020-01-29 PROCEDURE — 97129 THER IVNTJ 1ST 15 MIN: CPT

## 2020-01-29 PROCEDURE — 97130 THER IVNTJ EA ADDL 15 MIN: CPT

## 2020-01-29 PROCEDURE — 1280000000 HC REHAB R&B

## 2020-01-29 PROCEDURE — 97112 NEUROMUSCULAR REEDUCATION: CPT

## 2020-01-29 PROCEDURE — 97110 THERAPEUTIC EXERCISES: CPT

## 2020-01-29 PROCEDURE — 97116 GAIT TRAINING THERAPY: CPT

## 2020-01-29 PROCEDURE — 6370000000 HC RX 637 (ALT 250 FOR IP): Performed by: PHYSICAL MEDICINE & REHABILITATION

## 2020-01-29 PROCEDURE — 97530 THERAPEUTIC ACTIVITIES: CPT

## 2020-01-29 PROCEDURE — 97535 SELF CARE MNGMENT TRAINING: CPT

## 2020-01-29 RX ADMIN — VITAMIN D, TAB 1000IU (100/BT) 1000 UNITS: 25 TAB at 08:16

## 2020-01-29 RX ADMIN — HYDRALAZINE HYDROCHLORIDE 25 MG: 25 TABLET, FILM COATED ORAL at 05:37

## 2020-01-29 RX ADMIN — FUROSEMIDE 80 MG: 40 TABLET ORAL at 08:16

## 2020-01-29 RX ADMIN — ASPIRIN 81 MG: 81 TABLET, COATED ORAL at 08:16

## 2020-01-29 RX ADMIN — METFORMIN HYDROCHLORIDE 1000 MG: 500 TABLET ORAL at 08:16

## 2020-01-29 RX ADMIN — RIVAROXABAN 20 MG: 20 TABLET, FILM COATED ORAL at 17:26

## 2020-01-29 RX ADMIN — METFORMIN HYDROCHLORIDE 1000 MG: 500 TABLET ORAL at 17:26

## 2020-01-29 RX ADMIN — LOSARTAN POTASSIUM 100 MG: 100 TABLET, FILM COATED ORAL at 08:16

## 2020-01-29 RX ADMIN — ALLOPURINOL 100 MG: 100 TABLET ORAL at 08:16

## 2020-01-29 RX ADMIN — ISOSORBIDE MONONITRATE 60 MG: 60 TABLET, EXTENDED RELEASE ORAL at 08:16

## 2020-01-29 RX ADMIN — METOPROLOL SUCCINATE 100 MG: 50 TABLET, EXTENDED RELEASE ORAL at 08:16

## 2020-01-29 RX ADMIN — HYDRALAZINE HYDROCHLORIDE 25 MG: 25 TABLET, FILM COATED ORAL at 12:39

## 2020-01-29 RX ADMIN — SPIRONOLACTONE 25 MG: 25 TABLET ORAL at 08:16

## 2020-01-29 RX ADMIN — ATORVASTATIN CALCIUM 40 MG: 40 TABLET, FILM COATED ORAL at 22:20

## 2020-01-29 RX ADMIN — HYDRALAZINE HYDROCHLORIDE 25 MG: 25 TABLET, FILM COATED ORAL at 17:26

## 2020-01-29 ASSESSMENT — PAIN SCALES - GENERAL
PAINLEVEL_OUTOF10: 0

## 2020-01-29 NOTE — PROGRESS NOTES
renal function intermittently.     DM2 - A1C 7.4%. Metformin, ISS. Dc'ed glipizide due to intermittent hypoglycemia.      Gout - allopurinol     Tobacco use - nicotine patch offered. Cessation counseling.     Anxiety - hydroxyzine prn     Dysphagia - Dietitian and SLP consults. Currently on regular + thins with aspiration precautions.      Bladder - high risk retention - Monitor PVRs, SC prn >300cc     Bowel - high risk constipation - colace BID, PRN miralax and MoM. follow bowel movements. Enema or suppository if needed.      Safety - fall and aspiration precautions     DVT ppx - Xarelto    Rehab Progress:  Making good progress with left side strength/coordination, balance, dysarthria, dysphagia. Also working on endurance in setting of CHF. Anticipated Dispo: No longer has apartment. Per patient and family, he is unable to stay with them. Tentatively planning discharge to homeless shelter. Working with SW to explore all options. Services: HH PT, OT, SLP, RN  DME: Shower chair [facility]  ELOS: 1/31      Hai Crandall.  Shanda Cooper MD 1/29/2020, 10:21 AM

## 2020-01-29 NOTE — PROGRESS NOTES
Occupational Therapy  Facility/Department: [address] REHAB  Daily Treatment Note  AM and PM Sessions:  NAME: Lila Baca  : 1965  MRN: [id number]    Date of Service: 2020    Discharge Recommendations:  Home with Home health OT, Home with assist PRN  OT Equipment Recommendations  Other: Recommend TSF if no support by toilet, shower chair, hand held shower, non skid mat/decals for shower floor    Assessment   Performance deficits / Impairments: Decreased functional mobility ; Decreased high-level IADLs;Decreased cognition;Decreased ADL status; Decreased endurance;Decreased coordination;Decreased strength;Decreased balance;Decreased safe awareness;Decreased fine motor control  Assessment: Pt performed shower ADL session with supervision but continues to get SOB @ times, especially during dressing tasks. D/C targeted for 20 with home OT/PT. Plan to continue tx per POC. Treatment Diagnosis: Impaired: ADL/IADL function, mobility/transfers, balance, strength, coordination, activity tolerance, higher level cognition  Prognosis: Good  OT Education: Plan of Care;ADL Adaptive Strategies; Equipment;Transfer Training  REQUIRES OT FOLLOW UP: Yes  Activity Tolerance  Activity Tolerance: Patient limited by fatigue;Patient Tolerated treatment well  Activity Tolerance: pt required a few rest during session after mob/dressing tasks/standing, improved from initially  [facility] in place: Yes  Type of devices: Patient at risk for falls; Left in chair;Call light within reach;Nurse notified;Gait belt; Chair alarm in place         Patient Diagnosis(es): There were no encounter diagnoses. has a past medical history of CHF (congestive heart failure) ([facility] [address] Diabetes mellitus ([facility] [address] Hyperlipidemia, Hypertension, and Vitamin D deficiency.    has a past surgical history that includes Diagnostic Cardiac Cath Lab Procedure sink > closet to put dirty clothes in laundry bag>recliner All supervision, no LOB but intermittent SOB. Shower Transfers  Shower - Transfer From: Other(no device)  Shower - Transfer Type: To and From  Shower - Transfer To: Shower seat with back  Shower - Technique: Ambulating  Shower Transfers: Supervision  Shower Transfers Comments: >< shower chair in stall supervision  Wheelchair Bed Transfers  Wheelchair/Bed - Technique: Ambulating(no device)  Equipment Used: Other(recliner, arm chair)  Level of Asssistance: Supervision  Wheelchair Transfers Comments: >< recliner and arm chair supervision no device                             Cognition  Overall Cognitive Status: (WFL for ADL tasks)     Second Session:    Met in dept, stated he feels fatigued/SOB but better than yesterday. Denied pain. Cognition/FM coord: Pt completed 8 pill/day meds sorting tasks to fill pill sorter for week in AM/PM slots. Pt followed directions and completed task in min increased time. Did not have an issue with dropping pills, but mostly used his R hand. Pt was able to open non child proof pill bottles, but struggled D/T decreased L hand coord. OT suggested he get non child proof bottles when he gets RX filled @ D/C. Also suggested towel/pillow case on table when sorting pills to prevent dropped pills from rolling. He was in agreement with recs. He stated he knew he needed to use pill sorter because he got his pills mixed up @ home the way he was doing it prior to admit. Pt completed [place] wood puzzle using both hands but mostly R w/o cues. Did not have any problems with problem solving and was able to manipulate pieces effectively. Pt attempted key hole pegs with L hand but struggled to manipulate, as quite small so OT stopped tasks as seemed frustrating for pt. ADL: Requested toileting in a hurry when he stood for task. Pt amb >< bathroom supervision, stood to void mod ind, washed hands ind.       Transfers: Pt amb no device >< FM tasks with increased accuracy and speed.(evidence by test measures) MET  Long term goals  Time Frame for Long term goals : 2 weeks from 1/23/20  Long term goal 1: Pt will bathe mod ind. with DME. Long term goal 2: Pt willl dress mod ind. Long term goal 3: Pt will toilet mod ind. Long term goal 4: Pt will perform functional transfers mod ind with AD. Long term goal 5: Pt will perform IADL tasks mod ind with AD and demo good safety awareness/problem solving/standing for 7-8 minutes. Long term goals 6: Pt will perform neuro re-ed/HEP to increase LUE strength/cood to use effectively for ADL/IADL tasks in decreased time, w/o dropping objects. Patient Goals   Patient goals : Pt stated he wants to go home as soon as possible, get L arm working better/stop dropping things.        Therapy Time   Individual Concurrent Group Co-treatment   Time In 0715         Time Out 0815         Minutes 60         Timed Code Treatment Minutes: [facility] Time     Individual Co-treatment   Time In [facility]     Time Out [address]     Minutes [address] [facility], OT   Electronically signed by LANE Kim/L  License # [id number]

## 2020-01-29 NOTE — PLAN OF CARE
Problem: Falls - Risk of:  Goal: Will remain free from falls  Description  Will remain free from falls  Outcome: Ongoing  Note:   Yellow FALL RISK band on patient. Orange light on outside of room. Non skid footwear in place. Alarms used appropriately. Patient instructed to call and wait for staff before getting up. Rounding done to anticipate needs. Appropriate safety devices used for transfers      Problem: Falls - Risk of:  Goal: Absence of physical injury  Description  Absence of physical injury  Outcome: Ongoing  Note:   Pt is free of injury. No injury noted. Fall precautions in place. Call light within reach. Will monitor. Problem: OXYGENATION/RESPIRATORY FUNCTION  Goal: Patient will maintain patent airway  Outcome: Ongoing  Note:   Airway is patent. No respiratory distress. Problem: ACTIVITY INTOLERANCE/IMPAIRED MOBILITY  Goal: Mobility/activity is maintained at optimum level for patient  Outcome: Ongoing  Note:   Patient working with therapy at least 3 hours/day to obtain maximal mobility. Safety devices used.

## 2020-01-29 NOTE — CARE COORDINATION
KINZA met with patient along with bedside RN, Sherley, to discuss his plan for discharge and to offer a meeting with family if desired. He reports,\"What is my discharge? \"  KINZA informed him that our Conference on Monday our plan was for a DC on Friday, 1- to a homeless shelter. However, OMERW informed him his family has a different idea and asked if he wants them involved in his discharge plan or is he comfortable making his plans without them. He was very disgruntled. He was upset with the confusion relating to his Life Vest.  He states he does want his family to be involved in his care but , \"THey areNOT  taking me in.\"   He reports we have no where for him to go so he will go to the [address]. He asked me, What is my discharge? KINZA informed him that at some point he will be discharged from the hospital and if he has no where to go, I will get him a ride to the [address]. He became upset that we have no place for him to go and stated, \"My family is not taking me in.\"  Call placed to sister J Luis Osborn to acknowledge the idea of getting him into a drug and alcohol program.  She reports they have made contact with [facility] today and he did complete a phone call with their representative and he must call again on Thursday and Friday. Sisters will ensure he is making these calls. Lesa Moe reports they hope to have a bed for him on Thursday or Friday and when a bed is available he will need to be discharged as they do not want him to miss this opportunity. She asks that we confirm for her the discharge date in the event a bed is not available on Thursday or over the weekend. SHe continues to state that he has a long history of crack cocaine use. She reports he used the day before his stroke. She reports the family has been supporting him by paying for his rent, utilities, previously had been paying for his child support but has since stopped that and he lost his license.       She reports he is only boy out of 5 sisters and they have \"enabled him as that what his parents did. \"  She reports their parents  8 years ago and the family took up taking care of him since then. She states the family has gone through some intense tears, conversations and have come up with their plan which is to get him into Drug and Alcohol Program as their final straw. LSW affirmed their decisions and offered Melody for family support. OMERW informed her that Rehab for drug/alcohol problem is really an inside job in that he either wants to do it or he does not. She continues to state he has lied to their faces about being hungry and asking for money only for him to use it on drugs. She reports this was the last straw. OMERW informed her that we do not have record of drug use. She states she must apologize to our staff as she assumed that we were aware of this issue as he used the day before the stroke happened. She reports her daughter was listening to him tell her things about our staff here and was so appalled that she went to [facility]  to seek assistance. She realizes he has a history of not telling the truth. She wants him to be ready for discharge for when a bed might become available and wants to confirm the discharge date with our MD.  If there is no bed available before Monday, they are resigned to have him go to the homeless shelter.   [place], [place]     Case Management   [phone]    2020  6:13 PM

## 2020-01-29 NOTE — PROGRESS NOTES
Patient oriented to person, place, time. Vital signs stable. Patient remains on room air only. No complaints of pain this shift, pt denies any pain. Shift assessment completed. Medication administration completed without any complications. Patient is up with activity times 1 with no device. Requiring Close supervision. Patients tolerating low sodium diet with 2000 ml FR. Remains continent of bowel and bladder. No needs voiced at this time. Call light within reach. Will continue to monitor.  Electronically signed by Neeraj Mayo RN on 1/29/2020 at 11:49 AM

## 2020-01-29 NOTE — PROGRESS NOTES
Physical Therapy  Facility/Department: [address] IP REHAB  Daily Treatment Note  NAME: Lila Baca  : 1965  MRN: [id number]    Date of Service: 2020    Discharge Recommendations:  Continue to assess pending progress, Home with assist PRN, Home with Home health PT   PT Equipment Recommendations  Equipment Needed: No    Assessment   Body structures, Functions, Activity limitations: Decreased functional mobility ; Decreased endurance  Assessment: Patient with improvements with SOB this AM.  He was able to complete 36 steps this date with use of bilateral rails and one standing rest break before descending the 36 steps. He continues to be supervision with ambulation without a device. Plan for discharge on Friday, . Recommend out patient PT to continue to address balance, coordination, and gait. Treatment Diagnosis: decreased mobility following CHF and CVA  Specific instructions for Next Treatment: increase gt and exercises  Prognosis: Good  PT Education: Transfer Training;General Safety;Gait Training  REQUIRES PT FOLLOW UP: Yes  Activity Tolerance  Activity Tolerance: Patient Tolerated treatment well     Patient Diagnosis(es): There were no encounter diagnoses. has a past medical history of CHF (congestive heart failure) ([facility] [address] Diabetes mellitus ([facility] [address] Hyperlipidemia, Hypertension, and Vitamin D deficiency. has a past surgical history that includes Diagnostic Cardiac Cath Lab Procedure (2018).     Social/Functional History  Lives With: Alone  Type of Home: Apartment  Home Layout: Performs ADL's on one level  Home Access: Stairs to enter with rails  Entrance Stairs - Number of Steps: 3 flights of steps to enter going up L rail &  R wall   Entrance Stairs - Rails: Left  Bathroom Shower/Tub: Tub/Shower unit  Bathroom Toilet: Standard(low toilet, tub on L, vanity R but not strong enough to use for support, towel rack in front, uses @ times)  Bathroom Accessibility: [employer] accessible  ADL Assistance: Independent  Homemaking Assistance: Independent  Homemaking Responsibilities: (was ind for all homemaking, finances, meds management)  Ambulation Assistance: Independent  Transfer Assistance: Independent  Active : No  Patient's  Info: per pt: license suspended D/T not making child support payment  Occupation: Other(comment)  Type of occupation: recently worked prn work as an , 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 may remove vest for shower, DIET CARDIAC; Carb Control: 5 carb choices (75 gms)/meal; Low Sodium (2 GM); Daily Fluid Restriction: 2000 ml      Subjective   General  Chart Reviewed: Yes  Additional Pertinent Hx: per Dr. Miguel Mckeon note, \"52 yo right handed M with pmh nonischemic cardiomyopathy, HTN, DM2 who initially presented to [facility] on 1/14/2020 with slurred speech and left side weakness. Imaging revealed acute ischemic right MCA stroke and M2 occlusion. He therefore underwent thrombectomy (1/14). Of note, patient had not been taking his medications for several months. Etiology of stroke, thought to be cardioembolic. TTE revealed LVH, severe diffuse hypokinesis, with an EF of 15-20%. Cardiac MRI was negative for thrombus. He was started on Xarelto for secondary prevention. Cardiology was consulted for possible AICD placement, however, recommended optimizing medical management and using LifeVest for 3 months. He did have elevated troponin upon admission (peak 0.06) which later normalized. Course was complicated by shortness of breath and productive cough. Discharging services not feel this was due to infection but rather, pulmonary hypertension, right-sided heart failure, and tobacco use. bar    PM Session  Patient reports he is very tired from lack of sleep. He has no complaints of pain and is agreeable to therapy. Sit<>stand with supervision. Ambulated 400' without device with supervision. Patient with improved MITUL but continues to demonstrate decreased reg. He was able to ambulate 61' at a \"fast\" pace and demonstrated improved foot clearance, improved step length, and improved arm swing. Patient set up on NuStep and completed 10 minutes with seat at 15, bilateral UE at 15, resistance at 5, and average SPM at 25. Performed standing balance activity, grapevine in hallway with bilateral UE support. Patient required mod assist to maintain balance. 30' each direction. Increased time for coordination and motor planning. Short seated break required. Stood and performed alternating wide side step, into a lunge, then push off back to start position. Patient with difficulty pushing off of either LE to return to starting position. CGA and increased time required. Ambulated heel to toe 30' x2 with right HHA/min assist.  Patient with increased difficulty when MITUL narrowed. Care transitioned to OTRajni        Goals  Short term goals  Time Frame for Short term goals: 1 week  Short term goal 1: transfers S  Short term goal 2: amb 76' with no AD S  Short term goal 3: 12 steps B rails SBA min SOB  Long term goals  Time Frame for Long term goals : 10 to 14 days  Long term goal 1: all transfers modif I  Long term goal 2: amb 150' with no AD modif I  Long term goal 3: 24 steps L rail modif I  Long term goal 4: car transfer S  Patient Goals   Patient goals : go home    Plan    Plan  Times per week: 5 to 6  Times per day: Twice a day  Plan weeks: 1 to 2  Specific instructions for Next Treatment: increase gt and exercises  Current Treatment Recommendations: Balance Training, Transfer Training, Functional Mobility Training, Endurance Training, Gait Training  Safety Devices  Type of devices:  All fall risk precautions in place, Gait belt, Left in chair(seated in wheelchair in therapy gym with staff present, waiting for speech sessin with Alice Jordan)     Therapy Time   Individual Concurrent Group Co-treatment   Time In 0900         Time Out 0930         Minutes 30                Second Session Therapy Time     Individual Co-treatment   Time In 1430     Time Out 1515     Minutes 45            Electronically signed by Jamal Cervantes PT on 1/29/2020 at 2:50 PM

## 2020-01-29 NOTE — PROGRESS NOTES
Admitted with CVA . Alert and oriented x 4. Awake and sleeps in recliner chair. Life Vest on and he can manage it himself. Lungs clear, he states he is breathing easier than yesterday, he has voided over 850 ml this shift. Fluid restrictions 2000 ml/day. Reviewed CHF education with him. Transfers in room SBA no device, gait steady. Denied pain. Blood sugar low at HS, he says he didn't eat dinner. Provided turkey sandwich after midnight per his request, Blood sugar 146 at 0211. Intentional rounding to anticipate needs.  Angi Camarena RN

## 2020-01-29 NOTE — PROGRESS NOTES
preparation phase via bolus control/manipulation exercises to 5/5 each trial. Not targeted; last targeted 1/28/2020 and goal appeared met n/a   4.  Pt will complete lingual and laryngeal ex 15/15 to assist with improved bolus control and laryngeal functional during the swallow Not targeted; last targeted 1/28/2020 and goal appeared met   n/a   Cognitive-Oral Motor Speech Voice  Goals:   Pt goal is to Help me with my control of my drooling and speech; I want to get back to being able to care for myself and probably care for myself better         Therapy working toward pt goal    Goal 1: Pt will demosntrate improved oral motor rom/strengt/ coordination and sensation for 100% intelligible speech and >90% precision of articulation at >7 syllable utterance level across tasks Conversational exchanges: speech audible and intelligible; variable minimal distortions    0% anterior loss of oral secretions n/a   Goal 2: Pt will demostrate imrpoved respiratory control for phonation at 4-6 syllable utteranc elevel without dysphonia or complaints of SOB Goal met across structured and less structured speech tasks    Goal 3: Pt will demonstrate improved math language and numeric reasoning for mild complex PS tasks with set up; use of compensatory strategies and <mild assist   Math Language:   -time measurements:   Mild complex: continued but small sample size  -reinforced calc and numeric reasoning for diet restrictions (ie carb counting etc) n/a   Goal 4: Pt will participate in further assessmennt of VPS/PS/reasoning and organization skills for executive functino with additional goals to follow Mild complex PS/VPS using deductive and inductive reasoning for sequential organization: set up ; conditioning to task and mild assist. Pt does need extra time     PS and use of paragraph information for concrete spatial organization for manipulation of 12 items: set up; conditioning to task; extra time and >90% IND    VPS/PS for use of rule application for 5 varying criteria using map information: set up; extra time; and >85%    VPS/PS and discharge planning: reinforced incorporating current restricitons/new learning safety strategies (ie diet restrictions; CHF; ambulation; fall risks)     n/a       Goal 5: Pt will demonstrate functional working memory and concrete alternating/divided attention for rule application for 15 task trials and/or 3 minute tasks with set up ; use of compensatory strategy and < mild assist     Working memory  -Pt needs extra time. Pt is more optimal with use of language processing for re-reading. Set up and variable mild re-direct to task after set up         n/a   Other areas targeted: Recall of daily therapies and diet restrictions: Pt able to verbalize. Recall of current medications: reinforced; but no data taken on pt's IND recall        Education:   ongoign pt ed    Safety Devices: [] Call light within reach  [] Chair alarm activated  [] Bed alarm activated  [x] Other: transport returned pt to room  [] Call light within reach  [] Chair alarm activated  [] Bed alarm activated  [] Other:    Progress Assessment: 1/24/2020: Continued treatment POC. Incidental note: during table top cognitive-linguistic tasks : mild drooling or oral secretions. This SLP discussed sensory treatments one of which was use of NMES vital stimulation which would require pt to shave. Pt declined this type of treatment at this time. 1/28/2020:  Pt voiced frustration with current living situation and thus frustration with medical needs making it hard to concentrate on ex. Plan: Continue as per plan of care.    Continued Tx Upon Discharge: ?    [x] Yes [] No [] TBD based on progress while on ARU [] Vital Stim indicated [] Other:   Estimated discharge date: TBD   Discharge recommendations:   [] Home independently  [x] Home with assistance []  24 hour supervision  [] ECF [] Other:     Additional information:     Interventions used during Rehab Stay:  [] Speech/Language Treatment  [] Instruction in HEP [] Group [x] Dysphagia Treatment [x] Cognitive Treatment   [x] Other: Oral Motor Speech  Adverse Reactions to Treatment/Significant Change in Status:       Electronically Signed by    Phillip Álvarez. MS Sarahi,CCC,SLP [id number]  Speech and Language Pathologist

## 2020-01-30 LAB
ANION GAP SERPL CALCULATED.3IONS-SCNC: 14 MMOL/L (ref 3–16)
BASOPHILS ABSOLUTE: 0 K/UL (ref 0–0.2)
BASOPHILS RELATIVE PERCENT: 0.5 %
BUN BLDV-MCNC: 18 MG/DL (ref 7–20)
CALCIUM SERPL-MCNC: 9 MG/DL (ref 8.3–10.6)
CHLORIDE BLD-SCNC: 98 MMOL/L (ref 99–110)
CO2: 26 MMOL/L (ref 21–32)
CREAT SERPL-MCNC: 1 MG/DL (ref 0.9–1.3)
EOSINOPHILS ABSOLUTE: 0.2 K/UL (ref 0–0.6)
EOSINOPHILS RELATIVE PERCENT: 2.9 %
GFR AFRICAN AMERICAN: >60
GFR NON-AFRICAN AMERICAN: >60
GLUCOSE BLD-MCNC: 131 MG/DL (ref 70–99)
GLUCOSE BLD-MCNC: 137 MG/DL (ref 70–99)
GLUCOSE BLD-MCNC: 151 MG/DL (ref 70–99)
GLUCOSE BLD-MCNC: 177 MG/DL (ref 70–99)
GLUCOSE BLD-MCNC: 74 MG/DL (ref 70–99)
HCT VFR BLD CALC: 43.1 % (ref 40.5–52.5)
HEMOGLOBIN: 14 G/DL (ref 13.5–17.5)
LYMPHOCYTES ABSOLUTE: 2.3 K/UL (ref 1–5.1)
LYMPHOCYTES RELATIVE PERCENT: 43.5 %
MCH RBC QN AUTO: 29.4 PG (ref 26–34)
MCHC RBC AUTO-ENTMCNC: 32.6 G/DL (ref 31–36)
MCV RBC AUTO: 90.4 FL (ref 80–100)
MONOCYTES ABSOLUTE: 0.5 K/UL (ref 0–1.3)
MONOCYTES RELATIVE PERCENT: 8.6 %
NEUTROPHILS ABSOLUTE: 2.4 K/UL (ref 1.7–7.7)
NEUTROPHILS RELATIVE PERCENT: 44.5 %
PDW BLD-RTO: 14.5 % (ref 12.4–15.4)
PERFORMED ON: ABNORMAL
PERFORMED ON: NORMAL
PLATELET # BLD: 208 K/UL (ref 135–450)
PMV BLD AUTO: 9 FL (ref 5–10.5)
POTASSIUM REFLEX MAGNESIUM: 3.6 MMOL/L (ref 3.5–5.1)
RBC # BLD: 4.76 M/UL (ref 4.2–5.9)
SODIUM BLD-SCNC: 138 MMOL/L (ref 136–145)
WBC # BLD: 5.4 K/UL (ref 4–11)

## 2020-01-30 PROCEDURE — 6370000000 HC RX 637 (ALT 250 FOR IP): Performed by: PHYSICAL MEDICINE & REHABILITATION

## 2020-01-30 PROCEDURE — 1280000000 HC REHAB R&B

## 2020-01-30 PROCEDURE — 97129 THER IVNTJ 1ST 15 MIN: CPT

## 2020-01-30 PROCEDURE — 97110 THERAPEUTIC EXERCISES: CPT | Performed by: PHYSICAL THERAPIST

## 2020-01-30 PROCEDURE — 80048 BASIC METABOLIC PNL TOTAL CA: CPT

## 2020-01-30 PROCEDURE — 97530 THERAPEUTIC ACTIVITIES: CPT | Performed by: PHYSICAL THERAPIST

## 2020-01-30 PROCEDURE — 85025 COMPLETE CBC W/AUTO DIFF WBC: CPT

## 2020-01-30 PROCEDURE — 97110 THERAPEUTIC EXERCISES: CPT

## 2020-01-30 PROCEDURE — 97116 GAIT TRAINING THERAPY: CPT

## 2020-01-30 PROCEDURE — 97116 GAIT TRAINING THERAPY: CPT | Performed by: PHYSICAL THERAPIST

## 2020-01-30 PROCEDURE — 97112 NEUROMUSCULAR REEDUCATION: CPT

## 2020-01-30 PROCEDURE — 92526 ORAL FUNCTION THERAPY: CPT

## 2020-01-30 PROCEDURE — 97535 SELF CARE MNGMENT TRAINING: CPT

## 2020-01-30 PROCEDURE — 97530 THERAPEUTIC ACTIVITIES: CPT

## 2020-01-30 PROCEDURE — 97130 THER IVNTJ EA ADDL 15 MIN: CPT

## 2020-01-30 PROCEDURE — 36415 COLL VENOUS BLD VENIPUNCTURE: CPT

## 2020-01-30 RX ADMIN — DOCUSATE SODIUM 100 MG: 100 CAPSULE, LIQUID FILLED ORAL at 09:06

## 2020-01-30 RX ADMIN — HYDRALAZINE HYDROCHLORIDE 25 MG: 25 TABLET, FILM COATED ORAL at 05:42

## 2020-01-30 RX ADMIN — HYDRALAZINE HYDROCHLORIDE 25 MG: 25 TABLET, FILM COATED ORAL at 12:25

## 2020-01-30 RX ADMIN — METOPROLOL SUCCINATE 100 MG: 50 TABLET, EXTENDED RELEASE ORAL at 09:05

## 2020-01-30 RX ADMIN — RIVAROXABAN 20 MG: 20 TABLET, FILM COATED ORAL at 17:48

## 2020-01-30 RX ADMIN — ALLOPURINOL 100 MG: 100 TABLET ORAL at 09:05

## 2020-01-30 RX ADMIN — ATORVASTATIN CALCIUM 40 MG: 40 TABLET, FILM COATED ORAL at 21:29

## 2020-01-30 RX ADMIN — ISOSORBIDE MONONITRATE 60 MG: 60 TABLET, EXTENDED RELEASE ORAL at 09:06

## 2020-01-30 RX ADMIN — INSULIN LISPRO 1 UNITS: 100 INJECTION, SOLUTION INTRAVENOUS; SUBCUTANEOUS at 21:29

## 2020-01-30 RX ADMIN — LOSARTAN POTASSIUM 100 MG: 100 TABLET, FILM COATED ORAL at 09:05

## 2020-01-30 RX ADMIN — SPIRONOLACTONE 25 MG: 25 TABLET ORAL at 09:05

## 2020-01-30 RX ADMIN — METFORMIN HYDROCHLORIDE 1000 MG: 500 TABLET ORAL at 17:48

## 2020-01-30 RX ADMIN — ASPIRIN 81 MG: 81 TABLET, COATED ORAL at 09:05

## 2020-01-30 RX ADMIN — HYDRALAZINE HYDROCHLORIDE 25 MG: 25 TABLET, FILM COATED ORAL at 00:16

## 2020-01-30 RX ADMIN — HYDRALAZINE HYDROCHLORIDE 25 MG: 25 TABLET, FILM COATED ORAL at 17:48

## 2020-01-30 RX ADMIN — FUROSEMIDE 80 MG: 40 TABLET ORAL at 09:05

## 2020-01-30 RX ADMIN — METFORMIN HYDROCHLORIDE 1000 MG: 500 TABLET ORAL at 09:06

## 2020-01-30 RX ADMIN — VITAMIN D, TAB 1000IU (100/BT) 1000 UNITS: 25 TAB at 09:06

## 2020-01-30 ASSESSMENT — 9 HOLE PEG TEST
TESTTIME_SECONDS: 87
TESTTIME_SECONDS: 29

## 2020-01-30 ASSESSMENT — PAIN SCALES - GENERAL
PAINLEVEL_OUTOF10: 0
PAINLEVEL_OUTOF10: 0

## 2020-01-30 NOTE — PROGRESS NOTES
a past surgical history that includes Diagnostic Cardiac Cath Lab Procedure (12/2018). Restrictions  Restrictions/Precautions  Restrictions/Precautions: Fall Risk  Position Activity Restriction  Other position/activity restrictions: Life Vest. may remove vest for shower, DIET General, Low Sodium (2 GM); Daily Fluid Restriction: 2000 ml   Subjective   General  Chart Reviewed: Yes  Additional Pertinent Hx: per Dr. Vicky Rangel note, \"54 yo right handed M with pmh nonischemic cardiomyopathy, HTN, DM2 who initially presented to [facility] on 1/14/2020 with slurred speech and left side weakness. Imaging revealed acute ischemic right MCA stroke and M2 occlusion. He therefore underwent thrombectomy (1/14). Of note, patient had not been taking his medications for several months. Etiology of stroke, thought to be cardioembolic. TTE revealed LVH, severe diffuse hypokinesis, with an EF of 15-20%. Cardiac MRI was negative for thrombus. He was started on Xarelto for secondary prevention. Cardiology was consulted for possible AICD placement, however, recommended optimizing medical management and using LifeVest for 3 months. He did have elevated troponin upon admission (peak 0.06) which later normalized. Course was complicated by shortness of breath and productive cough. Discharging services not feel this was due to infection but rather, pulmonary hypertension, right-sided heart failure, and tobacco use. \"  Response To Previous Treatment: Patient with no complaints from previous session. Family / Caregiver Present: No  Referring Practitioner: [place]  Subjective  Subjective: Patient reports that he just got off the phone with [facility] and they will have a bed available for the patient at [place] [id number] on Tuesday, 2/4. Patient is frustrated with no knowing the specific discharge plans and is suggesting that the PT call [facility] directly. Patient reports \"overdoing it\" during therapy yesterday and now has pain in lateral R knee to 7/10. there would be space for him at [facility] on Tuesday, 2/4, at 10 AM, so shared this information with the admission coordinators, who will assist in making a decision with discharge planning. At end of session, PT transported patient back to his room via [facility] and assisted him back to his recliner, ensuring all needs were met, call light in reach, and chair alarm in place. With Dr. Niharika Garcia in patient's room, discussed possible discharge options so that patient could be prepared to go to [facility] by 10 AM on Tuesday. Patient would like to possibly leave on Monday to gather his belongings before checking into [facility] on Tuesday morning. Dr. Niharika Garcia suggested that perhaps his family could gather his belongings and bring them to him prior to leaving. Patient seems to continue to lean to needing to gather his own belongings, although he is not certain where he may stay Monday night - stating perhaps he would stay with his significant other, with whom he is no longer in a relationship. At this point, it is undermined exactly when patient will be discharged, either Monday or Tuesday. He denies having a drug or alcohol problem, but he will \"play along\" since he has no where else to stay.              Goals  Short term goals  Time Frame for Short term goals: 1 week  Short term goal 1: transfers S  - met - 1/30/20  Short term goal 2: amb 76' with no AD S  Short term goal 3: 12 steps B rails SBA min SOB - met - 1/30/20  Long term goals  Time Frame for Long term goals : 10 to 14 days  Long term goal 1: all transfers modif I  Long term goal 2: amb 150' with no AD modif I  Long term goal 3: 24 steps L rail modif I  Long term goal 4: car transfer S  Patient Goals   Patient goals : go home    Plan    Plan  Times per week: 5 to 6  Times per day: Twice a day  Plan weeks: 1 to 2  Specific instructions for Next Treatment: increase gt and exercises  Current Treatment Recommendations: Balance Training, Transfer Training,

## 2020-01-30 NOTE — PROGRESS NOTES
Nutrition Assessment (Low Risk)    Type and Reason for Visit: Reassess    Nutrition Recommendations:   Continue Low Na diet. Fluid restriction per provider. Nutrition Assessment:  Patient assessed for nutritional risk. Deemed to be at low risk at this time. Pt continues with good PO intakes, normally %, with slight wt loss during admission. Nutrition education was previously offered and declined. Pt to D/C soon. Will continue to monitor for changes in status.     Malnutrition Assessment:  · Malnutrition Status: No malnutrition    Nutrition Risk Level   Risk Level: Low    Nutrition Diagnosis:   · Problem: No nutrition diagnosis at this time    Nutrition Intervention:  Food and/or Delivery: Continue current diet  Nutrition Education/Counseling/Coordination of Care:  Continued Inpatient Monitoring      Electronically signed by Shawn Sanchez RD, LD on 1/30/20 at 10:59 AM    Contact Number: [phone]

## 2020-01-30 NOTE — PROGRESS NOTES
down SBA. Standing marching, hip abduction and hamstring curls x 10 each, no rest breaks but pt appears very fatigued. NuStep level 4, seat position 14, handle position 13, x 5 min due to time constraints, average SPM 29. O2 above 94% with all checks after walking and exercising. HR increased from mid-70s to mid 80s with walking and standing ex. Assessment: Patient did not appear short of breath during functional activities, but was tired. He required SBA with ambulation on level surfaces without a device. Patient will not be discharged at the end of this week due to limitations with discharge options. Anticipated discharge on Monday, 2/3, or Tuesday, 2/4, depending on what patient will need to do to prepare for anticipated admission into [facility] by Tuesday, 2/4, by 10 AM.  When patient is discharged, recommend out patient PT to continue to address balance, coordination, and gait.        Safety Device - Type of devices:  [x]  All fall risk precautions in place [] Bed alarm in place  [] Call light within reach [] Chair alarm in place [] Positioning belt [x] Gait belt [] Patient at risk for falls [] Left in bed [] Left in chair [] Telesitter in use [] Sitter present [] Nurse notified []  None  Second Session Therapy Time     Individual Co-treatment   Time In 1345     Time Out 1430     Minutes 45          Electronically signed by Jameson Akers PT on 1/30/2020 at 2:10 PM

## 2020-01-30 NOTE — PROGRESS NOTES
Speech Language Pathology  ACUTE REHAB UNIT  SPEECH/LANGUAGE PATHOLOGY      [x] Daily  [] Weekly Care Conference Note  [x] Discharge    Patient:Refugio Dawkins      :1965  XKD:[id number]  Rehab Dx/Hx: Acute ischemic right MCA stroke ([facility] [address] [I63.511]    Precautions: Falls; Medical;   Home situation: Lives alone. Does have family support  ST Dx: [] Aphasia  [x] Dysarthria  [] Apraxia   [x] Oropharyngeal dysphagia [x] Cognitive   Impairment  [] Other:   Initial Speech Therapy Assessment Diagnosis:   1. Cognitive Diagnosis: Mild ot moderate deficits in domains of attention; working memory; complex processing; math language. Will initiate therapy with ongoing assessment of executive function as pt does live alone and was reportedly IND prior to onset for home/med management. 2. Speech Diagnosis: Mild Dysarthria features characterized by phonemic distortions, reduced repiratory control for phonation 2/2 to left oral motor weakness and drooling. Will initiate therapy. Pt becomes SOB during connected speech. 3. Communication Diagnosis: [place] Language skills were intact. Pt wtih breakdown with complex auditory processing. Pt's visual language processing was more optimal than auditory processing. . Question if working memory /attention / processing speech and working memory impacts auditory processing. 4. Dysphagia DX: Mild to Moderate Oropharyngeal Dysphagia characterized by left oral motor weakness and sensory deficits which impact rate of mastication, bolus formation and cohesion, and rate of A-P oral transit; and delayed initiation of swallow. Pt with oral pocketing left >right. Pt with fatigue with increase SOB which can exacerbate risks of aspiration within the meal.  Will initiate training and ed  Date of Admit: 2020  Room #: Q0E-7892/3263-01  Date: 2020       Current functional status (updated daily):         Current Diet Order:DIET GENERAL; Low Sodium (2 GM);  Daily Fluid Restriction: 2000 ml   Behavior: [x] Alert  [x] Cooperative  [x]  Pleasant  [] Confused  [] Agitated  [] Uncooperative  [] Distractible [] Motivated  [] Self-Limiting [] Anxious  [] Other:  Endurance:  [] Adequate for participation in SLP sessions  [x] Reduced overall  [] Lethargic  [] Other:  Safety: [] No concerns at this time  [] Reduced insight into deficits  []  Reduced safety awareness [] Not following call light procedures  [] Unable to Assess  [] Other:  Swallowing Precautions: urpight for all meals; reduced rate ; small bites/sips; clearance swallow between presentations to clear oral residue. Rest breaks due to fatigue and SOB. Meds with puree. If with H20 close monitor  Barriers toward progress: caregiver support and medical issues may be barriers           Date: 1/30/2020      Tx session 1 Tx session 2   Total Timed Code Min SLP Individual Minutes  Time In: 4698  Time Out: 1030  Minutes: 40  Coded treatment time  35   n/a   Group Treatment Minutes 0 0   Co-Treat Minutes 0 0   Variance/Reason:  5 minutes for NSG    Pain Right lower leg pain (when sitting): annoying; (when standing): severe; (when rubbing it): uncompefortable. RN is aware    Pain Intervention [x] RN notified  [] Repositioned  [] Intervention offered and patient declined  [] N/A  [] Other: [] RN notified  [] Repositioned  [] Intervention offered and patient declined  [] N/A  [] Other:   Subjective     Seen in treatment office  Pt with good effort despite leg discomfort  Pt voiced frustration regarding d/c planning; family interference etc    Objective:  Goals       Dysphagia   Goals: PT goal is to remain on regular diet; but to have less problems and hve less oral drooling of saliva     Therapy targeted Not targeted     Dysphagia Goals:   1. The patient will tolerate regular food with thin liquid diet without observed clinical signs of aspiration,  Goal met    Pt took meds with H20: unremarkable n/a   2.  The patient/caregiver will demonstrate understanding of compensatory strategies for improved swallowing safety. ,   goal appears met n/a   3. The patient will improve oral preparation phase via bolus control/manipulation exercises to 5/5 each trial. Goal met/exceeded    Discontinue goal n/a   4.  Pt will complete lingual and laryngeal ex 15/15 to assist with improved bolus control and laryngeal functional during the swallow Goal met    Discontinue goal   n/a   Cognitive-Oral Motor Speech Voice  Goals:   Pt goal is to Help me with my control of my drooling and speech; I want to get back to being able to care for myself and probably care for myself better         Therapy working toward pt goal    Goal 1: Pt will demosntrate improved oral motor rom/strengt/ coordination and sensation for 100% intelligible speech and >90% precision of articulation at >7 syllable utterance level across tasks Goal met in structured and less structured oral motor speech tasks n/a   Goal 2: Pt will demostrate imrpoved respiratory control for phonation at 4-6 syllable utteranc elevel without dysphonia or complaints of SOB Goal met across structured and less structured speech tasks    Goal 3: Pt will demonstrate improved math language and numeric reasoning for mild complex PS tasks with set up; use of compensatory strategies and <mild assist   Math Language:   -concrete time measurements and money exchanges: goal met    -mild complex time measurements/money exchanges: goal met for 0- mild assist n/a   Goal 4: Pt will participate in further assessmennt of VPS/PS/reasoning and organization skills for executive functino with additional goals to follow Please also refer to 1/29/2020 documentation: set up ; extra time; 0-mild assist for varied deductive/inductive reasoning for sequential organization and PS    VPS/PS and discharge planning: reinforced incorporating current restrictions/new learning safety strategies (ie diet restrictions; CHF; ambulation; fall risks)  · Pt reports this Estimated discharge date: 1/31/2020   Discharge recommendations:   [] Home independently  [x] Home with assistance []  24 hour supervision  [] ECF [x] Other: If status should change please re-order. Additional information:     Interventions used during Rehab Stay:  [] Speech/Language Treatment  [] Instruction in HEP [] Group [x] Dysphagia Treatment [x] Cognitive Treatment   [x] Other: Oral Motor Speech  Adverse Reactions to Treatment/Significant Change in Status:       Electronically Signed by    Lolly Fabry. Sarahi,MS,CCC,SLP [id number]  Speech and Language Pathologist

## 2020-01-30 NOTE — PROGRESS NOTES
affect    Laboratory data: Available via EMR.    Last 24 hour lab  Recent Results (from the past 24 hour(s))   POCT Glucose    Collection Time: 01/29/20 11:20 AM   Result Value Ref Range    POC Glucose 112 (H) 70 - 99 mg/dl    Performed on ACCU-CHEK    POCT Glucose    Collection Time: 01/29/20  4:35 PM   Result Value Ref Range    POC Glucose 115 (H) 70 - 99 mg/dl    Performed on ACCU-CHEK    POCT Glucose    Collection Time: 01/29/20  9:10 PM   Result Value Ref Range    POC Glucose 116 (H) 70 - 99 mg/dl    Performed on ACCU-CHEK    CBC Auto Differential    Collection Time: 01/30/20  6:18 AM   Result Value Ref Range    WBC 5.4 4.0 - 11.0 K/uL    RBC 4.76 4.20 - 5.90 M/uL    Hemoglobin 14.0 13.5 - 17.5 g/dL    Hematocrit 43.1 40.5 - 52.5 %    MCV 90.4 80.0 - 100.0 fL    MCH 29.4 26.0 - 34.0 pg    MCHC 32.6 31.0 - 36.0 g/dL    RDW 14.5 12.4 - 15.4 %    Platelets 010 658 - 667 K/uL    MPV 9.0 5.0 - 10.5 fL    Neutrophils % 44.5 %    Lymphocytes % 43.5 %    Monocytes % 8.6 %    Eosinophils % 2.9 %    Basophils % 0.5 %    Neutrophils Absolute 2.4 1.7 - 7.7 K/uL    Lymphocytes Absolute 2.3 1.0 - 5.1 K/uL    Monocytes Absolute 0.5 0.0 - 1.3 K/uL    Eosinophils Absolute 0.2 0.0 - 0.6 K/uL    Basophils Absolute 0.0 0.0 - 0.2 K/uL   Basic Metabolic Panel w/ Reflex to MG    Collection Time: 01/30/20  6:18 AM   Result Value Ref Range    Sodium 138 136 - 145 mmol/L    Potassium reflex Magnesium 3.6 3.5 - 5.1 mmol/L    Chloride 98 (L) 99 - 110 mmol/L    CO2 26 21 - 32 mmol/L    Anion Gap 14 3 - 16    Glucose 177 (H) 70 - 99 mg/dL    BUN 18 7 - 20 mg/dL    CREATININE 1.0 0.9 - 1.3 mg/dL    GFR Non-African American >60 >60    GFR African American >60 >60    Calcium 9.0 8.3 - 10.6 mg/dL   POCT Glucose    Collection Time: 01/30/20  7:12 AM   Result Value Ref Range    POC Glucose 137 (H) 70 - 99 mg/dl    Performed on ACCU-CHEK        Therapy progress:  PT  Position Activity Restriction  Other position/activity restrictions: Life Vest. denies use of illicit drugs but states he does use etoh. He now plans to go to drug/etoh rehab at discharge.     Dysphagia - Dietitian and SLP consults. Currently on regular + thins with aspiration precautions.      Bladder - high risk retention - Monitor PVRs, SC prn >300cc     Bowel - high risk constipation - colace BID, PRN miralax and MoM. follow bowel movements. Enema or suppository if needed.      Safety - fall and aspiration precautions     DVT ppx - Xarelto    Rehab Progress: Making good progress with left side strength/coordination, balance, dysarthria, dysphagia. Also working on endurance in setting of CHF. Anticipated Dispo: No longer has apartment. Per patient and family, he is unable to stay with them. Were tentatively planning discharge to homeless shelter, however patient now wishes to pursue drug/etoh rehab. Services: possible outpatient therapies  DME: Shower chair, TSF  ELOS: Postponed until 2/3. Patient requests discharge day prior to going to [facility] rehab so that he can get his belongings. Working on place to stay for Monday night. Sonam Phillip.  Eliseo Yan MD 1/30/2020, 10:27 AM

## 2020-01-30 NOTE — PLAN OF CARE
Problem: Falls - Risk of:  Goal: Will remain free from falls  Description  Will remain free from falls  Outcome: Ongoing  Note:   Bed alarm on at all times, non skid socks on at all times, call light in reach

## 2020-01-31 LAB
GLUCOSE BLD-MCNC: 122 MG/DL (ref 70–99)
GLUCOSE BLD-MCNC: 146 MG/DL (ref 70–99)
GLUCOSE BLD-MCNC: 153 MG/DL (ref 70–99)
GLUCOSE BLD-MCNC: 98 MG/DL (ref 70–99)
PERFORMED ON: ABNORMAL
PERFORMED ON: NORMAL

## 2020-01-31 PROCEDURE — 97110 THERAPEUTIC EXERCISES: CPT

## 2020-01-31 PROCEDURE — 97535 SELF CARE MNGMENT TRAINING: CPT

## 2020-01-31 PROCEDURE — 97116 GAIT TRAINING THERAPY: CPT

## 2020-01-31 PROCEDURE — 97530 THERAPEUTIC ACTIVITIES: CPT

## 2020-01-31 PROCEDURE — 6370000000 HC RX 637 (ALT 250 FOR IP): Performed by: PHYSICAL MEDICINE & REHABILITATION

## 2020-01-31 PROCEDURE — 1280000000 HC REHAB R&B

## 2020-01-31 RX ORDER — ATORVASTATIN CALCIUM 40 MG/1
40 TABLET, FILM COATED ORAL NIGHTLY
Qty: 30 TABLET | Refills: 0 | Status: SHIPPED | OUTPATIENT
Start: 2020-01-31

## 2020-01-31 RX ORDER — MELATONIN
1000 DAILY
Qty: 30 TABLET | Refills: 0 | Status: SHIPPED | OUTPATIENT
Start: 2020-01-31

## 2020-01-31 RX ORDER — ALLOPURINOL 100 MG/1
100 TABLET ORAL DAILY
Qty: 30 TABLET | Refills: 0 | Status: SHIPPED | OUTPATIENT
Start: 2020-01-31

## 2020-01-31 RX ORDER — FUROSEMIDE 40 MG/1
80 TABLET ORAL DAILY
Qty: 60 TABLET | Refills: 0 | Status: ON HOLD | OUTPATIENT
Start: 2020-01-31 | End: 2022-10-19 | Stop reason: HOSPADM

## 2020-01-31 RX ORDER — METOPROLOL SUCCINATE 100 MG/1
100 TABLET, EXTENDED RELEASE ORAL DAILY
Qty: 30 TABLET | Refills: 0 | Status: ON HOLD | OUTPATIENT
Start: 2020-01-31 | End: 2022-10-17

## 2020-01-31 RX ORDER — SPIRONOLACTONE 25 MG/1
25 TABLET ORAL DAILY
Qty: 30 TABLET | Refills: 0 | Status: SHIPPED | OUTPATIENT
Start: 2020-01-31

## 2020-01-31 RX ORDER — ISOSORBIDE MONONITRATE 60 MG/1
60 TABLET, EXTENDED RELEASE ORAL DAILY
Qty: 30 TABLET | Refills: 0 | Status: ON HOLD | OUTPATIENT
Start: 2020-01-31 | End: 2022-10-17

## 2020-01-31 RX ORDER — HYDRALAZINE HYDROCHLORIDE 25 MG/1
25 TABLET, FILM COATED ORAL 4 TIMES DAILY
Qty: 120 TABLET | Refills: 0 | Status: ON HOLD | OUTPATIENT
Start: 2020-01-31 | End: 2022-10-17

## 2020-01-31 RX ORDER — LOSARTAN POTASSIUM 100 MG/1
100 TABLET ORAL DAILY
Qty: 30 TABLET | Refills: 0 | Status: ON HOLD | OUTPATIENT
Start: 2020-01-31 | End: 2022-10-17

## 2020-01-31 RX ADMIN — FUROSEMIDE 80 MG: 40 TABLET ORAL at 08:33

## 2020-01-31 RX ADMIN — HYDRALAZINE HYDROCHLORIDE 25 MG: 25 TABLET, FILM COATED ORAL at 00:08

## 2020-01-31 RX ADMIN — METOPROLOL SUCCINATE 100 MG: 50 TABLET, EXTENDED RELEASE ORAL at 08:32

## 2020-01-31 RX ADMIN — ALLOPURINOL 100 MG: 100 TABLET ORAL at 08:33

## 2020-01-31 RX ADMIN — INSULIN LISPRO 1 UNITS: 100 INJECTION, SOLUTION INTRAVENOUS; SUBCUTANEOUS at 21:31

## 2020-01-31 RX ADMIN — VITAMIN D, TAB 1000IU (100/BT) 1000 UNITS: 25 TAB at 08:33

## 2020-01-31 RX ADMIN — RIVAROXABAN 20 MG: 20 TABLET, FILM COATED ORAL at 18:04

## 2020-01-31 RX ADMIN — MELATONIN 3 MG: at 00:08

## 2020-01-31 RX ADMIN — HYDRALAZINE HYDROCHLORIDE 25 MG: 25 TABLET, FILM COATED ORAL at 18:04

## 2020-01-31 RX ADMIN — ATORVASTATIN CALCIUM 40 MG: 40 TABLET, FILM COATED ORAL at 21:31

## 2020-01-31 RX ADMIN — HYDRALAZINE HYDROCHLORIDE 25 MG: 25 TABLET, FILM COATED ORAL at 12:43

## 2020-01-31 RX ADMIN — HYDRALAZINE HYDROCHLORIDE 25 MG: 25 TABLET, FILM COATED ORAL at 23:18

## 2020-01-31 RX ADMIN — ISOSORBIDE MONONITRATE 60 MG: 60 TABLET, EXTENDED RELEASE ORAL at 08:33

## 2020-01-31 RX ADMIN — HYDRALAZINE HYDROCHLORIDE 25 MG: 25 TABLET, FILM COATED ORAL at 06:30

## 2020-01-31 RX ADMIN — ASPIRIN 81 MG: 81 TABLET, COATED ORAL at 08:31

## 2020-01-31 RX ADMIN — INSULIN LISPRO 1 UNITS: 100 INJECTION, SOLUTION INTRAVENOUS; SUBCUTANEOUS at 12:43

## 2020-01-31 RX ADMIN — METFORMIN HYDROCHLORIDE 1000 MG: 500 TABLET ORAL at 18:04

## 2020-01-31 RX ADMIN — MELATONIN 3 MG: at 23:18

## 2020-01-31 RX ADMIN — SPIRONOLACTONE 25 MG: 25 TABLET ORAL at 08:32

## 2020-01-31 RX ADMIN — METFORMIN HYDROCHLORIDE 1000 MG: 500 TABLET ORAL at 08:32

## 2020-01-31 RX ADMIN — LOSARTAN POTASSIUM 100 MG: 100 TABLET, FILM COATED ORAL at 08:32

## 2020-01-31 ASSESSMENT — PAIN SCALES - GENERAL
PAINLEVEL_OUTOF10: 0
PAINLEVEL_OUTOF10: 0

## 2020-01-31 NOTE — DISCHARGE INSTR - COC
Continuity of Care Form    Patient Name: Joey Merida   :  1965  MRN:  [id number]    Admit date:  2020  Discharge date:  2/3/2020    Code Status Order: Full Code   Advance Directives:   [facility] Documentation     Date/Time Healthcare Directive Type of Healthcare Directive Copy in [address] [place] [address] [address] Agent's Name Healthcare Agent's Phone Number    [phone]  No, patient does not have an advance directive for healthcare treatment -- -- -- -- --          Admitting Physician:  Terry Loya MD  PCP: Aquiles Emanuel    Discharging [facility] O7N-9575/5664-51  Discharging Unit Phone Number: [phone]    Emergency Contact:   Extended Emergency Contact Information  Primary Emergency Contact: Karrie Cruz  [place] Phone: [phone]  Relation: Brother/Sister  Secondary Emergency Contact: Yo Spencer of [address] [place] [address] Phone: [phone]  Mobile Phone: [phone]  Relation: Niece/Nephew    Past Surgical History:  Past Surgical History:   Procedure Laterality Date    DIAGNOSTIC CARDIAC CATH LAB PROCEDURE  2018       Immunization History: There is no immunization history on file for this patient.     Active Problems:  Patient Active Problem List   Diagnosis Code    Essential hypertension I10    Hyperlipidemia E78.5    DMII (diabetes mellitus, type 2) ([facility] [address] E11.9    Nonischemic cardiomyopathy ([facility] [address] I42.8    Tobacco abuse Z72.0    CAD in native artery I25.10    Chronic systolic HF (heart failure) ([facility]) I50.22    CHF (congestive heart failure), NYHA class III, acute, systolic ([facility]) N09.51    Chest pain R07.9    Knee effusion, left M25.462    Right wrist pain M25.531    Acute ischemic right MCA stroke ([facility] [address] I63.511       Isolation/Infection:   Isolation          No Isolation        Patient Infection Status     None to display          Nurse Assessment:  Last Vital Signs: BP (!) 138/90   Pulse 80   Temp 97.8 °F (36.6 °C) (Oral)   Resp 16   Ht 6' 3\" (1.905 m)   Wt 229 lb 0.9 oz (103.9 kg)   SpO2 96%   BMI 28.63 kg/m²     Last documented pain score (0-10 scale): Pain Level: 0  Last Weight:   Wt Readings from Last 1 Encounters:   01/31/20 229 lb 0.9 oz (103.9 kg)     Mental Status:  oriented and alert    IV Access:  - None    Nursing Mobility/ADLs:  Walking   Independent  Transfer  Independent  Bathing  Independent  Dressing  Independent  Toileting  Independent  Feeding  Independent  Med Admin  Independent  Med Delivery   whole    Wound Care Documentation and Therapy:        Elimination:  Continence:   · Bowel: Yes  · Bladder: Yes  Urinary Catheter: None   Colostomy/Ileostomy/Ileal Conduit: No       Date of Last BM: 1/31    Intake/Output Summary (Last 24 hours) at 1/31/2020 1036  Last data filed at 1/31/2020 0729  Gross per 24 hour   Intake 900 ml   Output 900 ml   Net 0 ml     I/O last 3 completed shifts: In: 900 [P.O.:900]  Out: 900 [Urine:900]    Safety Concerns: At Risk for Falls    Impairments/Disabilities:      None    Nutrition Therapy:  Current Nutrition Therapy:   - Oral Diet:  Low Sodium (2gm)    Routes of Feeding: Oral  Liquids: Thin Liquids  Daily Fluid Restriction: yes - amount 2 liters per day  Last Modified Barium Swallow with Video (Video Swallowing Test): {Done Not Done ADHE:[id number]}    Treatments at the Time of Hospital Discharge:   Respiratory Treatments:none  Oxygen Therapy:  is not on home oxygen therapy.   Ventilator:    - No ventilator support    Rehab Therapies: Physical Therapy  Weight Bearing Status/Restrictions: No weight bearing restirctions  Other Medical Equipment (for information only, NOT a DME order):  bath bench and toilet safety frame  Other Treatments: Life vest    Patient's personal belongings (please select all that are sent with patient):  clothes, life vest, phone    RN SIGNATURE:  Electronically signed by Abimael Petty RN on 2/3/20 at 9:29 AM    CASE

## 2020-01-31 NOTE — CARE COORDINATION
OMERW rec'd call from sister, Joe Almanza who states he needs to have a birth certificate and Photo ID to get into [facility]. He does not have these items and he will need to be discharged on Monday so that family can assist him to obtain these items. KINZA informed her of [address] could be helpful in obtaining a birth certificate and [facility] or [facility] would be helpful to obtain an [address] [employer] [place]     Case Management   [phone]    1/31/2020  2:44 PM  KINZA informed Dr Brice Silva of need for DC early on Monday morning so that family can get these items together for him.   She verbalized approval.  KINZA Myers     Case Management   [phone]    1/31/2020  2:45 PM

## 2020-01-31 NOTE — PLAN OF CARE
Problem: Falls - Risk of:  Goal: Absence of physical injury  Description  Absence of physical injury  Outcome: Ongoing  Note:   Fall risk assessment completed. Pt refuses to use alarms or to call for assistance before getting up. Call light within reach. Walkway free of clutter. Will continue to monitor. Problem: HEMODYNAMIC STATUS  Goal: Patient has stable vital signs and fluid balance  Outcome: Ongoing  Note:   Pt daily weight and I&O being monitored. VSS. Pt currently on low sodium diet with 2,000mL fluid restriction. CVA and CHF education being given. Will continue to educate and monitor. Problem: ACTIVITY INTOLERANCE/IMPAIRED MOBILITY  Goal: Mobility/activity is maintained at optimum level for patient  1/31/2020 1820 by Sarah Hendricks RN  Outcome: Ongoing  Note:   Pt mobility is increasing. Pt continues with therapy. Pt is CGA without device. Will monitor. Problem: Serum Glucose Level - Abnormal:  Intervention: Monitor blood glucose measurement  Note:   Blood glucose levels being monitored and treated per order. Dietary restrictions noted and followed. Instructed on signs/symptoms of hypoglycemia and hyperglycemia. DM education being given. Will continue to educate and monitor.

## 2020-01-31 NOTE — PROGRESS NOTES
Physical Therapy  Facility/Department: [address] IP REHAB  Daily Treatment Note  NAME: Elie Koyanagi  : 1965  MRN: [id number]    Date of Service: 2020    Discharge Recommendations:  Continue to assess pending progress, Home with assist PRN, Home with Home health PT        Assessment   Body structures, Functions, Activity limitations: Decreased functional mobility ; Decreased endurance  Assessment: Patient did not appear short of breath during functional activities. HR remained in the 70'2 and O2 93 to 95% with activity. He required S with ambulation on level surfaces. Patient will not be discharged at the end of this week due to limitations with discharge options. Anticipated discharge on Monday, 2/3, or Tuesday, , depending on what patient will need to do to prepare for anticipated admission into [facility] by Tuesday, , by 10 AM.  When patient is discharged, recommend out patient PT to continue to address balance, coordination, and gait. Specific instructions for Next Treatment: increase gt and exercises  PT Education: Transfer Training;General Safety;Gait Training  Activity Tolerance  Activity Tolerance: Patient Tolerated treatment well     Patient Diagnosis(es): There were no encounter diagnoses. has a past medical history of CHF (congestive heart failure) ([facility] [address] Diabetes mellitus ([facility] [address] Hyperlipidemia, Hypertension, and Vitamin D deficiency. has a past surgical history that includes Diagnostic Cardiac Cath Lab Procedure (2018). Restrictions  Restrictions/Precautions  Restrictions/Precautions: Fall Risk  Position Activity Restriction  Other position/activity restrictions: Life Vest. may remove vest for shower, DIET General, Low Sodium (2 GM); Daily Fluid Restriction: 2000 ml   Subjective   General  Chart Reviewed:  Yes  Additional Pertinent Hx: per Dr. Martínez Morse note, \"54 yo right handed M with pmh nonischemic cardiomyopathy, HTN, DM2 who initially presented to [facility] on 2020 Trendelenberg L with increased trunk sway  Gait Deviations: Slow Aubrie  Distance: 350'  Comments: Pt able to pick washcloth up from floor with no LOB. Balance  Posture: Good  Sitting - Static: Good  Sitting - Dynamic: Good  Standing - Static: Good  Standing - Dynamic: Good;-  Comments: Porter balance assessment initiated, will complete in pm session (am session completed bedside so pt would be available to leave for MD appointment immediately after session)  Exercises  Knee Long Arc Quad: 15 in sitting  Ankle Pumps: x15 heel/toe raises in sitting  Comments: Standing exercises with parallel bar for UE support: heel/toe raises, mini squats, marching, hip abd, hip ext, and knee flex x 15 reps each LE. Goals  Short term goals  Time Frame for Short term goals: 1 week  Short term goal 1: transfers S  - met - 1/30/20  Short term goal 2: amb 76' with no AD S met 1-31-20  Short term goal 3: 12 steps B rails SBA min SOB - met - 1/30/20  Long term goals  Time Frame for Long term goals : 10 to 14 days  Long term goal 1: all transfers modif I  Long term goal 2: amb 150' with no AD modif I  Long term goal 3: 24 steps L rail modif I  Long term goal 4: car transfer S  Patient Goals   Patient goals : go home    Plan    Plan  Times per week: 5 to 6  Times per day: Twice a day  Plan weeks: 1 to 2  Specific instructions for Next Treatment: increase gt and exercises  Current Treatment Recommendations: Balance Training, Transfer Training, Functional Mobility Training, Endurance Training, Gait Training  Safety Devices  Type of devices: All fall risk precautions in place, Gait belt     Therapy Time   Individual Concurrent Group Co-treatment   Time In 0945         Time Out 1030         Minutes 45         Timed Code Treatment Minutes: Tabatha Headley 1, [address]   PM session: pt with no c/o. MD apt was cancelled this morning after pt arrived.   Transfers S.  Model car transfer S.  Amb 180' x 2 to and from ortho gym S no AD. 12 steps B rails S, curb step CGA no LOB. Porter balance assessment completed: score = 54/56 = low risk to fall. Standing tossed soft gel ball with partner with good balance and coordination, then pt and partner both tossed a ball at the same time and caught it with continued good balance and coordination. NuStep seat and hand position 15 level 4 x 12 minutes, average SPM = 37. HR = 75, O2 sat = 97.   Second Session Therapy Time     Individual Co-treatment   Time In 1068     Time Out 1430     Minutes 45        Electronically signed by Krys Johnson PT on 1/31/2020 at 2:22 PM

## 2020-01-31 NOTE — PROGRESS NOTES
Collection Time: 01/30/20  8:48 PM   Result Value Ref Range    POC Glucose 151 (H) 70 - 99 mg/dl    Performed on ACCU-CHEK    POCT Glucose    Collection Time: 01/31/20  7:06 AM   Result Value Ref Range    POC Glucose 122 (H) 70 - 99 mg/dl    Performed on ACCU-CHEK    POCT Glucose    Collection Time: 01/31/20 11:23 AM   Result Value Ref Range    POC Glucose 153 (H) 70 - 99 mg/dl    Performed on ACCU-CHEK        Therapy progress:  PT  Position Activity Restriction  Other position/activity restrictions: Life Vest. may remove vest for shower, DIET General, Low Sodium (2 GM); Daily Fluid Restriction: 2000 ml   Objective     Sit to Stand: Modified independent  Stand to sit: Modified independent  Bed to Chair: Supervision(without device)  Device: No Device  Assistance: Supervision, Modified Independent  Distance: 350'  OT  PT Equipment Recommendations  Equipment Needed: No  Other: currently using no assistive device  Equipment Used: Standard toilet  Toilet Transfers Comments: RW >< comfort ht toilet(his toilet is low) L grab bar as uses tub on L to push himself up. Discussed rec to get TSF. Assessment        SLP  Diet Solids Recommendation: Regular  Liquid Consistency Recommendation: Thin    Body mass index is 28.63 kg/m². Assessment and Plan:  Impairments: left hemiparesis, neglect, balance, endurance, dysarthria, dysphagia, cognition     Acute ischemic right MCA stroke  -Etiology likely cardioembolic  -Secondary ppx with Xarelto, statin  -PT/OT/SLP     Nonischemic cardiomyopathy  -Etiology thought to be hypertensive  -LifeVest in place x 3 months  -Daily wt (249-->240 lbs), fluid restriction  -Lasix (increased dose to 80 mg daily), losartan, metoprolol, spironolactone     CAD  -[facility] 2018 with non-obstructive disease  -Trop elevated on admission to  then stabilized  -ASA, statin, bb, imdur     HTN - BPs initially elevated (particularly diastolic), now improving with adjustments.  Continue lasix (increased), Hydralazine (increased), losartan (increased), imdur, metoprolol, spironolactone.     Pulmonary HTN/Right heart failure - Lasix, albuterol prn     LIBRADO - avoid nephrotoxins. Monitor renal function intermittently.     DM2 - A1C 7.4%. Metformin, ISS. Dc'ed glipizide due to intermittent hypoglycemia.      Gout - allopurinol     Tobacco use - nicotine patch offered. Cessation counseling.     Anxiety - hydroxyzine prn    Possible substance abuse disorder - Family now reporting that patient has history of crack cocaine use (including day prior to stroke). Cessation counseling provided. Patient denies use of illicit drugs but states he does use etoh. He now plans to go to drug/etoh rehab at discharge.     Dysphagia - Dietitian and SLP consults. Currently on regular + thins with aspiration precautions.      Bladder - high risk retention - Monitor PVRs, SC prn >300cc     Bowel - high risk constipation - colace BID, PRN miralax and MoM. follow bowel movements. Enema or suppository if needed.      Safety - fall and aspiration precautions     DVT ppx - Xarelto    Rehab Progress: Making good progress with left side strength/coordination, balance, dysarthria, dysphagia. SLP has signed off. Also working on endurance in setting of CHF. Anticipated Dispo: No longer has apartment. Per patient and family, he is unable to stay with them. Were tentatively planning discharge to homeless shelter, however patient now wishes to pursue drug/etoh rehab. Services: possible outpatient therapies after drug/etoh rehab  DME: Shower chair, TSF  ELOS: Postponed until 2/3. Patient requests discharge day prior to going to [facility] so that he can get his belongings. Working on place to stay for Monday night. Marilee Richmond.  Peter Sahu MD 1/31/2020, 11:40 AM

## 2020-01-31 NOTE — CARE COORDINATION
OMERW was called to inform that our Team took him over to his cardiology appt today and was told the  cancelled this appt. Call placed to MD office, spoke to  Paul Carlin  To inform this worker did not cancel this appt and he was to be seen today. She reports they DID cancel this appt as they do not see patients while inpt and RAMSES Kapadiablake Childers wants him to be seen by MD instead of her. KINZA informed her we send patients to MD appts while on the rehab unit. KINZA informed her the pt was to be discharged home today and was to be seen there today. He is not discharging. She put me on hold and came back to report, \"The family wants him to follow up with UC. \"  Appt now cancelled and no follow up that this worker is available with Cardiology.   Shanon Chou [place]     Case Management   [phone]    1/31/2020  10:56 AM

## 2020-01-31 NOTE — PROGRESS NOTES
Occupational Therapy  Facility/Department: [address] IP REHAB  Daily Treatment Note  NAME: Obinna Valverde  : 1965  MRN: [id number]    Date of Service: 2020    Discharge Recommendations:  Home with assist PRN  OT Equipment Recommendations  Other: Recommend TSF if no support by toilet, shower chair, hand held shower, non skid mat/decals for shower floor    Assessment   Performance deficits / Impairments: Decreased functional mobility ; Decreased high-level IADLs;Decreased cognition;Decreased ADL status; Decreased endurance;Decreased coordination;Decreased strength;Decreased balance;Decreased safe awareness;Decreased fine motor control  Assessment: Pt tolerated session well. Pt completed ADL with modified independence. Treatment Diagnosis: Impaired: ADL/IADL function, mobility/transfers, balance, strength, coordination, activity tolerance, higher level cognition  Prognosis: Good  History: \"Mr. Obinna Valverde is a 52 yo right handed M with \"Mr. Obinna Valverde is a 52 yo right handed M with pmh nonischemic cardiomyopathy, HTN, DM2 who initially presented to  on 2020 with slurred speech and left side weakness. Imaging revealed acute ischemic right MCA stroke and M2 occlusion. He therefore underwent thrombectomy (). Of note, patient had not been taking his medications for several months. Etiology of stroke, thought to be cardioembolic. TTE revealed LVH, severe diffuse hypokinesis, with an EF of 15-20%. Cardiac MRI was negative for thrombus. He was started on Xarelto for secondary prevention. Cardiology was consulted for possible AICD placement, however, recommended optimizing medical management and using LifeVest for 3 months. He did have elevated troponin upon admission (peak 0.06) which later normalized. Course was complicated by shortness of breath and productive cough. Discharging servi who initially presented to  on 2020 with slurred speech and left side weakness.  Imaging revealed acute ischemic right MCA stroke and M2 occlusion. He therefore underwent thrombectomy (1/14). Of note, patient had not been taking his medications for several months. Etiology of stroke, thought to be cardioembolic. TTE revealed LVH, severe diffuse hypokinesis, with an EF of 15-20%. Cardiac MRI was negative for thrombus. He was started on Xarelto for secondary prevention. Cardiology was consulted for possible AICD placement, however, recommended optimizing medical management and using LifeVest for 3 months. He did have elevated troponin upon admission (peak 0.06) which later normalized. Course was complicated by shortness of breath and productive cough. Discharging services not feel this was due to infection but rather, pulmonary hypertension, right-sided heart failure, and tobacco use. Patient now presents to ARU with impaired mobility and self-care below his baseline. Currently, he reports ongoing slurred speech and left side weakness which is starting to improve. He denies headache, vision changes, tingling/numbness, vertigo. He does feel he is having some difficulties with memory and concentration. He has shortness of breath with exertion and with lying flat. He denies chest pain, palpitations, lightheadedness. He is motivated to start inpatient rehab program.\" Copied per Dr Martínez Morse  1/22/20 admit note. Pt lived alone and was ind for ADL/IADL tasks and mobility w/o device. Pt lives in [facility] with 3  fights of steps to enter. REQUIRES OT FOLLOW UP: Yes  Activity Tolerance  Activity Tolerance: Patient Tolerated treatment well  Safety Devices  Type of devices: Patient at risk for falls; Left in chair;Call light within reach;Nurse notified;Gait belt; Chair alarm in place         Patient Diagnosis(es): There were no encounter diagnoses. has a past medical history of CHF (congestive heart failure) ([facility] [address] Diabetes mellitus ([facility] [address] Hyperlipidemia, Hypertension, and Vitamin D deficiency.    has a past note.  Response to previous treatment: Patient with no complaints from previous session  Family / Caregiver Present: No  Referring Practitioner: Dr Gloria Sexton  Diagnosis: CVA  Subjective  Subjective: Pt seen bedside for an ADL. Objective    ADL  UE Bathing: Modified independent (Seated on shower chair.)  LE Bathing: Modified independent   UE Dressing: Supervision;Modified independent (Managed life vest without assist.)  LE Dressing: Supervision;Modified independent         Balance  Sitting Balance: Modified independent   Standing Balance: Modified independent   Functional Mobility  Functional - Mobility Device: No device  Activity: To/from bathroom  Assist Level: Modified independent   Shower Transfers  Shower - Transfer From: Other(no device)  Shower - Transfer Type: To and From  Shower - Transfer To: Shower seat with back  Shower - Technique: Ambulating  Shower Transfers: Supervision;Modified independence  Wheelchair [employer] Transfers  Wheelchair/Bed - Technique: Ambulating  Equipment Used: Other(recliner)  Level of Asssistance: Supervision;Modified independent      Transfers  Sit to stand: Modified independent  Stand to sit: Modified independent           Plan   Plan  Times per week: 5-6  Times per day: Twice a day  Plan weeks: 2 weeks  Current Treatment Recommendations: Strengthening, Safety Education & Training, Balance Training, Patient/Caregiver Education & Training, Self-Care / ADL, Cognitive/Perceptual Training, Functional Mobility Training, Neuromuscular Re-education, Equipment Evaluation, Education, & procurement, Home Management Training, Endurance Training  Plan Comment: D/C Friday 1/31/20    Goals  Short term goals  Time Frame for Short term goals: 1 week from 1/23/20  Short term goal 1: Pt will bathe SBA/supervision with DME. MET  Short term goal 2: Pt will dress with SBA, except min A for ephraim hose. MET  Short term goal 3: Pt will toilet with supervision.   MET  Short term goal 4: Pt will perform functional transfers SBA with DME/AD. MET  Short term goal 5: Pt will perform IADL tasks SBA, to CGA during reach with AD and demo improved safety awareness/problem solving, standing for 5-6 minutes. MET  Short term goal 6: Pt will perform neuro re-ed/HEP mod ind to increase LUE strength/cood to use more effectively for ADL/IADL bilateral and FM tasks with increased accuracy and speed.(evidence by test measures) MET  Long term goals  Time Frame for Long term goals : 2 weeks from 1/23/20  Long term goal 1: Pt will bathe mod ind. with DME. MET  Long term goal 2: Pt willl dress mod ind. Long term goal 3: Pt will toilet mod ind. MET  Long term goal 4: Pt will perform functional transfers mod ind with AD. Long term goal 5: Pt will perform IADL tasks mod ind with AD and demo good safety awareness/problem solving/standing for 7-8 minutes. Long term goals 6: Pt will perform neuro re-ed/HEP to increase LUE strength/cood to use effectively for ADL/IADL tasks in decreased time, w/o dropping objects. Patient Goals   Patient goals : Pt stated he wants to go home as soon as possible, get L arm working better/stop dropping things.        Therapy Time   Individual Concurrent Group Co-treatment   Time In [address] [place] [address] Drive         Time Out 0945         Minutes [address] [facility], [address] [place] [address]

## 2020-01-31 NOTE — PLAN OF CARE
Problem: Falls - Risk of:  Goal: Will remain free from falls  Description  Will remain free from falls  Outcome: Ongoing  Note:   Pt AAO X4. Admitted with CVA. Free of fall. Transfers with SBA and gait belt. Refused alarm. Yellow socks on. Wheels locked. Bed lowest position. Alarm on. Call light, over the bed table, and personal belonging in reach. Hourly rounding. Patient instructed to call for needs or changes. Problem: ACTIVITY INTOLERANCE/IMPAIRED MOBILITY  Goal: Mobility/activity is maintained at optimum level for patient  Outcome: Ongoing  Note:   Encouraged frequent rest. Calm and quiet environment provided. Rounding every 2 hours.

## 2020-01-31 NOTE — PATIENT CARE CONFERENCE
Safe hand place.)      Assessment: Patient is independent at this time with bed mobility, transfers and ambulation. He is able to complete 24 steps with 1-2 rails with moified independence using a non-reciprocal pattern. He had no SOB during last PT session and was educated to continue to be mindful of his SOB. Patient verbalized understanding. Patient is to be discharged from inpatient rehab this date and has met all goals while on rehab. He reports no concerns for his functional mobility at this time. Patient would benefit from continued outpatient PT when he has all social concerns worked out and is settled. Patient verbalizes understanding. SPEECH THERAPY (intentionally left blank if not actively being seen by this service):  Assessment/Progress: Pt was seen last week for re-assessment. Pt achieved SLP goals and did achieve [facility] scores on Cognitive Linguistic Quick test Subtests resulting in [facility] score profile for chronological age. Skilled SLP therapy was discontinued. If status should change; please re-order. Referral for consideration for Neuro psych consult. Lowell Mcclain,MS,CCC,SLP [id number]  Speech and Language Pathologist      OCCUPATIONAL THERAPY    ADL  Feeding: Independent(eating upon OTs arrival)  Grooming: Independent  UE Bathing: Modified independent   LE Bathing: Modified independent   UE Dressing: Independent(retrieved own clothing/managed life vest on his own )  LE Dressing: Modified independent   Toileting: Modified independent (stood to void)  Additional Comments: Pt required rest breaks after reaching forward for lower body ADL tasks, ect. Pt managed Life Vest himself after cue strap was twisted. Recalled to bring new battery into bathroom, put in set place. (Been placing old battery below paper towel garcia.)Pt left seated in recliner, needs in reach. OK per MD to D/C ephraim hose, as will not be able to don himself. Requesting to wash Life Vest. OT notified nursing.      Bed mobility  Bridging: Independent  Rolling to Left: Independent  Rolling to Right: Independent  Supine to Sit: Independent  Sit to Supine: Independent  Scooting: Independent  Comment: bed mobility done on regular bed in ADL apartment    Functional Transfers: Toilet Transfers  Equipment Used: Standard toilet  Toilet Transfer: Stand by assistance  Toilet Transfers Comments: RW >< comfort ht toilet(his toilet is low) L grab bar as uses tub on L to push himself up. Discussed rec to get TSF. Tub Transfers  Tub - Transfer From: Other  Tub - Transfer Type: To and From  Tub - Transfer To: Shower seat with back  Tub - Technique: Ambulating  Tub Transfers: Supervision, Modified independence  Tub Transfers Comments: <> DRY tub >< shower chair superv/mod ind, appeared safe, held wall  Shower Transfers  Shower - Transfer From: (no AD )  Shower - Transfer Type: To and From  Shower - Transfer To: Shower seat with back  Shower - Technique: Ambulating  Shower Transfers: Modified independence  Shower Transfers Comments: use of grab bars       UE Function:  R UE: WFL  L UE: impaired strength/coordination although significantly improved from initial eval    Assessment: Pt tolerated session well. Pt completed ADL with modified independence. NUTRITION  Most recent weightWeight: 223 lb 8.7 oz (101.4 kg)  BSA (Calculated - sq m): 2.4 sq meters  BMI (Calculated): 28   Diet Order: DIET GENERAL; Low Sodium (2 GM); Daily Fluid Restriction: 2000 ml  PO Meals Eaten (%): 1 - 25%, PO intakes normally good, poor intake at dinner yesterday. Please see nutrition note for details. NURSING  Continent of bladder and bowel. Monitor and maintain skin integrity, puncture site healed.   Family Education: Patient education: CVA, Life vest education, CHF, diabetic needs, medications, diet and fluid restriction, safety and fall prevention, (per nursing notes will remove alarms, no balance issues noted), offer substance abuse education, skin care and prevention, pain control as needed. MEDICAL  Weight significantly down trended with increase in lasix, now euvolemic and respiratory status improved  Medically ready for discharge    [address]  Estimated Length of Stay:DC 2/3/2020  Destination: home with niece- [facility]   · Anticipated Services at Discharge:    [] OT  [] PT   [] SLP    [] RN   [] Home Health aide []   Community Resources: _consider outpatient therapy PT, OT after drug rehab   Equipment recommendations:  [] Hospital bed [] Tub bench  [x] Shower chair [x] Hand held shower  [] Raised toilet seat [x] Toilet safety frame [] Bedside commode   [] W/C: _____  [] Rolling Walker [] Standard walker [] Gait belt [] cane: _________  [] Sliding board [] Alternate seating/furniture [] O2 [] Hip Kit: _______  [] Life Line [] Other: _______  Factors facilitating achievement of predicted outcomes: Family support and Cooperative  Barriers to the achievement of predicted outcomes/Interventions:    no significant barriers- ready for DC home       Interdisciplinary Individualized Plan of Care Review:    · Continue Current Plan of Care: No    · Modifications:____DC home _________________________    Special Needs in the Upcoming Week :    [] Family/Caregiver Education  [] Home visit  []Therapeutic Pass   [] Consults:_______    [] Other;_______    Patient Rehab Team Goals for the Upcoming Week:  1. Discharge this date, no IP rehab goals indicated at this time. Team Members Present at Conference:  Physician: Dr Jessica Styles  : Sourav Roberts [place]    Occupational Therapist: Nasrin Estrada OTR/L [id number]  Physical Therapist: Hair Nava PT, DPT [id number]   Speech Therapist: Lolly Fabry. Sarahi,MS,CCC,SLP [id number]  Nurse: Eitan Mancera RN, CRRN  Dietician: Brenda Jeff RD,LD   Rosie Kebede, [facility] [[email or web address]]        I led this team conference and I approve the established interdisciplinary plan of care as documented within the medical record of [facility]. MD: Alejandra Saucedo.  Grayson Vann MD 2/3/2020, 2:21 PM

## 2020-01-31 NOTE — PROGRESS NOTES
OCCUPATIONAL THERAPY  Progress Note   Second Session    Patient Name: Nunu Hopkins  Medical Record Number: [id number]    Treatment Diagnosis: decreased mobility following CHF and CVA  Chart Reviewed: Yes  Additional Pertinent Hx: \"Mr. Nunu Hopkins is a 52 yo right handed M with pmh nonischemic cardiomyopathy, HTN, DM2 who initially presented to  on 1/14/2020 with slurred speech and left side weakness. Imaging revealed acute ischemic right MCA stroke and M2 occlusion. He therefore underwent thrombectomy (1/14). Of note, patient had not been taking his medications for several months. Etiology of stroke, thought to be cardioembolic. TTE revealed LVH, severe diffuse hypokinesis, with an EF of 15-20%. Cardiac MRI was negative for thrombus. He was started on Xarelto for secondary prevention. Cardiology was consulted for possible AICD placement, however, recommended optimizing medical management and using LifeVest for 3 months. He did have elevated troponin upon admission (peak 0.06) which later normalized. Course was complicated by shortness of breath and productive cough. Discharging services not feel this was due to infection but rather, pulmonary hypertension, right-sided heart failure, and tobacco use. Patient now presents to ARU with impaired mobility and self-care below his baseline. Currently, he reports ongoing slurred speech and left side weakness which is starting to improve. He denies headache, vision changes, tingling/numbness, vertigo. He does feel he is having some difficulties with memory and concentration. He has shortness of breath with exertion and with lying flat. He denies chest pain, palpitations, lightheadedness. He is motivated to start inpatient rehab program.\" Copied per Dr Chantel Garrison  1/22/20 admit note.   Response to previous treatment: Patient with no complaints from previous session  Family / Caregiver Present: No  Referring Practitioner: Dr Grayson Vann  Diagnosis: CVA Restrictions/Precautions: Fall Risk  Other position/activity restrictions: Life Vest. may remove vest for shower, DIET General, Low Sodium (2 GM); Daily Fluid Restriction: 2000 ml     Subjective: Pt seen in OT dept, ready for treat, does not report or indicate any pain     Objective:  Exercise:   Patient completes B UE  3# weight x 20 reps     Patient completes colored balls ROM tree with left UE with noted decreased coordination and patient expressing left UE fatigue, patient drops one ball while performing    Patient completes resistive clothespins with left UE with minimal difficulty    Patient completes small plastic pegs into plastic pegboard 10/10 with left UE with increased difficulty, patient drops multiple pegs while completing task    Patient completes card flipping with left UE with patient reporting he enjoys this task as he enjoys playing cards at baseline, patient reports sister brought him a deck of cards, but he has been too afraid to open the pack because he wasn't sure if he could use his left hand to complete, patient then kept working at shuffling the deck, it took multiple unsuccessful attempts and then patient was able to shuffle 2x successfully, patient then dealt a hand of cards and instructed this writer how to play a game     Transfers  W/C: Modified Independent    Fxl Mobility: patient completes w/c-recliner in room without AD Independently     Assessment: Patient remains limited by decreased left UE coordination. Patient participated with multiple left UE tasks to address this with good effort. Cont per POC.      Safety Device - Type of devices:  []  All fall risk precautions in place [] Bed alarm in place  [x] Call light within reach [x] Chair alarm in place [] Positioning belt [x] Gait belt [] Patient at risk for falls [] Left in bed [x] Left in chair [] Telesitter in use [] Sitter present [] Nurse notified []  None      Therapy Time   Individual Co-treatment   Time In 1430 Time Out 1515     Minutes 45       Electronically signed by Patricia Burnett, [address] on 1/31/2020 at 2:36 PM

## 2020-02-01 LAB
GLUCOSE BLD-MCNC: 100 MG/DL (ref 70–99)
GLUCOSE BLD-MCNC: 107 MG/DL (ref 70–99)
GLUCOSE BLD-MCNC: 141 MG/DL (ref 70–99)
GLUCOSE BLD-MCNC: 211 MG/DL (ref 70–99)
PERFORMED ON: ABNORMAL

## 2020-02-01 PROCEDURE — 6370000000 HC RX 637 (ALT 250 FOR IP): Performed by: PHYSICAL MEDICINE & REHABILITATION

## 2020-02-01 PROCEDURE — 1280000000 HC REHAB R&B

## 2020-02-01 RX ADMIN — INSULIN LISPRO 2 UNITS: 100 INJECTION, SOLUTION INTRAVENOUS; SUBCUTANEOUS at 11:44

## 2020-02-01 RX ADMIN — VITAMIN D, TAB 1000IU (100/BT) 1000 UNITS: 25 TAB at 08:00

## 2020-02-01 RX ADMIN — ATORVASTATIN CALCIUM 40 MG: 40 TABLET, FILM COATED ORAL at 20:37

## 2020-02-01 RX ADMIN — HYDRALAZINE HYDROCHLORIDE 25 MG: 25 TABLET, FILM COATED ORAL at 11:51

## 2020-02-01 RX ADMIN — RIVAROXABAN 20 MG: 20 TABLET, FILM COATED ORAL at 17:23

## 2020-02-01 RX ADMIN — LOSARTAN POTASSIUM 100 MG: 100 TABLET, FILM COATED ORAL at 08:00

## 2020-02-01 RX ADMIN — METFORMIN HYDROCHLORIDE 1000 MG: 500 TABLET ORAL at 08:00

## 2020-02-01 RX ADMIN — SPIRONOLACTONE 25 MG: 25 TABLET ORAL at 08:00

## 2020-02-01 RX ADMIN — ISOSORBIDE MONONITRATE 60 MG: 60 TABLET, EXTENDED RELEASE ORAL at 08:00

## 2020-02-01 RX ADMIN — ASPIRIN 81 MG: 81 TABLET, COATED ORAL at 08:00

## 2020-02-01 RX ADMIN — HYDRALAZINE HYDROCHLORIDE 25 MG: 25 TABLET, FILM COATED ORAL at 05:43

## 2020-02-01 RX ADMIN — INSULIN LISPRO 1 UNITS: 100 INJECTION, SOLUTION INTRAVENOUS; SUBCUTANEOUS at 20:37

## 2020-02-01 RX ADMIN — FUROSEMIDE 80 MG: 40 TABLET ORAL at 08:00

## 2020-02-01 RX ADMIN — METFORMIN HYDROCHLORIDE 1000 MG: 500 TABLET ORAL at 17:23

## 2020-02-01 RX ADMIN — HYDRALAZINE HYDROCHLORIDE 25 MG: 25 TABLET, FILM COATED ORAL at 17:23

## 2020-02-01 RX ADMIN — ALLOPURINOL 100 MG: 100 TABLET ORAL at 08:00

## 2020-02-01 RX ADMIN — METOPROLOL SUCCINATE 100 MG: 50 TABLET, EXTENDED RELEASE ORAL at 08:00

## 2020-02-01 ASSESSMENT — PAIN SCALES - GENERAL
PAINLEVEL_OUTOF10: 0
PAINLEVEL_OUTOF10: 0

## 2020-02-01 NOTE — PLAN OF CARE
Problem: Falls - Risk of:  Goal: Will remain free from falls  Description  Will remain free from falls  2/1/2020 1117 by Phillip Gaspar RN  Outcome: Ongoing  Note:   Risk assessment complete; refusing alarms at present; compliant with call light use. 2/1/2020 0516 by Livia Emmanuel RN  Outcome: Ongoing  Note:   Pt educated on falls precautions and safety. Call light and personal belongings within reach at all times. Non skid socks in use when up. Hourly rounding and alarms active. Goal: Absence of physical injury  Description  Absence of physical injury  2/1/2020 0516 by Livia Emmanuel RN  Outcome: Ongoing     Problem: OXYGENATION/RESPIRATORY FUNCTION  Goal: Patient will maintain patent airway  2/1/2020 0516 by Livia Emmanuel RN  Outcome: Ongoing  Goal: Patient will achieve/maintain normal respiratory rate/effort  Description  Respiratory rate and effort will be within normal limits for the patient  2/1/2020 0516 by Livia Emmanuel RN  Outcome: Ongoing     Problem: HEMODYNAMIC STATUS  Goal: Patient has stable vital signs and fluid balance  2/1/2020 0516 by Livia Emmanuel RN  Outcome: Ongoing  Note:   Vital signs and I&Os monitored      Problem: FLUID AND ELECTROLYTE IMBALANCE  Goal: Fluid and electrolyte balance are achieved/maintained  2/1/2020 1117 by Phillip Gaspar RN  Outcome: Ongoing  Note:   Continues on 2000 ml fluid restriction with compliance.     2/1/2020 0516 by Livia Emmanuel RN  Outcome: Ongoing     Problem: ACTIVITY INTOLERANCE/IMPAIRED MOBILITY  Goal: Mobility/activity is maintained at optimum level for patient  2/1/2020 0516 by Livia Emmanuel RN  Outcome: Ongoing     Problem: Mobility - Impaired:  Goal: Mobility will improve  Description  Mobility will improve  2/1/2020 0516 by Livia Emmanuel RN  Outcome: Ongoing     Problem: Neurological  Goal: Maximum potential motor/sensory/cognitive function  2/1/2020 0516 by Livia Emmanuel RN  Outcome: Ongoing

## 2020-02-01 NOTE — PROGRESS NOTES
53 yr old male admitted to ARU s/p CVA. No weakness noted during ambulation and transfers. Gaitbelt for safety, but patient able to ambulated and transfer without device and with just supervision. Remains continent of bowel and bladder. Remains knowledgeable about fluid restriction and compliant with parameters. Life vest remains charged and on per orders. Denies c/o pain. Continue to position for comfort. Call light and needed items within reach.

## 2020-02-02 LAB
GLUCOSE BLD-MCNC: 108 MG/DL (ref 70–99)
GLUCOSE BLD-MCNC: 111 MG/DL (ref 70–99)
GLUCOSE BLD-MCNC: 112 MG/DL (ref 70–99)
GLUCOSE BLD-MCNC: 120 MG/DL (ref 70–99)
PERFORMED ON: ABNORMAL

## 2020-02-02 PROCEDURE — 1280000000 HC REHAB R&B

## 2020-02-02 PROCEDURE — 6370000000 HC RX 637 (ALT 250 FOR IP): Performed by: PHYSICAL MEDICINE & REHABILITATION

## 2020-02-02 RX ADMIN — ISOSORBIDE MONONITRATE 60 MG: 60 TABLET, EXTENDED RELEASE ORAL at 08:01

## 2020-02-02 RX ADMIN — ALLOPURINOL 100 MG: 100 TABLET ORAL at 08:02

## 2020-02-02 RX ADMIN — LOSARTAN POTASSIUM 100 MG: 100 TABLET, FILM COATED ORAL at 08:01

## 2020-02-02 RX ADMIN — SPIRONOLACTONE 25 MG: 25 TABLET ORAL at 08:01

## 2020-02-02 RX ADMIN — ASPIRIN 81 MG: 81 TABLET, COATED ORAL at 08:01

## 2020-02-02 RX ADMIN — HYDRALAZINE HYDROCHLORIDE 25 MG: 25 TABLET, FILM COATED ORAL at 23:51

## 2020-02-02 RX ADMIN — ATORVASTATIN CALCIUM 40 MG: 40 TABLET, FILM COATED ORAL at 20:32

## 2020-02-02 RX ADMIN — METFORMIN HYDROCHLORIDE 1000 MG: 500 TABLET ORAL at 08:01

## 2020-02-02 RX ADMIN — RIVAROXABAN 20 MG: 20 TABLET, FILM COATED ORAL at 17:06

## 2020-02-02 RX ADMIN — FUROSEMIDE 80 MG: 40 TABLET ORAL at 08:01

## 2020-02-02 RX ADMIN — HYDRALAZINE HYDROCHLORIDE 25 MG: 25 TABLET, FILM COATED ORAL at 01:11

## 2020-02-02 RX ADMIN — HYDRALAZINE HYDROCHLORIDE 25 MG: 25 TABLET, FILM COATED ORAL at 12:09

## 2020-02-02 RX ADMIN — METFORMIN HYDROCHLORIDE 1000 MG: 500 TABLET ORAL at 17:06

## 2020-02-02 RX ADMIN — MELATONIN 3 MG: at 01:12

## 2020-02-02 RX ADMIN — HYDRALAZINE HYDROCHLORIDE 25 MG: 25 TABLET, FILM COATED ORAL at 17:06

## 2020-02-02 RX ADMIN — METOPROLOL SUCCINATE 100 MG: 50 TABLET, EXTENDED RELEASE ORAL at 08:02

## 2020-02-02 RX ADMIN — VITAMIN D, TAB 1000IU (100/BT) 1000 UNITS: 25 TAB at 08:01

## 2020-02-02 RX ADMIN — HYDRALAZINE HYDROCHLORIDE 25 MG: 25 TABLET, FILM COATED ORAL at 05:47

## 2020-02-02 ASSESSMENT — PAIN SCALES - GENERAL
PAINLEVEL_OUTOF10: 0

## 2020-02-02 NOTE — PLAN OF CARE
Problem: Falls - Risk of:  Goal: Will remain free from falls  Description  Will remain free from falls  Outcome: Ongoing   Fall precautions in place. Room free of clutter. Call light with in reach. Chair wheels locked. Non skid footwear on. Refused alarm this shift. Intentional rounding to anticipate needs. Problem: HEMODYNAMIC STATUS  Goal: Patient has stable vital signs and fluid balance  Outcome: Ongoing  Lungs assessed this shift. Low sodium diet, 2000 ml fluid restriction. Monitoring output every shift and monitoring daily weight.

## 2020-02-02 NOTE — PROGRESS NOTES
Admitted with CVA, alert and oriented x 4. LUE  slightly weaker than right. Transfers mod independent , no device, gait steady. He is aware he is being discharged on Monday. Apical pulse regular, Life Vest on. Lungs clear. Denies pain. Sleeping up in recliner chair, able to recline chair without compromising his breathing. Intentional rounding to anticipate needs.  Nitesh Bucio RN

## 2020-02-02 NOTE — PROGRESS NOTES
Patient here with CVA. Transfers with SBA with no device. Takes his alarms off periodically and does not wait for help despite numerous requests. Gait steady. Denies pain. Continent of bowel and bladder. All safety precautions in place. Will continue to monitor.

## 2020-02-03 VITALS
HEART RATE: 78 BPM | OXYGEN SATURATION: 94 % | DIASTOLIC BLOOD PRESSURE: 93 MMHG | SYSTOLIC BLOOD PRESSURE: 146 MMHG | RESPIRATION RATE: 17 BRPM | WEIGHT: 223.55 LBS | TEMPERATURE: 97.6 F | HEIGHT: 75 IN | BODY MASS INDEX: 27.8 KG/M2

## 2020-02-03 LAB
GLUCOSE BLD-MCNC: 132 MG/DL (ref 70–99)
GLUCOSE BLD-MCNC: 150 MG/DL (ref 70–99)
PERFORMED ON: ABNORMAL
PERFORMED ON: ABNORMAL

## 2020-02-03 PROCEDURE — 97116 GAIT TRAINING THERAPY: CPT

## 2020-02-03 PROCEDURE — 97530 THERAPEUTIC ACTIVITIES: CPT

## 2020-02-03 PROCEDURE — 6370000000 HC RX 637 (ALT 250 FOR IP): Performed by: PHYSICAL MEDICINE & REHABILITATION

## 2020-02-03 PROCEDURE — 97535 SELF CARE MNGMENT TRAINING: CPT

## 2020-02-03 RX ADMIN — METFORMIN HYDROCHLORIDE 1000 MG: 500 TABLET ORAL at 08:53

## 2020-02-03 RX ADMIN — METOPROLOL SUCCINATE 100 MG: 50 TABLET, EXTENDED RELEASE ORAL at 08:53

## 2020-02-03 RX ADMIN — ALLOPURINOL 100 MG: 100 TABLET ORAL at 08:54

## 2020-02-03 RX ADMIN — ISOSORBIDE MONONITRATE 60 MG: 60 TABLET, EXTENDED RELEASE ORAL at 08:53

## 2020-02-03 RX ADMIN — HYDRALAZINE HYDROCHLORIDE 25 MG: 25 TABLET, FILM COATED ORAL at 06:06

## 2020-02-03 RX ADMIN — INSULIN LISPRO 1 UNITS: 100 INJECTION, SOLUTION INTRAVENOUS; SUBCUTANEOUS at 08:54

## 2020-02-03 RX ADMIN — LOSARTAN POTASSIUM 100 MG: 100 TABLET, FILM COATED ORAL at 08:53

## 2020-02-03 RX ADMIN — ASPIRIN 81 MG: 81 TABLET, COATED ORAL at 08:53

## 2020-02-03 RX ADMIN — VITAMIN D, TAB 1000IU (100/BT) 1000 UNITS: 25 TAB at 08:53

## 2020-02-03 RX ADMIN — SPIRONOLACTONE 25 MG: 25 TABLET ORAL at 08:54

## 2020-02-03 RX ADMIN — MELATONIN 3 MG: at 01:09

## 2020-02-03 RX ADMIN — FUROSEMIDE 80 MG: 40 TABLET ORAL at 08:53

## 2020-02-03 ASSESSMENT — PAIN SCALES - GENERAL: PAINLEVEL_OUTOF10: 0

## 2020-02-03 NOTE — PROGRESS NOTES
Data- discharge order received, pt verbalized agreement to discharge, disposition to previous residence, no needs for HHC/DME. Action- discharge instructions prepared and given to patient, pt verbalized understanding. Medication information packet given r/t NEW and/or CHANGED prescriptions emphasizing name/purpose/side effects, pt verbalized understanding. Discharge instruction summary: Diet- 2000 ml FR, Activity- as tolerated, Primary Care Physician as follows: Brandt Jade [phone] f/u appointment to be scheduled, prescription medications filled by outpatient pharmacy. CHF Education reviewed. Pt/ Family has had a total of 60 minutes CHF education this admission encounter. Response- Pt belongings gathered. Disposition is home (no HHC/DME needs), transported with belongings, taken to lobby via w/c w/ family at side, no complications.

## 2020-02-03 NOTE — PROGRESS NOTES
6: Pt will perform neuro re-ed/HEP mod ind to increase LUE strength/cood to use more effectively for ADL/IADL bilateral and FM tasks with increased accuracy and speed.(evidence by test measures) MET  Long term goals  Time Frame for Long term goals : 2 weeks from 1/23/20  Long term goal 1: Pt will bathe mod ind. with DME. MET  Long term goal 2: Pt willl dress mod ind.: MET   Long term goal 3: Pt will toilet mod ind. MET  Long term goal 4: Pt will perform functional transfers mod ind with AD.: MET   Long term goal 5: Pt will perform IADL tasks mod ind with AD and demo good safety awareness/problem solving/standing for 7-8 minutes. NOT MET   Long term goals 6: Pt will perform neuro re-ed/HEP to increase LUE strength/cood to use effectively for ADL/IADL tasks in decreased time, w/o dropping objects.: MET  Patient Goals   Patient goals : Pt stated he wants to go home as soon as possible, get L arm working better/stop dropping things.        Therapy Time   Individual Concurrent Group Co-treatment   Time In 5893         Time Out 0919         Minutes 24         Timed Code Treatment Minutes: Keisha 6 1780 [address] 140

## 2020-02-03 NOTE — DISCHARGE SUMMARY
counseling.     Anxiety - hydroxyzine prn     Possible substance abuse disorder - Family now reporting that patient has history of crack cocaine use (including day prior to stroke). Cessation counseling provided. Patient denies use of illicit drugs but states he does use etoh. He now plans to go to drug/etoh rehab at discharge.     Dysphagia - Dietitian and SLP consults. Currently on regular + thins with aspiration precautions.       DVT ppx - Xarelto      Discharge Exam:  Const: Alert. No distress, pleasant. HEENT: Normocephalic, atraumatic. Normal sclera/conjunctiva. MMM. CV: Regular rate and rhythm. LifeVest in place. Resp: No respiratory distress. Lungs CTAB. Abd: Soft, nontender, nondistended, NABS+   Ext: Trace edema. Neuro: Alert, oriented, appropriately interactive. Mild left facial droop with dysarthria. Left hemiparesis overall 5-/5. Psych: Cooperative, appropriate mood and affect      Discharge Functional Status:    Physical therapy:  Bed Mobility: Scooting: Independent  Transfers: Sit to Stand: Independent  Stand to sit: Independent  Bed to Chair: Independent  Comment: Tomer Stands, is off for today, but indicated in her last note that if [facility] did not have a bed by Monday, the patient would be discharged to a homeless shelter. However, patient is now reporting that there would be space for him at [facility] on Tuesday, 2/4, at 10 AM, so shared this information with the admission coordinators, who will assist in making a decision with discharge planning. At end of session, PT transported patient back to his room via [facility] and assisted him back to his recliner, ensuring all needs were met, call light in reach, and chair alarm in place. With Dr. Roshni Laughlin in patient's room, discussed possible discharge options so that patient could be prepared to go to [facility] by 10 AM on Tuesday.   Patient would like to possibly leave on Monday to gather his belongings before checking into [facility] on non-reciprocal pattern. He had no SOB during last PT session and was educated to continue to be mindful of his SOB. Patient verbalized understanding. Patient is to be discharged from inpatient rehab this date and has met all goals while on rehab. He reports no concerns for his functional mobility at this time. Patient would benefit from continued outpatient PT when he has all social concerns worked out and is settled. Patient verbalizes understanding. Occupational therapy:  ,  , Assessment: Patient tolerated session well. Patient completed ADLs, transfers, and fxl mobility with no more than Modified Independent. Patient is planning on d/c later today. Speech therapy:         Significant Diagnostics:   Lab Results   Component Value Date    CREATININE 1.0 01/30/2020    BUN 18 01/30/2020     01/30/2020    K 3.6 01/30/2020    CL 98 (L) 01/30/2020    CO2 26 01/30/2020       Lab Results   Component Value Date    WBC 5.4 01/30/2020    HGB 14.0 01/30/2020    HCT 43.1 01/30/2020    MCV 90.4 01/30/2020     01/30/2020       Disposition:  No longer has apartment. Per patient and family, he is unable to stay with them. Were tentatively planning discharge to homeless shelter, however patient now wishes to pursue drug/etoh rehab. Will be staying at sister's home for one night and then admitting to rehab facility on 2/4. Services: possible outpatient therapies after drug/etoh rehab  DME: Shower chair, TSF recommended    Discharge Condition: Stable    Follow-up:  See after visit summary from hospitalization    Discharge Medications:     Medication List      CHANGE how you take these medications    furosemide 40 MG tablet  Commonly known as:  LASIX  Take 2 tablets by mouth daily  What changed:    · how much to take  · when to take this  · Another medication with the same name was removed. Continue taking this medication, and follow the directions you see here.   Notes to patient:  Diuretic     hydrALAZINE 25 MG tablet  Commonly known as:  APRESOLINE  Take 1 tablet by mouth 4 times daily  What changed:    · when to take this  · Another medication with the same name was removed. Continue taking this medication, and follow the directions you see here. Notes to patient:   To lower blood pressure     losartan 100 MG tablet  Commonly known as:  COZAAR  Take 1 tablet by mouth daily  What changed:    · medication strength  · how much to take  Notes to patient:  Antihypertensive medication to treat your heart faliure        CONTINUE taking these medications    allopurinol 100 MG tablet  Commonly known as:  ZYLOPRIM  Take 1 tablet by mouth daily  Notes to patient:  For gout     aspirin 81 MG tablet  Take 1 tablet by mouth daily  Notes to patient:  Mild blood thinner     atorvastatin 40 MG tablet  Commonly known as:  LIPITOR  Take 1 tablet by mouth nightly  Notes to patient:  Lower cholesterol     isosorbide mononitrate 60 MG extended release tablet  Commonly known as:  IMDUR  Take 1 tablet by mouth daily  Notes to patient:  Vasodilator to treat your heart failure     metFORMIN 1000 MG tablet  Commonly known as:  GLUCOPHAGE  Take 1 tablet by mouth 2 times daily (with meals)  Notes to patient:  For diabetes     metoprolol succinate 100 MG extended release tablet  Commonly known as:  TOPROL XL  Take 1 tablet by mouth daily  Notes to patient:  Antihypertensive to treat your heart failure, will slow heart rate and lower blood pressure     rivaroxaban 20 MG Tabs tablet  Commonly known as:  XARELTO  Take 1 tablet by mouth daily (with breakfast)  Notes to patient:  Blood thinner     spironolactone 25 MG tablet  Commonly known as:  ALDACTONE  Take 1 tablet by mouth daily  Notes to patient:  For heart failure, will lower blood pressure     vitamin D3 25 MCG (1000 UT) Tabs  Take 1 tablet by mouth daily  Notes to patient:  Vitamin D supplement        STOP taking these medications    albuterol sulfate  (90 Base) MCG/ACT inhaler colchicine 0.6 MG tablet  Commonly known as:  COLCRYS     glipiZIDE 10 MG tablet  Commonly known as:  GLUCOTROL     hydrOXYzine 10 MG tablet  Commonly known as:  ATARAX           Where to Get Your Medications      These medications were sent to [facility] [address], [address] - F [phone]  [address] [address], [address] [address]    Phone:  [phone]   · allopurinol 100 MG tablet  · aspirin 81 MG tablet  · atorvastatin 40 MG tablet  · furosemide 40 MG tablet  · hydrALAZINE 25 MG tablet  · isosorbide mononitrate 60 MG extended release tablet  · losartan 100 MG tablet  · metFORMIN 1000 MG tablet  · metoprolol succinate 100 MG extended release tablet  · rivaroxaban 20 MG Tabs tablet  · spironolactone 25 MG tablet  · vitamin D3 25 MCG (1000 UT) Tabs           I spent over 35 minutes on this discharge encounter between counseling, coordination of care, and medication reconciliation.       Danny Shah MD

## 2020-02-03 NOTE — PROGRESS NOTES
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 may remove vest for shower, DIET General, Low Sodium (2 GM); Daily Fluid Restriction: 2000 ml      Subjective   General  Chart Reviewed: Yes  Additional Pertinent Hx: per Dr. Miguel Mckeon note, \"52 yo right handed M with pmh nonischemic cardiomyopathy, HTN, DM2 who initially presented to [facility] on 1/14/2020 with slurred speech and left side weakness. Imaging revealed acute ischemic right MCA stroke and M2 occlusion. He therefore underwent thrombectomy (1/14). Of note, patient had not been taking his medications for several months. Etiology of stroke, thought to be cardioembolic. TTE revealed LVH, severe diffuse hypokinesis, with an EF of 15-20%. Cardiac MRI was negative for thrombus. He was started on Xarelto for secondary prevention. Cardiology was consulted for possible AICD placement, however, recommended optimizing medical management and using LifeVest for 3 months. He did have elevated troponin upon admission (peak 0.06) which later normalized. Course was complicated by shortness of breath and productive cough.   Discharging services not feel this was due to infection but rather, pulmonary hypertension, right-sided heart failure, and tobacco use. \"  Response To Previous Treatment: Patient with no complaints from previous session. Family / Caregiver Present: No  Referring Practitioner: Christine Blanco  Subjective  Subjective: Patient reports he is feeling well and has no complaints at this time. General Comment  Comments: Patient with LifeVest on and able to manage this on his own. Orientation  Orientation  Overall Orientation Status: Within Normal Limits(BIMs also given and patient scored a 15/15)    Objective   Bed mobility  Bridging: Independent  Rolling to Left: Independent  Rolling to Right: Independent  Supine to Sit: Independent  Sit to Supine: Independent  Scooting: Independent  Comment: bed mobility done on regular bed in ADL apartment    Transfers  Sit to Stand: Independent  Stand to sit: Independent  Bed to Chair: Independent  Car Transfer: Independent(mock car transfer without a device. patient turns to sit before swinging legs in. Safe hand place.)    Ambulation  Ambulation?: Yes  More Ambulation?: No  Ambulation 1  Surface: level tile;carpet  Device: No Device  Assistance: Independent  Quality of Gait: slower than a normal pace, slight internal rotation noted of right LE but patient reports this is chronic  Gait Deviations: Slow Aubrie;Decreased step height  Distance: 210', 350', 220', short distances in therapy gym with multiple turns  Comments: Patient able to transition on and off carpet safely without difficulty. PT reviewed importance of insuring he clears his feet in swing phase as he intermittently scuffs his left foot during ambulation. Patient was also able to retrieve a pen from the floor during ambulation without LOB.       Stairs/Curb  Stairs?: Yes  Stairs  # Steps : 12  Stairs Height: (stairwell steps )  Rails: Right ascending  Device: No Device  Assistance: Modified independent   Comment: Patient able to ascend and descend 12 steps with one rail using a non-reciprocal pattern without difficulty. He uses a slow, steady pace, and no LOB occurs. No SOB reported. Patient performed another 12 steps (to total 24) with bilateral rails with modified independence. Continues to have no SOB. Curbs: 6\"(without a device with modified independence as he slows his pace to ensure he is steady before ascending and descending. )       Balance  Posture: Good  Sitting - Static: Good  Sitting - Dynamic: Good  Standing - Static: Good  Standing - Dynamic: Good  Comments: standing balance without UE support      Goals  Short term goals  Time Frame for Short term goals: 1 week  Short term goal 1: transfers S  - met - 1/30/20  Short term goal 2: amb 76' with no AD S met 1-31-20  Short term goal 3: 12 steps B rails SBA min SOB - met - 1/30/20  Long term goals  Time Frame for Long term goals : 10 to 14 days  Long term goal 1: all transfers modif I - met  Long term goal 2: amb 150' with no AD modif I - met  Long term goal 3: 24 steps L rail modif I - met  Long term goal 4: car transfer S - met  Patient Goals   Patient goals : go home    Plan    Plan  Times per week: discharge from inpatient rehab this date  Times per day: Twice a day  Plan weeks: 1 to 2  Specific instructions for Next Treatment: increase gt and exercises  Current Treatment Recommendations: Balance Training, Transfer Training, Functional Mobility Training, Endurance Training, Gait Training  Safety Devices  Type of devices:  All fall risk precautions in place, Gait belt(ambulating in room with Sebastien Radford, present)     Therapy Time   Individual Concurrent Group Co-treatment   Time In 0815         Time Out 0855         Minutes 40                 Electronically signed by Luis Enrique Shah, PT on 2/3/2020 at 8:56 AM

## 2022-10-16 ENCOUNTER — APPOINTMENT (OUTPATIENT)
Dept: GENERAL RADIOLOGY | Age: 57
DRG: 291 | End: 2022-10-16
Payer: MEDICARE

## 2022-10-16 ENCOUNTER — HOSPITAL ENCOUNTER (INPATIENT)
Age: 57
LOS: 3 days | Discharge: HOME | DRG: 291 | End: 2022-10-19
Attending: EMERGENCY MEDICINE | Admitting: INTERNAL MEDICINE
Payer: MEDICARE

## 2022-10-16 ENCOUNTER — APPOINTMENT (OUTPATIENT)
Dept: CT IMAGING | Age: 57
DRG: 291 | End: 2022-10-16
Payer: MEDICARE

## 2022-10-16 DIAGNOSIS — R77.8 ELEVATED TROPONIN: ICD-10-CM

## 2022-10-16 DIAGNOSIS — R07.9 CHEST PAIN, UNSPECIFIED TYPE: ICD-10-CM

## 2022-10-16 DIAGNOSIS — R00.0 TACHYCARDIA: ICD-10-CM

## 2022-10-16 DIAGNOSIS — R06.00 DYSPNEA, UNSPECIFIED TYPE: ICD-10-CM

## 2022-10-16 DIAGNOSIS — I50.9 ACUTE ON CHRONIC CONGESTIVE HEART FAILURE, UNSPECIFIED HEART FAILURE TYPE (HCC): Primary | ICD-10-CM

## 2022-10-16 PROBLEM — I42.9 CONGESTIVE HEART FAILURE DUE TO CARDIOMYOPATHY (HCC): Status: ACTIVE | Noted: 2022-10-16

## 2022-10-16 LAB
A/G RATIO: 0.9 (ref 1.1–2.2)
ALBUMIN SERPL-MCNC: 3.5 G/DL (ref 3.4–5)
ALP BLD-CCNC: 144 U/L (ref 40–129)
ALT SERPL-CCNC: 14 U/L (ref 10–40)
ANION GAP SERPL CALCULATED.3IONS-SCNC: 11 MMOL/L (ref 3–16)
AST SERPL-CCNC: 22 U/L (ref 15–37)
BASOPHILS ABSOLUTE: 0 K/UL (ref 0–0.2)
BASOPHILS RELATIVE PERCENT: 0.7 %
BILIRUB SERPL-MCNC: 0.9 MG/DL (ref 0–1)
BUN BLDV-MCNC: 13 MG/DL (ref 7–20)
CALCIUM SERPL-MCNC: 8.8 MG/DL (ref 8.3–10.6)
CHLORIDE BLD-SCNC: 102 MMOL/L (ref 99–110)
CO2: 24 MMOL/L (ref 21–32)
CREAT SERPL-MCNC: 1.1 MG/DL (ref 0.9–1.3)
D DIMER: 1.12 UG/ML FEU (ref 0–0.6)
EOSINOPHILS ABSOLUTE: 0.1 K/UL (ref 0–0.6)
EOSINOPHILS RELATIVE PERCENT: 2.1 %
GFR AFRICAN AMERICAN: >60
GFR NON-AFRICAN AMERICAN: >60
GLUCOSE BLD-MCNC: 127 MG/DL (ref 70–99)
HCT VFR BLD CALC: 46.5 % (ref 40.5–52.5)
HEMOGLOBIN: 15.6 G/DL (ref 13.5–17.5)
INR BLD: 1.09 (ref 0.87–1.14)
LYMPHOCYTES ABSOLUTE: 2.2 K/UL (ref 1–5.1)
LYMPHOCYTES RELATIVE PERCENT: 38.5 %
MCH RBC QN AUTO: 31.2 PG (ref 26–34)
MCHC RBC AUTO-ENTMCNC: 33.6 G/DL (ref 31–36)
MCV RBC AUTO: 93.1 FL (ref 80–100)
MONOCYTES ABSOLUTE: 0.5 K/UL (ref 0–1.3)
MONOCYTES RELATIVE PERCENT: 9.6 %
NEUTROPHILS ABSOLUTE: 2.8 K/UL (ref 1.7–7.7)
NEUTROPHILS RELATIVE PERCENT: 49.1 %
PDW BLD-RTO: 13.8 % (ref 12.4–15.4)
PLATELET # BLD: 175 K/UL (ref 135–450)
PMV BLD AUTO: 8.4 FL (ref 5–10.5)
POTASSIUM REFLEX MAGNESIUM: 3.8 MMOL/L (ref 3.5–5.1)
PRO-BNP: 4575 PG/ML (ref 0–124)
PROTHROMBIN TIME: 14.1 SEC (ref 11.7–14.5)
RAPID INFLUENZA  B AGN: NEGATIVE
RAPID INFLUENZA A AGN: NEGATIVE
RBC # BLD: 5 M/UL (ref 4.2–5.9)
SARS-COV-2, NAAT: NOT DETECTED
SODIUM BLD-SCNC: 137 MMOL/L (ref 136–145)
TOTAL PROTEIN: 7.2 G/DL (ref 6.4–8.2)
TROPONIN: 0.04 NG/ML
WBC # BLD: 5.7 K/UL (ref 4–11)

## 2022-10-16 PROCEDURE — 83880 ASSAY OF NATRIURETIC PEPTIDE: CPT

## 2022-10-16 PROCEDURE — 6370000000 HC RX 637 (ALT 250 FOR IP): Performed by: NURSE PRACTITIONER

## 2022-10-16 PROCEDURE — 1200000000 HC SEMI PRIVATE

## 2022-10-16 PROCEDURE — 6360000004 HC RX CONTRAST MEDICATION: Performed by: NURSE PRACTITIONER

## 2022-10-16 PROCEDURE — 6360000002 HC RX W HCPCS: Performed by: NURSE PRACTITIONER

## 2022-10-16 PROCEDURE — 85379 FIBRIN DEGRADATION QUANT: CPT

## 2022-10-16 PROCEDURE — 71046 X-RAY EXAM CHEST 2 VIEWS: CPT

## 2022-10-16 PROCEDURE — 85610 PROTHROMBIN TIME: CPT

## 2022-10-16 PROCEDURE — 99285 EMERGENCY DEPT VISIT HI MDM: CPT

## 2022-10-16 PROCEDURE — 87804 INFLUENZA ASSAY W/OPTIC: CPT

## 2022-10-16 PROCEDURE — 71260 CT THORAX DX C+: CPT | Performed by: NURSE PRACTITIONER

## 2022-10-16 PROCEDURE — 87635 SARS-COV-2 COVID-19 AMP PRB: CPT

## 2022-10-16 PROCEDURE — 93005 ELECTROCARDIOGRAM TRACING: CPT | Performed by: EMERGENCY MEDICINE

## 2022-10-16 PROCEDURE — 80053 COMPREHEN METABOLIC PANEL: CPT

## 2022-10-16 PROCEDURE — 85025 COMPLETE CBC W/AUTO DIFF WBC: CPT

## 2022-10-16 PROCEDURE — 36415 COLL VENOUS BLD VENIPUNCTURE: CPT

## 2022-10-16 PROCEDURE — 84484 ASSAY OF TROPONIN QUANT: CPT

## 2022-10-16 PROCEDURE — 96374 THER/PROPH/DIAG INJ IV PUSH: CPT

## 2022-10-16 RX ORDER — POLYETHYLENE GLYCOL 3350 17 G/17G
17 POWDER, FOR SOLUTION ORAL DAILY PRN
Status: DISCONTINUED | OUTPATIENT
Start: 2022-10-16 | End: 2022-10-19 | Stop reason: HOSPADM

## 2022-10-16 RX ORDER — ACETAMINOPHEN 650 MG/1
650 SUPPOSITORY RECTAL EVERY 6 HOURS PRN
Status: DISCONTINUED | OUTPATIENT
Start: 2022-10-16 | End: 2022-10-19 | Stop reason: HOSPADM

## 2022-10-16 RX ORDER — ASPIRIN 81 MG/1
324 TABLET, CHEWABLE ORAL ONCE
Status: COMPLETED | OUTPATIENT
Start: 2022-10-16 | End: 2022-10-16

## 2022-10-16 RX ORDER — SODIUM CHLORIDE 0.9 % (FLUSH) 0.9 %
5-40 SYRINGE (ML) INJECTION PRN
Status: DISCONTINUED | OUTPATIENT
Start: 2022-10-16 | End: 2022-10-19 | Stop reason: HOSPADM

## 2022-10-16 RX ORDER — METOPROLOL SUCCINATE 50 MG/1
200 TABLET, EXTENDED RELEASE ORAL DAILY
Status: DISCONTINUED | OUTPATIENT
Start: 2022-10-17 | End: 2022-10-19 | Stop reason: HOSPADM

## 2022-10-16 RX ORDER — ALLOPURINOL 100 MG/1
100 TABLET ORAL DAILY
Status: DISCONTINUED | OUTPATIENT
Start: 2022-10-17 | End: 2022-10-19 | Stop reason: HOSPADM

## 2022-10-16 RX ORDER — ATORVASTATIN CALCIUM 40 MG/1
40 TABLET, FILM COATED ORAL NIGHTLY
Status: DISCONTINUED | OUTPATIENT
Start: 2022-10-17 | End: 2022-10-19 | Stop reason: HOSPADM

## 2022-10-16 RX ORDER — ACETAMINOPHEN 325 MG/1
650 TABLET ORAL EVERY 6 HOURS PRN
Status: DISCONTINUED | OUTPATIENT
Start: 2022-10-16 | End: 2022-10-19 | Stop reason: HOSPADM

## 2022-10-16 RX ORDER — SODIUM CHLORIDE 9 MG/ML
INJECTION, SOLUTION INTRAVENOUS PRN
Status: DISCONTINUED | OUTPATIENT
Start: 2022-10-16 | End: 2022-10-19 | Stop reason: HOSPADM

## 2022-10-16 RX ORDER — FUROSEMIDE 10 MG/ML
40 INJECTION INTRAMUSCULAR; INTRAVENOUS ONCE
Status: COMPLETED | OUTPATIENT
Start: 2022-10-16 | End: 2022-10-16

## 2022-10-16 RX ORDER — VITAMIN B COMPLEX
1000 TABLET ORAL DAILY
Status: DISCONTINUED | OUTPATIENT
Start: 2022-10-17 | End: 2022-10-19 | Stop reason: HOSPADM

## 2022-10-16 RX ORDER — ISOSORBIDE MONONITRATE 60 MG/1
60 TABLET, EXTENDED RELEASE ORAL DAILY
Status: DISCONTINUED | OUTPATIENT
Start: 2022-10-17 | End: 2022-10-17

## 2022-10-16 RX ORDER — ONDANSETRON 4 MG/1
4 TABLET, ORALLY DISINTEGRATING ORAL EVERY 8 HOURS PRN
Status: DISCONTINUED | OUTPATIENT
Start: 2022-10-16 | End: 2022-10-19 | Stop reason: HOSPADM

## 2022-10-16 RX ORDER — ONDANSETRON 2 MG/ML
4 INJECTION INTRAMUSCULAR; INTRAVENOUS EVERY 6 HOURS PRN
Status: DISCONTINUED | OUTPATIENT
Start: 2022-10-16 | End: 2022-10-19 | Stop reason: HOSPADM

## 2022-10-16 RX ORDER — SPIRONOLACTONE 25 MG/1
25 TABLET ORAL DAILY
Status: DISCONTINUED | OUTPATIENT
Start: 2022-10-17 | End: 2022-10-19 | Stop reason: HOSPADM

## 2022-10-16 RX ORDER — ASPIRIN 81 MG/1
81 TABLET, CHEWABLE ORAL DAILY
Status: DISCONTINUED | OUTPATIENT
Start: 2022-10-17 | End: 2022-10-19 | Stop reason: HOSPADM

## 2022-10-16 RX ORDER — NITROGLYCERIN 0.4 MG/1
0.4 TABLET SUBLINGUAL EVERY 5 MIN PRN
Status: DISCONTINUED | OUTPATIENT
Start: 2022-10-16 | End: 2022-10-16

## 2022-10-16 RX ORDER — ESCITALOPRAM OXALATE 10 MG/1
10 TABLET ORAL DAILY
Status: DISCONTINUED | OUTPATIENT
Start: 2022-10-17 | End: 2022-10-19 | Stop reason: HOSPADM

## 2022-10-16 RX ORDER — SODIUM CHLORIDE 0.9 % (FLUSH) 0.9 %
5-40 SYRINGE (ML) INJECTION EVERY 12 HOURS SCHEDULED
Status: DISCONTINUED | OUTPATIENT
Start: 2022-10-17 | End: 2022-10-19 | Stop reason: HOSPADM

## 2022-10-16 RX ORDER — FUROSEMIDE 10 MG/ML
40 INJECTION INTRAMUSCULAR; INTRAVENOUS 2 TIMES DAILY
Status: DISCONTINUED | OUTPATIENT
Start: 2022-10-17 | End: 2022-10-19

## 2022-10-16 RX ADMIN — FUROSEMIDE 40 MG: 10 INJECTION, SOLUTION INTRAMUSCULAR; INTRAVENOUS at 19:47

## 2022-10-16 RX ADMIN — IOPAMIDOL 75 ML: 755 INJECTION, SOLUTION INTRAVENOUS at 19:42

## 2022-10-16 RX ADMIN — ASPIRIN 81 MG 324 MG: 81 TABLET ORAL at 18:41

## 2022-10-16 RX ADMIN — NITROGLYCERIN 0.5 INCH: 20 OINTMENT TOPICAL at 19:59

## 2022-10-16 RX ADMIN — NITROGLYCERIN 0.4 MG: 0.4 TABLET, ORALLY DISINTEGRATING SUBLINGUAL at 19:08

## 2022-10-16 ASSESSMENT — ENCOUNTER SYMPTOMS
SHORTNESS OF BREATH: 1
WHEEZING: 0
ABDOMINAL PAIN: 0
DIARRHEA: 0
CONSTIPATION: 0
SORE THROAT: 0
CHEST TIGHTNESS: 1
BACK PAIN: 0
EYE PAIN: 0
COUGH: 1
VOMITING: 0
NAUSEA: 0

## 2022-10-16 ASSESSMENT — PAIN DESCRIPTION - PAIN TYPE: TYPE: ACUTE PAIN

## 2022-10-16 ASSESSMENT — PAIN DESCRIPTION - DESCRIPTORS: DESCRIPTORS: SQUEEZING;PRESSURE

## 2022-10-16 ASSESSMENT — PAIN DESCRIPTION - ONSET: ONSET: ON-GOING

## 2022-10-16 ASSESSMENT — PAIN DESCRIPTION - ORIENTATION: ORIENTATION: MID

## 2022-10-16 ASSESSMENT — PAIN SCALES - GENERAL
PAINLEVEL_OUTOF10: 5
PAINLEVEL_OUTOF10: 4
PAINLEVEL_OUTOF10: 4

## 2022-10-16 ASSESSMENT — PAIN DESCRIPTION - LOCATION: LOCATION: CHEST

## 2022-10-16 ASSESSMENT — PAIN - FUNCTIONAL ASSESSMENT
PAIN_FUNCTIONAL_ASSESSMENT: 0-10
PAIN_FUNCTIONAL_ASSESSMENT: ACTIVITIES ARE NOT PREVENTED
PAIN_FUNCTIONAL_ASSESSMENT: 0-10
PAIN_FUNCTIONAL_ASSESSMENT: 0-10

## 2022-10-16 ASSESSMENT — PAIN DESCRIPTION - FREQUENCY: FREQUENCY: CONTINUOUS

## 2022-10-16 NOTE — ED PROVIDER NOTES
Attending Supervisory Note/Shared Visit   I have personally performed a face to face diagnostic evaluation on this patient. I have reviewed the mid-levels findings and agree. History and Exam by me shows complaining of a 1-1/2-week history of difficulty breathing and tightness in his chest.  Its been worse for the last 2 or 3 days. He has difficulty lying flat at night. This patient has a history of hyperlipidemia, hypertension, cardiomyopathy, coronary artery disease, congestive heart failure. He states he stopped all of his medications several weeks ago because he was feeling fine. General: Alert black male in no acute distress. Heart: Tachycardic, regular. Positive S3 gallop noted. No murmurs. Lungs: Breath sounds decreased in the bases midlung field with rales bilaterally. No wheezes. Abdomen: Soft, nondistended, nontender. No masses organomegaly. Musculoskeletal: No lower extremity edema. No calf tenderness. EKG:  Sinus tachycardia, rate of 123, left atrial enlargement, age-indeterminate anterior septal infarct. Nonspecific ST-T wave changes. Him strip shows sinus tachycardia with a rate of 123, WY interval 160 ms, QRS 92 ms with no other ectopy as interpreted by me. This was compared to 4/6/2019, no significant change noted. Lab reviewed. H&H of 15.6 and 46.5. White blood cell count 5700 with 49 neutrophils and 38 lymphs. Electrolytes normal.  BUN of 13 with a creatinine of 1.1. Glucose of 127. Liver enzymes are normal.  COVID-negative. Influenza negative. D-dimer of 1.12. BNP of 4575. Troponin of 0.04. PT and INR normal.    Chest x-ray: No acute process. CT pulmonary embolism study: No evidence of central or segmental pulmonary artery embolism. Limited study to assess peripheral pulmonary emboli. Faint patchy posterior right lower lobe airspace disease which could represent pneumonia in the appropriate setting. Lungs are otherwise clear.   Trace right pleural effusion. Cardiology was consulted. The patient's EKG did show the sinus tachycardia. He had some diffuse inferior lateral T wave flattening and inversion. He also had what looks like some J-point elevation in the anterior precordial leads. The J-point elevation looks similar to his previous tracing, questionably more prominent. The inferior lateral changes are old. Cardiology felt that it was reasonable to admit, monitor, continue her topical nitrates, aspirin, Lasix. Hospitalist was consulted for admission. 1. Acute on chronic congestive heart failure, unspecified heart failure type (HCC)    2. Chest pain, unspecified type    3. Dyspnea, unspecified type    4. Elevated troponin    5.  Tachycardia      Disposition:  Admit      (Please note that portions of this note were completed with a voice recognition program.  Efforts were made to edit the dictations but occasionally words are mis-transcribed.)    Alina Loomis MD  Attending Emergency Physician        Carlyle Goodman MD  10/16/22 8000

## 2022-10-16 NOTE — ED PROVIDER NOTES
[address] [facility]        Pt Name: Armando Burch  MRN: [id number]  [place] 1965  Date of evaluation: 10/16/2022  Provider: MACO Anderson - CNP  PCP: Luann Strauss MD  Note Started: 6:29 PM EDT        I have seen and evaluated this patient with my supervising physician Gavino Garcia MD.    [address]       Chief Complaint   Patient presents with    Chest Pain    Shortness of Breath     1.5 weeks ago progressively getting worse       HISTORY OF PRESENT ILLNESS   (Location, Timing/Onset, Context/Setting, Quality, Duration, Modifying Factors, Severity, Associated Signs and Symptoms)  Note limiting factors. Chief Complaint: Chest pressure and shortness of breath    Armando Burch is a 61 y.o. male who presents to the emergency department with complaints of chest pressure and shortness of breath. Chest pressure is across his entire chest.  Not localized. Not radiational.  States that he has shortness of breath with it. He states it \"feels like somebody is doing push-ups on my chest.\"  Does have an intermittent cough, nonproductive. No recent sick contacts. No fevers or chills. His felt fatigued. He states that when he lies down he has worsening shortness of breath, and had to sleep sitting up. Is not vaccinated against COVID-19 or influenza. States that \"I felt so good recently that I have stopped taking my medications. \"  He stopped taking all of his medications for the past couple of weeks, including his Xarelto and \"heart pills. \"  States that the symptoms started approximately a week and a half ago but really have been prominent over the past 24 to 48 hours. Came to the ED for further evaluation and treatment. Nursing Notes were all reviewed and agreed with or any disagreements were addressed in the HPI.     REVIEW OF SYSTEMS    (2-9 systems for level 4, 10 or more for level 5)     Review of Systems Constitutional:  Positive for fatigue. Negative for chills and fever. HENT:  Negative for congestion and sore throat. Eyes:  Negative for pain and visual disturbance. Respiratory:  Positive for cough, chest tightness and shortness of breath. Negative for wheezing. Cardiovascular:  Positive for chest pain. Negative for palpitations and leg swelling. Gastrointestinal:  Negative for abdominal pain, constipation, diarrhea, nausea and vomiting. Genitourinary:  Negative for dysuria, flank pain and hematuria. Musculoskeletal:  Negative for back pain, neck pain and neck stiffness. Skin:  Negative for rash and wound. Neurological:  Negative for dizziness and light-headedness. All other systems reviewed and are negative. Positives and Pertinent negatives as per HPI. Except as noted above in the ROS, all other systems were reviewed and negative.        PAST MEDICAL HISTORY     Past Medical History:   Diagnosis Date    CHF (congestive heart failure) ([facility] [address]     Diabetes mellitus ([facility] [address]     Hyperlipidemia     Hypertension     Vitamin D deficiency          SURGICAL HISTORY     Past Surgical History:   Procedure Laterality Date    DIAGNOSTIC CARDIAC CATH LAB PROCEDURE  12/2018         CURRENTMEDICATIONS       Previous Medications    ALLOPURINOL (ZYLOPRIM) 100 MG TABLET    Take 1 tablet by mouth daily    ASPIRIN 81 MG TABLET    Take 1 tablet by mouth daily    ATORVASTATIN (LIPITOR) 40 MG TABLET    Take 1 tablet by mouth nightly    CHOLECALCIFEROL (VITAMIN D3) 25 MCG (1000 UT) TABS    Take 1 tablet by mouth daily    FUROSEMIDE (LASIX) 40 MG TABLET    Take 2 tablets by mouth daily    HYDRALAZINE (APRESOLINE) 25 MG TABLET    Take 1 tablet by mouth 4 times daily    ISOSORBIDE MONONITRATE (IMDUR) 60 MG EXTENDED RELEASE TABLET    Take 1 tablet by mouth daily    LOSARTAN (COZAAR) 100 MG TABLET    Take 1 tablet by mouth daily    METFORMIN (GLUCOPHAGE) 1000 MG TABLET    Take 1 tablet by mouth 2 times daily (with meals)    METOPROLOL SUCCINATE (TOPROL XL) 100 MG EXTENDED RELEASE TABLET    Take 1 tablet by mouth daily    RIVAROXABAN (XARELTO) 20 MG TABS TABLET    Take 1 tablet by mouth daily (with breakfast)    SPIRONOLACTONE (ALDACTONE) 25 MG TABLET    Take 1 tablet by mouth daily         ALLERGIES     Lisinopril    FAMILYHISTORY       Family History   Problem Relation Age of Onset    Heart Failure Mother     Hypertension Father     Diabetes Father     Hypertension Sister     Heart Failure Sister           SOCIAL HISTORY       Social History     Tobacco Use    Smoking status: Former     Packs/day: 0.20     Types: Cigarettes    Smokeless tobacco: Never    Tobacco comments:     occassional   Vaping Use    Vaping Use: Never used   Substance Use Topics    Alcohol use: Yes     Comment: few beers every few weeks    Drug use: No       SCREENINGS    [place] Coma Scale  Eye Opening: Spontaneous  Best Verbal Response: Oriented  Best Motor Response: Obeys commands  [place] Coma Scale Score: 15        PHYSICAL EXAM    (up to 7 for level 4, 8 or more for level 5)     ED Triage Vitals   BP Temp Temp src Pulse Resp SpO2 Height Weight   -- -- -- -- -- -- -- --       Physical Exam  Vitals and nursing note reviewed. Constitutional:       General: He is not in acute distress. Appearance: Normal appearance. He is not ill-appearing, toxic-appearing or diaphoretic. HENT:      Head: Normocephalic and atraumatic. No raccoon eyes, Farr's sign, right periorbital erythema or left periorbital erythema. Right Ear: Hearing and external ear normal.      Left Ear: Hearing and external ear normal.      Nose: Nose normal. No laceration, nasal tenderness, mucosal edema, congestion or rhinorrhea. Right Nostril: No epistaxis. Left Nostril: No epistaxis. Mouth/Throat:      Lips: Pink. No lesions. Mouth: Mucous membranes are moist.      Tongue: No lesions. Tongue does not deviate from midline. Pharynx: Oropharynx is clear. Uvula midline. No pharyngeal swelling, oropharyngeal exudate, posterior oropharyngeal erythema or uvula swelling. Tonsils: No tonsillar exudate or tonsillar abscesses. Eyes:      General: Lids are normal.         Right eye: No discharge. Left eye: No discharge. Extraocular Movements: Extraocular movements intact. Pupils: Pupils are equal, round, and reactive to light. Neck:      Trachea: Phonation normal. No abnormal tracheal secretions or tracheal deviation. Comments: No meningismus   Cardiovascular:      Rate and Rhythm: Regular rhythm. Tachycardia present. Pulses: Normal pulses. No decreased pulses. Radial pulses are 2+ on the right side and 2+ on the left side. Dorsalis pedis pulses are 2+ on the right side and 2+ on the left side. Heart sounds: No murmur heard. No friction rub. Gallop present. S3 sounds present. Pulmonary:      Effort: Pulmonary effort is normal. No respiratory distress. Breath sounds: Normal breath sounds. No stridor. No wheezing, rhonchi or rales. Comments: Does have conversational dyspnea  Chest:      Chest wall: No tenderness. Abdominal:      General: Bowel sounds are normal. There is no distension. Palpations: Abdomen is soft. There is no mass. Tenderness: There is no abdominal tenderness. There is no right CVA tenderness, left CVA tenderness, guarding or rebound. Hernia: No hernia is present. Musculoskeletal:         General: No tenderness. Normal range of motion. Cervical back: Full passive range of motion without pain, normal range of motion and neck supple. No rigidity. No spinous process tenderness or muscular tenderness. Right lower leg: No tenderness. No edema. Left lower leg: No tenderness. No edema. Lymphadenopathy:      Cervical: No cervical adenopathy. Skin:     General: Skin is warm and dry. Capillary Refill: Capillary refill takes less than 2 seconds. Neurological:      General: No focal deficit present. Mental Status: He is alert and oriented to person, place, and time. GCS: GCS eye subscore is 4. GCS verbal subscore is 5. GCS motor subscore is 6. Cranial Nerves: No cranial nerve deficit, dysarthria or facial asymmetry. Sensory: Sensation is intact. No sensory deficit. Motor: Motor function is intact. No weakness. Coordination: Coordination normal.      Gait: Gait is intact. Gait normal.   Psychiatric:         Mood and Affect: Mood normal.         Behavior: Behavior normal.         Thought Content: Thought content normal.       DIAGNOSTIC RESULTS   LABS:    Labs Reviewed   COMPREHENSIVE METABOLIC PANEL W/ REFLEX TO MG FOR LOW K - Abnormal; Notable for the following components:       Result Value    Glucose 127 (*)     Albumin/Globulin Ratio 0.9 (*)     Alkaline Phosphatase 144 (*)     All other components within normal limits   D-DIMER, QUANTITATIVE - Abnormal; Notable for the following components:    D-Dimer, Quant 1.12 (*)     All other components within normal limits   BRAIN NATRIURETIC PEPTIDE - Abnormal; Notable for the following components:    Pro-BNP 4,575 (*)     All other components within normal limits   TROPONIN - Abnormal; Notable for the following components:    Troponin 0.04 (*)     All other components within normal limits   COVID-19, RAPID   RAPID INFLUENZA A/B ANTIGENS   CBC WITH AUTO DIFFERENTIAL   PROTIME-INR       When ordered only abnormal lab results are displayed. All other labs were within normal range or not returned as of this dictation. EKG: When ordered, EKG's are interpreted by the Emergency Department Physician in the absence of a cardiologist.  Please see their note for interpretation of EKG.     RADIOLOGY:   Non-plain film images such as CT, Ultrasound and MRI are read by the radiologist. Plain radiographic images are visualized and preliminarily interpreted by the ED Provider with the below findings:        Interpretation per the Radiologist below, if available at the time of this note:    CT CHEST PULMONARY EMBOLISM W CONTRAST   Final Result   No evidence of central or segmental pulmonary artery embolism. Limited study   for assessment of peripheral pulmonary emboli due to respiratory motion   artifact and suboptimal vessel opacification. Faint patchy of a posterior right lower lobe airspace disease which may   represent developing pneumonia in the appropriate setting. Lungs otherwise   clear. Cardiomegaly. Trace right pleural effusion. No pericardial or left pleural   effusion. XR CHEST (2 VW)   Final Result   No acute process. No results found. PROCEDURES   Unless otherwise noted below, none     Procedures    CRITICAL CARE TIME   CRITICAL CARE NOTE:    Cammie Benites CNP am the primary clinician of record. There was a high probability of clinically significant life-threatening deterioration of the patient's condition requiring my urgent intervention. Total critical care time was at least 30 minutes. Of nonconcurrent critical care time. This includes vital sign monitoring, pulse oximetry monitoring, telemetry monitoring, clinical response to the IV medications, reviewing the nursing notes, consultation time, dictation/documentation time, and interpretation of the labwork. This excludes any separately billable procedures performed.       CONSULTS:  IP CONSULT TO CARDIOLOGY  IP CONSULT TO HEART FAILURE NURSE/COORDINATOR  IP CONSULT TO DIETITIAN  IP CONSULT TO CARDIOLOGY      EMERGENCY DEPARTMENT COURSE and DIFFERENTIAL DIAGNOSIS/MDM:   Vitals:    Vitals:    10/16/22 1826 10/16/22 1942 10/16/22 2128 10/16/22 2129   BP: (!) 148/109 (!) 156/115  (!) 151/118   Pulse: (!) 123 (!) 122  (!) 123   Resp: 16 29 22   Temp: 98.3 °F (36.8 °C)      TempSrc: Oral      SpO2: 99% 98%  98%   Weight: 234 lb 12.6 oz (106.5 kg)  234 lb 12.6 oz (106.5 kg)    Height: 6' 3\" (1.905 m)  6' 3\" (1.905 m)        Patient was given the following medications:  Medications   aspirin chewable tablet 324 mg (324 mg Oral Given 10/16/22 1841)   furosemide (LASIX) injection 40 mg (40 mg IntraVENous Given 10/16/22 1947)   iopamidol (ISOVUE-370) 76 % injection 75 mL (75 mLs IntraVENous Given 10/16/22 1942)   nitroglycerin (NITRO-BID) 2 % ointment 0.5 inch (0.5 inches Topical Given 10/16/22 1959)         Is this patient to be included in the SEP-1 Core Measure due to severe sepsis or septic shock? No   Exclusion criteria - the patient is NOT to be included for SEP-1 Core Measure due to:  Viral etiology found or highly suspected (including COVID-19) without concomitant bacterial infection    MDM: See HPI and above for full presentation and physical exam.  Differential diagnoses included but not limited to viral illness such as influenza or COVID-19, bronchitis, URI, ACS, arrhythmia, CHF exacerbation, PE, pneumonia, other    EKG shows a sinus tachycardia, ventricular rate of 123. QTc is long, 592. Compared to previous EKG does not have any significant changes, however he does have some subtle worsening J-point elevations, with some nonspecific T wave flattening. See my attendings note for official EKG interpretation. Based off of previous EKG this is not an overt STEMI. However could indicate a possible NSTEMI, will await for troponin results. We will give him nitro and ASA in the interim. Blood work shows no leukocytosis. No anemia. No significant electrolyte derangements or LIBRADO. No thrombocytopenia. Rapid flu and COVID-negative    Troponin is elevated 0.04. He was given aspirin, nitro did not help his pain. We will give the Nitropaste for afterload reduction. I did consult cardiology, does not appear to be necessarily ACS related, likely heart failure related especially given that the patient's BNP is 4575.   No recommendations for Cath Lab tonight or need of any acute interventions tonight. They will see tomorrow. Otherwise his dimer was elevated, cannot rule out PE via PERC or Wells criteria and not only due to his age but because of his persistent tachycardia. Plain films and CT PE study as read by the radiologist as above    Given patient's recent noncompliance with his medications, he states later that its been almost a month since he is been out of his medications, with an elevated troponin, persistent tachycardia with dyspnea with conversation and elevated BNP signaling CHF exacerbation I do believe he needs to be admitted for further evaluation and treatment. Therefore consulted the hospitalist for admission who agreed to admit patient and write orders for admission. Radiology states that they will follow along as consult    FINAL IMPRESSION      1. Acute on chronic congestive heart failure, unspecified heart failure type (HCC)    2. Chest pain, unspecified type    3. Dyspnea, unspecified type    4. Elevated troponin    5. Tachycardia          DISPOSITION/PLAN   DISPOSITION Admitted 10/16/2022 09:44:14 PM      PATIENT REFERRED TO:  No follow-up provider specified.     DISCHARGE MEDICATIONS:  New Prescriptions    No medications on file       DISCONTINUED MEDICATIONS:  Discontinued Medications    No medications on file              (Please note that portions of this note were completed with a voice recognition program.  Efforts were made to edit the dictations but occasionally words are mis-transcribed.)    MACO Olmstead CNP (electronically signed)            MACO Olmstead CNP  10/16/22 1595

## 2022-10-17 LAB
ANION GAP SERPL CALCULATED.3IONS-SCNC: 14 MMOL/L (ref 3–16)
BUN BLDV-MCNC: 13 MG/DL (ref 7–20)
CALCIUM SERPL-MCNC: 8.8 MG/DL (ref 8.3–10.6)
CHLORIDE BLD-SCNC: 102 MMOL/L (ref 99–110)
CHOLESTEROL, TOTAL: 137 MG/DL (ref 0–199)
CO2: 23 MMOL/L (ref 21–32)
CREAT SERPL-MCNC: 1.1 MG/DL (ref 0.9–1.3)
EKG ATRIAL RATE: 123 BPM
EKG DIAGNOSIS: NORMAL
EKG P AXIS: 67 DEGREES
EKG P-R INTERVAL: 160 MS
EKG Q-T INTERVAL: 414 MS
EKG QRS DURATION: 92 MS
EKG QTC CALCULATION (BAZETT): 592 MS
EKG R AXIS: 85 DEGREES
EKG T AXIS: 73 DEGREES
EKG VENTRICULAR RATE: 123 BPM
GFR AFRICAN AMERICAN: >60
GFR NON-AFRICAN AMERICAN: >60
GLUCOSE BLD-MCNC: 139 MG/DL (ref 70–99)
GLUCOSE BLD-MCNC: 146 MG/DL (ref 70–99)
GLUCOSE BLD-MCNC: 205 MG/DL (ref 70–99)
HDLC SERPL-MCNC: 37 MG/DL (ref 40–60)
LDL CHOLESTEROL CALCULATED: 82 MG/DL
MAGNESIUM: 1.6 MG/DL (ref 1.8–2.4)
PERFORMED ON: ABNORMAL
PERFORMED ON: ABNORMAL
POTASSIUM SERPL-SCNC: 3.6 MMOL/L (ref 3.5–5.1)
SODIUM BLD-SCNC: 139 MMOL/L (ref 136–145)
TRIGL SERPL-MCNC: 89 MG/DL (ref 0–150)
TROPONIN: 0.04 NG/ML
TROPONIN: 0.04 NG/ML
TSH SERPL DL<=0.05 MIU/L-ACNC: 1.47 UIU/ML (ref 0.27–4.2)
VLDLC SERPL CALC-MCNC: 18 MG/DL

## 2022-10-17 PROCEDURE — 80061 LIPID PANEL: CPT

## 2022-10-17 PROCEDURE — 94760 N-INVAS EAR/PLS OXIMETRY 1: CPT

## 2022-10-17 PROCEDURE — 2580000003 HC RX 258: Performed by: NURSE PRACTITIONER

## 2022-10-17 PROCEDURE — 84443 ASSAY THYROID STIM HORMONE: CPT

## 2022-10-17 PROCEDURE — 80048 BASIC METABOLIC PNL TOTAL CA: CPT

## 2022-10-17 PROCEDURE — 83735 ASSAY OF MAGNESIUM: CPT

## 2022-10-17 PROCEDURE — 1200000000 HC SEMI PRIVATE

## 2022-10-17 PROCEDURE — 6360000002 HC RX W HCPCS: Performed by: NURSE PRACTITIONER

## 2022-10-17 PROCEDURE — 36415 COLL VENOUS BLD VENIPUNCTURE: CPT

## 2022-10-17 PROCEDURE — 99222 1ST HOSP IP/OBS MODERATE 55: CPT | Performed by: STUDENT IN AN ORGANIZED HEALTH CARE EDUCATION/TRAINING PROGRAM

## 2022-10-17 PROCEDURE — 6370000000 HC RX 637 (ALT 250 FOR IP): Performed by: NURSE PRACTITIONER

## 2022-10-17 PROCEDURE — 93010 ELECTROCARDIOGRAM REPORT: CPT | Performed by: INTERNAL MEDICINE

## 2022-10-17 PROCEDURE — 6360000002 HC RX W HCPCS: Performed by: INTERNAL MEDICINE

## 2022-10-17 PROCEDURE — 84484 ASSAY OF TROPONIN QUANT: CPT

## 2022-10-17 RX ORDER — INSULIN LISPRO 100 [IU]/ML
0-4 INJECTION, SOLUTION INTRAVENOUS; SUBCUTANEOUS NIGHTLY
Status: DISCONTINUED | OUTPATIENT
Start: 2022-10-17 | End: 2022-10-19 | Stop reason: HOSPADM

## 2022-10-17 RX ORDER — ESCITALOPRAM OXALATE 10 MG/1
10 TABLET ORAL DAILY
COMMUNITY

## 2022-10-17 RX ORDER — DEXTROSE MONOHYDRATE 100 MG/ML
INJECTION, SOLUTION INTRAVENOUS CONTINUOUS PRN
Status: DISCONTINUED | OUTPATIENT
Start: 2022-10-17 | End: 2022-10-19 | Stop reason: HOSPADM

## 2022-10-17 RX ORDER — SACUBITRIL AND VALSARTAN 97; 103 MG/1; MG/1
1 TABLET, FILM COATED ORAL 2 TIMES DAILY
Status: ON HOLD | COMMUNITY
End: 2022-10-19 | Stop reason: HOSPADM

## 2022-10-17 RX ORDER — INSULIN LISPRO 100 [IU]/ML
0-4 INJECTION, SOLUTION INTRAVENOUS; SUBCUTANEOUS
Status: DISCONTINUED | OUTPATIENT
Start: 2022-10-17 | End: 2022-10-19 | Stop reason: HOSPADM

## 2022-10-17 RX ORDER — MAGNESIUM SULFATE IN WATER 40 MG/ML
2000 INJECTION, SOLUTION INTRAVENOUS ONCE
Status: COMPLETED | OUTPATIENT
Start: 2022-10-17 | End: 2022-10-17

## 2022-10-17 RX ADMIN — RIVAROXABAN 20 MG: 20 TABLET, FILM COATED ORAL at 08:18

## 2022-10-17 RX ADMIN — SODIUM CHLORIDE: 9 INJECTION, SOLUTION INTRAVENOUS at 11:07

## 2022-10-17 RX ADMIN — Medication 1000 UNITS: at 08:18

## 2022-10-17 RX ADMIN — ATORVASTATIN CALCIUM 40 MG: 40 TABLET, FILM COATED ORAL at 01:27

## 2022-10-17 RX ADMIN — ALLOPURINOL 100 MG: 100 TABLET ORAL at 08:17

## 2022-10-17 RX ADMIN — Medication 10 ML: at 21:31

## 2022-10-17 RX ADMIN — ACETAMINOPHEN 650 MG: 325 TABLET ORAL at 06:05

## 2022-10-17 RX ADMIN — ATORVASTATIN CALCIUM 40 MG: 40 TABLET, FILM COATED ORAL at 21:30

## 2022-10-17 RX ADMIN — SPIRONOLACTONE 25 MG: 25 TABLET ORAL at 08:18

## 2022-10-17 RX ADMIN — FUROSEMIDE 40 MG: 10 INJECTION, SOLUTION INTRAMUSCULAR; INTRAVENOUS at 08:17

## 2022-10-17 RX ADMIN — MAGNESIUM SULFATE HEPTAHYDRATE 2000 MG: 40 INJECTION, SOLUTION INTRAVENOUS at 11:10

## 2022-10-17 RX ADMIN — SACUBITRIL AND VALSARTAN 1 TABLET: 97; 103 TABLET, FILM COATED ORAL at 21:30

## 2022-10-17 RX ADMIN — ASPIRIN 81 MG 81 MG: 81 TABLET ORAL at 08:18

## 2022-10-17 RX ADMIN — METOPROLOL SUCCINATE 200 MG: 50 TABLET, EXTENDED RELEASE ORAL at 08:17

## 2022-10-17 RX ADMIN — Medication 10 ML: at 08:18

## 2022-10-17 RX ADMIN — SACUBITRIL AND VALSARTAN 1 TABLET: 97; 103 TABLET, FILM COATED ORAL at 01:27

## 2022-10-17 RX ADMIN — ESCITALOPRAM OXALATE 10 MG: 10 TABLET ORAL at 08:18

## 2022-10-17 RX ADMIN — SACUBITRIL AND VALSARTAN 1 TABLET: 97; 103 TABLET, FILM COATED ORAL at 08:17

## 2022-10-17 RX ADMIN — FUROSEMIDE 40 MG: 10 INJECTION, SOLUTION INTRAMUSCULAR; INTRAVENOUS at 19:09

## 2022-10-17 ASSESSMENT — PAIN DESCRIPTION - LOCATION
LOCATION: HEAD

## 2022-10-17 ASSESSMENT — PAIN SCALES - GENERAL
PAINLEVEL_OUTOF10: 10
PAINLEVEL_OUTOF10: 0
PAINLEVEL_OUTOF10: 4

## 2022-10-17 ASSESSMENT — PAIN DESCRIPTION - FREQUENCY
FREQUENCY: INTERMITTENT
FREQUENCY: INTERMITTENT

## 2022-10-17 ASSESSMENT — PAIN DESCRIPTION - ONSET
ONSET: SUDDEN
ONSET: SUDDEN

## 2022-10-17 ASSESSMENT — PAIN DESCRIPTION - PAIN TYPE
TYPE: ACUTE PAIN
TYPE: ACUTE PAIN

## 2022-10-17 ASSESSMENT — PAIN DESCRIPTION - DESCRIPTORS: DESCRIPTORS: DISCOMFORT

## 2022-10-17 ASSESSMENT — PAIN - FUNCTIONAL ASSESSMENT
PAIN_FUNCTIONAL_ASSESSMENT: PREVENTS OR INTERFERES SOME ACTIVE ACTIVITIES AND ADLS
PAIN_FUNCTIONAL_ASSESSMENT: ACTIVITIES ARE NOT PREVENTED

## 2022-10-17 ASSESSMENT — PAIN DESCRIPTION - ORIENTATION
ORIENTATION: LEFT
ORIENTATION: LEFT

## 2022-10-17 NOTE — PLAN OF CARE
Problem: Discharge Planning  Goal: Discharge to home or other facility with appropriate resources  10/17/2022 1138 by Amber Medina RN  Outcome: Progressing     Problem: Pain  Goal: Verbalizes/displays adequate comfort level or baseline comfort level  10/17/2022 1138 by Amber Medina RN  Outcome: Progressing     Problem: Safety - Adult  Goal: Free from fall injury  10/17/2022 1138 by Amber Medina RN  Outcome: Progressing

## 2022-10-17 NOTE — PROGRESS NOTES
Medication Reconciliation    List of medications patient is currently taking is complete. Source of information: 1. RN Interview                                      2. Epic records                                      3. Dispense history     Allergies  Lisinopril     Notes regarding home medications:   1. Patient states he stopped taking medications a few weeks ago, however appears last refills on most medications were 05/03/2022.  2. Appears patient is supposed to be on Lexapro 10 mg daily - added to list  3. Patient no longer prescribed losartan, Imdur, or hydralazine. Removed from list.  4. Entresto  mg BID added to list  5. Metoprolol increased to 200 mg XL daily  6.  Furosemide changed to 40 mg daily

## 2022-10-17 NOTE — CARE COORDINATION
10/17 Patient with Heart Failure nurse at time of visit. Will attempt to see again later as time permits.  Electronically signed by Antoine Loomis RN on 10/17/2022 at 3:15 PM

## 2022-10-17 NOTE — PROGRESS NOTES
Nutrition Education    Patient reported having difficulty with shopping and reading labels. RD suggested figuring out a few meals from appropriate cook books, building up a pantry of sodium free seasonings, whole grains etc will also help with grocery shopping. Patient stated will also have sister help with label reading as her  also has to watch sodium intake. Educated on CHF nutrition, label reading tips, shopping tips, heart healthy with carb counting nutrition, cook books from the [employer] and [employer] association combined  Learners: Patient  Readiness: Eager  Method: Explanation and Handout  Response: Verbalizes Understanding  Contact name and number provided.     Time Spent: 20 minutes    ARNOLDO CULLEN, LUIS, LD  Contact Number: [phone]

## 2022-10-17 NOTE — CONSULTS
[facility].        Chief Complaint   Patient presents with    Chest Pain    Shortness of Breath     1.5 weeks ago progressively getting worse            History of Present Illness:  Noman Quispe is a 61 y.o. patient who presented to the hospital with complaints of shortness of breath. I have been asked to provide consultation regarding further management and testing. Patient with a history of nonischemic cardiomyopathy with reduced EF 15 to 20% presumed to be secondary to essential hypertension as well as alcohol abuse. Has known nonobstructive coronary disease. Patient reports to me unfortunately has been quite noncompliant, unfortunately was unable to afford his medication the last month and additionally after his son is moved out of his home he has been cooking much and is relying more on cold cuts. Additionally does appear as though he is doing some drinking again reporting a beer or 2 daily, patient was still smoking cloves daily. Patient was working yesterday with his daughter and she reported that it he was increasingly dyspneic and having a hard time at work him to come to the hospital.    Past Medical History:   has a past medical history of CHF (congestive heart failure) ([facility] [address] Diabetes mellitus ([facility] [address] Hyperlipidemia, Hypertension, and Vitamin D deficiency. Surgical History:   has a past surgical history that includes Diagnostic Cardiac Cath Lab Procedure (12/2018). Social History:   reports that he has quit smoking. His smoking use included cigarettes. He smoked an average of .2 packs per day. He has never used smokeless tobacco. He reports current alcohol use. He reports that he does not use drugs. Family History:  No family history of premature coronary artery disease, aortic disease, or valve disease. Home Medications:  Were reviewed and are listed in nursing record.  and/or listed below  Prior to Admission medications    Medication Sig Start Date End Date Taking?  Authorizing Provider   Metoprolol Succinate 200 MG CS24 Take 200 mg by mouth daily   Yes Historical Provider, MD   sacubitril-valsartan (ENTRESTO)  MG per tablet Take 1 tablet by mouth 2 times daily   Yes Historical Provider, MD   escitalopram (LEXAPRO) 10 MG tablet Take 10 mg by mouth daily   Yes Historical Provider, MD   aspirin 81 MG tablet Take 1 tablet by mouth daily 1/31/20   Josefa Ahuja MD   rivaroxaban (XARELTO) 20 MG TABS tablet Take 1 tablet by mouth daily (with breakfast) 1/31/20   Josefa Ahuja MD   metFORMIN (GLUCOPHAGE) 1000 MG tablet Take 1 tablet by mouth 2 times daily (with meals) 1/31/20   Josefa Auhja MD   atorvastatin (LIPITOR) 40 MG tablet Take 1 tablet by mouth nightly 1/31/20   Josefa Ahuja MD   spironolactone (ALDACTONE) 25 MG tablet Take 1 tablet by mouth daily 1/31/20   Josefa Ahuja MD   furosemide (LASIX) 40 MG tablet Take 2 tablets by mouth daily  Patient taking differently: Take 40 mg by mouth daily 1/31/20   Josefa Ahuja MD   allopurinol (ZYLOPRIM) 100 MG tablet Take 1 tablet by mouth daily 1/31/20   Josefa Ahuja MD   Cholecalciferol (VITAMIN D3) 25 MCG (1000 UT) TABS Take 1 tablet by mouth daily 1/31/20   Josefa Ahuja MD        Current Medications:  Current Facility-Administered Medications   Medication Dose Route Frequency Provider Last Rate Last Admin    insulin lispro (HUMALOG) injection vial 0-4 Units  0-4 Units SubCUTAneous TID WC Coni Ortiz MD        insulin lispro (HUMALOG) injection vial 0-4 Units  0-4 Units SubCUTAneous Nightly Coni Ortiz MD        glucose chewable tablet 16 g  4 tablet Oral PRN Coni Ortiz MD        dextrose bolus 10% 125 mL  125 mL IntraVENous PRN Coni Ortiz MD        Or    dextrose bolus 10% 250 mL  250 mL IntraVENous PRN Coni Ortiz MD        glucagon (rDNA) injection 1 mg  1 mg SubCUTAneous PRN Coni Ortiz MD        dextrose 10 % infusion   IntraVENous Continuous PRN Leslie Thompson MD        allopurinol (ZYLOPRIM) tablet 100 mg  100 mg Oral Daily Kossuth Zac, APRN - CNP   100 mg at 10/17/22 9988    aspirin chewable tablet 81 mg  81 mg Oral Daily Kossuth Zac, APRN - CNP   81 mg at 10/17/22 0818    atorvastatin (LIPITOR) tablet 40 mg  40 mg Oral Nightly Kossuth Zac, APRN - CNP   40 mg at 10/17/22 0127    Vitamin D (CHOLECALCIFEROL) tablet 1,000 Units  1,000 Units Oral Daily Kossuth Zac, APRN - CNP   1,000 Units at 10/17/22 0818    metoprolol succinate (TOPROL XL) extended release tablet 200 mg  200 mg Oral Daily Kossuth Zac, APRN - CNP   200 mg at 10/17/22 1514    rivaroxaban (XARELTO) tablet 20 mg  20 mg Oral Daily with breakfast Kossuth Zac, APRN - CNP   20 mg at 10/17/22 0818    spironolactone (ALDACTONE) tablet 25 mg  25 mg Oral Daily Kossuth Zac, APRN - CNP   25 mg at 10/17/22 0818    sodium chloride flush 0.9 % injection 5-40 mL  5-40 mL IntraVENous 2 times per day Kossuth Zac, APRN - CNP   10 mL at 10/17/22 0818    sodium chloride flush 0.9 % injection 5-40 mL  5-40 mL IntraVENous PRN Kossuth Zac, APRN - CNP        0.9 % sodium chloride infusion   IntraVENous PRN Kossuth Zac, APRN - CNP 75 mL/hr at 10/17/22 1107 New Bag at 10/17/22 1107    ondansetron (ZOFRAN-ODT) disintegrating tablet 4 mg  4 mg Oral Q8H PRN Kossuth Zac, APRN - CNP        Or    ondansetron (ZOFRAN) injection 4 mg  4 mg IntraVENous Q6H PRN Kossuth Zac, APRN - CNP        polyethylene glycol (GLYCOLAX) packet 17 g  17 g Oral Daily PRN Kossuth Zac, APRN - CNP        acetaminophen (TYLENOL) tablet 650 mg  650 mg Oral Q6H PRN Kossuth Zac, APRN - CNP   650 mg at 10/17/22 6294    Or    acetaminophen (TYLENOL) suppository 650 mg  650 mg Rectal Q6H PRN Kossuth Zac, APRN - CNP        perflutren lipid microspheres (DEFINITY) injection 1.65 mg  1.5 mL IntraVENous ONCE PRN Kossuth Zac, APRN - CNP        furosemide (LASIX) injection 40 mg  40 mg IntraVENous BID MACO Brush CNP   40 mg at 10/17/22 0817    escitalopram (LEXAPRO) tablet 10 mg  10 mg Oral Daily MACO Brush CNP   10 mg at 10/17/22 0818    sacubitril-valsartan (ENTRESTO)  MG per tablet 1 tablet  1 tablet Oral BID MACO Brush CNP   1 tablet at 10/17/22 9736        Allergies:  Lisinopril     Review of Systems:   Positive for shortness of breath  All other systems reviewed and negative    Physical Examination:    Vitals:    10/17/22 0851   BP:    Pulse:    Resp:    Temp:    SpO2: 97%      Weight: 227 lb 0.3 oz (103 kg)       Physical Exam  Vitals and nursing note reviewed. Constitutional:       Appearance: Normal appearance. HENT:      Head: Normocephalic and atraumatic. Mouth/Throat:      Mouth: Mucous membranes are moist.   Eyes:      Pupils: Pupils are equal, round, and reactive to light. Cardiovascular:      Rate and Rhythm: Normal rate and regular rhythm. Heart sounds: No murmur heard. Pulmonary:      Effort: Pulmonary effort is normal.      Breath sounds: Rales present. Abdominal:      General: Abdomen is flat. Tenderness: There is no abdominal tenderness. Musculoskeletal:      Right lower leg: Edema present. Left lower leg: Edema present. Skin:     General: Skin is warm and dry. Neurological:      General: No focal deficit present. Mental Status: He is alert and oriented to person, place, and time.    Psychiatric:         Mood and Affect: Mood normal.         Behavior: Behavior normal.        Labs  CBC:   Lab Results   Component Value Date/Time    WBC 5.7 10/16/2022 06:36 PM    RBC 5.00 10/16/2022 06:36 PM    HGB 15.6 10/16/2022 06:36 PM    HCT 46.5 10/16/2022 06:36 PM    MCV 93.1 10/16/2022 06:36 PM    RDW 13.8 10/16/2022 06:36 PM     10/16/2022 06:36 PM     CMP:    Lab Results   Component Value Date/Time     10/17/2022 06:07 AM    K 3.6 10/17/2022 06:07 AM    K 3.8 10/16/2022 06:36 PM     10/17/2022 06:07 AM    CO2 23 10/17/2022 06:07 AM    BUN 13 10/17/2022 06:07 AM    CREATININE 1.1 10/17/2022 06:07 AM    GFRAA >60 10/17/2022 06:07 AM    AGRATIO 0.9 10/16/2022 06:36 PM    LABGLOM >60 10/17/2022 06:07 AM    GLUCOSE 139 10/17/2022 06:07 AM    PROT 7.2 10/16/2022 06:36 PM    CALCIUM 8.8 10/17/2022 06:07 AM    BILITOT 0.9 10/16/2022 06:36 PM    ALKPHOS 144 10/16/2022 06:36 PM    AST 22 10/16/2022 06:36 PM    ALT 14 10/16/2022 06:36 PM     PT/INR:  No results found for: PTINR  Lab Results   Component Value Date    TROPONINI 0.04 (H) 10/17/2022       ECG: sinus tachycardia, possible left atrial enlargement; septal infarct; prolonged QT     Echo 12/3/2018:  Lucio Stager left ventricular function is severely decreased with ejection   fraction estimated from 15 % to 20 %. concentric left ventricular hypertrophy. The left atrium is moderately dilated. Right ventricular systolic function is severely reduced   mild MR   SPAP 56 mmHg markedly elevated RA pressure (15 mmHg). Cardiac cath 12/10/2018:  1. Right dominant coronary arterial circulation with a 30% proximal RCA  disease in the dominant vessel. These are very large coronary arteries. In the left system, there is no  left main disease. The circumflex  artery has 50% disease at the bifurcation of the obtuse marginal  branches and the proximal circ. In the left anterior descending artery,  there is a 25% disease in the proximal and mid-portions. 2.  Severe left ventricular systolic function. LV ejection fraction of  20%. 3.  Moderately elevated left ventricular end-diastolic pressure at 25  mmHg. 4.  Evidence of right-sided volume overload with right atrial pressure  of 11 and pulmonary capillary wedge pressure of 26 mmHg. This  corresponds to left ventricular end-diastolic pressure of 25 mmHg.   5.  Pulmonary hypertension, likely venous with an instantaneous pressure  of 37/15 for a mean pulmonary arterial pressure of 28.  6.  Normal mixed venous oxygen saturations at 65% in the right atrium  and 60% in the pulmonary artery. 7.  Pulmonary vascular resistance of 31.  8.  Normal cardiac output by thermodilution at 5.08 liters per minute  with a cardiac index of 2.19 liters per kilogram per minute. This is  likely falsely elevated. Old notes reviewed  Telemetry reviewed  Ekg personally reviewed  Chest xray personally reviewed  Echo, cath, and   Medications and labs reviewed    Assessment/Plan:  Principal Problem:    Decompensated Congestive heart failure due to cardiomyopathy ([facility] [address] -failure decompensation secondary to noncompliance for medication as well as dietary sodium intake. Patient does appear grossly volume overloaded. With IV diuresis 640 mg IV twice daily also was began diarrhea uptitrate and restart his GDMT for systolic dysfunction.  -lasix mg IV twice daily  -Continue high-dose Entresto, however I might have to convert this to just twice daily ARB given insurance  -Continue Toprol- mg daily  -Continue Aldactone 25 mg daily    Tobacco/ETOH abuse  -counselled regarding cessation    Hypertension  -antihypertensives as above    pAF  - Continue BB/Xarelto    ---    I will address the patient's cardiac risk factors and adjusted pharmacologic treatment as needed. In addition, I have reinforced the need for patient directed risk factor modification. Tobacco use was discussed with the patient and educated on the negative effects. I have asked the patient to not utilize these agents. Thank you for allowing to us to participate in the care or [employer]. Further evaluation will be based upon the patient's clinical course and testing results. All questions and concerns were addressed to the patient/family. Alternatives to my treatment were discussed. The note was completed using EMR.  Every effort was made to ensure accuracy; however, inadvertent computerized transcription errors may be present. Mely Dacosta MD  Interventional Cardiology  10/17/2022  5:26 PM    [address]  [facility], [address] [address]  Ph: [phone]  Fax: [phone]    NOTE: This report was transcribed using voice recognition software. Every effort was made to ensure accuracy, however, inadvertent computerized transcription errors may be present.

## 2022-10-17 NOTE — PROGRESS NOTES
Hospitalist Progress Note      PCP: Yvonne Gao MD    Date of Admission: 10/16/2022    Chief Complaint: shortness of breath    Hospital Course: 61 y.o. male with PMHx of CHF, DM, HTN and R MCA stroke s/p thrombectomy presented to [facility] with 10 day history of shortness of breath and chest pain. Symptoms much worse last 1-2 days. + orthopnea and dyspnea on exertion. No cough or congestion. No fever or chills. No change in appetite or nausea/vomiting. Pt reports stopping all medicines several weeks ago because he was feeling fine. Subjective: Pt seen and examined. Feels much better. Breathing has improved. States it is easier to lay flat. Medications:  Reviewed    Infusion Medications    sodium chloride 75 mL/hr at 10/17/22 1107     Scheduled Medications    allopurinol  100 mg Oral Daily    aspirin  81 mg Oral Daily    atorvastatin  40 mg Oral Nightly    Vitamin D  1,000 Units Oral Daily    metoprolol succinate  200 mg Oral Daily    rivaroxaban  20 mg Oral Daily with breakfast    spironolactone  25 mg Oral Daily    sodium chloride flush  5-40 mL IntraVENous 2 times per day    furosemide  40 mg IntraVENous BID    escitalopram  10 mg Oral Daily    sacubitril-valsartan  1 tablet Oral BID     PRN Meds: sodium chloride flush, sodium chloride, ondansetron **OR** ondansetron, polyethylene glycol, acetaminophen **OR** acetaminophen, perflutren lipid microspheres    No intake or output data in the 24 hours ending 10/17/22 1409    Exam:    BP (!) 132/90   Pulse (!) 121   Temp 98.3 °F (36.8 °C) (Oral)   Resp 17   Ht 6' 3\" (1.905 m)   Wt 227 lb 0.3 oz (103 kg)   SpO2 97%   BMI 28.38 kg/m²     General appearance: No apparent distress, appears stated age and cooperative. HEENT: Pupils equal, round, and reactive to light. Conjunctivae/corneas clear. Neck: Supple, with full range of motion. No jugular venous distention. Trachea midline. Respiratory:  Normal respiratory effort.  Clear to auscultation, bilaterally without Rales/Wheezes/Rhonchi. Cardiovascular: Regular rate and rhythm with normal S1/S2 without murmurs, rubs or gallops. Abdomen: Soft, non-tender, non-distended with normal bowel sounds. Musculoskeletal: No clubbing, cyanosis or edema bilaterally. Full range of motion without deformity. Skin: Skin color, texture, turgor normal.  No rashes or lesions. Neurologic:  Neurovascularly intact without any focal sensory/motor deficits. Cranial nerves: II-XII intact, grossly non-focal.  Psychiatric: Alert and oriented, thought content appropriate, normal insight  Capillary Refill: Brisk,< 3 seconds   Peripheral Pulses: +2 palpable, equal bilaterally       Labs:   Recent Labs     10/16/22  1836   WBC 5.7   HGB 15.6   HCT 46.5        Recent Labs     10/16/22  1836 10/17/22  0607    139   K 3.8 3.6    102   CO2 24 23   BUN 13 13   CREATININE 1.1 1.1   CALCIUM 8.8 8.8     Recent Labs     10/16/22  1836   AST 22   ALT 14   BILITOT 0.9   ALKPHOS 144*     Recent Labs     10/16/22  1836   INR 1.09     Recent Labs     10/16/22  1836 10/17/22  0049 10/17/22  0240   TROPONINI 0.04* 0.04* 0.04*       Urinalysis:      Lab Results   Component Value Date/Time    NITRU Negative 12/03/2018 10:50 PM    WBCUA 3-5 01/19/2018 12:45 PM    BACTERIA 1+ 01/19/2018 12:45 PM    RBCUA None seen 01/19/2018 12:45 PM    BLOODU Negative 12/03/2018 10:50 PM    SPECGRAV 1.006 12/03/2018 10:50 PM    GLUCOSEU Negative 12/03/2018 10:50 PM       Radiology:  CT CHEST PULMONARY EMBOLISM W CONTRAST   Final Result   No evidence of central or segmental pulmonary artery embolism. Limited study   for assessment of peripheral pulmonary emboli due to respiratory motion   artifact and suboptimal vessel opacification. Faint patchy of a posterior right lower lobe airspace disease which may   represent developing pneumonia in the appropriate setting. Lungs otherwise   clear. Cardiomegaly.   Trace right pleural effusion. No pericardial or left pleural   effusion. XR CHEST (2 VW)   Final Result   No acute process. Assessment/Plan:    Active Hospital Problems    Diagnosis Date Noted    Congestive heart failure due to cardiomyopathy ([facility] [address] [I50.9, I42.9] 10/16/2022     Priority: Medium       Acute on chronic systolic CHF exacerbation  - last echo: 7-2020 EF 45% to 50%. Diffuse hypokinesis         TTE and cardiac MRI (2020) as part of stroke work-up revealed 15-20% EF w/ diffuse severe hypokinesis. LHC/RHC showed non-obstructive disease. RV systolic function severely reduced w/ PAP 28. He was medically optimized at D/C w/ life vest for 3 months prior to considering AICD.  - BNP 4500, trop 0.04 and will trend  - continue metoprolol 200 mg daily, spironolactone 25 mg, entresto to 97/103  - daily weights and I/O  - consult cardiology  - consult HF nurse  - repeat Echo  - continue IV Lasix 40 mg BID    Hx R MCA stroke s/p thrombectomy   - thought to be cardio-embolic  - atorvastatin 85RD daily; ASA 81mg;   - Xarelto (due to cardioembolic etiology)     HTN  - monitor blood pressure  - BP meds per above     Diabetes mellitus II   - hold home metformin, SSI and CCD     Gout  - allopurinol      DVT Prophylaxis: Xarelto  Diet: ADULT DIET; Regular; 4 carb choices (60 gm/meal);  Low Sodium (2 gm)  Code Status: Full Code    PT/OT Eval Status: defer    Dispo - continue care    Lesley Santos MD

## 2022-10-17 NOTE — H&P
Hospital Medicine History & Physical      PCP: Patricia Lima MD    Date of Admission: 10/16/2022    Date of Service: Pt seen/examined on 10/16/2022 and Admitted to Inpatient with expected LOS greater than two midnights due to medical therapy. Chief Complaint:  Shortness of breath      History Of Present Illness:      61 y.o. male with PMHx of CHF, DM, HTN and R MCA stroke s/p thrombectomy presented to [facility] with 10 day history of shortness of breath and chest pain. Symptoms much worse last 1-2 days. + orthopnea and dyspnea on exertion. No cough or congestion. No fever or chills. No change in appetite or nausea/vomiting. Pt reports stopping all medicines several weeks ago because he was feeling fine. Past Medical History:          Diagnosis Date    CHF (congestive heart failure) ([facility]     Diabetes mellitus ([facility] [address]     Hyperlipidemia     Hypertension     Vitamin D deficiency        Past Surgical History:          Procedure Laterality Date    DIAGNOSTIC CARDIAC CATH LAB PROCEDURE  12/2018       Medications Prior to Admission:      Prior to Admission medications    Medication Sig Start Date End Date Taking?  Authorizing Provider   aspirin 81 MG tablet Take 1 tablet by mouth daily 1/31/20   Aletha Lanes, MD   isosorbide mononitrate (IMDUR) 60 MG extended release tablet Take 1 tablet by mouth daily 1/31/20   Aletha Lanes, MD   rivaroxaban Bradford Guerrero) 20 MG TABS tablet Take 1 tablet by mouth daily (with breakfast) 1/31/20   Aletha Lanes, MD   metFORMIN (GLUCOPHAGE) 1000 MG tablet Take 1 tablet by mouth 2 times daily (with meals) 1/31/20   Aletha Lanes, MD   atorvastatin (LIPITOR) 40 MG tablet Take 1 tablet by mouth nightly 1/31/20   Aletha Lanes, MD   losartan (COZAAR) 100 MG tablet Take 1 tablet by mouth daily 1/31/20   Aletha Lanes, MD   hydrALAZINE (APRESOLINE) 25 MG tablet Take 1 tablet by mouth 4 times daily 1/31/20   Aletha Lanes, MD   metoprolol succinate (TOPROL XL) 100 MG extended release tablet Take 1 tablet by mouth daily 1/31/20   Sharona Chamberlain MD   spironolactone (ALDACTONE) 25 MG tablet Take 1 tablet by mouth daily 1/31/20   Sharona Chamberlain MD   furosemide (LASIX) 40 MG tablet Take 2 tablets by mouth daily 1/31/20   Sharona Chamberlain MD   allopurinol (ZYLOPRIM) 100 MG tablet Take 1 tablet by mouth daily 1/31/20   Sharona Chamberlain MD   Cholecalciferol (VITAMIN D3) 25 MCG (1000 UT) TABS Take 1 tablet by mouth daily 1/31/20   Sharona Chamberlain MD       Allergies:  Lisinopril    Social History:      The patient currently lives home alone    TOBACCO:   reports that he has quit smoking. His smoking use included cigarettes. He smoked an average of .2 packs per day. He has never used smokeless tobacco.  ETOH:   reports current alcohol use. Family History:      Reviewed in detail positive as follows:        Problem Relation Age of Onset    Heart Failure Mother     Hypertension Father     Diabetes Father     Hypertension Sister     Heart Failure Sister        REVIEW OF SYSTEMS:   Pertinent positives as noted in the HPI. All other systems reviewed and negative. PHYSICAL EXAM PERFORMED:    BP (!) 151/118   Pulse (!) 123   Temp 98.3 °F (36.8 °C) (Oral)   Resp 22   Ht 6' 3\" (1.905 m)   Wt 234 lb 12.6 oz (106.5 kg)   SpO2 98%   BMI 29.35 kg/m²     General appearance:  Well developed, well nourished, AA male sitting upright on ED cart with mild respiratory distress after walking back from the BR. Pt appears stated age and cooperative. HEENT:  Normal cephalic, atraumatic without obvious deformity. Pupils equal, round, and reactive to light. Conjunctivae/corneas clear. Neck: Supple, with full range of motion. No jugular venous distention. Trachea midline. Respiratory:  Increased respiratory effort. Clear to auscultation, bilaterally without accessory muscle use.   Cardiovascular:  Tachycardic rate with regular rhythm without murmurs, no lower extremity edema. Abdomen: Soft, non-tender, non-distended, without rebound or guarding. Normal bowel sounds. Musculoskeletal:  Moves all extremities equally. Full range of motion without deformity. Skin: Skin warm, dry and intact. No rashes or lesions. Neurologic:  Neurovascularly intact without any focal sensory/motor deficits. Cranial nerves: II-XII intact, grossly non-focal.  Psychiatric:  Alert and oriented, thought content appropriate, normal insight  Capillary Refill: Brisk,< 3 seconds   Peripheral Pulses: +2 palpable, equal bilaterally       Labs:     Recent Labs     10/16/22  1836   WBC 5.7   HGB 15.6   HCT 46.5        Recent Labs     10/16/22  1836      K 3.8      CO2 24   BUN 13   CREATININE 1.1   CALCIUM 8.8     Recent Labs     10/16/22  1836   AST 22   ALT 14   BILITOT 0.9   ALKPHOS 144*     Recent Labs     10/16/22  1836   INR 1.09     Recent Labs     10/16/22  1836   TROPONINI 0.04*       Urinalysis:      Lab Results   Component Value Date/Time    NITRU Negative 12/03/2018 10:50 PM    WBCUA 3-5 01/19/2018 12:45 PM    BACTERIA 1+ 01/19/2018 12:45 PM    RBCUA None seen 01/19/2018 12:45 PM    BLOODU Negative 12/03/2018 10:50 PM    SPECGRAV 1.006 12/03/2018 10:50 PM    GLUCOSEU Negative 12/03/2018 10:50 PM       Radiology:     EKG:  I have reviewed the EKG with the following interpretation: sinus tachycardia, rate of 123. No acute ST elevation or ischemia    CT CHEST PULMONARY EMBOLISM W CONTRAST   Final Result   No evidence of central or segmental pulmonary artery embolism. Limited study   for assessment of peripheral pulmonary emboli due to respiratory motion   artifact and suboptimal vessel opacification. Faint patchy of a posterior right lower lobe airspace disease which may   represent developing pneumonia in the appropriate setting. Lungs otherwise   clear. Cardiomegaly. Trace right pleural effusion. No pericardial or left pleural   effusion.          XR CHEST (2 VW)   Final Result   No acute process. ASSESSMENT:    Active Hospital Problems    Diagnosis Date Noted    Congestive heart failure due to cardiomyopathy ([facility] [address] [I50.9, I42.9] 10/16/2022     Priority: Medium         PLAN:    Acute CHF due to cardiomyopathy   - last echo: 7-2020 EF 45% to 50%. Diffuse hypokinesis         TTE and cardiac MRI (2020) as part of stroke work-up revealed 15-20% EF w/ diffuse severe hypokinesis. LHC/RHC showed non-obstructive disease. RV systolic function severely reduced w/ PAP 28. He was medically optimized at D/C w/ life vest for 3 months prior to considering AICD.  - BNP 4500, trop 0.04 and will trend  - continue metoprolol 200 mg daily, spironolactone 25 mg, entresto to 97/103  - daily weights and I/O  - consult cardiology  - consult HF nurse    Hx R MCA stroke s/p thrombectomy   - thought to be cardio-embolic  - atorvastatin 71GG daily; ASA 81mg;   - Xarelto (due to cardioembolic etiology)     HTN  - monitor blood pressure  - BP meds per above     Diabetes mellitus II   - hold home metformin, SSI and CCD     Gout  - allopurinol    DVT Prophylaxis: Xarelto  Diet: Adult regular diet, 4 carb choices; low sodium  Code Status: Full Code    Dispo - Inpatient       [address], APRN - CNP    Thank you Jessica Macedo MD for the opportunity to be involved in this patient's care. If you have any questions or concerns please feel free to contact me at [phone].

## 2022-10-18 LAB
ANION GAP SERPL CALCULATED.3IONS-SCNC: 14 MMOL/L (ref 3–16)
BUN BLDV-MCNC: 13 MG/DL (ref 7–20)
CALCIUM SERPL-MCNC: 8.6 MG/DL (ref 8.3–10.6)
CHLORIDE BLD-SCNC: 101 MMOL/L (ref 99–110)
CO2: 23 MMOL/L (ref 21–32)
CREAT SERPL-MCNC: 1.1 MG/DL (ref 0.9–1.3)
GFR SERPL CREATININE-BSD FRML MDRD: >60 ML/MIN/{1.73_M2}
GLUCOSE BLD-MCNC: 182 MG/DL (ref 70–99)
GLUCOSE BLD-MCNC: 188 MG/DL (ref 70–99)
GLUCOSE BLD-MCNC: 188 MG/DL (ref 70–99)
GLUCOSE BLD-MCNC: 210 MG/DL (ref 70–99)
GLUCOSE BLD-MCNC: 235 MG/DL (ref 70–99)
LV EF: 13 %
LVEF MODALITY: NORMAL
MAGNESIUM: 1.8 MG/DL (ref 1.8–2.4)
PERFORMED ON: ABNORMAL
POTASSIUM SERPL-SCNC: 3.7 MMOL/L (ref 3.5–5.1)
PRO-BNP: 1339 PG/ML (ref 0–124)
SODIUM BLD-SCNC: 138 MMOL/L (ref 136–145)

## 2022-10-18 PROCEDURE — 83880 ASSAY OF NATRIURETIC PEPTIDE: CPT

## 2022-10-18 PROCEDURE — 2580000003 HC RX 258: Performed by: NURSE PRACTITIONER

## 2022-10-18 PROCEDURE — 80048 BASIC METABOLIC PNL TOTAL CA: CPT

## 2022-10-18 PROCEDURE — 99233 SBSQ HOSP IP/OBS HIGH 50: CPT | Performed by: STUDENT IN AN ORGANIZED HEALTH CARE EDUCATION/TRAINING PROGRAM

## 2022-10-18 PROCEDURE — 6370000000 HC RX 637 (ALT 250 FOR IP): Performed by: NURSE PRACTITIONER

## 2022-10-18 PROCEDURE — 36415 COLL VENOUS BLD VENIPUNCTURE: CPT

## 2022-10-18 PROCEDURE — 6370000000 HC RX 637 (ALT 250 FOR IP): Performed by: INTERNAL MEDICINE

## 2022-10-18 PROCEDURE — 6360000002 HC RX W HCPCS: Performed by: NURSE PRACTITIONER

## 2022-10-18 PROCEDURE — 83735 ASSAY OF MAGNESIUM: CPT

## 2022-10-18 PROCEDURE — 94760 N-INVAS EAR/PLS OXIMETRY 1: CPT

## 2022-10-18 PROCEDURE — 1200000000 HC SEMI PRIVATE

## 2022-10-18 PROCEDURE — 93306 TTE W/DOPPLER COMPLETE: CPT

## 2022-10-18 PROCEDURE — 6370000000 HC RX 637 (ALT 250 FOR IP): Performed by: STUDENT IN AN ORGANIZED HEALTH CARE EDUCATION/TRAINING PROGRAM

## 2022-10-18 RX ORDER — VALSARTAN 80 MG/1
160 TABLET ORAL 2 TIMES DAILY
Status: DISCONTINUED | OUTPATIENT
Start: 2022-10-18 | End: 2022-10-19 | Stop reason: HOSPADM

## 2022-10-18 RX ADMIN — METOPROLOL SUCCINATE 200 MG: 50 TABLET, EXTENDED RELEASE ORAL at 09:45

## 2022-10-18 RX ADMIN — VALSARTAN 160 MG: 80 TABLET, FILM COATED ORAL at 21:27

## 2022-10-18 RX ADMIN — SPIRONOLACTONE 25 MG: 25 TABLET ORAL at 09:45

## 2022-10-18 RX ADMIN — Medication 1000 UNITS: at 09:44

## 2022-10-18 RX ADMIN — RIVAROXABAN 20 MG: 20 TABLET, FILM COATED ORAL at 09:45

## 2022-10-18 RX ADMIN — INSULIN LISPRO 1 UNITS: 100 INJECTION, SOLUTION INTRAVENOUS; SUBCUTANEOUS at 18:43

## 2022-10-18 RX ADMIN — FUROSEMIDE 40 MG: 10 INJECTION, SOLUTION INTRAMUSCULAR; INTRAVENOUS at 18:43

## 2022-10-18 RX ADMIN — ASPIRIN 81 MG 81 MG: 81 TABLET ORAL at 09:43

## 2022-10-18 RX ADMIN — Medication 10 ML: at 21:36

## 2022-10-18 RX ADMIN — ATORVASTATIN CALCIUM 40 MG: 40 TABLET, FILM COATED ORAL at 21:27

## 2022-10-18 RX ADMIN — ESCITALOPRAM OXALATE 10 MG: 10 TABLET ORAL at 09:45

## 2022-10-18 RX ADMIN — SACUBITRIL AND VALSARTAN 1 TABLET: 97; 103 TABLET, FILM COATED ORAL at 09:52

## 2022-10-18 RX ADMIN — FUROSEMIDE 40 MG: 10 INJECTION, SOLUTION INTRAMUSCULAR; INTRAVENOUS at 09:46

## 2022-10-18 RX ADMIN — ALLOPURINOL 100 MG: 100 TABLET ORAL at 09:44

## 2022-10-18 ASSESSMENT — PAIN SCALES - GENERAL
PAINLEVEL_OUTOF10: 0

## 2022-10-18 NOTE — ACP (ADVANCE CARE PLANNING)
Advance Care Planning     Advance Care Planning Activator (Inpatient)  Conversation Note      Date of ACP Conversation: 10/18/2022     Conversation Conducted with: Patient with Decision Making Capacity    ACP Activator: Flavia Pollard RN    Health Care Decision Maker:     Current Designated Health Care Decision Maker:     Primary Decision Maker: Josefina De La Torre Brother/Sister - [phone]    Care Preferences    Ventilation: \"If you were in your present state of health and suddenly became very ill and were unable to breathe on your own, what would your preference be about the use of a ventilator (breathing machine) if it were available to you? \"      Would the patient desire the use of ventilator (breathing machine)?: yes    \"If your health worsens and it becomes clear that your chance of recovery is unlikely, what would your preference be about the use of a ventilator (breathing machine) if it were available to you? \"     Would the patient desire the use of ventilator (breathing machine)?: Yes      Resuscitation  \"CPR works best to restart the heart when there is a sudden event, like a heart attack, in someone who is otherwise healthy. Unfortunately, CPR does not typically restart the heart for people who have serious health conditions or who are very sick. \"    \"In the event your heart stopped as a result of an underlying serious health condition, would you want attempts to be made to restart your heart (answer \"yes\" for attempt to resuscitate) or would you prefer a natural death (answer \"no\" for do not attempt to resuscitate)? \" yes       [] Yes   [x] No   Educated Patient / Winferd Meeker regarding differences between Advance Directives and portable DNR orders.     Length of ACP Conversation in minutes:  3    Conversation Outcomes:  [x] ACP discussion completed  [] Existing advance directive reviewed with patient; no changes to patient's previously recorded wishes  [] New Advance Directive completed  [] Portable Do Not Rescitate prepared for Provider review and signature  [] POLST/POST/MOLST/MOST prepared for Provider review and signature      Follow-up plan:    [] Schedule follow-up conversation to continue planning  [] Referred individual to Provider for additional questions/concerns   [] Advised patient/agent/surrogate to review completed ACP document and update if needed with changes in condition, patient preferences or care setting    [] This note routed to one or more involved healthcare providers

## 2022-10-18 NOTE — CARE COORDINATION
INITIAL CASE MANAGEMENT ASSESSMENT    Reviewed chart, met with patient to assess possible discharge needs. Explained Case Management role/services. Living Situation: Lives alone in an apartment with 15 steps to enter. ADLs: Independent     DME: None    PT/OT Recs: None     Active Services: None     Transportation: Non-/Sister will transport     Medications: [facility]/No arriers    PCP: Marisela Alvarado MD      HD/PD: N/A    PLAN/COMMENTS: Plan: home. Follow for needs. SW/CM provided contact information for patient or family to call with any questions. SW/CM will follow and assist as needed.    Electronically signed by Cyrus Liu RN on 10/18/2022 at 4:54 PM

## 2022-10-18 NOTE — PROGRESS NOTES
Patient A+O, VSS. Returned to unit from Echo without complications. In bed resting at this time. Call light within reach.  Electronically signed by Alisa Burris RN on 10/18/2022 at 7:32 PM

## 2022-10-18 NOTE — PROGRESS NOTES
[address]    Admit Date: 10/16/2022    PCP: Parminder Vega MD                  : 1965  MRN: [id number]    Subjective: Interval History:   Patient seen and examined. Clinical notes reviewed. Telemetry reviewed. No new complaint today. No major events overnight. Denies having chest pain, shortness of breath, dyspnea on exertion, Orthopnea, PND at the time of this visit. Patient with 5x unmeasured urinary occurences - feeling much better  Still has some further diuresis to go, will continue IV diuresis today      Review of System:  Pertinent positive and negatives are in the HPI and interval above, the rest are negative. Data:   Scheduled Meds:   valsartan  160 mg Oral BID    insulin lispro  0-4 Units SubCUTAneous TID WC    insulin lispro  0-4 Units SubCUTAneous Nightly    allopurinol  100 mg Oral Daily    aspirin  81 mg Oral Daily    atorvastatin  40 mg Oral Nightly    Vitamin D  1,000 Units Oral Daily    metoprolol succinate  200 mg Oral Daily    rivaroxaban  20 mg Oral Daily with breakfast    spironolactone  25 mg Oral Daily    sodium chloride flush  5-40 mL IntraVENous 2 times per day    furosemide  40 mg IntraVENous BID    escitalopram  10 mg Oral Daily     PRN Meds:glucose, dextrose bolus **OR** dextrose bolus, glucagon (rDNA), dextrose, sodium chloride flush, sodium chloride, ondansetron **OR** ondansetron, polyethylene glycol, acetaminophen **OR** acetaminophen, perflutren lipid microspheres  I/O last 3 completed shifts: In: 1440 [P.O.:1440]  Out: -   No intake/output data recorded. Intake/Output Summary (Last 24 hours) at 10/18/2022 1520  Last data filed at 10/18/2022 0101  Gross per 24 hour   Intake 840 ml   Output --   Net 840 ml       Objective:     Vitals: /79   Pulse 83   Temp 98.2 °F (36.8 °C) (Oral)   Resp 20   Ht 6' 3\" (1.905 m)   Wt 226 lb 3.2 oz (102.6 kg)   SpO2 97%   BMI 28.27 kg/m²     Physical Exam  Vitals and nursing note reviewed. Constitutional:       Appearance: Normal appearance. He is not ill-appearing or diaphoretic. HENT:      Head: Normocephalic and atraumatic. Mouth/Throat:      Mouth: Mucous membranes are moist.   Eyes:      Pupils: Pupils are equal, round, and reactive to light. Cardiovascular:      Rate and Rhythm: Normal rate and regular rhythm. Heart sounds: No murmur heard. Pulmonary:      Effort: Pulmonary effort is normal.      Breath sounds: Rales present. Abdominal:      General: Abdomen is flat. There is no distension. Tenderness: There is no abdominal tenderness. Skin:     General: Skin is warm and dry. Neurological:      General: No focal deficit present. Mental Status: He is alert and oriented to person, place, and time. Psychiatric:         Mood and Affect: Mood normal.         Behavior: Behavior normal.           LABS:    CBC:   Recent Labs     10/16/22  1836   WBC 5.7   HGB 15.6   HCT 46.5   MCV 93.1                                                                   BMP:    Recent Labs     10/16/22  1836 10/17/22  0607 10/18/22  0620    139 138   K 3.8 3.6 3.7    102 101   CO2 24 23 23   BUN 13 13 13   CREATININE 1.1 1.1 1.1   GLUCOSE 127* 139* 188*       LFT's:   Recent Labs     10/16/22  1836   AST 22   ALT 14   BILITOT 0.9   ALKPHOS 144*       Troponin:   Recent Labs     10/16/22  1836 10/17/22  0049 10/17/22  0240   TROPONINI 0.04* 0.04* 0.04*         Lipids:   Recent Labs     10/17/22  0607   CHOL 137   HDL 37*       INR:   Recent Labs     10/16/22  1836   INR 1.09     -----------------------------------------------------------------  RAD:   CT CHEST PULMONARY EMBOLISM W CONTRAST   Final Result   No evidence of central or segmental pulmonary artery embolism. Limited study   for assessment of peripheral pulmonary emboli due to respiratory motion   artifact and suboptimal vessel opacification.       Faint patchy of a posterior right lower lobe airspace disease which may   represent developing pneumonia in the appropriate setting. Lungs otherwise   clear. Cardiomegaly. Trace right pleural effusion. No pericardial or left pleural   effusion. XR CHEST (2 VW)   Final Result   No acute process. ECG: sinus tachycardia, possible left atrial enlargement; septal infarct; prolonged QT     Echo 12/3/2018:  Chyna Andrade left ventricular function is severely decreased with ejection   fraction estimated from 15 % to 20 %. concentric left ventricular hypertrophy. The left atrium is moderately dilated. Right ventricular systolic function is severely reduced   mild MR   SPAP 56 mmHg markedly elevated RA pressure (15 mmHg). Cardiac cath 12/10/2018:  1. Right dominant coronary arterial circulation with a 30% proximal RCA  disease in the dominant vessel. These are very large coronary arteries. In the left system, there is no  left main disease. The circumflex  artery has 50% disease at the bifurcation of the obtuse marginal  branches and the proximal circ. In the left anterior descending artery,  there is a 25% disease in the proximal and mid-portions. 2.  Severe left ventricular systolic function. LV ejection fraction of  20%. 3.  Moderately elevated left ventricular end-diastolic pressure at 25  mmHg. 4.  Evidence of right-sided volume overload with right atrial pressure  of 11 and pulmonary capillary wedge pressure of 26 mmHg. This  corresponds to left ventricular end-diastolic pressure of 25 mmHg. 5.  Pulmonary hypertension, likely venous with an instantaneous pressure  of 37/15 for a mean pulmonary arterial pressure of 28.  6.  Normal mixed venous oxygen saturations at 65% in the right atrium  and 60% in the pulmonary artery. 7.  Pulmonary vascular resistance of 31.  8.  Normal cardiac output by thermodilution at 5.08 liters per minute  with a cardiac index of 2.19 liters per kilogram per minute.   This is  likely falsely elevated. Assessment/Plan:   Decompensated Congestive heart failure due to cardiomyopathy ([address] -failure decompensation secondary to noncompliance for medication as well as dietary sodium intake. Patient does appear grossly volume overloaded. With IV diuresis 640 mg IV twice daily also was began diarrhea uptitrate and restart his GDMT for systolic dysfunction.  -lasix mg IV twice daily for today, will transition to torsemide in AM  -Stop entresto and switch to Valsartan 180mg BID (approximate therapeutic interchange from his entresto dosage) this is a much more affordable option for him  -Continue Toprol- mg daily  -Continue Aldactone 25 mg daily  -Discussed with the patient will send him to [facility] for continued evaluation with heart failure/advanced therapy team     Tobacco/ETOH abuse  -counselled regarding cessation     Hypertension  -antihypertensives as above     pAF  - Continue BB/Xarelto    Jason Ayala MD  Interventional Cardiology  10/18/2022  3:20 PM    [place] [address]  Ph: [phone]  Fax: [phone]      NOTE: This report was transcribed using voice recognition software. Every effort was made to ensure accuracy, however, inadvertent computerized transcription errors may be present.

## 2022-10-19 VITALS
TEMPERATURE: 97.8 F | SYSTOLIC BLOOD PRESSURE: 100 MMHG | HEIGHT: 75 IN | DIASTOLIC BLOOD PRESSURE: 79 MMHG | BODY MASS INDEX: 29.28 KG/M2 | RESPIRATION RATE: 18 BRPM | WEIGHT: 235.45 LBS | HEART RATE: 74 BPM | OXYGEN SATURATION: 96 %

## 2022-10-19 LAB
ANION GAP SERPL CALCULATED.3IONS-SCNC: 9 MMOL/L (ref 3–16)
BUN BLDV-MCNC: 19 MG/DL (ref 7–20)
CALCIUM SERPL-MCNC: 8.8 MG/DL (ref 8.3–10.6)
CHLORIDE BLD-SCNC: 101 MMOL/L (ref 99–110)
CO2: 27 MMOL/L (ref 21–32)
CREAT SERPL-MCNC: 1.1 MG/DL (ref 0.9–1.3)
GFR SERPL CREATININE-BSD FRML MDRD: >60 ML/MIN/{1.73_M2}
GLUCOSE BLD-MCNC: 140 MG/DL (ref 70–99)
GLUCOSE BLD-MCNC: 144 MG/DL (ref 70–99)
GLUCOSE BLD-MCNC: 151 MG/DL (ref 70–99)
GLUCOSE BLD-MCNC: 211 MG/DL (ref 70–99)
MAGNESIUM: 1.7 MG/DL (ref 1.8–2.4)
PERFORMED ON: ABNORMAL
POTASSIUM SERPL-SCNC: 3.4 MMOL/L (ref 3.5–5.1)
PRO-BNP: 1310 PG/ML (ref 0–124)
SODIUM BLD-SCNC: 137 MMOL/L (ref 136–145)

## 2022-10-19 PROCEDURE — 80048 BASIC METABOLIC PNL TOTAL CA: CPT

## 2022-10-19 PROCEDURE — 83735 ASSAY OF MAGNESIUM: CPT

## 2022-10-19 PROCEDURE — 99233 SBSQ HOSP IP/OBS HIGH 50: CPT | Performed by: STUDENT IN AN ORGANIZED HEALTH CARE EDUCATION/TRAINING PROGRAM

## 2022-10-19 PROCEDURE — 6370000000 HC RX 637 (ALT 250 FOR IP): Performed by: NURSE PRACTITIONER

## 2022-10-19 PROCEDURE — 94760 N-INVAS EAR/PLS OXIMETRY 1: CPT

## 2022-10-19 PROCEDURE — 83880 ASSAY OF NATRIURETIC PEPTIDE: CPT

## 2022-10-19 PROCEDURE — 6360000002 HC RX W HCPCS: Performed by: NURSE PRACTITIONER

## 2022-10-19 PROCEDURE — 6370000000 HC RX 637 (ALT 250 FOR IP): Performed by: STUDENT IN AN ORGANIZED HEALTH CARE EDUCATION/TRAINING PROGRAM

## 2022-10-19 PROCEDURE — 36415 COLL VENOUS BLD VENIPUNCTURE: CPT

## 2022-10-19 PROCEDURE — 6370000000 HC RX 637 (ALT 250 FOR IP): Performed by: INTERNAL MEDICINE

## 2022-10-19 PROCEDURE — 2580000003 HC RX 258: Performed by: NURSE PRACTITIONER

## 2022-10-19 RX ORDER — TORSEMIDE 20 MG/1
20 TABLET ORAL DAILY
Qty: 30 TABLET | Refills: 3 | Status: SHIPPED | OUTPATIENT
Start: 2022-10-19

## 2022-10-19 RX ORDER — VALSARTAN 160 MG/1
160 TABLET ORAL 2 TIMES DAILY
Qty: 30 TABLET | Refills: 0 | Status: SHIPPED | OUTPATIENT
Start: 2022-10-19

## 2022-10-19 RX ORDER — TORSEMIDE 20 MG/1
20 TABLET ORAL DAILY
Status: DISCONTINUED | OUTPATIENT
Start: 2022-10-19 | End: 2022-10-19 | Stop reason: HOSPADM

## 2022-10-19 RX ORDER — METOPROLOL SUCCINATE 200 MG/1
200 TABLET, EXTENDED RELEASE ORAL DAILY
Qty: 30 TABLET | Refills: 0 | Status: SHIPPED | OUTPATIENT
Start: 2022-10-20

## 2022-10-19 RX ADMIN — FUROSEMIDE 40 MG: 10 INJECTION, SOLUTION INTRAMUSCULAR; INTRAVENOUS at 08:36

## 2022-10-19 RX ADMIN — ESCITALOPRAM OXALATE 10 MG: 10 TABLET ORAL at 08:35

## 2022-10-19 RX ADMIN — Medication 10 ML: at 08:36

## 2022-10-19 RX ADMIN — SPIRONOLACTONE 25 MG: 25 TABLET ORAL at 08:35

## 2022-10-19 RX ADMIN — VALSARTAN 160 MG: 80 TABLET, FILM COATED ORAL at 08:35

## 2022-10-19 RX ADMIN — METOPROLOL SUCCINATE 200 MG: 50 TABLET, EXTENDED RELEASE ORAL at 08:54

## 2022-10-19 RX ADMIN — ALLOPURINOL 100 MG: 100 TABLET ORAL at 08:36

## 2022-10-19 RX ADMIN — RIVAROXABAN 20 MG: 20 TABLET, FILM COATED ORAL at 08:35

## 2022-10-19 RX ADMIN — INSULIN LISPRO 1 UNITS: 100 INJECTION, SOLUTION INTRAVENOUS; SUBCUTANEOUS at 12:09

## 2022-10-19 RX ADMIN — Medication 1000 UNITS: at 08:36

## 2022-10-19 RX ADMIN — ASPIRIN 81 MG 81 MG: 81 TABLET ORAL at 08:35

## 2022-10-19 NOTE — NURSE NAVIGATOR
Discharge order noted. Pt has had 60 minutes of heart failure education. He is going to follow up within 7 days with Cardiology here Vaishnavi May NP) while the details are being worked out with [facility]. We have left a message with them to schedule an appointment. All GDMT have been addressed.  His weight today is 235 lbs

## 2022-10-19 NOTE — DISCHARGE INSTR - COC
Continuity of Care Form    Patient Name: Darling Worrell   :  1965  MRN:  [id number]    Admit date:  10/16/2022  Discharge date:  ***    Code Status Order: Full Code   Advance Directives:     Admitting Physician:  Mell Steele MD  PCP: Jessica Foster MD    Discharging Nurse: [facility] Unit/Room#: W9F-4085/3278-70  Discharging Unit Phone Number: ***    Emergency Contact:   Extended Emergency Contact Information  Primary Emergency Contact: Monse Kennygabriel Stephenson Swenson of [address] [place] [address] Phone: [phone]  Mobile Phone: [phone]  Relation: Niece/Nephew  Secondary Emergency Contact: Delia Doran Phone: [phone]  Mobile Phone: [phone]  Relation: Brother/Sister    Past Surgical History:  Past Surgical History:   Procedure Laterality Date    DIAGNOSTIC CARDIAC CATH LAB PROCEDURE  2018       Immunization History: There is no immunization history on file for this patient.     Active Problems:  Patient Active Problem List   Diagnosis Code    Essential hypertension I10    Hyperlipidemia E78.5    DMII (diabetes mellitus, type 2) ([facility]) E11.9    Nonischemic cardiomyopathy ([facility]) I42.8    Tobacco abuse Z72.0    CAD in native artery I25.10    Chronic systolic HF (heart failure) ([facility]) I50.22    CHF (congestive heart failure), NYHA class III, acute, systolic ([facility]) Q53.83    Chest pain R07.9    Knee effusion, left M25.462    Right wrist pain M25.531    Acute ischemic right MCA stroke ([facility]) I63.511    Congestive heart failure due to cardiomyopathy ([facility]) I50.9, I42.9       Isolation/Infection:   Isolation            No Isolation          Patient Infection Status       Infection Onset Added Last Indicated Last Indicated By Review Planned Expiration Resolved Resolved By    None active    Resolved    COVID-19 (Rule Out) 10/16/22 10/16/22 10/16/22 COVID-19, Rapid (Ordered)   10/16/22 Rule-Out Test Resulted            Nurse Assessment:  Last Vital Signs: /72   Pulse 78 Temp 98.1 °F (36.7 °C) (Oral)   Resp 18   Ht 6' 3\" (1.905 m)   Wt 235 lb 7.2 oz (106.8 kg)   SpO2 94%   BMI 29.43 kg/m²     Last documented pain score (0-10 scale): Pain Level: 0  Last Weight:   Wt Readings from Last 1 Encounters:   10/19/22 235 lb 7.2 oz (106.8 kg)     Mental Status:  {IP PT MENTAL STATUS:96220}    IV Access:  { ABDIRASHID IV ACCESS:989424802}    Nursing Mobility/ADLs:  Walking   {CHP DME GLVN:637342532}  Transfer  {CHP DME OCQE:650319527}  Bathing  {CHP DME OCYM:971839614}  Dressing  {CHP DME LCLF:859477671}  Toileting  {CHP DME TLNW:661819496}  Feeding  {CHP DME VCQY:708333857}  Med Admin  {CHP DME WIMF:456365241}  Med Delivery   { ABDIRASHID MED Delivery:836597037}    Wound Care Documentation and Therapy:        Elimination:  Continence: Bowel: {YES / EC:36561}  Bladder: {YES / HENDRICKS:31176}  Urinary Catheter: {Urinary Catheter:186267608}   Colostomy/Ileostomy/Ileal Conduit: {YES / KB:61815}       Date of Last BM: ***    Intake/Output Summary (Last 24 hours) at 10/19/2022 1517  Last data filed at 10/19/2022 0358  Gross per 24 hour   Intake 60 ml   Output --   Net 60 ml     I/O last 3 completed shifts:   In: 5 [P.O.:540]  Out: -     Safety Concerns:     [address] Safety Concerns:424818601}    Impairments/Disabilities:      [address] Impairments/Disabilities:710705950}    Nutrition Therapy:  Current Nutrition Therapy:   [address] Diet List:783771687}    Routes of Feeding: {CHP DME Other Feedings:419459108}  Liquids: {Slp liquid thickness:01446}  Daily Fluid Restriction: {CHP DME Yes amt example:663684900}  Last Modified Barium Swallow with Video (Video Swallowing Test): {Done Not Done MZII:791953435}    Treatments at the Time of Hospital Discharge:   Respiratory Treatments: ***  Oxygen Therapy:  {Therapy; copd oxygen:17373}  Ventilator:    {CORNELIO PATTON Vent KZHX:838323385}    Rehab Therapies: {THERAPEUTIC INTERVENTION:3080744278}  Weight Bearing Status/Restrictions: {CORNELIO PATTON Weight Bearin}  Other Medical Equipment (for information only, NOT a DME order):  {EQUIPMENT:788909691}  Other Treatments: ***    Patient's personal belongings (please select all that are sent with patient):  {CHP DME Belongings:260932897}    RN SIGNATURE:  {Esignature:985875248}    CASE MANAGEMENT/SOCIAL WORK SECTION    Inpatient Status Date: ***    Readmission Risk Assessment Score:  Readmission Risk              Risk of Unplanned Readmission:  11           Discharging to Facility/ Agency   Name:   Address:  Phone:  Fax:    Dialysis Facility (if applicable)   Name:  Address:  Dialysis Schedule:  Phone:  Fax:    / signature: {Esignature:053837403}    PHYSICIAN SECTION    Prognosis: Fair    Condition at Discharge: Stable    Rehab Potential (if transferring to Rehab): Fair    Recommended Labs or Other Treatments After Discharge: Follow-up with PCP within 7 days from discharge, not doing so could have life-threatening consequences. Take medication as instructed;  return to the emergency department if persistent symptoms, experiencing side effects from medication including nausea vomiting or unable to keep medications down.    Follow up with cardiology recommended      PHYSICIAN SIGNATURE:  Electronically signed by Mansoor Aguilar MD on 10/19/22 at 3:17 PM EDT

## 2022-10-19 NOTE — DISCHARGE SUMMARY
Hospital Medicine Discharge Summary    Patient ID: Taras Donis      Patient's PCP: Ysabel Wilson MD    Admit Date: 10/16/2022     Discharge Date:   10/19/22    Admitting Provider: Tristin Jeff MD     Discharge Provider: Sudheer Rudolph MD     Discharge Diagnoses: Active Hospital Problems    Diagnosis     Congestive heart failure due to cardiomyopathy ([facility] [address] [I50.9, I42.9]      Priority: Medium       The patient was seen and examined on day of discharge and this discharge summary is in conjunction with any daily progress note from day of discharge. Hospital Course:        Acute on chronic systolic CHF exacerbation  - last echo: 7-2020 EF 45% to 50%. Diffuse hypokinesis         TTE and cardiac MRI (2020) as part of stroke work-up revealed 15-20% EF w/ diffuse severe hypokinesis. LHC/RHC showed non-obstructive disease. RV systolic function severely reduced w/ PAP 28. He was medically optimized at D/C w/ life vest for 3 months prior to considering AICD.  - BNP 4500, trop 0.04 and will trend  - continue metoprolol 200 mg daily, spironolactone 25 mg, valsartan, spironolactone and torsemide  - daily weights and I/O  - consult cardiology  - consult HF nurse  - repeat Echo showed EF 10-15% with severe diffuse hypokinesis, grade III DD   -Patient to follow-up with cardiology to decide regarding AICD placement after maximal medical therapy.   SGLT2 initiation defer to cardiology in the outpatient setting    Hx R MCA stroke s/p thrombectomy   - thought to be cardio-embolic  - atorvastatin 85IW daily; ASA 81mg;   - Xarelto (due to cardioembolic etiology)     HTN  - monitor blood pressure  - BP meds per above     Diabetes mellitus II   - hold home metformin, SSI and CCD     Gout  - allopurinol      Physical Exam Performed:     /72   Pulse 78   Temp 98.1 °F (36.7 °C) (Oral)   Resp 18   Ht 6' 3\" (1.905 m)   Wt 235 lb 7.2 oz (106.8 kg)   SpO2 94%   BMI 29.43 kg/m²       General appearance:  No apparent distress, appears stated age and cooperative. HEENT:  Normal cephalic, atraumatic without obvious deformity. Pupils equal, round, and reactive to light. Extra ocular muscles intact. Conjunctivae/corneas clear. Neck: Supple, with full range of motion. No jugular venous distention. Trachea midline. Respiratory:  Normal respiratory effort. Clear to auscultation, bilaterally without Rales/Wheezes/Rhonchi. Cardiovascular:  Regular rate and rhythm with normal S1/S2 without murmurs, rubs or gallops. Abdomen: Soft, non-tender, non-distended with normal bowel sounds. Musculoskeletal:  No clubbing, cyanosis or edema bilaterally. Full range of motion without deformity. Skin: Skin color, texture, turgor normal.  No rashes or lesions. Neurologic:  Neurovascularly intact without any focal sensory/motor deficits. Cranial nerves: II-XII intact, grossly non-focal.  Psychiatric:  Alert and oriented, thought content appropriate, normal insight  Capillary Refill: Brisk,< 3 seconds   Peripheral Pulses: +2 palpable, equal bilaterally       Labs: For convenience and continuity at follow-up the following most recent labs are provided:      CBC:    Lab Results   Component Value Date/Time    WBC 5.7 10/16/2022 06:36 PM    HGB 15.6 10/16/2022 06:36 PM    HCT 46.5 10/16/2022 06:36 PM     10/16/2022 06:36 PM       Renal:    Lab Results   Component Value Date/Time     10/19/2022 05:49 AM    K 3.4 10/19/2022 05:49 AM    K 3.8 10/16/2022 06:36 PM     10/19/2022 05:49 AM    CO2 27 10/19/2022 05:49 AM    BUN 19 10/19/2022 05:49 AM    CREATININE 1.1 10/19/2022 05:49 AM    CALCIUM 8.8 10/19/2022 05:49 AM         Significant Diagnostic Studies    Radiology:   CT CHEST PULMONARY EMBOLISM W CONTRAST   Final Result   No evidence of central or segmental pulmonary artery embolism.   Limited study   for assessment of peripheral pulmonary emboli due to respiratory motion   artifact and suboptimal vessel opacification. Faint patchy of a posterior right lower lobe airspace disease which may   represent developing pneumonia in the appropriate setting. Lungs otherwise   clear. Cardiomegaly. Trace right pleural effusion. No pericardial or left pleural   effusion. XR CHEST (2 VW)   Final Result   No acute process. Consults:     IP CONSULT TO CARDIOLOGY  IP CONSULT TO HEART FAILURE NURSE/COORDINATOR  IP CONSULT TO DIETITIAN  IP CONSULT TO CARDIOLOGY  IP CONSULT TO CARDIAC REHAB    Disposition:  home     Condition at Discharge: Stable    Discharge Instructions/Follow-up:  Follow-up with PCP within 7 days from discharge, not doing so could have life-threatening consequences. Take medication as instructed;  return to the emergency department if persistent symptoms, experiencing side effects from medication including nausea vomiting or unable to keep medications down.      Code Status:  Full Code     Activity: activity as tolerated    Diet: cardiac diet      Discharge Medications:     Current Discharge Medication List             Details   valsartan (DIOVAN) 160 MG tablet Take 1 tablet by mouth in the morning and at bedtime  Qty: 30 tablet, Refills: 0      torsemide (DEMADEX) 20 MG tablet Take 1 tablet by mouth daily  Qty: 30 tablet, Refills: 3                Details   metoprolol succinate (TOPROL XL) 200 MG extended release tablet Take 1 tablet by mouth daily  Qty: 30 tablet, Refills: 0                Details   escitalopram (LEXAPRO) 10 MG tablet Take 10 mg by mouth daily      aspirin 81 MG tablet Take 1 tablet by mouth daily  Qty: 30 tablet, Refills: 0    Associated Diagnoses: Acute on chronic systolic congestive heart failure (HCC)      rivaroxaban (XARELTO) 20 MG TABS tablet Take 1 tablet by mouth daily (with breakfast)  Qty: 30 tablet, Refills: 0      metFORMIN (GLUCOPHAGE) 1000 MG tablet Take 1 tablet by mouth 2 times daily (with meals)  Qty: 60 tablet, Refills: 0      atorvastatin (LIPITOR) 40 MG tablet Take 1 tablet by mouth nightly  Qty: 30 tablet, Refills: 0    Associated Diagnoses: Acute on chronic systolic congestive heart failure (HCC)      spironolactone (ALDACTONE) 25 MG tablet Take 1 tablet by mouth daily  Qty: 30 tablet, Refills: 0    Associated Diagnoses: Acute on chronic systolic congestive heart failure (HCC)      allopurinol (ZYLOPRIM) 100 MG tablet Take 1 tablet by mouth daily  Qty: 30 tablet, Refills: 0      Cholecalciferol (VITAMIN D3) 25 MCG (1000 UT) TABS Take 1 tablet by mouth daily  Qty: 30 tablet, Refills: 0             Time Spent on discharge is more than 30 minutes in the examination, evaluation, counseling and review of medications and discharge plan. Signed:    Mohsen Arceo MD   10/19/2022      Thank you Diane Stephens MD for the opportunity to be involved in this patient's care. If you have any questions or concerns, please feel free to contact me at [phone].

## 2022-10-19 NOTE — PROGRESS NOTES
[facility]    Admit Date: 10/16/2022    PCP: Libby Jimenez MD                  : 1965  MRN: [id number]    Subjective: Interval History:   Patient seen and examined. Clinical notes reviewed. Telemetry reviewed. No new complaint today. No major events overnight. Denies having chest pain, shortness of breath, dyspnea on exertion, Orthopnea, PND at the time of this visit. Patient with unmeasured urine outputs, feeling much better  From a lab standpoint it does appear as though he is naina initial bicarb was around 23 up to 27 now. Review of System:  Pertinent positive and negatives are in the HPI and interval above, the rest are negative. Data:   Scheduled Meds:   torsemide  20 mg Oral Daily    valsartan  160 mg Oral BID    insulin lispro  0-4 Units SubCUTAneous TID WC    insulin lispro  0-4 Units SubCUTAneous Nightly    allopurinol  100 mg Oral Daily    aspirin  81 mg Oral Daily    atorvastatin  40 mg Oral Nightly    Vitamin D  1,000 Units Oral Daily    metoprolol succinate  200 mg Oral Daily    rivaroxaban  20 mg Oral Daily with breakfast    spironolactone  25 mg Oral Daily    sodium chloride flush  5-40 mL IntraVENous 2 times per day    escitalopram  10 mg Oral Daily     PRN Meds:glucose, dextrose bolus **OR** dextrose bolus, glucagon (rDNA), dextrose, sodium chloride flush, sodium chloride, ondansetron **OR** ondansetron, polyethylene glycol, acetaminophen **OR** acetaminophen, perflutren lipid microspheres  I/O last 3 completed shifts: In: 540 [P.O.:540]  Out: -   No intake/output data recorded.     Intake/Output Summary (Last 24 hours) at 10/19/2022 1059  Last data filed at 10/19/2022 0358  Gross per 24 hour   Intake 60 ml   Output --   Net 60 ml         Objective:     Vitals: /81   Pulse 78   Temp 98.6 °F (37 °C) (Oral)   Resp 16   Ht 6' 3\" (1.905 m)   Wt 235 lb 7.2 oz (106.8 kg)   SpO2 93%   BMI 29.43 kg/m²     Physical Exam  Vitals and nursing note reviewed. Constitutional:       Appearance: Normal appearance. He is not ill-appearing or diaphoretic. HENT:      Head: Normocephalic and atraumatic. Mouth/Throat:      Mouth: Mucous membranes are moist.   Eyes:      Pupils: Pupils are equal, round, and reactive to light. Cardiovascular:      Rate and Rhythm: Normal rate and regular rhythm. Heart sounds: No murmur heard. Pulmonary:      Effort: Pulmonary effort is normal.      Breath sounds: Rales present. Abdominal:      General: Abdomen is flat. There is no distension. Tenderness: There is no abdominal tenderness. Skin:     General: Skin is warm and dry. Neurological:      General: No focal deficit present. Mental Status: He is alert and oriented to person, place, and time. Psychiatric:         Mood and Affect: Mood normal.         Behavior: Behavior normal.           LABS:    CBC:   Recent Labs     10/16/22  1836   WBC 5.7   HGB 15.6   HCT 46.5   MCV 93.1                                                                     BMP:    Recent Labs     10/17/22  0607 10/18/22  0620 10/19/22  0549    138 137   K 3.6 3.7 3.4*    101 101   CO2 23 23 27   BUN 13 13 19   CREATININE 1.1 1.1 1.1   GLUCOSE 139* 188* 151*         LFT's:   Recent Labs     10/16/22  1836   AST 22   ALT 14   BILITOT 0.9   ALKPHOS 144*         Troponin:   Recent Labs     10/16/22  1836 10/17/22  0049 10/17/22  0240   TROPONINI 0.04* 0.04* 0.04*           Lipids:   Recent Labs     10/17/22  0607   CHOL 137   HDL 37*         INR:   Recent Labs     10/16/22  1836   INR 1.09       -----------------------------------------------------------------  RAD:   CT CHEST PULMONARY EMBOLISM W CONTRAST   Final Result   No evidence of central or segmental pulmonary artery embolism. Limited study   for assessment of peripheral pulmonary emboli due to respiratory motion   artifact and suboptimal vessel opacification.       Faint patchy of a posterior right lower lobe airspace disease which may   represent developing pneumonia in the appropriate setting. Lungs otherwise   clear. Cardiomegaly. Trace right pleural effusion. No pericardial or left pleural   effusion. XR CHEST (2 VW)   Final Result   No acute process. ECG: sinus tachycardia, possible left atrial enlargement; septal infarct; prolonged QT     Echo 12/3/2018:  Sherisam Valeriojerome left ventricular function is severely decreased with ejection   fraction estimated from 15 % to 20 %. concentric left ventricular hypertrophy. The left atrium is moderately dilated. Right ventricular systolic function is severely reduced   mild MR   SPAP 56 mmHg markedly elevated RA pressure (15 mmHg). Cardiac cath 12/10/2018:  1. Right dominant coronary arterial circulation with a 30% proximal RCA  disease in the dominant vessel. These are very large coronary arteries. In the left system, there is no  left main disease. The circumflex  artery has 50% disease at the bifurcation of the obtuse marginal  branches and the proximal circ. In the left anterior descending artery,  there is a 25% disease in the proximal and mid-portions. 2.  Severe left ventricular systolic function. LV ejection fraction of  20%. 3.  Moderately elevated left ventricular end-diastolic pressure at 25  mmHg. 4.  Evidence of right-sided volume overload with right atrial pressure  of 11 and pulmonary capillary wedge pressure of 26 mmHg. This  corresponds to left ventricular end-diastolic pressure of 25 mmHg. 5.  Pulmonary hypertension, likely venous with an instantaneous pressure  of 37/15 for a mean pulmonary arterial pressure of 28.  6.  Normal mixed venous oxygen saturations at 65% in the right atrium  and 60% in the pulmonary artery. 7.  Pulmonary vascular resistance of 31.  8.  Normal cardiac output by thermodilution at 5.08 liters per minute  with a cardiac index of 2.19 liters per kilogram per minute.   This is  likely falsely elevated. Assessment/Plan:   Decompensated Congestive heart failure due to cardiomyopathy ([address] -failure decompensation secondary to noncompliance for medication as well as dietary sodium intake. Patient does appear grossly volume overloaded. With IV diuresis 640 mg IV twice daily also was began diarrhea uptitrate and restart his GDMT for systolic dysfunction.  -Start p.o. torsemide, which I will plan to discharge him on  -Continue valsartan 180 mg twice daily, patient unable to afford Entresto  -Continue Toprol- mg daily  -Continue Aldactone 25 mg daily  -I discussed this case with heart failure at [facility] where he had followed previously, at this point he is fortunate that his renal function is maintained steady and his RV looks reasonable on echo mildly reduced whereas in the past his RV function had been severely reduced. My concern for Manjeet Goel is that there is a good likelihood his cardiomyopathy is not going to significantly improve even with medical therapy, but ends up being the case I would like him to be evaluated for advanced therapies. -SGLT2 as an outpatient, has had difficulties with insurance coverage  -After a period of extended GDMT will have repeat TTE performed in 90 days for assessment of candidacy for device therapy (ICD)      Tobacco/ETOH abuse  -counselled regarding cessation     Hypertension  -antihypertensives as above     pAF  -Continue BB/Xarelto    The patient is okay for discharge from a cardiovascular disease standpoint this evening. Cardiology will sign off at this time, please do not hesitate to reengage us if any new issues arise or significant clinical changes. Thank you so much for allowing us to participate in this consult.         Darlene Alan MD  Interventional Cardiology  10/19/2022  10:59 AM    [address]  [facility], [address] [place], [place] [address]  Ph: [phone]  Fax: [phone]      NOTE: This report was transcribed using voice recognition software. Every effort was made to ensure accuracy, however, inadvertent computerized transcription errors may be present.

## 2022-10-19 NOTE — PROGRESS NOTES
Discharged patient. IV removed without any complication. Answer all questions of a patient. Walked patient to the car.

## 2022-10-19 NOTE — CONSULTS
[facility]  HEART FAILURE PROGRAM      NAME:  Kamron [address] RECORD NUMBER:  [id number]  AGE: 61 y.o.    GENDER: male  : 1965  TODAY'S DATE:  10/19/2022    Subjective:     VISIT TYPE: evaluation     ADMITTING PHYSICIAN:  Mary Collins MD    PAST MEDICAL HISTORY        Diagnosis Date    CHF (congestive heart failure) ([facility]     CVA (cerebral vascular accident) ([facility]     Diabetes mellitus ([facility]     Hyperlipidemia     Hypertension     Vitamin D deficiency        SOCIAL HISTORY    Social History     Tobacco Use    Smoking status: Former     Packs/day: 0.20     Types: Cigarettes    Smokeless tobacco: Never    Tobacco comments:     occassional   Vaping Use    Vaping Use: Never used   Substance Use Topics    Alcohol use: Yes     Comment: few beers every few weeks    Drug use: No       ALLERGIES    Allergies   Allergen Reactions    Lisinopril Swelling     Lips swell       MEDICATIONS  Scheduled Meds:   valsartan  160 mg Oral BID    insulin lispro  0-4 Units SubCUTAneous TID WC    insulin lispro  0-4 Units SubCUTAneous Nightly    allopurinol  100 mg Oral Daily    aspirin  81 mg Oral Daily    atorvastatin  40 mg Oral Nightly    Vitamin D  1,000 Units Oral Daily    metoprolol succinate  200 mg Oral Daily    rivaroxaban  20 mg Oral Daily with breakfast    spironolactone  25 mg Oral Daily    sodium chloride flush  5-40 mL IntraVENous 2 times per day    furosemide  40 mg IntraVENous BID    escitalopram  10 mg Oral Daily       ADMIT DATE: 10/16/2022      Objective:     ADMISSION DIAGNOSIS:   Tachycardia [R00.0]  Elevated troponin [R77.8]  Dyspnea, unspecified type [R06.00]  Chest pain, unspecified type [R07.9]  Acute on chronic congestive heart failure, unspecified heart failure type (HCC) [I50.9]  Congestive heart failure due to cardiomyopathy ([facility] [I50.9, I42.9]     /81   Pulse 78   Temp 98.6 °F (37 °C) (Oral)   Resp 16   Ht 6' 3\" (1.905 m)   Wt 235 lb 7.2 oz (106.8 kg)   SpO2 93%   BMI 29.43 kg/m²     ADMIT:  Weight: 234 lb 12.6 oz (106.5 kg)    TODAY: Weight: 235 lb 7.2 oz (106.8 kg)    Wt Readings from Last 10 Encounters:   10/19/22 235 lb 7.2 oz (106.8 kg)   02/03/20 223 lb 8.7 oz (101.4 kg)   04/09/19 214 lb 4.6 oz (97.2 kg)   02/21/19 229 lb 12.8 oz (104.2 kg)   02/21/19 228 lb (103.4 kg)   02/21/19 231 lb 7.7 oz (105 kg)   02/08/19 224 lb 10.4 oz (101.9 kg)   02/07/19 230 lb 2.6 oz (104.4 kg)   02/06/19 232 lb 5.8 oz (105.4 kg)   02/06/19 237 lb (107.5 kg)          Intake/Output Summary (Last 24 hours) at 10/19/2022 0938  Last data filed at 10/19/2022 0358  Gross per 24 hour   Intake 60 ml   Output --   Net 60 ml       LABS  BMP:   Lab Results   Component Value Date/Time     10/19/2022 05:49 AM    K 3.4 10/19/2022 05:49 AM    K 3.8 10/16/2022 06:36 PM     10/19/2022 05:49 AM    CO2 27 10/19/2022 05:49 AM    BUN 19 10/19/2022 05:49 AM    LABALBU 3.5 10/16/2022 06:36 PM    CREATININE 1.1 10/19/2022 05:49 AM    CALCIUM 8.8 10/19/2022 05:49 AM    GFRAA >60 10/17/2022 06:07 AM    LABGLOM >60 10/19/2022 05:49 AM    GLUCOSE 151 10/19/2022 05:49 AM     CBC:   Recent Labs     10/16/22  1836   WBC 5.7   HGB 15.6   HCT 46.5   MCV 93.1        BNP: No results found for: BNP    ECHOCARDIOGRAM:     Summary   Left ventricular cavity size is at the upper limits of normal.   There is moderately increased left ventricular wall thickness. Overall left ventricular systolic function appears severely reduced. Ejection fraction is visually estimated to be 10-15%. There is severe diffuse hypokinesis. Grade III diastolic dysfunction with elevated LV filling pressures. Mild mitral regurgitation. The right ventricle is normal in size. Right ventricular systolic function is mildly reduced .       Signature      ------------------------------------------------------------------   Electronically signed by Dion Harrell MD (Interpreting   physician) on 10/18/2022 at 04:23 PM ------------------------------------------------------------------       Assessment:     CONSULTS:   IP CONSULT TO CARDIOLOGY  IP CONSULT TO HEART FAILURE NURSE/COORDINATOR  IP CONSULT TO DIETITIAN  IP CONSULT TO CARDIOLOGY    Patient has a CARDIOLOGY CONSULT: Yes      Patient taking an ACEI/ARB:  Yes       Patient taking a BETA BLOCKER:  Yes    SCALE AVAILABLE:  Yes     EDUCATION STATUS: Patient   [x]  Provided both written and verbal education on Heart Failure signs/symptoms. [x] Provided instructions on daily medications. [x]  Provided instructions to monitor and record weight daily. [x]  Provided instructions to call if weight increases 3 lbs in one day or 5 lbs in one week. [x]  Received verbal acknowledgment/understanding of Heart Failure related causes. [x]  Provided instructions on how to maintain a low sodium diet. [x]  Provided recommendations for smoking cessation programs  [x]  Provided recommendations on activity and exercise    [x]  Other:  Mr Juan Alberto Gordillo was admitted through the ER with 10 day history of shortness of breath and chest pain. Symptoms much worse last 1-2 days. + orthopnea and dyspnea on exertion. He has a history of cardiomyopathy EF 15-20%,He shared with Dr. Elke Guzman, that he had stopped his meds because he wasn't able to afford them, and had been feeling well. He has not been eating well--relying on cold cuts , and has been drinking some beer each day as well as some smoking. His echo during this admission has revealed an even lower EF--now 10-15%. He will be following up with [facility] advanced heart failure program.  I introduced myself and my role in heart failure education. I told him I was not coming in to \"bust his chops\" over this setback, but to gently remind him that there are no days off from having heart failure. He said he realizes this now. He had been feeling so good, and now it is football season, and he got together with friends to [address]. . now he is here.   I reminded him that sometimes we all learn the hard way. He said he does remember the original information on Heart Failure--he had just stopped doing what he was supposed to do. We reviewed the zones of heart failure, and the 3/5 lb rule--I told him the next time there is a football tailgate or the like, he should call his MD the following day to report his s/s. If he can address his symptoms early ( yellow zone) he can avoid an admission to the hospital, and help his overall  out come. I did remind him that if he doesn't make good choices, he puts himself at a very high risk for sudden cardiac death. He plans on getting back on the right track, starting with taking his meds, and daily weights. He will watch his diet, and fluid restriction of 64 ounces. He will try hard to steer clear of beer. Or at the least he said he will try to keep it to 1 ( short) beer. CURRENT DIET: ADULT DIET; Regular; 4 carb choices (60 gm/meal); Low Sodium (2 gm); 1500 ml  ADULT ORAL NUTRITION SUPPLEMENT; Breakfast, Dinner; Diabetic Oral Supplement    EDUCATIONAL PACKETS PROVIDED- [facility] Heart Failure Booklet  [x]  What is Heart Failure? [x]  Heart Failure: Warning Signs of a Flare-Up  [x]  Heart Failure: Making Changes to Your Diet  [x]  Heart Failure: Medications to Help Your Heart   [x]  Other:     [employer] Nutrition Guide  PATIENT/CAREGIVER TEACHING:      Level of patient/caregiver understanding able to:   [x] Verbalize understanding   [x] Demonstrate understanding       [x] Teach back        [] Needs reinforcement     []  Other:      TEACHING TIME:  30 minutes       Plan:       DISCHARGE PLAN:  Placement for patient upon discharge: home with support   Hospice Care:  no  Code Status: Full Code  Discharge appointment scheduled: Yes     RECOMMENDATIONS:   [x]  Encourage to call Heart Failure Resource Line with any questions or concerns.   [x]  Educate further Mr. Stefany Vasquez on fluid restriction 48 oz- 64 oz during inpatient stay so he can understand how to measure intake at home. [x]  Continue to educate on S/S of Heart Failure. [x]  Emphasize daily weights, diet, and if changes, to call Heart Failure Resource Line  [x]  Other:  pamphlets provided on [facility]/Cardiac rehab. [facility] Referral placed to cardiac rehab.            Electronically signed by Clement Martin RN, BSN  on 10/19/2022 at 9:38 AM

## 2022-10-19 NOTE — PROGRESS NOTES
Hospitalist Progress Note      PCP: Kashif German MD    Date of Admission: 10/16/2022    Chief Complaint: shortness of breath    Hospital Course: 61 y.o. male with PMHx of CHF, DM, HTN and R MCA stroke s/p thrombectomy presented to [facility] with 10 day history of shortness of breath and chest pain. Symptoms much worse last 1-2 days. + orthopnea and dyspnea on exertion. No cough or congestion. No fever or chills. No change in appetite or nausea/vomiting. Pt reports stopping all medicines several weeks ago because he was feeling fine. Subjective: Pt seen and examined. Feels much better. Breathing has improved. States it is easier to lay flat.        Medications:  Reviewed    Infusion Medications    dextrose      sodium chloride 75 mL/hr at 10/17/22 1107     Scheduled Medications    valsartan  160 mg Oral BID    insulin lispro  0-4 Units SubCUTAneous TID WC    insulin lispro  0-4 Units SubCUTAneous Nightly    allopurinol  100 mg Oral Daily    aspirin  81 mg Oral Daily    atorvastatin  40 mg Oral Nightly    Vitamin D  1,000 Units Oral Daily    metoprolol succinate  200 mg Oral Daily    rivaroxaban  20 mg Oral Daily with breakfast    spironolactone  25 mg Oral Daily    sodium chloride flush  5-40 mL IntraVENous 2 times per day    furosemide  40 mg IntraVENous BID    escitalopram  10 mg Oral Daily     PRN Meds: glucose, dextrose bolus **OR** dextrose bolus, glucagon (rDNA), dextrose, sodium chloride flush, sodium chloride, ondansetron **OR** ondansetron, polyethylene glycol, acetaminophen **OR** acetaminophen, perflutren lipid microspheres      Intake/Output Summary (Last 24 hours) at 10/18/2022 2211  Last data filed at 10/18/2022 0101  Gross per 24 hour   Intake 120 ml   Output --   Net 120 ml       Exam:    /74   Pulse 83   Temp 98 °F (36.7 °C) (Oral)   Resp 14   Ht 6' 3\" (1.905 m)   Wt 226 lb 3.2 oz (102.6 kg)   SpO2 94%   BMI 28.27 kg/m²     General appearance: No apparent distress, appears stated age and cooperative. HEENT: Pupils equal, round, and reactive to light. Conjunctivae/corneas clear. Neck: Supple, with full range of motion. No jugular venous distention. Trachea midline. Respiratory:  Normal respiratory effort. Clear to auscultation, bilaterally without Rales/Wheezes/Rhonchi. Cardiovascular: Regular rate and rhythm with normal S1/S2 without murmurs, rubs or gallops. Abdomen: Soft, non-tender, non-distended with normal bowel sounds. Musculoskeletal: No clubbing, cyanosis or edema bilaterally. Full range of motion without deformity. Skin: Skin color, texture, turgor normal.  No rashes or lesions. Neurologic:  Neurovascularly intact without any focal sensory/motor deficits. Cranial nerves: II-XII intact, grossly non-focal.  Psychiatric: Alert and oriented, thought content appropriate, normal insight  Capillary Refill: Brisk,< 3 seconds   Peripheral Pulses: +2 palpable, equal bilaterally       Labs:   Recent Labs     10/16/22  1836   WBC 5.7   HGB 15.6   HCT 46.5          Recent Labs     10/16/22  1836 10/17/22  0607 10/18/22  0620    139 138   K 3.8 3.6 3.7    102 101   CO2 24 23 23   BUN 13 13 13   CREATININE 1.1 1.1 1.1   CALCIUM 8.8 8.8 8.6       Recent Labs     10/16/22  1836   AST 22   ALT 14   BILITOT 0.9   ALKPHOS 144*       Recent Labs     10/16/22  1836   INR 1.09       Recent Labs     10/16/22  1836 10/17/22  0049 10/17/22  0240   TROPONINI 0.04* 0.04* 0.04*         Urinalysis:      Lab Results   Component Value Date/Time    NITRU Negative 12/03/2018 10:50 PM    WBCUA 3-5 01/19/2018 12:45 PM    BACTERIA 1+ 01/19/2018 12:45 PM    RBCUA None seen 01/19/2018 12:45 PM    BLOODU Negative 12/03/2018 10:50 PM    SPECGRAV 1.006 12/03/2018 10:50 PM    GLUCOSEU Negative 12/03/2018 10:50 PM       Radiology:  CT CHEST PULMONARY EMBOLISM W CONTRAST   Final Result   No evidence of central or segmental pulmonary artery embolism.   Limited study   for assessment of peripheral pulmonary emboli due to respiratory motion   artifact and suboptimal vessel opacification. Faint patchy of a posterior right lower lobe airspace disease which may   represent developing pneumonia in the appropriate setting. Lungs otherwise   clear. Cardiomegaly. Trace right pleural effusion. No pericardial or left pleural   effusion. XR CHEST (2 VW)   Final Result   No acute process. Assessment/Plan:    Active Hospital Problems    Diagnosis Date Noted    Congestive heart failure due to cardiomyopathy ([facility] [address] [I50.9, I42.9] 10/16/2022     Priority: Medium       Acute on chronic systolic CHF exacerbation  - last echo: 7-2020 EF 45% to 50%. Diffuse hypokinesis         TTE and cardiac MRI (2020) as part of stroke work-up revealed 15-20% EF w/ diffuse severe hypokinesis. LHC/RHC showed non-obstructive disease. RV systolic function severely reduced w/ PAP 28. He was medically optimized at D/C w/ life vest for 3 months prior to considering AICD.  - BNP 4500, trop 0.04 and will trend  - continue metoprolol 200 mg daily, spironolactone 25 mg, entresto to 97/103  - daily weights and I/O  - consult cardiology  - consult HF nurse  - repeat Echo showed EF 10-15% with severe diffuse hypokinesis, grade III DD   - continue IV Lasix 40 mg BID, possible transition to torsemide in the morning    Hx R MCA stroke s/p thrombectomy   - thought to be cardio-embolic  - atorvastatin 10EI daily; ASA 81mg;   - Xarelto (due to cardioembolic etiology)     HTN  - monitor blood pressure  - BP meds per above     Diabetes mellitus II   - hold home metformin, SSI and CCD     Gout  - allopurinol      DVT Prophylaxis: Xarelto  Diet: ADULT DIET; Regular; 4 carb choices (60 gm/meal);  Low Sodium (2 gm); 1500 ml  ADULT ORAL NUTRITION SUPPLEMENT; Breakfast, Dinner; Diabetic Oral Supplement  Code Status: Full Code    PT/OT Eval Status: defer    Dispo - continue care, possible d/c in 1-2 days    Colletta Smiles, MD

## 2022-10-19 NOTE — CARE COORDINATION
DISCHARGE SUMMARY     DATE OF DISCHARGE: 10/19/22    DISCHARGE DESTINATION: Home    HOME CARE: No    HEMODIALYSIS: No    TRANSPORTATION: Private Car    NEW DME ORDERED: no    COMMENTS: Sister will transport to home when she gets off of work.  Electronically signed by Carlos Cruz RN on 10/19/2022 at 4:18 PM

## 2022-10-19 NOTE — PLAN OF CARE
Problem: Discharge Planning  Goal: Discharge to home or other facility with appropriate resources  10/19/2022 1547 by Susie Rivers RN  Outcome: Completed  Flowsheets (Taken 10/19/2022 1000 by Alisa Ochoa RN)  Discharge to home or other facility with appropriate resources: Identify barriers to discharge with patient and caregiver  10/19/2022 0409 by Padmini Castro RN  Outcome: Progressing     Problem: Pain  Goal: Verbalizes/displays adequate comfort level or baseline comfort level  10/19/2022 1547 by Susie Rivers RN  Outcome: Completed  10/19/2022 0409 by Padmini Castro RN  Outcome: Progressing     Problem: Safety - Adult  Goal: Free from fall injury  10/19/2022 1547 by Susie Rivers RN  Outcome: Completed  10/19/2022 0409 by Padmini Castro RN  Outcome: Progressing     Problem: Respiratory - Adult  Goal: Achieves optimal ventilation and oxygenation  Outcome: Completed     Problem: Cardiovascular - Adult  Goal: Maintains optimal cardiac output and hemodynamic stability  Outcome: Completed  Goal: Absence of cardiac dysrhythmias or at baseline  Outcome: Completed     Problem: Musculoskeletal - Adult  Goal: Return mobility to safest level of function  Outcome: Completed  Flowsheets (Taken 10/19/2022 1000 by Alisa Ochoa RN)  Return Mobility to Safest Level of Function: Assess patient stability and activity tolerance for standing, transferring and ambulating with or without assistive devices  Goal: Maintain proper alignment of affected body part  Outcome: Completed  Flowsheets (Taken 10/19/2022 1000 by Alisa Ochoa RN)  Maintain proper alignment of affected body part: Support and protect limb and body alignment per provider's orders     Problem: Skin/Tissue Integrity  Goal: Absence of new skin breakdown  Description: 1. Monitor for areas of redness and/or skin breakdown  2. Assess vascular access sites hourly  3. Every 4-6 hours minimum:  Change oxygen saturation probe site  4.   Every 4-6 hours:  If on nasal continuous positive airway pressure, respiratory therapy assess nares and determine need for appliance change or resting period.   Outcome: Completed

## 2022-10-20 NOTE — PROGRESS NOTES
CLINICAL PHARMACY NOTE: MEDS TO BEDS    Total # of Prescriptions Filled: 2   The following medications were delivered to the patient:  Discharge Medication List as of 10/19/2022  3:55 PM        START taking these medications    Details   valsartan (DIOVAN) 160 MG tablet Take 1 tablet by mouth in the morning and at bedtime, Disp-30 tablet, R-0Normal      torsemide (DEMADEX) 20 MG tablet Take 1 tablet by mouth daily, Disp-30 tablet, R-3Normal               Additional Documentation:

## 2022-10-21 ENCOUNTER — FOLLOWUP TELEPHONE ENCOUNTER (OUTPATIENT)
Dept: INPATIENT UNIT | Age: 57
End: 2022-10-21

## 2022-10-25 NOTE — PROGRESS NOTES
Physician Progress Note      PATIENT:               Christine Valentine  CSN #:                  [id number]  :                       1965  ADMIT DATE:       10/16/2022 6:21 PM  [address] DATE:        10/19/2022 5:33 PM  RESPONDING  PROVIDER #:        Baron Maureen VALE          QUERY TEXT:    Dear Dr. Silvia Montiel,  Pt admitted with Acute on chronic systolic CHF. Pt noted to also have   Cardiomyopathy and HTN. If possible, please document in progress notes and   discharge summary the etiology of CHF, if able to be determined. The medical record reflects the following:  Risk Factors: Hx CHF, HTN, HLD and per Cardiology has Hx \" non-ischemic   cardiomyopathy \"  Clinical Indicators: 10/16 ED for SOB, B/P=148/109 and 156/115, FU=846, Pt   reports stopping all medicines several weeks ago. Admitted for Acute on   chronic CHF noted in H and  P \" due to cardiomyopathy\", 10/17 PCP notes  Acute   on chronic systolic CHF exac and HTN also noted. Cardiology consult-\"history   of nonischemic cardiomyopathy with reduced EF 15 to 20% presumed to be   secondary to essential hypertension as well as alcohol abuse. Has known   nonobstructive coronary disease\" and also notes \"Decompensated Congestive   heart failure due to cardiomyopathy ([facility] [address] -failure decompens  Treatment: IV Lasix, I and O, Daily Weight, Cardiology consult, Toprol XL,   Aldactone, Entresto changed later to Valsartan  Thank you,  Sylvia Durant RN, [email or web address]  Options provided:  -- CHF due to Hypertensive Heart Disease and non ischemic cardiomyopathy  -- CHF due to Hypertensive Heart Disease  -- CHF not due to Hypertension but due to non ischemic cardiomyopathy  -- Other - I will add my own diagnosis  -- Disagree - Not applicable / Not valid  -- Disagree - Clinically unable to determine / Unknown  -- Refer to Clinical Documentation Reviewer    PROVIDER RESPONSE TEXT:    This patient has CHF due to hypertensive heart disease and non ischemic cardiomyopathy. Query created by:  [id number] on 10/25/2022 9:20 AM      Electronically signed by:  Nazia Mireles MD 10/25/2022 1:32 PM

## 2023-04-03 ENCOUNTER — HOSPITAL ENCOUNTER (EMERGENCY)
Age: 58
Discharge: HOME | End: 2023-04-03
Attending: EMERGENCY MEDICINE
Payer: MEDICARE

## 2023-04-03 ENCOUNTER — APPOINTMENT (OUTPATIENT)
Dept: CT IMAGING | Age: 58
End: 2023-04-03
Payer: MEDICARE

## 2023-04-03 ENCOUNTER — APPOINTMENT (OUTPATIENT)
Dept: GENERAL RADIOLOGY | Age: 58
End: 2023-04-03
Payer: MEDICARE

## 2023-04-03 VITALS
DIASTOLIC BLOOD PRESSURE: 95 MMHG | WEIGHT: 228.4 LBS | OXYGEN SATURATION: 99 % | SYSTOLIC BLOOD PRESSURE: 126 MMHG | RESPIRATION RATE: 18 BRPM | HEART RATE: 86 BPM | HEIGHT: 75 IN | TEMPERATURE: 98.1 F | BODY MASS INDEX: 28.4 KG/M2

## 2023-04-03 DIAGNOSIS — I50.9 ACUTE ON CHRONIC CONGESTIVE HEART FAILURE, UNSPECIFIED HEART FAILURE TYPE (HCC): ICD-10-CM

## 2023-04-03 DIAGNOSIS — R07.9 CHEST PAIN, UNSPECIFIED TYPE: Primary | ICD-10-CM

## 2023-04-03 DIAGNOSIS — R74.8 ELEVATED LIVER ENZYMES: ICD-10-CM

## 2023-04-03 DIAGNOSIS — K76.0 HEPATIC STEATOSIS: ICD-10-CM

## 2023-04-03 LAB
ALBUMIN SERPL-MCNC: 3.6 G/DL (ref 3.4–5)
ALBUMIN/GLOB SERPL: 1.1 {RATIO} (ref 1.1–2.2)
ALP SERPL-CCNC: 125 U/L (ref 40–129)
ALT SERPL-CCNC: 1477 U/L (ref 10–40)
ANION GAP SERPL CALCULATED.3IONS-SCNC: 15 MMOL/L (ref 3–16)
AST SERPL-CCNC: 867 U/L (ref 15–37)
BILIRUB SERPL-MCNC: 1.9 MG/DL (ref 0–1)
BUN SERPL-MCNC: 40 MG/DL (ref 7–20)
CALCIUM SERPL-MCNC: 9.3 MG/DL (ref 8.3–10.6)
CHLORIDE SERPL-SCNC: 101 MMOL/L (ref 99–110)
CO2 SERPL-SCNC: 22 MMOL/L (ref 21–32)
CREAT SERPL-MCNC: 1.5 MG/DL (ref 0.9–1.3)
DEPRECATED RDW RBC AUTO: 14.4 % (ref 12.4–15.4)
FLUAV RNA UPPER RESP QL NAA+PROBE: NEGATIVE
FLUBV AG NPH QL: NEGATIVE
GFR SERPLBLD CREATININE-BSD FMLA CKD-EPI: 53 ML/MIN/{1.73_M2}
GLUCOSE SERPL-MCNC: 201 MG/DL (ref 70–99)
HCT VFR BLD AUTO: 49.6 % (ref 40.5–52.5)
HGB BLD-MCNC: 15.8 G/DL (ref 13.5–17.5)
MCH RBC QN AUTO: 29.7 PG (ref 26–34)
MCHC RBC AUTO-ENTMCNC: 31.9 G/DL (ref 31–36)
MCV RBC AUTO: 93 FL (ref 80–100)
NT-PROBNP SERPL-MCNC: 7213 PG/ML (ref 0–124)
PLATELET # BLD AUTO: 206 K/UL (ref 135–450)
PMV BLD AUTO: 8.9 FL (ref 5–10.5)
POTASSIUM SERPL-SCNC: 4.8 MMOL/L (ref 3.5–5.1)
PROT SERPL-MCNC: 6.8 G/DL (ref 6.4–8.2)
RBC # BLD AUTO: 5.33 M/UL (ref 4.2–5.9)
SARS-COV-2 RDRP RESP QL NAA+PROBE: NOT DETECTED
SODIUM SERPL-SCNC: 138 MMOL/L (ref 136–145)
TROPONIN T SERPL-MCNC: 0.04 NG/ML
TROPONIN T SERPL-MCNC: 0.04 NG/ML
WBC # BLD AUTO: 7.4 K/UL (ref 4–11)

## 2023-04-03 PROCEDURE — 85027 COMPLETE CBC AUTOMATED: CPT

## 2023-04-03 PROCEDURE — 93005 ELECTROCARDIOGRAM TRACING: CPT | Performed by: EMERGENCY MEDICINE

## 2023-04-03 PROCEDURE — 87635 SARS-COV-2 COVID-19 AMP PRB: CPT

## 2023-04-03 PROCEDURE — 83880 ASSAY OF NATRIURETIC PEPTIDE: CPT

## 2023-04-03 PROCEDURE — 96374 THER/PROPH/DIAG INJ IV PUSH: CPT

## 2023-04-03 PROCEDURE — 36415 COLL VENOUS BLD VENIPUNCTURE: CPT

## 2023-04-03 PROCEDURE — 74177 CT ABD & PELVIS W/CONTRAST: CPT

## 2023-04-03 PROCEDURE — 84484 ASSAY OF TROPONIN QUANT: CPT

## 2023-04-03 PROCEDURE — 71046 X-RAY EXAM CHEST 2 VIEWS: CPT

## 2023-04-03 PROCEDURE — 87804 INFLUENZA ASSAY W/OPTIC: CPT

## 2023-04-03 PROCEDURE — 99285 EMERGENCY DEPT VISIT HI MDM: CPT

## 2023-04-03 PROCEDURE — 6360000004 HC RX CONTRAST MEDICATION: Performed by: EMERGENCY MEDICINE

## 2023-04-03 PROCEDURE — 80053 COMPREHEN METABOLIC PANEL: CPT

## 2023-04-03 PROCEDURE — 6360000002 HC RX W HCPCS: Performed by: EMERGENCY MEDICINE

## 2023-04-03 RX ORDER — ONDANSETRON 4 MG/1
4 TABLET, FILM COATED ORAL EVERY 8 HOURS PRN
Qty: 20 TABLET | Refills: 0 | Status: SHIPPED | OUTPATIENT
Start: 2023-04-03

## 2023-04-03 RX ORDER — TORSEMIDE 20 MG/1
20 TABLET ORAL DAILY
Qty: 30 TABLET | Refills: 0 | Status: SHIPPED | OUTPATIENT
Start: 2023-04-03

## 2023-04-03 RX ORDER — FUROSEMIDE 10 MG/ML
40 INJECTION INTRAMUSCULAR; INTRAVENOUS ONCE
Status: COMPLETED | OUTPATIENT
Start: 2023-04-03 | End: 2023-04-03

## 2023-04-03 RX ADMIN — FUROSEMIDE 40 MG: 10 INJECTION, SOLUTION INTRAMUSCULAR; INTRAVENOUS at 19:37

## 2023-04-03 RX ADMIN — IOPAMIDOL 75 ML: 755 INJECTION, SOLUTION INTRAVENOUS at 19:28

## 2023-04-03 ASSESSMENT — PAIN - FUNCTIONAL ASSESSMENT
PAIN_FUNCTIONAL_ASSESSMENT: 0-10
PAIN_FUNCTIONAL_ASSESSMENT: 0-10

## 2023-04-03 ASSESSMENT — ENCOUNTER SYMPTOMS
SHORTNESS OF BREATH: 1
COUGH: 1
VOMITING: 1
CONSTIPATION: 0
CHEST TIGHTNESS: 0
DIARRHEA: 1
NAUSEA: 1
SORE THROAT: 0
ABDOMINAL PAIN: 0

## 2023-04-03 ASSESSMENT — PAIN DESCRIPTION - FREQUENCY
FREQUENCY: CONTINUOUS
FREQUENCY: CONTINUOUS

## 2023-04-03 ASSESSMENT — PAIN DESCRIPTION - LOCATION
LOCATION: ABDOMEN
LOCATION: CHEST

## 2023-04-03 ASSESSMENT — PAIN DESCRIPTION - ORIENTATION: ORIENTATION: ANTERIOR

## 2023-04-03 ASSESSMENT — PAIN DESCRIPTION - DESCRIPTORS: DESCRIPTORS: SHARP;DULL

## 2023-04-03 ASSESSMENT — PAIN SCALES - GENERAL
PAINLEVEL_OUTOF10: 5
PAINLEVEL_OUTOF10: 5

## 2023-04-03 ASSESSMENT — PAIN DESCRIPTION - PAIN TYPE
TYPE: ACUTE PAIN
TYPE: ACUTE PAIN

## 2023-04-03 ASSESSMENT — LIFESTYLE VARIABLES
HOW MANY STANDARD DRINKS CONTAINING ALCOHOL DO YOU HAVE ON A TYPICAL DAY: 1 OR 2
HOW OFTEN DO YOU HAVE A DRINK CONTAINING ALCOHOL: 2-3 TIMES A WEEK

## 2023-04-03 NOTE — ED PROVIDER NOTES
[address]      Pt Name: Curt Mcgee  MRN: [id number]  [place] 1965  Date of evaluation: 4/3/2023  Provider: Aster Skaggs MD    [address] [place] [address]       Chief Complaint   Patient presents with    Chest Pain     Chest pain started yesterday around noon while resting, labor breathing, SOB upon execration. N/V for a few days, had diarrhea, hx of CHF. HISTORY OF PRESENT ILLNESS    Curt Mcgee is a 61 y.o. male who  has a past medical history of CHF (congestive heart failure) ([facility] [address] CVA (cerebral vascular accident) ([facility] [address] Diabetes mellitus ([facility] [address] Hyperlipidemia, Hypertension, and Vitamin D deficiency. who presents to the emergency department with complaint of shortness of breath and chest pain. Patient states his chest pain is across his entire upper chest.  Patient states that he has been coughing occasionally has brown sputum reduction with his cough. States he is also had nausea vomiting diarrhea over the past 3 days. Denies melanotic stool or bright red blood per rectum. Denies any hematemesis. States that he also feels short of breath mainly with exertion. Patient states he does have history of CHF but denies any lower extremity pain or swelling. States he have a normal urinary habits. Denies any documented fevers at home. No known sick contacts. No recent travel. States his chest pain came on when he was at rest.  Describes the pain as a aching pain with coughing. No abdominal pain. Patient states he has been taking his Xarelto, metformin, metoprolol, and aspirin but has been unable to afford his other medications because of increasing co-pay. Limitations to history: None, history provided by patient    Nursing Notes were reviewed. Including nursing noted for FM, Surgical History, Past Medical History, Social History, vitals, and allergies; agree with all.      REVIEW OF SYSTEMS       Review of Systems

## 2023-04-03 NOTE — ED TRIAGE NOTES
Patient began having chest pain yesterday around noon while resting on couch. Hx of CHF, frequent SOB upon exertion. N/V starting 3 days ago, along with some diarrhea. Pain 5/10, sitting up only relief.  Pt on diuretics but has been unable to obtain his medication for 2 months

## 2023-04-04 LAB
EKG ATRIAL RATE: 85 BPM
EKG DIAGNOSIS: NORMAL
EKG P AXIS: 68 DEGREES
EKG P-R INTERVAL: 154 MS
EKG Q-T INTERVAL: 400 MS
EKG QRS DURATION: 92 MS
EKG QTC CALCULATION (BAZETT): 476 MS
EKG R AXIS: 116 DEGREES
EKG T AXIS: -52 DEGREES
EKG VENTRICULAR RATE: 85 BPM

## 2023-04-04 PROCEDURE — 93010 ELECTROCARDIOGRAM REPORT: CPT | Performed by: INTERNAL MEDICINE

## 2023-04-04 NOTE — DISCHARGE INSTRUCTIONS
Call today or tomorrow to follow up with Enrico Rojas MD  in 3-4 days. Take your medication as prescribed. Make sure you take your water pills as previously indicated. Avoid eating foods that are rich in salt (sodium). If you use salt substitutes, be careful with the amount of extra potassium that you take in. Return to the Emergency Department for worsening of shortness of breath, inability to ambulate, increase swelling to feet / hands, increase in the number of pillows you sleep on, fever > 101.5, any other care or concern.

## 2023-05-24 NOTE — CONSULTS
[address] [place] [address]  Heart Failure Service    NAME: Akash Way  MEDICAL RECORD NUMBER:  [id number]  AGE: 46 y.o. GENDER: male  : 1965  TODAY'S DATE:  2019      I met with Akash Way today to discuss the [address] I introduced myself and Akash Way agreed to meet with me. A description of our services was provided.     []    Akash Way is interested in attending our [address] [place] [address], a referral will be obtained and an appointment was scheduled. []    Patient declines [address] [place] [address] follow up at this time. Scheduled patient an appointment in the [facility] on . Pertinent heart failure education was reviewed with this patient including but not limited to: Low sodium diet, monitoring of daily weights, fluid restriction, worsening signs and symptoms of heart failure and when to call, the importance of regular exercise and activity, adherence to medication therapy and appropriate follow up. Any questions from the patient were answered. A pamphlet was also provided that includes a description of our services as well as our  contact information. [address] [place] [address]  Heart Failure Service  Phone: [phone]  Fax [phone]    We also have outpatient services in COPD, Diabetes, Anticoagulation and Infusion. Libtayo Pregnancy And Lactation Text: This medication is contraindicated in pregnancy and when breast feeding.

## 2023-06-15 ENCOUNTER — HOSPITAL ENCOUNTER (INPATIENT)
Age: 58
LOS: 5 days | Discharge: NURSING, REHAB OR LONG-TERM CARE | DRG: 291 | End: 2023-06-20
Attending: EMERGENCY MEDICINE | Admitting: INTERNAL MEDICINE
Payer: MEDICARE

## 2023-06-15 ENCOUNTER — APPOINTMENT (OUTPATIENT)
Dept: GENERAL RADIOLOGY | Age: 58
DRG: 291 | End: 2023-06-15
Payer: MEDICARE

## 2023-06-15 DIAGNOSIS — I50.9 ACUTE ON CHRONIC CONGESTIVE HEART FAILURE, UNSPECIFIED HEART FAILURE TYPE (HCC): Primary | ICD-10-CM

## 2023-06-15 DIAGNOSIS — I10 ESSENTIAL HYPERTENSION: ICD-10-CM

## 2023-06-15 DIAGNOSIS — R77.8 ELEVATED TROPONIN: ICD-10-CM

## 2023-06-15 DIAGNOSIS — I50.23 ACUTE ON CHRONIC SYSTOLIC CONGESTIVE HEART FAILURE (HCC): ICD-10-CM

## 2023-06-15 LAB
ALBUMIN SERPL-MCNC: 3.5 G/DL (ref 3.4–5)
ALBUMIN/GLOB SERPL: 0.9 {RATIO} (ref 1.1–2.2)
ALP SERPL-CCNC: 101 U/L (ref 40–129)
ALT SERPL-CCNC: 93 U/L (ref 10–40)
ANION GAP SERPL CALCULATED.3IONS-SCNC: 13 MMOL/L (ref 3–16)
AST SERPL-CCNC: 63 U/L (ref 15–37)
BASE EXCESS BLDV CALC-SCNC: -0.6 MMOL/L
BASOPHILS # BLD: 0 K/UL (ref 0–0.2)
BASOPHILS NFR BLD: 0.3 %
BILIRUB SERPL-MCNC: 3 MG/DL (ref 0–1)
BUN SERPL-MCNC: 19 MG/DL (ref 7–20)
CALCIUM SERPL-MCNC: 9.2 MG/DL (ref 8.3–10.6)
CHLORIDE SERPL-SCNC: 103 MMOL/L (ref 99–110)
CO2 BLDV-SCNC: 26 MMOL/L
CO2 SERPL-SCNC: 22 MMOL/L (ref 21–32)
COHGB MFR BLDV: 1.4 %
CREAT SERPL-MCNC: 1.2 MG/DL (ref 0.9–1.3)
DEPRECATED RDW RBC AUTO: 19.6 % (ref 12.4–15.4)
EOSINOPHIL # BLD: 0 K/UL (ref 0–0.6)
EOSINOPHIL NFR BLD: 0.2 %
GFR SERPLBLD CREATININE-BSD FMLA CKD-EPI: >60 ML/MIN/{1.73_M2}
GLUCOSE SERPL-MCNC: 121 MG/DL (ref 70–99)
HCO3 BLDV-SCNC: 24 MMOL/L (ref 23–29)
HCT VFR BLD AUTO: 45.6 % (ref 40.5–52.5)
HGB BLD-MCNC: 14.7 G/DL (ref 13.5–17.5)
LACTATE BLDV-SCNC: 2.7 MMOL/L (ref 0.4–1.9)
LYMPHOCYTES # BLD: 2.2 K/UL (ref 1–5.1)
LYMPHOCYTES NFR BLD: 35.1 %
MCH RBC QN AUTO: 29 PG (ref 26–34)
MCHC RBC AUTO-ENTMCNC: 32.2 G/DL (ref 31–36)
MCV RBC AUTO: 90.1 FL (ref 80–100)
METHGB MFR BLDV: 0.7 %
MONOCYTES # BLD: 0.5 K/UL (ref 0–1.3)
MONOCYTES NFR BLD: 8.5 %
NEUTROPHILS # BLD: 3.5 K/UL (ref 1.7–7.7)
NEUTROPHILS NFR BLD: 55.9 %
NT-PROBNP SERPL-MCNC: ABNORMAL PG/ML (ref 0–124)
O2 THERAPY: NORMAL
PCO2 BLDV: 40.8 MMHG (ref 40–50)
PH BLDV: 7.39 [PH] (ref 7.35–7.45)
PLATELET # BLD AUTO: 153 K/UL (ref 135–450)
PMV BLD AUTO: 8.8 FL (ref 5–10.5)
PO2 BLDV: <30 MMHG
POTASSIUM SERPL-SCNC: 4.9 MMOL/L (ref 3.5–5.1)
PROT SERPL-MCNC: 7.4 G/DL (ref 6.4–8.2)
RBC # BLD AUTO: 5.06 M/UL (ref 4.2–5.9)
SAO2 % BLDV: 34 %
SODIUM SERPL-SCNC: 138 MMOL/L (ref 136–145)
TROPONIN, HIGH SENSITIVITY: 66 NG/L (ref 0–22)
TROPONIN, HIGH SENSITIVITY: 67 NG/L (ref 0–22)
WBC # BLD AUTO: 6.2 K/UL (ref 4–11)

## 2023-06-15 PROCEDURE — 6370000000 HC RX 637 (ALT 250 FOR IP): Performed by: PHYSICIAN ASSISTANT

## 2023-06-15 PROCEDURE — 99285 EMERGENCY DEPT VISIT HI MDM: CPT

## 2023-06-15 PROCEDURE — 83605 ASSAY OF LACTIC ACID: CPT

## 2023-06-15 PROCEDURE — 2060000000 HC ICU INTERMEDIATE R&B

## 2023-06-15 PROCEDURE — 93005 ELECTROCARDIOGRAM TRACING: CPT | Performed by: EMERGENCY MEDICINE

## 2023-06-15 PROCEDURE — 82803 BLOOD GASES ANY COMBINATION: CPT

## 2023-06-15 PROCEDURE — 6360000002 HC RX W HCPCS: Performed by: PHYSICIAN ASSISTANT

## 2023-06-15 PROCEDURE — 96374 THER/PROPH/DIAG INJ IV PUSH: CPT

## 2023-06-15 PROCEDURE — 85025 COMPLETE CBC W/AUTO DIFF WBC: CPT

## 2023-06-15 PROCEDURE — 83880 ASSAY OF NATRIURETIC PEPTIDE: CPT

## 2023-06-15 PROCEDURE — 71046 X-RAY EXAM CHEST 2 VIEWS: CPT

## 2023-06-15 PROCEDURE — 84484 ASSAY OF TROPONIN QUANT: CPT

## 2023-06-15 PROCEDURE — 80053 COMPREHEN METABOLIC PANEL: CPT

## 2023-06-15 RX ORDER — FUROSEMIDE 10 MG/ML
20 INJECTION INTRAMUSCULAR; INTRAVENOUS ONCE
Status: COMPLETED | OUTPATIENT
Start: 2023-06-15 | End: 2023-06-15

## 2023-06-15 RX ORDER — ENOXAPARIN SODIUM 100 MG/ML
30 INJECTION SUBCUTANEOUS 2 TIMES DAILY
Status: CANCELLED | OUTPATIENT
Start: 2023-06-15

## 2023-06-15 RX ORDER — METOPROLOL TARTRATE 50 MG/1
50 TABLET, FILM COATED ORAL ONCE
Status: COMPLETED | OUTPATIENT
Start: 2023-06-15 | End: 2023-06-15

## 2023-06-15 RX ORDER — VALSARTAN 80 MG/1
40 TABLET ORAL EVERY 12 HOURS SCHEDULED
Status: CANCELLED | OUTPATIENT
Start: 2023-06-15

## 2023-06-15 RX ADMIN — METOPROLOL TARTRATE 50 MG: 50 TABLET ORAL at 22:36

## 2023-06-15 RX ADMIN — NITROGLYCERIN 0.5 INCH: 20 OINTMENT TOPICAL at 21:51

## 2023-06-15 RX ADMIN — FUROSEMIDE 20 MG: 10 INJECTION, SOLUTION INTRAMUSCULAR; INTRAVENOUS at 21:52

## 2023-06-15 ASSESSMENT — PAIN SCALES - GENERAL: PAINLEVEL_OUTOF10: 6

## 2023-06-15 ASSESSMENT — LIFESTYLE VARIABLES
HOW MANY STANDARD DRINKS CONTAINING ALCOHOL DO YOU HAVE ON A TYPICAL DAY: 1 OR 2
HOW OFTEN DO YOU HAVE A DRINK CONTAINING ALCOHOL: MONTHLY OR LESS

## 2023-06-15 ASSESSMENT — PAIN - FUNCTIONAL ASSESSMENT: PAIN_FUNCTIONAL_ASSESSMENT: 0-10

## 2023-06-16 PROBLEM — N17.9 AKI (ACUTE KIDNEY INJURY) (HCC): Status: ACTIVE | Noted: 2023-06-16

## 2023-06-16 PROBLEM — I24.8 DEMAND ISCHEMIA (HCC): Status: ACTIVE | Noted: 2023-06-16

## 2023-06-16 PROBLEM — I24.89 DEMAND ISCHEMIA: Status: ACTIVE | Noted: 2023-06-16

## 2023-06-16 PROBLEM — R57.0 CARDIOGENIC SHOCK (HCC): Status: ACTIVE | Noted: 2023-06-16

## 2023-06-16 LAB
ALBUMIN SERPL-MCNC: 2.9 G/DL (ref 3.4–5)
ALBUMIN SERPL-MCNC: 3.1 G/DL (ref 3.4–5)
ALP SERPL-CCNC: 102 U/L (ref 40–129)
ALT SERPL-CCNC: 305 U/L (ref 10–40)
ANION GAP SERPL CALCULATED.3IONS-SCNC: 17 MMOL/L (ref 3–16)
ANION GAP SERPL CALCULATED.3IONS-SCNC: 19 MMOL/L (ref 3–16)
AST SERPL-CCNC: 660 U/L (ref 15–37)
BASOPHILS # BLD: 0 K/UL (ref 0–0.2)
BASOPHILS NFR BLD: 0.1 %
BILIRUB DIRECT SERPL-MCNC: 2.1 MG/DL (ref 0–0.3)
BILIRUB INDIRECT SERPL-MCNC: 2.4 MG/DL (ref 0–1)
BILIRUB SERPL-MCNC: 4.5 MG/DL (ref 0–1)
BUN SERPL-MCNC: 24 MG/DL (ref 7–20)
BUN SERPL-MCNC: 28 MG/DL (ref 7–20)
CALCIUM SERPL-MCNC: 8.6 MG/DL (ref 8.3–10.6)
CALCIUM SERPL-MCNC: 8.9 MG/DL (ref 8.3–10.6)
CHLORIDE SERPL-SCNC: 103 MMOL/L (ref 99–110)
CHLORIDE SERPL-SCNC: 103 MMOL/L (ref 99–110)
CO2 SERPL-SCNC: 17 MMOL/L (ref 21–32)
CO2 SERPL-SCNC: 17 MMOL/L (ref 21–32)
CREAT SERPL-MCNC: 1.5 MG/DL (ref 0.9–1.3)
CREAT SERPL-MCNC: 1.5 MG/DL (ref 0.9–1.3)
DEPRECATED RDW RBC AUTO: 19.7 % (ref 12.4–15.4)
EKG ATRIAL RATE: 138 BPM
EKG ATRIAL RATE: 85 BPM
EKG DIAGNOSIS: NORMAL
EKG DIAGNOSIS: NORMAL
EKG P AXIS: 256 DEGREES
EKG P AXIS: 65 DEGREES
EKG P-R INTERVAL: 164 MS
EKG P-R INTERVAL: 166 MS
EKG Q-T INTERVAL: 258 MS
EKG Q-T INTERVAL: 420 MS
EKG QRS DURATION: 84 MS
EKG QRS DURATION: 94 MS
EKG QTC CALCULATION (BAZETT): 390 MS
EKG QTC CALCULATION (BAZETT): 499 MS
EKG R AXIS: 84 DEGREES
EKG R AXIS: 89 DEGREES
EKG T AXIS: -13 DEGREES
EKG T AXIS: -66 DEGREES
EKG VENTRICULAR RATE: 138 BPM
EKG VENTRICULAR RATE: 85 BPM
EOSINOPHIL # BLD: 0 K/UL (ref 0–0.6)
EOSINOPHIL NFR BLD: 0 %
EST. AVERAGE GLUCOSE BLD GHB EST-MCNC: 151.3 MG/DL
GFR SERPLBLD CREATININE-BSD FMLA CKD-EPI: 53 ML/MIN/{1.73_M2}
GFR SERPLBLD CREATININE-BSD FMLA CKD-EPI: 53 ML/MIN/{1.73_M2}
GLUCOSE BLD-MCNC: 131 MG/DL (ref 70–99)
GLUCOSE BLD-MCNC: 206 MG/DL (ref 70–99)
GLUCOSE BLD-MCNC: 214 MG/DL (ref 70–99)
GLUCOSE BLD-MCNC: 229 MG/DL (ref 70–99)
GLUCOSE BLD-MCNC: 95 MG/DL (ref 70–99)
GLUCOSE SERPL-MCNC: 141 MG/DL (ref 70–99)
GLUCOSE SERPL-MCNC: 236 MG/DL (ref 70–99)
HBA1C MFR BLD: 6.9 %
HCT VFR BLD AUTO: 43.6 % (ref 40.5–52.5)
HGB BLD-MCNC: 13.9 G/DL (ref 13.5–17.5)
LACTATE BLDV-SCNC: 4 MMOL/L (ref 0.4–2)
LACTATE BLDV-SCNC: 5 MMOL/L (ref 0.4–1.9)
LACTATE BLDV-SCNC: 5.8 MMOL/L (ref 0.4–1.9)
LYMPHOCYTES # BLD: 1.4 K/UL (ref 1–5.1)
LYMPHOCYTES NFR BLD: 17.8 %
MAGNESIUM SERPL-MCNC: 2.2 MG/DL (ref 1.8–2.4)
MCH RBC QN AUTO: 29.2 PG (ref 26–34)
MCHC RBC AUTO-ENTMCNC: 31.8 G/DL (ref 31–36)
MCV RBC AUTO: 91.7 FL (ref 80–100)
MONOCYTES # BLD: 0.5 K/UL (ref 0–1.3)
MONOCYTES NFR BLD: 6.8 %
NEUTROPHILS # BLD: 6 K/UL (ref 1.7–7.7)
NEUTROPHILS NFR BLD: 75.3 %
PERFORMED ON: ABNORMAL
PERFORMED ON: NORMAL
PHOSPHATE SERPL-MCNC: 4.7 MG/DL (ref 2.5–4.9)
PLATELET # BLD AUTO: 127 K/UL (ref 135–450)
PMV BLD AUTO: 9.1 FL (ref 5–10.5)
POTASSIUM SERPL-SCNC: 4.7 MMOL/L (ref 3.5–5.1)
POTASSIUM SERPL-SCNC: 5 MMOL/L (ref 3.5–5.1)
PROT SERPL-MCNC: 6.8 G/DL (ref 6.4–8.2)
RBC # BLD AUTO: 4.76 M/UL (ref 4.2–5.9)
SODIUM SERPL-SCNC: 137 MMOL/L (ref 136–145)
SODIUM SERPL-SCNC: 139 MMOL/L (ref 136–145)
TROPONIN, HIGH SENSITIVITY: 69 NG/L (ref 0–22)
WBC # BLD AUTO: 8 K/UL (ref 4–11)

## 2023-06-16 PROCEDURE — 6360000002 HC RX W HCPCS: Performed by: INTERNAL MEDICINE

## 2023-06-16 PROCEDURE — 83735 ASSAY OF MAGNESIUM: CPT

## 2023-06-16 PROCEDURE — 36415 COLL VENOUS BLD VENIPUNCTURE: CPT

## 2023-06-16 PROCEDURE — 2060000000 HC ICU INTERMEDIATE R&B

## 2023-06-16 PROCEDURE — 94760 N-INVAS EAR/PLS OXIMETRY 1: CPT

## 2023-06-16 PROCEDURE — 93010 ELECTROCARDIOGRAM REPORT: CPT | Performed by: INTERNAL MEDICINE

## 2023-06-16 PROCEDURE — 2580000003 HC RX 258: Performed by: INTERNAL MEDICINE

## 2023-06-16 PROCEDURE — 84484 ASSAY OF TROPONIN QUANT: CPT

## 2023-06-16 PROCEDURE — 99291 CRITICAL CARE FIRST HOUR: CPT | Performed by: INTERNAL MEDICINE

## 2023-06-16 PROCEDURE — 6370000000 HC RX 637 (ALT 250 FOR IP): Performed by: INTERNAL MEDICINE

## 2023-06-16 PROCEDURE — 83036 HEMOGLOBIN GLYCOSYLATED A1C: CPT

## 2023-06-16 PROCEDURE — 80076 HEPATIC FUNCTION PANEL: CPT

## 2023-06-16 PROCEDURE — 85025 COMPLETE CBC W/AUTO DIFF WBC: CPT

## 2023-06-16 PROCEDURE — 93005 ELECTROCARDIOGRAM TRACING: CPT | Performed by: INTERNAL MEDICINE

## 2023-06-16 PROCEDURE — 80069 RENAL FUNCTION PANEL: CPT

## 2023-06-16 PROCEDURE — 83605 ASSAY OF LACTIC ACID: CPT

## 2023-06-16 RX ORDER — INSULIN LISPRO 100 [IU]/ML
0-4 INJECTION, SOLUTION INTRAVENOUS; SUBCUTANEOUS
Status: DISCONTINUED | OUTPATIENT
Start: 2023-06-16 | End: 2023-06-20 | Stop reason: HOSPADM

## 2023-06-16 RX ORDER — ONDANSETRON 4 MG/1
4 TABLET, ORALLY DISINTEGRATING ORAL EVERY 8 HOURS PRN
Status: DISCONTINUED | OUTPATIENT
Start: 2023-06-16 | End: 2023-06-20 | Stop reason: HOSPADM

## 2023-06-16 RX ORDER — ACETAMINOPHEN 650 MG/1
650 SUPPOSITORY RECTAL EVERY 6 HOURS PRN
Status: DISCONTINUED | OUTPATIENT
Start: 2023-06-16 | End: 2023-06-20 | Stop reason: HOSPADM

## 2023-06-16 RX ORDER — SODIUM CHLORIDE 0.9 % (FLUSH) 0.9 %
5-40 SYRINGE (ML) INJECTION PRN
Status: DISCONTINUED | OUTPATIENT
Start: 2023-06-16 | End: 2023-06-20 | Stop reason: HOSPADM

## 2023-06-16 RX ORDER — ASPIRIN 81 MG/1
81 TABLET, CHEWABLE ORAL DAILY
Status: DISCONTINUED | OUTPATIENT
Start: 2023-06-16 | End: 2023-06-20 | Stop reason: HOSPADM

## 2023-06-16 RX ORDER — CARVEDILOL 6.25 MG/1
6.25 TABLET ORAL 2 TIMES DAILY WITH MEALS
Status: DISCONTINUED | OUTPATIENT
Start: 2023-06-16 | End: 2023-06-16

## 2023-06-16 RX ORDER — ACETAMINOPHEN 325 MG/1
650 TABLET ORAL EVERY 6 HOURS PRN
Status: DISCONTINUED | OUTPATIENT
Start: 2023-06-16 | End: 2023-06-20 | Stop reason: HOSPADM

## 2023-06-16 RX ORDER — SODIUM CHLORIDE 9 MG/ML
INJECTION, SOLUTION INTRAVENOUS PRN
Status: DISCONTINUED | OUTPATIENT
Start: 2023-06-16 | End: 2023-06-20 | Stop reason: HOSPADM

## 2023-06-16 RX ORDER — POLYETHYLENE GLYCOL 3350 17 G/17G
17 POWDER, FOR SOLUTION ORAL DAILY PRN
Status: DISCONTINUED | OUTPATIENT
Start: 2023-06-16 | End: 2023-06-20 | Stop reason: HOSPADM

## 2023-06-16 RX ORDER — CARVEDILOL 6.25 MG/1
6.25 TABLET ORAL 2 TIMES DAILY WITH MEALS
Status: ON HOLD | COMMUNITY
End: 2023-06-16

## 2023-06-16 RX ORDER — MILRINONE LACTATE 0.2 MG/ML
.0625-.75 INJECTION, SOLUTION INTRAVENOUS CONTINUOUS
Status: DISCONTINUED | OUTPATIENT
Start: 2023-06-16 | End: 2023-06-18

## 2023-06-16 RX ORDER — ATORVASTATIN CALCIUM 40 MG/1
40 TABLET, FILM COATED ORAL NIGHTLY
Status: DISCONTINUED | OUTPATIENT
Start: 2023-06-16 | End: 2023-06-20 | Stop reason: HOSPADM

## 2023-06-16 RX ORDER — MAGNESIUM SULFATE IN WATER 40 MG/ML
2000 INJECTION, SOLUTION INTRAVENOUS PRN
Status: DISCONTINUED | OUTPATIENT
Start: 2023-06-16 | End: 2023-06-20 | Stop reason: HOSPADM

## 2023-06-16 RX ORDER — SODIUM CHLORIDE 0.9 % (FLUSH) 0.9 %
5-40 SYRINGE (ML) INJECTION EVERY 12 HOURS SCHEDULED
Status: DISCONTINUED | OUTPATIENT
Start: 2023-06-16 | End: 2023-06-20 | Stop reason: HOSPADM

## 2023-06-16 RX ORDER — FUROSEMIDE 10 MG/ML
40 INJECTION INTRAMUSCULAR; INTRAVENOUS 2 TIMES DAILY
Status: DISCONTINUED | OUTPATIENT
Start: 2023-06-16 | End: 2023-06-18

## 2023-06-16 RX ORDER — POTASSIUM CHLORIDE 7.45 MG/ML
10 INJECTION INTRAVENOUS PRN
Status: DISCONTINUED | OUTPATIENT
Start: 2023-06-16 | End: 2023-06-20 | Stop reason: HOSPADM

## 2023-06-16 RX ORDER — INSULIN LISPRO 100 [IU]/ML
0-4 INJECTION, SOLUTION INTRAVENOUS; SUBCUTANEOUS NIGHTLY
Status: DISCONTINUED | OUTPATIENT
Start: 2023-06-16 | End: 2023-06-20 | Stop reason: HOSPADM

## 2023-06-16 RX ORDER — POTASSIUM CHLORIDE 20 MEQ/1
40 TABLET, EXTENDED RELEASE ORAL PRN
Status: DISCONTINUED | OUTPATIENT
Start: 2023-06-16 | End: 2023-06-20 | Stop reason: HOSPADM

## 2023-06-16 RX ORDER — ONDANSETRON 2 MG/ML
4 INJECTION INTRAMUSCULAR; INTRAVENOUS EVERY 6 HOURS PRN
Status: DISCONTINUED | OUTPATIENT
Start: 2023-06-16 | End: 2023-06-20 | Stop reason: HOSPADM

## 2023-06-16 RX ORDER — DEXTROSE MONOHYDRATE 100 MG/ML
INJECTION, SOLUTION INTRAVENOUS CONTINUOUS PRN
Status: DISCONTINUED | OUTPATIENT
Start: 2023-06-16 | End: 2023-06-20 | Stop reason: HOSPADM

## 2023-06-16 RX ORDER — GAUZE BANDAGE 2" X 2"
100 BANDAGE TOPICAL DAILY
Status: DISCONTINUED | OUTPATIENT
Start: 2023-06-16 | End: 2023-06-20 | Stop reason: HOSPADM

## 2023-06-16 RX ADMIN — CARVEDILOL 6.25 MG: 6.25 TABLET, FILM COATED ORAL at 08:53

## 2023-06-16 RX ADMIN — SODIUM CHLORIDE, PRESERVATIVE FREE 10 ML: 5 INJECTION INTRAVENOUS at 08:55

## 2023-06-16 RX ADMIN — INSULIN LISPRO 1 UNITS: 100 INJECTION, SOLUTION INTRAVENOUS; SUBCUTANEOUS at 18:30

## 2023-06-16 RX ADMIN — Medication 100 MG: at 08:53

## 2023-06-16 RX ADMIN — SODIUM CHLORIDE, PRESERVATIVE FREE 10 ML: 5 INJECTION INTRAVENOUS at 20:07

## 2023-06-16 RX ADMIN — RIVAROXABAN 20 MG: 20 TABLET, FILM COATED ORAL at 08:53

## 2023-06-16 RX ADMIN — ATORVASTATIN CALCIUM 40 MG: 40 TABLET, FILM COATED ORAL at 01:06

## 2023-06-16 RX ADMIN — ASPIRIN 81 MG CHEWABLE TABLET 81 MG: 81 TABLET CHEWABLE at 08:53

## 2023-06-16 RX ADMIN — MILRINONE LACTATE IN DEXTROSE 0.25 MCG/KG/MIN: 200 INJECTION, SOLUTION INTRAVENOUS at 11:03

## 2023-06-16 RX ADMIN — MILRINONE LACTATE IN DEXTROSE 0.25 MCG/KG/MIN: 200 INJECTION, SOLUTION INTRAVENOUS at 20:08

## 2023-06-16 RX ADMIN — SACUBITRIL AND VALSARTAN 1 TABLET: 49; 51 TABLET, FILM COATED ORAL at 01:06

## 2023-06-16 RX ADMIN — FUROSEMIDE 40 MG: 10 INJECTION, SOLUTION INTRAMUSCULAR; INTRAVENOUS at 18:30

## 2023-06-16 RX ADMIN — INSULIN LISPRO 1 UNITS: 100 INJECTION, SOLUTION INTRAVENOUS; SUBCUTANEOUS at 13:14

## 2023-06-16 RX ADMIN — FUROSEMIDE 40 MG: 10 INJECTION, SOLUTION INTRAMUSCULAR; INTRAVENOUS at 08:53

## 2023-06-16 ASSESSMENT — PAIN SCALES - GENERAL
PAINLEVEL_OUTOF10: 0
PAINLEVEL_OUTOF10: 0

## 2023-06-17 PROBLEM — K72.00 ISCHEMIC HEPATITIS: Status: ACTIVE | Noted: 2023-06-17

## 2023-06-17 PROBLEM — Z91.199 NON-COMPLIANCE: Status: ACTIVE | Noted: 2023-06-17

## 2023-06-17 LAB
ANION GAP SERPL CALCULATED.3IONS-SCNC: 10 MMOL/L (ref 3–16)
BUN SERPL-MCNC: 27 MG/DL (ref 7–20)
CALCIUM SERPL-MCNC: 8.1 MG/DL (ref 8.3–10.6)
CHLORIDE SERPL-SCNC: 104 MMOL/L (ref 99–110)
CO2 SERPL-SCNC: 26 MMOL/L (ref 21–32)
CREAT SERPL-MCNC: 1.5 MG/DL (ref 0.9–1.3)
GFR SERPLBLD CREATININE-BSD FMLA CKD-EPI: 53 ML/MIN/{1.73_M2}
GLUCOSE BLD-MCNC: 150 MG/DL (ref 70–99)
GLUCOSE BLD-MCNC: 194 MG/DL (ref 70–99)
GLUCOSE BLD-MCNC: 197 MG/DL (ref 70–99)
GLUCOSE BLD-MCNC: 210 MG/DL (ref 70–99)
GLUCOSE SERPL-MCNC: 145 MG/DL (ref 70–99)
HAV IGM SERPL QL IA: NORMAL
HBV CORE IGM SERPL QL IA: NORMAL
HBV SURFACE AG SERPL QL IA: NORMAL
HCV AB SERPL QL IA: NORMAL
MAGNESIUM SERPL-MCNC: 1.4 MG/DL (ref 1.8–2.4)
PERFORMED ON: ABNORMAL
POTASSIUM SERPL-SCNC: 3.8 MMOL/L (ref 3.5–5.1)
SODIUM SERPL-SCNC: 140 MMOL/L (ref 136–145)

## 2023-06-17 PROCEDURE — 2060000000 HC ICU INTERMEDIATE R&B

## 2023-06-17 PROCEDURE — 80048 BASIC METABOLIC PNL TOTAL CA: CPT

## 2023-06-17 PROCEDURE — 6360000002 HC RX W HCPCS: Performed by: INTERNAL MEDICINE

## 2023-06-17 PROCEDURE — 6370000000 HC RX 637 (ALT 250 FOR IP): Performed by: INTERNAL MEDICINE

## 2023-06-17 PROCEDURE — 99233 SBSQ HOSP IP/OBS HIGH 50: CPT | Performed by: CLINICAL NURSE SPECIALIST

## 2023-06-17 PROCEDURE — 36415 COLL VENOUS BLD VENIPUNCTURE: CPT

## 2023-06-17 PROCEDURE — 83735 ASSAY OF MAGNESIUM: CPT

## 2023-06-17 PROCEDURE — 80074 ACUTE HEPATITIS PANEL: CPT

## 2023-06-17 PROCEDURE — 2580000003 HC RX 258: Performed by: INTERNAL MEDICINE

## 2023-06-17 RX ORDER — MAGNESIUM SULFATE IN WATER 40 MG/ML
2000 INJECTION, SOLUTION INTRAVENOUS ONCE
Status: COMPLETED | OUTPATIENT
Start: 2023-06-17 | End: 2023-06-17

## 2023-06-17 RX ORDER — CALCIUM CARBONATE 500 MG/1
500 TABLET, CHEWABLE ORAL 2 TIMES DAILY PRN
Status: DISCONTINUED | OUTPATIENT
Start: 2023-06-17 | End: 2023-06-20 | Stop reason: HOSPADM

## 2023-06-17 RX ADMIN — Medication 100 MG: at 09:52

## 2023-06-17 RX ADMIN — FUROSEMIDE 40 MG: 10 INJECTION, SOLUTION INTRAMUSCULAR; INTRAVENOUS at 17:38

## 2023-06-17 RX ADMIN — SODIUM CHLORIDE, PRESERVATIVE FREE 10 ML: 5 INJECTION INTRAVENOUS at 19:59

## 2023-06-17 RX ADMIN — MILRINONE LACTATE IN DEXTROSE 0.25 MCG/KG/MIN: 200 INJECTION, SOLUTION INTRAVENOUS at 12:33

## 2023-06-17 RX ADMIN — ASPIRIN 81 MG CHEWABLE TABLET 81 MG: 81 TABLET CHEWABLE at 09:52

## 2023-06-17 RX ADMIN — ONDANSETRON 4 MG: 2 INJECTION INTRAMUSCULAR; INTRAVENOUS at 23:23

## 2023-06-17 RX ADMIN — MAGNESIUM SULFATE HEPTAHYDRATE 2000 MG: 40 INJECTION, SOLUTION INTRAVENOUS at 17:42

## 2023-06-17 RX ADMIN — ANTACID TABLETS 500 MG: 500 TABLET, CHEWABLE ORAL at 23:22

## 2023-06-17 RX ADMIN — RIVAROXABAN 20 MG: 20 TABLET, FILM COATED ORAL at 09:53

## 2023-06-17 ASSESSMENT — PAIN SCALES - GENERAL
PAINLEVEL_OUTOF10: 0

## 2023-06-18 ENCOUNTER — APPOINTMENT (OUTPATIENT)
Dept: CT IMAGING | Age: 58
DRG: 291 | End: 2023-06-18
Payer: MEDICARE

## 2023-06-18 ENCOUNTER — APPOINTMENT (OUTPATIENT)
Dept: MRI IMAGING | Age: 58
DRG: 291 | End: 2023-06-18
Payer: MEDICARE

## 2023-06-18 LAB
ALBUMIN SERPL-MCNC: 2.5 G/DL (ref 3.4–5)
ALBUMIN SERPL-MCNC: 2.6 G/DL (ref 3.4–5)
ALBUMIN/GLOB SERPL: 0.8 {RATIO} (ref 1.1–2.2)
ALP SERPL-CCNC: 92 U/L (ref 40–129)
ALP SERPL-CCNC: 96 U/L (ref 40–129)
ALT SERPL-CCNC: 253 U/L (ref 10–40)
ALT SERPL-CCNC: 253 U/L (ref 10–40)
ANION GAP SERPL CALCULATED.3IONS-SCNC: 9 MMOL/L (ref 3–16)
AST SERPL-CCNC: 256 U/L (ref 15–37)
AST SERPL-CCNC: 267 U/L (ref 15–37)
BILIRUB DIRECT SERPL-MCNC: 1.1 MG/DL (ref 0–0.3)
BILIRUB INDIRECT SERPL-MCNC: 0.9 MG/DL (ref 0–1)
BILIRUB SERPL-MCNC: 1.9 MG/DL (ref 0–1)
BILIRUB SERPL-MCNC: 2 MG/DL (ref 0–1)
BUN SERPL-MCNC: 23 MG/DL (ref 7–20)
CALCIUM SERPL-MCNC: 8.2 MG/DL (ref 8.3–10.6)
CHLORIDE SERPL-SCNC: 100 MMOL/L (ref 99–110)
CHOLEST SERPL-MCNC: 86 MG/DL (ref 0–199)
CO2 SERPL-SCNC: 26 MMOL/L (ref 21–32)
CREAT SERPL-MCNC: 1.2 MG/DL (ref 0.9–1.3)
DEPRECATED RDW RBC AUTO: 19 % (ref 12.4–15.4)
GFR SERPLBLD CREATININE-BSD FMLA CKD-EPI: >60 ML/MIN/{1.73_M2}
GLUCOSE BLD-MCNC: 139 MG/DL (ref 70–99)
GLUCOSE BLD-MCNC: 158 MG/DL (ref 70–99)
GLUCOSE BLD-MCNC: 169 MG/DL (ref 70–99)
GLUCOSE BLD-MCNC: 184 MG/DL (ref 70–99)
GLUCOSE BLD-MCNC: 185 MG/DL (ref 70–99)
GLUCOSE SERPL-MCNC: 153 MG/DL (ref 70–99)
HCT VFR BLD AUTO: 44 % (ref 40.5–52.5)
HDLC SERPL-MCNC: 20 MG/DL (ref 40–60)
HGB BLD-MCNC: 14.4 G/DL (ref 13.5–17.5)
INR PPP: 4.7 (ref 0.84–1.16)
LDLC SERPL CALC-MCNC: 54 MG/DL
MAGNESIUM SERPL-MCNC: 1.5 MG/DL (ref 1.8–2.4)
MCH RBC QN AUTO: 29.1 PG (ref 26–34)
MCHC RBC AUTO-ENTMCNC: 32.8 G/DL (ref 31–36)
MCV RBC AUTO: 88.7 FL (ref 80–100)
NT-PROBNP SERPL-MCNC: 1265 PG/ML (ref 0–124)
PERFORMED ON: ABNORMAL
PHOSPHATE SERPL-MCNC: 3 MG/DL (ref 2.5–4.9)
PLATELET # BLD AUTO: 144 K/UL (ref 135–450)
PMV BLD AUTO: 8.5 FL (ref 5–10.5)
POTASSIUM SERPL-SCNC: 3.8 MMOL/L (ref 3.5–5.1)
PROT SERPL-MCNC: 6 G/DL (ref 6.4–8.2)
PROT SERPL-MCNC: 6 G/DL (ref 6.4–8.2)
PROTHROMBIN TIME: 43.8 SEC (ref 11.5–14.8)
RBC # BLD AUTO: 4.96 M/UL (ref 4.2–5.9)
SODIUM SERPL-SCNC: 135 MMOL/L (ref 136–145)
TRIGL SERPL-MCNC: 61 MG/DL (ref 0–150)
TROPONIN, HIGH SENSITIVITY: 59 NG/L (ref 0–22)
VLDLC SERPL CALC-MCNC: 12 MG/DL
WBC # BLD AUTO: 7.1 K/UL (ref 4–11)

## 2023-06-18 PROCEDURE — 6360000002 HC RX W HCPCS: Performed by: INTERNAL MEDICINE

## 2023-06-18 PROCEDURE — 70498 CT ANGIOGRAPHY NECK: CPT

## 2023-06-18 PROCEDURE — 83880 ASSAY OF NATRIURETIC PEPTIDE: CPT

## 2023-06-18 PROCEDURE — 70450 CT HEAD/BRAIN W/O DYE: CPT

## 2023-06-18 PROCEDURE — 83735 ASSAY OF MAGNESIUM: CPT

## 2023-06-18 PROCEDURE — 85027 COMPLETE CBC AUTOMATED: CPT

## 2023-06-18 PROCEDURE — 70551 MRI BRAIN STEM W/O DYE: CPT

## 2023-06-18 PROCEDURE — 6370000000 HC RX 637 (ALT 250 FOR IP): Performed by: INTERNAL MEDICINE

## 2023-06-18 PROCEDURE — 36415 COLL VENOUS BLD VENIPUNCTURE: CPT

## 2023-06-18 PROCEDURE — 99233 SBSQ HOSP IP/OBS HIGH 50: CPT | Performed by: CLINICAL NURSE SPECIALIST

## 2023-06-18 PROCEDURE — 80053 COMPREHEN METABOLIC PANEL: CPT

## 2023-06-18 PROCEDURE — 85610 PROTHROMBIN TIME: CPT

## 2023-06-18 PROCEDURE — 84484 ASSAY OF TROPONIN QUANT: CPT

## 2023-06-18 PROCEDURE — 2060000000 HC ICU INTERMEDIATE R&B

## 2023-06-18 PROCEDURE — 6360000004 HC RX CONTRAST MEDICATION: Performed by: INTERNAL MEDICINE

## 2023-06-18 PROCEDURE — 80061 LIPID PANEL: CPT

## 2023-06-18 PROCEDURE — 84100 ASSAY OF PHOSPHORUS: CPT

## 2023-06-18 PROCEDURE — 2580000003 HC RX 258: Performed by: INTERNAL MEDICINE

## 2023-06-18 RX ORDER — LANOLIN ALCOHOL/MO/W.PET/CERES
6 CREAM (GRAM) TOPICAL NIGHTLY PRN
Status: DISCONTINUED | OUTPATIENT
Start: 2023-06-18 | End: 2023-06-20 | Stop reason: HOSPADM

## 2023-06-18 RX ORDER — FUROSEMIDE 10 MG/ML
40 INJECTION INTRAMUSCULAR; INTRAVENOUS DAILY
Status: DISCONTINUED | OUTPATIENT
Start: 2023-06-19 | End: 2023-06-20 | Stop reason: HOSPADM

## 2023-06-18 RX ORDER — MILRINONE LACTATE 0.2 MG/ML
.0625-.75 INJECTION, SOLUTION INTRAVENOUS CONTINUOUS
Status: DISCONTINUED | OUTPATIENT
Start: 2023-06-18 | End: 2023-06-19

## 2023-06-18 RX ADMIN — MILRINONE LACTATE IN DEXTROSE 0.25 MCG/KG/MIN: 200 INJECTION, SOLUTION INTRAVENOUS at 07:53

## 2023-06-18 RX ADMIN — Medication 6 MG: at 21:10

## 2023-06-18 RX ADMIN — IOPAMIDOL 75 ML: 755 INJECTION, SOLUTION INTRAVENOUS at 02:33

## 2023-06-18 RX ADMIN — MAGNESIUM SULFATE HEPTAHYDRATE 2000 MG: 40 INJECTION, SOLUTION INTRAVENOUS at 18:16

## 2023-06-18 RX ADMIN — SODIUM CHLORIDE, PRESERVATIVE FREE 10 ML: 5 INJECTION INTRAVENOUS at 22:24

## 2023-06-18 RX ADMIN — Medication 100 MG: at 22:23

## 2023-06-18 RX ADMIN — ATORVASTATIN CALCIUM 40 MG: 40 TABLET, FILM COATED ORAL at 21:10

## 2023-06-18 RX ADMIN — ASPIRIN 81 MG CHEWABLE TABLET 81 MG: 81 TABLET CHEWABLE at 09:46

## 2023-06-18 RX ADMIN — MAGNESIUM SULFATE HEPTAHYDRATE 2000 MG: 40 INJECTION, SOLUTION INTRAVENOUS at 20:24

## 2023-06-19 LAB
ALBUMIN SERPL-MCNC: 2.3 G/DL (ref 3.4–5)
ANION GAP SERPL CALCULATED.3IONS-SCNC: 8 MMOL/L (ref 3–16)
BASOPHILS # BLD: 0 K/UL (ref 0–0.2)
BASOPHILS NFR BLD: 0.2 %
BUN SERPL-MCNC: 17 MG/DL (ref 7–20)
CALCIUM SERPL-MCNC: 7.8 MG/DL (ref 8.3–10.6)
CHLORIDE SERPL-SCNC: 98 MMOL/L (ref 99–110)
CO2 SERPL-SCNC: 28 MMOL/L (ref 21–32)
CREAT SERPL-MCNC: 0.9 MG/DL (ref 0.9–1.3)
DEPRECATED RDW RBC AUTO: 19 % (ref 12.4–15.4)
EOSINOPHIL # BLD: 0.1 K/UL (ref 0–0.6)
EOSINOPHIL NFR BLD: 1.9 %
GFR SERPLBLD CREATININE-BSD FMLA CKD-EPI: >60 ML/MIN/{1.73_M2}
GLUCOSE BLD-MCNC: 132 MG/DL (ref 70–99)
GLUCOSE BLD-MCNC: 167 MG/DL (ref 70–99)
GLUCOSE BLD-MCNC: 190 MG/DL (ref 70–99)
GLUCOSE BLD-MCNC: 202 MG/DL (ref 70–99)
GLUCOSE SERPL-MCNC: 174 MG/DL (ref 70–99)
HCT VFR BLD AUTO: 41.8 % (ref 40.5–52.5)
HGB BLD-MCNC: 13.7 G/DL (ref 13.5–17.5)
LV EF: 23 %
LVEF MODALITY: NORMAL
LYMPHOCYTES # BLD: 1.5 K/UL (ref 1–5.1)
LYMPHOCYTES NFR BLD: 28.9 %
MAGNESIUM SERPL-MCNC: 2 MG/DL (ref 1.8–2.4)
MCH RBC QN AUTO: 29.2 PG (ref 26–34)
MCHC RBC AUTO-ENTMCNC: 32.9 G/DL (ref 31–36)
MCV RBC AUTO: 89 FL (ref 80–100)
MONOCYTES # BLD: 0.6 K/UL (ref 0–1.3)
MONOCYTES NFR BLD: 11.9 %
NEUTROPHILS # BLD: 2.9 K/UL (ref 1.7–7.7)
NEUTROPHILS NFR BLD: 57.1 %
PERFORMED ON: ABNORMAL
PHOSPHATE SERPL-MCNC: 3.2 MG/DL (ref 2.5–4.9)
PLATELET # BLD AUTO: 163 K/UL (ref 135–450)
PMV BLD AUTO: 9.2 FL (ref 5–10.5)
POTASSIUM SERPL-SCNC: 3.6 MMOL/L (ref 3.5–5.1)
RBC # BLD AUTO: 4.7 M/UL (ref 4.2–5.9)
SODIUM SERPL-SCNC: 134 MMOL/L (ref 136–145)
WBC # BLD AUTO: 5.1 K/UL (ref 4–11)

## 2023-06-19 PROCEDURE — 97530 THERAPEUTIC ACTIVITIES: CPT

## 2023-06-19 PROCEDURE — 99233 SBSQ HOSP IP/OBS HIGH 50: CPT | Performed by: INTERNAL MEDICINE

## 2023-06-19 PROCEDURE — 2580000003 HC RX 258: Performed by: INTERNAL MEDICINE

## 2023-06-19 PROCEDURE — 6360000002 HC RX W HCPCS: Performed by: CLINICAL NURSE SPECIALIST

## 2023-06-19 PROCEDURE — 6370000000 HC RX 637 (ALT 250 FOR IP): Performed by: INTERNAL MEDICINE

## 2023-06-19 PROCEDURE — 92610 EVALUATE SWALLOWING FUNCTION: CPT

## 2023-06-19 PROCEDURE — 97535 SELF CARE MNGMENT TRAINING: CPT

## 2023-06-19 PROCEDURE — 6360000004 HC RX CONTRAST MEDICATION: Performed by: NURSE PRACTITIONER

## 2023-06-19 PROCEDURE — 94760 N-INVAS EAR/PLS OXIMETRY 1: CPT

## 2023-06-19 PROCEDURE — 80069 RENAL FUNCTION PANEL: CPT

## 2023-06-19 PROCEDURE — 92523 SPEECH SOUND LANG COMPREHEN: CPT

## 2023-06-19 PROCEDURE — 97166 OT EVAL MOD COMPLEX 45 MIN: CPT

## 2023-06-19 PROCEDURE — C8929 TTE W OR WO FOL WCON,DOPPLER: HCPCS

## 2023-06-19 PROCEDURE — 36415 COLL VENOUS BLD VENIPUNCTURE: CPT

## 2023-06-19 PROCEDURE — 97161 PT EVAL LOW COMPLEX 20 MIN: CPT

## 2023-06-19 PROCEDURE — 85025 COMPLETE CBC W/AUTO DIFF WBC: CPT

## 2023-06-19 PROCEDURE — 2060000000 HC ICU INTERMEDIATE R&B

## 2023-06-19 PROCEDURE — 83735 ASSAY OF MAGNESIUM: CPT

## 2023-06-19 PROCEDURE — 97116 GAIT TRAINING THERAPY: CPT

## 2023-06-19 RX ORDER — VALSARTAN 80 MG/1
80 TABLET ORAL DAILY
Status: DISCONTINUED | OUTPATIENT
Start: 2023-06-19 | End: 2023-06-20

## 2023-06-19 RX ADMIN — ASPIRIN 81 MG CHEWABLE TABLET 81 MG: 81 TABLET CHEWABLE at 09:01

## 2023-06-19 RX ADMIN — ATORVASTATIN CALCIUM 40 MG: 40 TABLET, FILM COATED ORAL at 20:30

## 2023-06-19 RX ADMIN — PERFLUTREN 1.5 ML: 6.52 INJECTION, SUSPENSION INTRAVENOUS at 15:20

## 2023-06-19 RX ADMIN — VALSARTAN 80 MG: 80 TABLET ORAL at 20:39

## 2023-06-19 RX ADMIN — FUROSEMIDE 40 MG: 10 INJECTION, SOLUTION INTRAMUSCULAR; INTRAVENOUS at 09:01

## 2023-06-19 RX ADMIN — RIVAROXABAN 20 MG: 20 TABLET, FILM COATED ORAL at 18:19

## 2023-06-19 RX ADMIN — SODIUM CHLORIDE, PRESERVATIVE FREE 10 ML: 5 INJECTION INTRAVENOUS at 09:12

## 2023-06-19 RX ADMIN — SODIUM CHLORIDE, PRESERVATIVE FREE 10 ML: 5 INJECTION INTRAVENOUS at 20:31

## 2023-06-19 RX ADMIN — Medication 100 MG: at 09:01

## 2023-06-19 RX ADMIN — INSULIN LISPRO 1 UNITS: 100 INJECTION, SOLUTION INTRAVENOUS; SUBCUTANEOUS at 09:01

## 2023-06-19 ASSESSMENT — PAIN SCALES - GENERAL: PAINLEVEL_OUTOF10: 0

## 2023-06-19 NOTE — PLAN OF CARE
Problem: Discharge Planning  Goal: Discharge to home or other facility with appropriate resources  Outcome: Progressing  Care coordination involved to assess needs and help determine and explore appropriate resources. Problem: Pain  Goal: Verbalizes/displays adequate comfort level or baseline comfort level  Outcome: Progressing  Pain being managed with PRN analgesics per MD orders. Patient able to express presence and absence of pain and rate pain appropriately using numerical scale. Problem: Cardiovascular - Adult  Goal: Maintains optimal cardiac output and hemodynamic stability  Outcome: Progressing  Patient's heart rate and rhythm, vital signs, and labs are being monitored for changes. Problem: Metabolic/Fluid and Electrolytes - Adult  Goal: Electrolytes maintained within normal limits  Outcome: Progressing  Patient's heart rate and rhythm, vital signs, and labs are being monitored for changes. Problem: Skin/Tissue Integrity  Goal: Absence of new skin breakdown  Description: 1. Monitor for areas of redness and/or skin breakdown  2. Assess vascular access sites hourly  3. Every 4-6 hours minimum:  Change oxygen saturation probe site  4. Every 4-6 hours:  If on nasal continuous positive airway pressure, respiratory therapy assess nares and determine need for appliance change or resting period. Outcome: Progressing  Skin assessment completed every shift. Patient assessed for incontinence, appropriate barrier cream applied prn. Patient encouraged to turn/rotate every 2 hours. Assistance provided if patient unable to do so themselves.

## 2023-06-19 NOTE — CONSULTS
Consult Note  Physical Medicine and Rehabilitation    Patient: Vashti Trimble  [id number]  Date: 6/19/2023      Chief Complaint: right side weakness    History of Present Illness/Hospital Course:  Patient is a 61 yo M with pmh CAD, HFrEF, HTN, HLD, DM2, gout, alcohol use, tobacco dependence, and prior stroke (Right MCA infarct s/p M2 occlusion thrombectomy, residual left hemiparesis) who initially presented 6/15/2023 with shortness of breath, orthopnea, chest tightness. Of note, he had not been taking medications for the preceding 2 months. He was admitted for acute on chronic CHF with cardiogenic shock, LIBRADO, and shock liver. He was treated with IV diuresis and inotropic therapy with milrinone. On early morning 6/18 he developed new right side weakness. Code stroke called but he was not a candidate for TNK due to being on Xarelto. CTA head/neck was negative for large vessel occlusion or flow limiting stenosis. MRI brain revealed acute ischemic infarct in left parasagittal aspect of the high left frontal/parietal lobes with chronic encephalomalacia right MCA distribution. Etiology suspected cardioembolic. Neurology consulted and recommends holding anticoagulation until repeat CT head on 6/23. Using ASA in the interim. Currently, patient reports ongoing but improved right side weakness. He denies tingling/numbness, vision changes, headache, difficulty with speech or swallow. His shortness of breath is much improved since admission. He is interested in coming to inpatient rehabilitation unit to improve his function prior to returning home.      Prior Level of Function:  Independent for mobility, ADLs, and IADLs    Current Level of Function:  CGA for mobility  Up to [place] for ADLs  Mild OP dysphagia, minimal dysarthria    Pertinent Social History:  Support: lives alone  Home set-up: 2nf floor apartment with 1+14 steps to enter, laundry in basement    Past Medical History:   Diagnosis Date    CHF (congestive heart

## 2023-06-20 ENCOUNTER — HOSPITAL ENCOUNTER (INPATIENT)
Age: 58
DRG: 056 | End: 2023-06-20
Attending: PHYSICAL MEDICINE & REHABILITATION | Admitting: PHYSICAL MEDICINE & REHABILITATION
Payer: MEDICARE

## 2023-06-20 VITALS
RESPIRATION RATE: 26 BRPM | DIASTOLIC BLOOD PRESSURE: 94 MMHG | BODY MASS INDEX: 26.62 KG/M2 | SYSTOLIC BLOOD PRESSURE: 122 MMHG | HEIGHT: 75 IN | HEART RATE: 99 BPM | OXYGEN SATURATION: 94 % | WEIGHT: 214.07 LBS | TEMPERATURE: 98 F

## 2023-06-20 DIAGNOSIS — I63.9 ACUTE ISCHEMIC STROKE (HCC): Primary | ICD-10-CM

## 2023-06-20 DIAGNOSIS — I50.23 ACUTE ON CHRONIC SYSTOLIC CONGESTIVE HEART FAILURE (HCC): ICD-10-CM

## 2023-06-20 PROBLEM — I51.3 LEFT VENTRICULAR APICAL THROMBUS: Status: ACTIVE | Noted: 2023-06-20

## 2023-06-20 LAB
ALBUMIN SERPL-MCNC: 2.5 G/DL (ref 3.4–5)
ALP SERPL-CCNC: 87 U/L (ref 40–129)
ALT SERPL-CCNC: 183 U/L (ref 10–40)
ANION GAP SERPL CALCULATED.3IONS-SCNC: 8 MMOL/L (ref 3–16)
AST SERPL-CCNC: 158 U/L (ref 15–37)
BILIRUB DIRECT SERPL-MCNC: 0.9 MG/DL (ref 0–0.3)
BILIRUB INDIRECT SERPL-MCNC: 0.6 MG/DL (ref 0–1)
BILIRUB SERPL-MCNC: 1.5 MG/DL (ref 0–1)
BUN SERPL-MCNC: 16 MG/DL (ref 7–20)
CALCIUM SERPL-MCNC: 7.8 MG/DL (ref 8.3–10.6)
CHLORIDE SERPL-SCNC: 100 MMOL/L (ref 99–110)
CO2 SERPL-SCNC: 27 MMOL/L (ref 21–32)
CREAT SERPL-MCNC: 1 MG/DL (ref 0.9–1.3)
GFR SERPLBLD CREATININE-BSD FMLA CKD-EPI: >60 ML/MIN/{1.73_M2}
GLUCOSE BLD-MCNC: 118 MG/DL (ref 70–99)
GLUCOSE BLD-MCNC: 166 MG/DL (ref 70–99)
GLUCOSE BLD-MCNC: 187 MG/DL (ref 70–99)
GLUCOSE BLD-MCNC: 205 MG/DL (ref 70–99)
GLUCOSE SERPL-MCNC: 181 MG/DL (ref 70–99)
MAGNESIUM SERPL-MCNC: 1.7 MG/DL (ref 1.8–2.4)
PERFORMED ON: ABNORMAL
POTASSIUM SERPL-SCNC: 3.6 MMOL/L (ref 3.5–5.1)
PROT SERPL-MCNC: 6 G/DL (ref 6.4–8.2)
SODIUM SERPL-SCNC: 135 MMOL/L (ref 136–145)

## 2023-06-20 PROCEDURE — 80076 HEPATIC FUNCTION PANEL: CPT

## 2023-06-20 PROCEDURE — 99233 SBSQ HOSP IP/OBS HIGH 50: CPT | Performed by: INTERNAL MEDICINE

## 2023-06-20 PROCEDURE — 6370000000 HC RX 637 (ALT 250 FOR IP): Performed by: PHYSICAL MEDICINE & REHABILITATION

## 2023-06-20 PROCEDURE — 6370000000 HC RX 637 (ALT 250 FOR IP): Performed by: INTERNAL MEDICINE

## 2023-06-20 PROCEDURE — 80048 BASIC METABOLIC PNL TOTAL CA: CPT

## 2023-06-20 PROCEDURE — 2580000003 HC RX 258: Performed by: PHYSICAL MEDICINE & REHABILITATION

## 2023-06-20 PROCEDURE — 1280000000 HC REHAB R&B

## 2023-06-20 PROCEDURE — 92526 ORAL FUNCTION THERAPY: CPT

## 2023-06-20 PROCEDURE — 97129 THER IVNTJ 1ST 15 MIN: CPT

## 2023-06-20 PROCEDURE — 97535 SELF CARE MNGMENT TRAINING: CPT

## 2023-06-20 PROCEDURE — 83735 ASSAY OF MAGNESIUM: CPT

## 2023-06-20 PROCEDURE — 6360000002 HC RX W HCPCS: Performed by: INTERNAL MEDICINE

## 2023-06-20 PROCEDURE — 94760 N-INVAS EAR/PLS OXIMETRY 1: CPT

## 2023-06-20 PROCEDURE — 6360000002 HC RX W HCPCS: Performed by: CLINICAL NURSE SPECIALIST

## 2023-06-20 PROCEDURE — 97530 THERAPEUTIC ACTIVITIES: CPT

## 2023-06-20 PROCEDURE — 2580000003 HC RX 258: Performed by: INTERNAL MEDICINE

## 2023-06-20 PROCEDURE — 97130 THER IVNTJ EA ADDL 15 MIN: CPT

## 2023-06-20 PROCEDURE — 36415 COLL VENOUS BLD VENIPUNCTURE: CPT

## 2023-06-20 RX ORDER — POTASSIUM CHLORIDE 20 MEQ/1
40 TABLET, EXTENDED RELEASE ORAL PRN
Status: CANCELLED | OUTPATIENT
Start: 2023-06-20

## 2023-06-20 RX ORDER — GAUZE BANDAGE 2" X 2"
100 BANDAGE TOPICAL DAILY
Status: DISCONTINUED | OUTPATIENT
Start: 2023-06-21 | End: 2023-06-28 | Stop reason: HOSPADM

## 2023-06-20 RX ORDER — POTASSIUM CHLORIDE 7.45 MG/ML
10 INJECTION INTRAVENOUS PRN
Status: CANCELLED | OUTPATIENT
Start: 2023-06-20

## 2023-06-20 RX ORDER — SODIUM CHLORIDE 0.9 % (FLUSH) 0.9 %
5-40 SYRINGE (ML) INJECTION PRN
Status: DISCONTINUED | OUTPATIENT
Start: 2023-06-20 | End: 2023-06-27

## 2023-06-20 RX ORDER — FUROSEMIDE 10 MG/ML
40 INJECTION INTRAMUSCULAR; INTRAVENOUS DAILY
Status: DISCONTINUED | OUTPATIENT
Start: 2023-06-21 | End: 2023-06-22

## 2023-06-20 RX ORDER — INSULIN LISPRO 100 [IU]/ML
0-4 INJECTION, SOLUTION INTRAVENOUS; SUBCUTANEOUS NIGHTLY
Status: CANCELLED | OUTPATIENT
Start: 2023-06-20

## 2023-06-20 RX ORDER — CARVEDILOL 3.12 MG/1
3.12 TABLET ORAL 2 TIMES DAILY WITH MEALS
Qty: 60 TABLET | Refills: 3 | Status: ON HOLD
Start: 2023-06-20

## 2023-06-20 RX ORDER — SENNA AND DOCUSATE SODIUM 50; 8.6 MG/1; MG/1
1 TABLET, FILM COATED ORAL 2 TIMES DAILY
Status: CANCELLED | OUTPATIENT
Start: 2023-06-20

## 2023-06-20 RX ORDER — SENNA AND DOCUSATE SODIUM 50; 8.6 MG/1; MG/1
1 TABLET, FILM COATED ORAL 2 TIMES DAILY
Status: DISCONTINUED | OUTPATIENT
Start: 2023-06-20 | End: 2023-06-28 | Stop reason: HOSPADM

## 2023-06-20 RX ORDER — ACETAMINOPHEN 325 MG/1
650 TABLET ORAL EVERY 6 HOURS PRN
Status: CANCELLED | OUTPATIENT
Start: 2023-06-20

## 2023-06-20 RX ORDER — CALCIUM CARBONATE 500 MG/1
500 TABLET, CHEWABLE ORAL 2 TIMES DAILY PRN
Status: CANCELLED | OUTPATIENT
Start: 2023-06-20

## 2023-06-20 RX ORDER — LANOLIN ALCOHOL/MO/W.PET/CERES
6 CREAM (GRAM) TOPICAL NIGHTLY PRN
Status: CANCELLED | OUTPATIENT
Start: 2023-06-20

## 2023-06-20 RX ORDER — ONDANSETRON 2 MG/ML
4 INJECTION INTRAMUSCULAR; INTRAVENOUS EVERY 6 HOURS PRN
Status: DISCONTINUED | OUTPATIENT
Start: 2023-06-20 | End: 2023-06-28 | Stop reason: HOSPADM

## 2023-06-20 RX ORDER — INSULIN LISPRO 100 [IU]/ML
0-4 INJECTION, SOLUTION INTRAVENOUS; SUBCUTANEOUS
Status: CANCELLED | OUTPATIENT
Start: 2023-06-21

## 2023-06-20 RX ORDER — POTASSIUM CHLORIDE 20 MEQ/1
40 TABLET, EXTENDED RELEASE ORAL ONCE
Status: COMPLETED | OUTPATIENT
Start: 2023-06-20 | End: 2023-06-20

## 2023-06-20 RX ORDER — ATORVASTATIN CALCIUM 40 MG/1
40 TABLET, FILM COATED ORAL NIGHTLY
Qty: 30 TABLET | Refills: 0 | Status: ON HOLD
Start: 2023-06-20

## 2023-06-20 RX ORDER — ACETAMINOPHEN 325 MG/1
650 TABLET ORAL EVERY 6 HOURS PRN
Status: DISCONTINUED | OUTPATIENT
Start: 2023-06-20 | End: 2023-06-28 | Stop reason: HOSPADM

## 2023-06-20 RX ORDER — SODIUM CHLORIDE 9 MG/ML
INJECTION, SOLUTION INTRAVENOUS PRN
Status: CANCELLED | OUTPATIENT
Start: 2023-06-20

## 2023-06-20 RX ORDER — LANOLIN ALCOHOL/MO/W.PET/CERES
6 CREAM (GRAM) TOPICAL NIGHTLY PRN
Status: DISCONTINUED | OUTPATIENT
Start: 2023-06-20 | End: 2023-06-21

## 2023-06-20 RX ORDER — POLYETHYLENE GLYCOL 3350 17 G/17G
17 POWDER, FOR SOLUTION ORAL DAILY PRN
Status: CANCELLED | OUTPATIENT
Start: 2023-06-20

## 2023-06-20 RX ORDER — SODIUM CHLORIDE 9 MG/ML
INJECTION, SOLUTION INTRAVENOUS PRN
Status: DISCONTINUED | OUTPATIENT
Start: 2023-06-20 | End: 2023-06-27

## 2023-06-20 RX ORDER — SODIUM CHLORIDE 0.9 % (FLUSH) 0.9 %
5-40 SYRINGE (ML) INJECTION EVERY 12 HOURS SCHEDULED
Status: DISCONTINUED | OUTPATIENT
Start: 2023-06-20 | End: 2023-06-27

## 2023-06-20 RX ORDER — ATORVASTATIN CALCIUM 40 MG/1
40 TABLET, FILM COATED ORAL NIGHTLY
Status: DISCONTINUED | OUTPATIENT
Start: 2023-06-20 | End: 2023-06-28 | Stop reason: HOSPADM

## 2023-06-20 RX ORDER — DEXTROSE MONOHYDRATE 100 MG/ML
INJECTION, SOLUTION INTRAVENOUS CONTINUOUS PRN
Status: CANCELLED | OUTPATIENT
Start: 2023-06-20

## 2023-06-20 RX ORDER — VALSARTAN 80 MG/1
80 TABLET ORAL 2 TIMES DAILY
Status: DISCONTINUED | OUTPATIENT
Start: 2023-06-20 | End: 2023-06-28 | Stop reason: HOSPADM

## 2023-06-20 RX ORDER — MAGNESIUM SULFATE IN WATER 40 MG/ML
2000 INJECTION, SOLUTION INTRAVENOUS PRN
Status: CANCELLED | OUTPATIENT
Start: 2023-06-20

## 2023-06-20 RX ORDER — MAGNESIUM SULFATE IN WATER 40 MG/ML
4000 INJECTION, SOLUTION INTRAVENOUS ONCE
Status: COMPLETED | OUTPATIENT
Start: 2023-06-20 | End: 2023-06-20

## 2023-06-20 RX ORDER — HYDRALAZINE HYDROCHLORIDE 10 MG/1
10 TABLET, FILM COATED ORAL EVERY 6 HOURS PRN
Status: CANCELLED | OUTPATIENT
Start: 2023-06-20

## 2023-06-20 RX ORDER — VALSARTAN 80 MG/1
80 TABLET ORAL 2 TIMES DAILY
Qty: 30 TABLET | Refills: 3 | Status: ON HOLD
Start: 2023-06-20

## 2023-06-20 RX ORDER — GAUZE BANDAGE 2" X 2"
100 BANDAGE TOPICAL DAILY
Status: CANCELLED | OUTPATIENT
Start: 2023-06-21

## 2023-06-20 RX ORDER — ONDANSETRON 2 MG/ML
4 INJECTION INTRAMUSCULAR; INTRAVENOUS EVERY 6 HOURS PRN
Status: CANCELLED | OUTPATIENT
Start: 2023-06-20

## 2023-06-20 RX ORDER — SODIUM CHLORIDE 0.9 % (FLUSH) 0.9 %
5-40 SYRINGE (ML) INJECTION EVERY 12 HOURS SCHEDULED
Status: CANCELLED | OUTPATIENT
Start: 2023-06-20

## 2023-06-20 RX ORDER — INSULIN LISPRO 100 [IU]/ML
0-4 INJECTION, SOLUTION INTRAVENOUS; SUBCUTANEOUS NIGHTLY
Status: DISCONTINUED | OUTPATIENT
Start: 2023-06-20 | End: 2023-06-28 | Stop reason: HOSPADM

## 2023-06-20 RX ORDER — SODIUM CHLORIDE 0.9 % (FLUSH) 0.9 %
5-40 SYRINGE (ML) INJECTION PRN
Status: CANCELLED | OUTPATIENT
Start: 2023-06-20

## 2023-06-20 RX ORDER — ASPIRIN 81 MG/1
81 TABLET, CHEWABLE ORAL DAILY
Status: CANCELLED | OUTPATIENT
Start: 2023-06-21

## 2023-06-20 RX ORDER — VALSARTAN 80 MG/1
80 TABLET ORAL 2 TIMES DAILY
Status: DISCONTINUED | OUTPATIENT
Start: 2023-06-20 | End: 2023-06-20 | Stop reason: HOSPADM

## 2023-06-20 RX ORDER — ONDANSETRON 4 MG/1
4 TABLET, ORALLY DISINTEGRATING ORAL EVERY 8 HOURS PRN
Status: CANCELLED | OUTPATIENT
Start: 2023-06-20

## 2023-06-20 RX ORDER — CARVEDILOL 3.12 MG/1
3.12 TABLET ORAL 2 TIMES DAILY WITH MEALS
Status: DISCONTINUED | OUTPATIENT
Start: 2023-06-21 | End: 2023-06-28 | Stop reason: HOSPADM

## 2023-06-20 RX ORDER — POTASSIUM CHLORIDE 7.45 MG/ML
10 INJECTION INTRAVENOUS PRN
Status: DISCONTINUED | OUTPATIENT
Start: 2023-06-20 | End: 2023-06-28 | Stop reason: HOSPADM

## 2023-06-20 RX ORDER — ONDANSETRON 4 MG/1
4 TABLET, ORALLY DISINTEGRATING ORAL EVERY 8 HOURS PRN
Status: DISCONTINUED | OUTPATIENT
Start: 2023-06-20 | End: 2023-06-28 | Stop reason: HOSPADM

## 2023-06-20 RX ORDER — ATORVASTATIN CALCIUM 40 MG/1
40 TABLET, FILM COATED ORAL NIGHTLY
Status: CANCELLED | OUTPATIENT
Start: 2023-06-20

## 2023-06-20 RX ORDER — THIAMINE MONONITRATE (VIT B1) 100 MG
100 TABLET ORAL DAILY
Qty: 30 TABLET | Refills: 0 | Status: ON HOLD
Start: 2023-06-21

## 2023-06-20 RX ORDER — POTASSIUM CHLORIDE 20 MEQ/1
40 TABLET, EXTENDED RELEASE ORAL PRN
Status: DISCONTINUED | OUTPATIENT
Start: 2023-06-20 | End: 2023-06-28 | Stop reason: HOSPADM

## 2023-06-20 RX ORDER — HYDRALAZINE HYDROCHLORIDE 10 MG/1
10 TABLET, FILM COATED ORAL EVERY 6 HOURS PRN
Status: DISCONTINUED | OUTPATIENT
Start: 2023-06-20 | End: 2023-06-28 | Stop reason: HOSPADM

## 2023-06-20 RX ORDER — CALCIUM CARBONATE 500 MG/1
500 TABLET, CHEWABLE ORAL 2 TIMES DAILY PRN
Status: DISCONTINUED | OUTPATIENT
Start: 2023-06-20 | End: 2023-06-28 | Stop reason: HOSPADM

## 2023-06-20 RX ORDER — BISACODYL 10 MG
10 SUPPOSITORY, RECTAL RECTAL DAILY PRN
Status: CANCELLED | OUTPATIENT
Start: 2023-06-20

## 2023-06-20 RX ORDER — INSULIN LISPRO 100 [IU]/ML
0-4 INJECTION, SOLUTION INTRAVENOUS; SUBCUTANEOUS
Status: DISCONTINUED | OUTPATIENT
Start: 2023-06-21 | End: 2023-06-28 | Stop reason: HOSPADM

## 2023-06-20 RX ORDER — TORSEMIDE 20 MG/1
20 TABLET ORAL DAILY
Qty: 30 TABLET | Refills: 0 | Status: ON HOLD
Start: 2023-06-20

## 2023-06-20 RX ORDER — CARVEDILOL 3.12 MG/1
3.12 TABLET ORAL 2 TIMES DAILY WITH MEALS
Status: CANCELLED | OUTPATIENT
Start: 2023-06-21

## 2023-06-20 RX ORDER — DEXTROSE MONOHYDRATE 100 MG/ML
INJECTION, SOLUTION INTRAVENOUS CONTINUOUS PRN
Status: DISCONTINUED | OUTPATIENT
Start: 2023-06-20 | End: 2023-06-28 | Stop reason: HOSPADM

## 2023-06-20 RX ORDER — VALSARTAN 80 MG/1
80 TABLET ORAL 2 TIMES DAILY
Status: CANCELLED | OUTPATIENT
Start: 2023-06-20

## 2023-06-20 RX ORDER — MAGNESIUM SULFATE IN WATER 40 MG/ML
2000 INJECTION, SOLUTION INTRAVENOUS PRN
Status: DISCONTINUED | OUTPATIENT
Start: 2023-06-20 | End: 2023-06-28 | Stop reason: HOSPADM

## 2023-06-20 RX ORDER — FUROSEMIDE 10 MG/ML
40 INJECTION INTRAMUSCULAR; INTRAVENOUS DAILY
Status: CANCELLED | OUTPATIENT
Start: 2023-06-21

## 2023-06-20 RX ORDER — BISACODYL 10 MG
10 SUPPOSITORY, RECTAL RECTAL DAILY PRN
Status: DISCONTINUED | OUTPATIENT
Start: 2023-06-20 | End: 2023-06-28 | Stop reason: HOSPADM

## 2023-06-20 RX ORDER — ASPIRIN 81 MG/1
81 TABLET, CHEWABLE ORAL DAILY
Status: DISCONTINUED | OUTPATIENT
Start: 2023-06-21 | End: 2023-06-28 | Stop reason: HOSPADM

## 2023-06-20 RX ORDER — CARVEDILOL 3.12 MG/1
3.12 TABLET ORAL 2 TIMES DAILY WITH MEALS
Status: DISCONTINUED | OUTPATIENT
Start: 2023-06-20 | End: 2023-06-20 | Stop reason: HOSPADM

## 2023-06-20 RX ORDER — POLYETHYLENE GLYCOL 3350 17 G/17G
17 POWDER, FOR SOLUTION ORAL DAILY PRN
Status: DISCONTINUED | OUTPATIENT
Start: 2023-06-20 | End: 2023-06-28 | Stop reason: HOSPADM

## 2023-06-20 RX ADMIN — ATORVASTATIN CALCIUM 40 MG: 40 TABLET, FILM COATED ORAL at 20:16

## 2023-06-20 RX ADMIN — Medication 10 ML: at 20:17

## 2023-06-20 RX ADMIN — VALSARTAN 80 MG: 80 TABLET ORAL at 09:43

## 2023-06-20 RX ADMIN — INSULIN LISPRO 1 UNITS: 100 INJECTION, SOLUTION INTRAVENOUS; SUBCUTANEOUS at 12:13

## 2023-06-20 RX ADMIN — CARVEDILOL 3.12 MG: 3.12 TABLET, FILM COATED ORAL at 12:12

## 2023-06-20 RX ADMIN — ASPIRIN 81 MG CHEWABLE TABLET 81 MG: 81 TABLET CHEWABLE at 09:43

## 2023-06-20 RX ADMIN — POTASSIUM CHLORIDE 40 MEQ: 1500 TABLET, EXTENDED RELEASE ORAL at 12:12

## 2023-06-20 RX ADMIN — CARVEDILOL 3.12 MG: 3.12 TABLET, FILM COATED ORAL at 17:33

## 2023-06-20 RX ADMIN — SODIUM CHLORIDE, PRESERVATIVE FREE 10 ML: 5 INJECTION INTRAVENOUS at 09:43

## 2023-06-20 RX ADMIN — Medication 100 MG: at 09:43

## 2023-06-20 RX ADMIN — VALSARTAN 80 MG: 80 TABLET ORAL at 21:03

## 2023-06-20 RX ADMIN — FUROSEMIDE 40 MG: 10 INJECTION, SOLUTION INTRAMUSCULAR; INTRAVENOUS at 09:43

## 2023-06-20 RX ADMIN — SENNOSIDES AND DOCUSATE SODIUM 1 TABLET: 50; 8.6 TABLET ORAL at 20:16

## 2023-06-20 RX ADMIN — Medication 6 MG: at 20:16

## 2023-06-20 RX ADMIN — MAGNESIUM SULFATE HEPTAHYDRATE 4000 MG: 40 INJECTION, SOLUTION INTRAVENOUS at 12:12

## 2023-06-20 RX ADMIN — RIVAROXABAN 20 MG: 20 TABLET, FILM COATED ORAL at 17:33

## 2023-06-20 ASSESSMENT — PAIN SCALES - GENERAL
PAINLEVEL_OUTOF10: 0
PAINLEVEL_OUTOF10: 0

## 2023-06-20 NOTE — PLAN OF CARE
Problem: Discharge Planning  Goal: Discharge to home or other facility with appropriate resources  6/19/2023 2318 by Sue Gant RN  Outcome: Progressing     Problem: Pain  Goal: Verbalizes/displays adequate comfort level or baseline comfort level  6/19/2023 2318 by Sue Gant RN  Outcome: Progressing     Problem: Respiratory - Adult  Goal: Achieves optimal ventilation and oxygenation  6/19/2023 2318 by Sue Gant RN  Outcome: Progressing  Flowsheets (Taken 6/19/2023 2022)  Achieves optimal ventilation and oxygenation: Assess for changes in respiratory status     Problem: Metabolic/Fluid and Electrolytes - Adult  Goal: Electrolytes maintained within normal limits  6/19/2023 2318 by Sue Gant RN  Outcome: Progressing     Problem: Skin/Tissue Integrity  Goal: Absence of new skin breakdown  Description: 1. Monitor for areas of redness and/or skin breakdown  2. Assess vascular access sites hourly  3. Every 4-6 hours minimum:  Change oxygen saturation probe site  4. Every 4-6 hours:  If on nasal continuous positive airway pressure, respiratory therapy assess nares and determine need for appliance change or resting period.   6/19/2023 2318 by Sue Gant RN  Outcome: Progressing     Problem: Neurosensory - Adult  Goal: Achieves stable or improved neurological status  6/19/2023 2318 by Sue Gant RN  Outcome: Progressing     Problem: Safety - Adult  Goal: Free from fall injury  Outcome: Progressing

## 2023-06-20 NOTE — DISCHARGE SUMMARY
06/19/23  0502   WBC 7.1 5.1   HGB 14.4 13.7    163     BMP:    Recent Labs     06/18/23  0328 06/19/23  0502 06/20/23  0519   * 134* 135*   K 3.8 3.6 3.6    98* 100   CO2 26 28 27   BUN 23* 17 16   CREATININE 1.2 0.9 1.0   GLUCOSE 153* 174* 181*     Hepatic:   Recent Labs     06/18/23  0328 06/20/23  0519   *  256* 158*   *  253* 183*   BILITOT 2.0*  1.9* 1.5*   ALKPHOS 96  92 87     Lipids:   Lab Results   Component Value Date/Time    CHOL 86 06/18/2023 03:28 AM    HDL 20 06/18/2023 03:28 AM    TRIG 61 06/18/2023 03:28 AM     Hemoglobin A1C:   Lab Results   Component Value Date/Time    LABA1C 6.9 06/16/2023 05:03 AM     TSH:   Lab Results   Component Value Date/Time    TSH 1.47 10/17/2022 12:49 AM     Troponin: No results found for: TROPONINT  Lactic Acid: No results for input(s): LACTA in the last 72 hours. BNP:   Recent Labs     06/18/23 0328   PROBNP 1,265*     UA:  Lab Results   Component Value Date/Time    NITRU Negative 12/03/2018 10:50 PM    COLORU YELLOW 12/03/2018 10:50 PM    PHUR 5.0 12/03/2018 10:50 PM    WBCUA 3-5 01/19/2018 12:45 PM    RBCUA None seen 01/19/2018 12:45 PM    BACTERIA 1+ 01/19/2018 12:45 PM    CLARITYU Clear 12/03/2018 10:50 PM    SPECGRAV 1.006 12/03/2018 10:50 PM    LEUKOCYTESUR Negative 12/03/2018 10:50 PM    UROBILINOGEN 0.2 12/03/2018 10:50 PM    BILIRUBINUR Negative 12/03/2018 10:50 PM    BLOODU Negative 12/03/2018 10:50 PM    GLUCOSEU Negative 12/03/2018 10:50 PM    KETUA Negative 12/03/2018 10:50 PM     Urine Cultures:   Lab Results   Component Value Date/Time    LABURIN No growth at 18-36 hours 01/19/2018 12:45 PM     Blood Cultures:   Lab Results   Component Value Date/Time    BC No growth after 5 days of incubation. 04/08/2019 01:13 PM     Lab Results   Component Value Date/Time    BLOODCULT2 No growth after 5 days of incubation.  04/08/2019 01:13 PM     Organism: No results found for: ORG    Time Spent Discharging patient 45

## 2023-06-20 NOTE — NURSE NAVIGATOR
patient/caregiver understanding able to:   [x] Verbalize understanding   [x] Demonstrate understanding       [] Teach back        [x] Needs reinforcement     []  Other:      TEACHING TIME:  50 minutes       Plan:       DISCHARGE PLAN:  Placement for patient upon discharge: intermediate care facility   Hospice Care:  no  Code Status: Full Code  Discharge appointment scheduled: Yes     RECOMMENDATIONS:   [x]  Encourage to call Heart Failure Resource Line with any questions or concerns. [x]  Educate further Mr. Cameron Shelton on fluid restriction 48 oz- 64 oz during inpatient stay so he can understand how to measure intake at home. [x]  Continue to educate on S/S of Heart Failure. [x]  Emphasize daily weights, diet, and if changes, to call Heart Failure Resource Line  [x]  Cardiac Rehab Phase 1 referral declined. contact info given  [address] [facility] [facility] referral declined.   Contact info given          Electronically signed by Warren Canavan, RN, BSN on 6/20/2023 at 2:26 PM

## 2023-06-20 NOTE — DISCHARGE INSTR - COC
Continuity of Care Form    Patient Name: Brooklynn [place]   :  1965  MRN:  [id number]    Admit date:  6/15/2023  Discharge date:  ***    Code Status Order: Full Code   Advance Directives:     Admitting Physician:  Angeliac Canchola MD  PCP: Claudia Ramirez MD    Discharging Nurse: [facility] Unit/Room#: [id number]/5122-01  Discharging Unit Phone Number: ***    Emergency Contact:   Extended Emergency Contact Information  Primary Emergency Contact: Yung Chapman   [address] Phone: [phone]  Mobile Phone: [phone]  Relation: Niece/Nephew  Secondary Emergency Contact: Karrie Cruz  [place] Phone: [phone]  Relation: Brother/Sister    Past Surgical History:  Past Surgical History:   Procedure Laterality Date    DIAGNOSTIC CARDIAC CATH LAB PROCEDURE  2018       Immunization History: There is no immunization history on file for this patient.     Active Problems:  Patient Active Problem List   Diagnosis Code    Essential hypertension I10    Hyperlipidemia E78.5    DMII (diabetes mellitus, type 2) ([facility]) E11.9    Nonischemic cardiomyopathy (HCC) I42.8    Tobacco abuse Z72.0    Coronary artery disease involving native coronary artery of native heart without angina pectoris I25.10    Chronic systolic HF (heart failure) ([facility]) I50.22    CHF (congestive heart failure), NYHA class III, acute, systolic (HCC) Q91.35    Chest pain R07.9    Knee effusion, left M25.462    Right wrist pain M25.531    Acute ischemic right MCA stroke ([facility]) I63.511    Congestive heart failure due to cardiomyopathy (HCC) I50.9, I42.9    CHF (congestive heart failure), NYHA class IV, acute on chronic, systolic (HCC) M99.54    Cardiogenic shock ([facility]) R57.0    Demand ischemia (HCC) I24.8    LIBRADO (acute kidney injury) ([facility] [address] N17.9    Ischemic hepatitis K72.00    Non-compliance Z91.199    Left ventricular apical thrombus I51.3       Isolation/Infection:   Isolation            No Isolation

## 2023-06-20 NOTE — PROGRESS NOTES
ARU consult noted. Chart reviewed and discussed with Dr. Carmen Schwartz. Full consult to follow.
Department of Physical Medicine & Rehabilitation  Progress Note    Patient Identification:  Cristopher Sanders  [id number]  : 1965  Admit date: 6/15/2023    Chief Complaint: CHF (congestive heart failure), NYHA class IV, acute on chronic, systolic (HCC)    Subjective:   No acute events overnight. Patient seen this afternoon resting in bed. Reports right side strength mildly improved compared to yesterday. He remains motivated to come to ARU. Labs reviewed. ROS: No f/c, n/v, cp     Objective:  Patient Vitals for the past 24 hrs:   BP Temp Temp src Pulse Resp SpO2 Weight   23 1156 115/79 98 °F (36.7 °C) Oral 90 -- 94 % --   23 0811 -- -- -- (!) 108 26 97 % --   23 0800 (!) 135/104 98.3 °F (36.8 °C) Axillary (!) 103 22 97 % --   23 0442 119/77 97.7 °F (36.5 °C) Oral (!) 105 22 96 % 214 lb 1.1 oz (97.1 kg)   23 0008 (!) 109/50 98.9 °F (37.2 °C) Oral 98 17 96 % --   239 -- -- -- (!) 101 23 -- --   23 (!) 130/100 98.7 °F (37.1 °C) Oral (!) 131 21 97 % --   23 1605 130/89 98.2 °F (36.8 °C) Oral 96 21 96 % --     Const: Alert. No distress, pleasant. HEENT: Normocephalic, atraumatic. Normal sclera/conjunctiva. MMM. CV: Regular rate and rhythm. Resp: No respiratory distress. Lungs CTAB. Abd: Soft, nontender, nondistended, NABS+   Ext: Trace edema  Neuro: Alert, oriented, appropriately interactive. Speech mildly dysarthric. Right hemiparesis 4/5 RUE and RLE except 3/5 right hip flexion  Psych: Cooperative, appropriate mood and affect    Laboratory data: Available via EMR.    Last 24 hour lab  Recent Results (from the past 24 hour(s))   POCT Glucose    Collection Time: 23  4:04 PM   Result Value Ref Range    POC Glucose 132 (H) 70 - 99 mg/dl    Performed on ACCU-CHEK    POCT Glucose    Collection Time: 23  8:39 PM   Result Value Ref Range    POC Glucose 167 (H) 70 - 99 mg/dl    Performed on ACCU-CHEK    Basic Metabolic Panel    Collection
Did not sleep much tonight. Easily frustrated when attempting to do activities of daily living per self and is unable to complete them without assistance. Re-educated patient that staff is available for assistance but patient often declines assistance until unable to complete tasks per self resulting in frustration. In bed, safety precautions in place.
NAME:  Tia Grandchild  YOB: 1965  MEDICAL RECORD NUMBER:  [id number]  TODAYS DATE:  6/19/2023    Discussed personal risk factors for Stroke/TIA with patient/family, and ways to reduce the risk for a recurrent stroke. Patient's personal risk factors which were identified are:     [x] Alcohol Abuse: check with your physician before any alcohol consumption. [] Atrial fibrillation: may cause blood clots. [] Drug Abuse: Seek help, talk with your doctor  [] Clotting Disorder  [x] Diabetes  [] Family history of stroke or heart disease  [x] High Blood Pressure/Hypertension: work with your physician. [x] High cholesterol: monitor cholesterol levels with your physician.   [] Overweight/Obesity: work with your physician for your ideal body weight.  [] Physical Inactivity: get regular exercise as directed by your physician. [x] Personal history of previous TIA or stroke  [x] Poor Diet; decrease salt (sodium) in your diet, follow diet directed by physician. [] Smoking: Cigarette/Cigar: stop smoking. Advised pt. that you can reduce your risk for stroke/TIA by modifying/controlling the risk factors that you have. Pt.advised to take the medications as prescribed, which will be detailed in the discharge instructions, and to not stop taking them without consulting their physician. In addition, pt. advised to maintain a healthy diet, exercise regularly and to not smoke. [facility]'s Stroke treatment and prevention, Managing your recovery  notebook  provided and/or reviewed  with patient/family. The notebook includes, but not limited to, sections addressing warning signs & symptoms of a stroke, which are: sudden numbness or weakness especially on one side of the body, sudden confusion, difficulty speaking or understanding, sudden changes in vision, sudden dizziness or loss of balance/ coordination, sudden severe headache, syncope and seizure.   The need to call EMS (911) immediately if signs &
Neurology Progress Note    UPDATE 6/19/23:  Patient continues to have right side weakness, mostly in RLE. Denies headache or other specific complaints. Reports he has not been taking his medications for about 2 months. Awaiting TTE results. There was no family at the bedside at the time of encounter.     Medical History:  Past Medical History:   Diagnosis Date    CHF (congestive heart failure) (HCC)     CVA (cerebral vascular accident) ([facility] [address]     Diabetes mellitus ([facility] [address]     Hyperlipidemia     Hypertension     Vitamin D deficiency      Past Surgical History:   Procedure Laterality Date    DIAGNOSTIC CARDIAC CATH LAB PROCEDURE  12/2018     Scheduled Meds:   furosemide  40 mg IntraVENous Daily    sodium chloride flush  5-40 mL IntraVENous 2 times per day    aspirin  81 mg Oral Daily    atorvastatin  40 mg Oral Nightly    thiamine  100 mg Oral Daily    insulin lispro  0-4 Units SubCUTAneous TID WC    insulin lispro  0-4 Units SubCUTAneous Nightly     Continuous Infusions:   milrinone 0.125 mcg/kg/min (06/18/23 1128)    sodium chloride      dextrose       PRN Meds:.melatonin, calcium carbonate, sodium chloride flush, sodium chloride, ondansetron **OR** ondansetron, polyethylene glycol, acetaminophen **OR** acetaminophen, potassium chloride **OR** potassium alternative oral replacement **OR** potassium chloride, magnesium sulfate, glucose, dextrose bolus **OR** dextrose bolus, glucagon (rDNA), dextrose    Medications Prior to Admission: [DISCONTINUED] carvedilol (COREG) 6.25 MG tablet, Take 1 tablet by mouth 2 times daily (with meals)  [DISCONTINUED] sacubitril-valsartan (ENTRESTO)  MG per tablet, Take 1 tablet by mouth 2 times daily  [DISCONTINUED] ondansetron (ZOFRAN) 4 MG tablet, Take 1 tablet by mouth every 8 hours as needed for Nausea  [DISCONTINUED] torsemide (DEMADEX) 20 MG tablet, Take 1 tablet by mouth daily  [DISCONTINUED] escitalopram (LEXAPRO) 10 MG tablet, Take 10 mg by mouth daily (Patient not
Neurology Progress Note    UPDATE 6/20/23:  Patient up in chair. No specific complaints. Feels like his leg is moving better. There was no family at the bedside at the time of encounter.     Medical History:  Past Medical History:   Diagnosis Date    CHF (congestive heart failure) (HCC)     CVA (cerebral vascular accident) ([facility] [address]     Diabetes mellitus ([facility] [address]     Hyperlipidemia     Hypertension     Vitamin D deficiency      Past Surgical History:   Procedure Laterality Date    DIAGNOSTIC CARDIAC CATH LAB PROCEDURE  12/2018     Scheduled Meds:   valsartan  80 mg Oral Daily    rivaroxaban  20 mg Oral Daily    furosemide  40 mg IntraVENous Daily    sodium chloride flush  5-40 mL IntraVENous 2 times per day    aspirin  81 mg Oral Daily    atorvastatin  40 mg Oral Nightly    thiamine  100 mg Oral Daily    insulin lispro  0-4 Units SubCUTAneous TID WC    insulin lispro  0-4 Units SubCUTAneous Nightly     Continuous Infusions:   sodium chloride      dextrose       PRN Meds:.melatonin, calcium carbonate, sodium chloride flush, sodium chloride, ondansetron **OR** ondansetron, polyethylene glycol, acetaminophen **OR** acetaminophen, potassium chloride **OR** potassium alternative oral replacement **OR** potassium chloride, magnesium sulfate, glucose, dextrose bolus **OR** dextrose bolus, glucagon (rDNA), dextrose    Medications Prior to Admission: [DISCONTINUED] carvedilol (COREG) 6.25 MG tablet, Take 1 tablet by mouth 2 times daily (with meals)  [DISCONTINUED] sacubitril-valsartan (ENTRESTO)  MG per tablet, Take 1 tablet by mouth 2 times daily  [DISCONTINUED] ondansetron (ZOFRAN) 4 MG tablet, Take 1 tablet by mouth every 8 hours as needed for Nausea  [DISCONTINUED] torsemide (DEMADEX) 20 MG tablet, Take 1 tablet by mouth daily  [DISCONTINUED] escitalopram (LEXAPRO) 10 MG tablet, Take 10 mg by mouth daily (Patient not taking: Reported on 4/3/2023)  [DISCONTINUED] aspirin 81 MG tablet, Take 1 tablet by mouth daily
Patient feeling better today. NIHSS improved, patient ambulating with walker, RLE with improved strength. Patient denies pain. Some shortness of breath noted with activity. Patient calls appropriately for assist. Call light in reach. Continue to monitor.
Physical Therapy  Facility/Department: [address] PROGRESSIVE CARE  Physical Therapy Initial Assessment      Name: Massiel Pablo  : 1965  MRN: [id number]  Date of Service: 2023    Discharge Recommendations:  Patient would benefit from continued therapy after discharge (5-7)   Massiel Pablo scored a 18/24 on the AM-PAC short mobility form. Current research shows that an AM-PAC score of 17 or less is typically not associated with a discharge to the patient's home setting. Based on the patient's AM-PAC score and their current functional mobility deficits, it is recommended that the patient have 5-7 sessions per week of Physical Therapy at d/c to increase the patient's independence. At this time, this patient demonstrates complex nursing, medical, and rehabilitative needs, and would benefit from intensive rehabilitation services upon discharge from the Inpatient setting. This patient demonstrates the ability to participate in and benefit from an intensive therapy program with a coordinated interdisciplinary team approach to foster frequent, structured, and documented communication among disciplines, who will work together to establish, prioritize, and achieve treatment goals. Please see assessment section for further patient specific details. If patient discharges prior to next session this note will serve as a discharge summary. Please see below for the latest assessment towards goals. PT Equipment Recommendations  Other: cont to assess      Patient Diagnosis(es): The primary encounter diagnosis was Acute on chronic congestive heart failure, unspecified heart failure type ([address] Diagnoses of Elevated troponin and Essential hypertension were also pertinent to this visit. Past Medical History:  has a past medical history of CHF (congestive heart failure) ([address] CVA (cerebral vascular accident) ([address] Diabetes mellitus ([address] Hyperlipidemia, Hypertension, and Vitamin D deficiency.   Past
Referring Physician: Dr. Krystle Holloway  Reason for Consultation: CHF exacerbation  Chief Complaint: Short of breath    Subjective:   History of Present Illness:  Edgardo Oates is a 60 y.o. patient who presented to the hospital with complaints of progressive shortness of breath. He typically follows with [facility] but has been without medication for several months due to various factors. Regardless, he notes worsening shortness of breath and ultimately is having symptoms at rest prompting his visit to the emergency department. He denies associated chest pains, weight gain, or lower extremity edema. The patient is in cardiogenic shock with worsening renal function, worsening lactic acidosis, and worsening liver function. His periphery is cool but seems to be mentating clearly. Interval history:  No acute overnight cardiac events. Notes reviewed from consulting physicians and hospitalist.  Patient with an acute CVA over the weekend with resulting right-sided weakness. The patient feels his shortness of breath is improving and denies associated chest pains. Milrinone dose reduced over the weekend and patient denies any worsening cardiac symptoms. Past Medical History:   has a past medical history of CHF (congestive heart failure) ([address] CVA (cerebral vascular accident) ([facility] [address] Diabetes mellitus ([facility] [address] Hyperlipidemia, Hypertension, and Vitamin D deficiency. Surgical History:   has a past surgical history that includes Diagnostic Cardiac Cath Lab Procedure (12/2018). Social History:   reports that he has quit smoking. His smoking use included cigarettes. He smoked an average of .2 packs per day. He has never used smokeless tobacco. He reports current alcohol use. He reports that he does not use drugs. Family History:  family history includes Diabetes in his father; Heart Failure in his mother and sister; Hypertension in his father and sister.     Home Medications:  Were reviewed and are
Referring Physician: Dr. Singh Polanco  Reason for Consultation: CHF exacerbation  Chief Complaint: Short of breath    Subjective:   History of Present Illness:  Lissette Mendieta is a 60 y.o. patient who presented to the hospital with complaints of progressive shortness of breath. He typically follows with [facility] but has been without medication for several months due to various factors. Regardless, he notes worsening shortness of breath and ultimately is having symptoms at rest prompting his visit to the emergency department. He denies associated chest pains, weight gain, or lower extremity edema. The patient is in cardiogenic shock with worsening renal function, worsening lactic acidosis, and worsening liver function. His periphery is cool but seems to be mentating clearly. Interval history:  No acute overnight cardiac events. Notes reviewed from consulting physicians and hospitalist.  Patient with an acute CVA over the weekend with resulting right-sided weakness. The patient feels his shortness of breath is improving and denies associated chest pains. Past Medical History:   has a past medical history of CHF (congestive heart failure) ([facility] [address] CVA (cerebral vascular accident) ([facility] [address] Diabetes mellitus ([facility] [address] Hyperlipidemia, Hypertension, and Vitamin D deficiency. Surgical History:   has a past surgical history that includes Diagnostic Cardiac Cath Lab Procedure (12/2018). Social History:   reports that he has quit smoking. His smoking use included cigarettes. He smoked an average of .2 packs per day. He has never used smokeless tobacco. He reports current alcohol use. He reports that he does not use drugs. Family History:  family history includes Diabetes in his father; Heart Failure in his mother and sister; Hypertension in his father and sister.     Home Medications:  Were reviewed and are listed in nursing record and/or below  Prior to Admission medications    Not on File
Speech Language/Pathology   SPEECH LANGUAGE AND CLINICAL BEDSIDE SWALLOWING EVALUATION   Speech Therapy Department     Massiel Pablo  AGE: 60 y.o. GENDER: male  : 1965  [id number]  EPISODE DATE:  6/15/2023    MEDICAL DIAGNOSIS:   Chief Complaint   Patient presents with    Shortness of Breath    Chest Pain     Shortness of breath and chest tightness ongoing for 2 days. Pt has had previous episodes. Pt has had issues getting his medications and has not had medications for 2 months. Hx of CHF. Pt states that his pain is going across the top of his chest and is worse when he lays down. Onset: 6/15/2023     PAST MEDICAL HISTORY        Diagnosis Date    CHF (congestive heart failure) (HCC)     CVA (cerebral vascular accident) ([facility] [address]     Diabetes mellitus ([facility] [address]     Hyperlipidemia     Hypertension     Vitamin D deficiency        PAST SURGICAL HISTORY    Past Surgical History:   Procedure Laterality Date    DIAGNOSTIC CARDIAC CATH LAB PROCEDURE  2018       ALLERGIES    Allergies   Allergen Reactions    Lisinopril Swelling     Lips swell     CHART REVIEW:  6/15/2023 admitted with c/o CHF  2023 code stroke around 2 AM    IMAGING:  CXR: 6/15/2023  IMPRESSION:  Small pleural effusions. CT HEAD: 2023  IMPRESSION:  No acute intracranial abnormality. Chronic appearing infarct, encephalomalacia right MCA distribution. CTA HEAD/NECK: 2023  IMPRESSION:  No significant stenosis or large vessel occlusion in the CTA of the head and neck. 1.5 mm outpouching distal left A1 segment may represent focal  ectasia/tortuosity, infundibulum, or minimal aneurysm. Consider follow  follow-up consultation and imaging surveillance. BRAIN MRI: 2023  IMPRESSION:  Acute/subacute ischemia in the parasagittal aspect of the high left  frontal/parietal lobes. Chronic microvascular disease with remote right MCA distribution infarct.     Prior Status:   Lives alone  Reports independent for ADLs and IADLs  Not
Speech Language/Pathology   SPEECH LANGUAGE AND CLINICAL BEDSIDE SWALLOWING TREATMENT  Speech Therapy Department       Bustamante Velazquez  AGE: 60 y.o. GENDER: male  : 1965  [id number]  EPISODE DATE:  6/15/2023    MEDICAL DIAGNOSIS:   Chief Complaint   Patient presents with    Shortness of Breath    Chest Pain     Shortness of breath and chest tightness ongoing for 2 days. Pt has had previous episodes. Pt has had issues getting his medications and has not had medications for 2 months. Hx of CHF. Pt states that his pain is going across the top of his chest and is worse when he lays down. ONSET: 6/15/2023       PAST MEDICAL HISTORY      Diagnosis Date    CHF (congestive heart failure) (HCC)     CVA (cerebral vascular accident) ([facility] [address]     Diabetes mellitus ([facility] [address]     Hyperlipidemia     Hypertension     Vitamin D deficiency        PAST SURGICAL HISTORY  Past Surgical History:   Procedure Laterality Date    DIAGNOSTIC CARDIAC CATH LAB PROCEDURE  2018       ALLERGIES  Allergies   Allergen Reactions    Lisinopril Swelling     Lips swell     CHART REVIEW:  6/15/2023 admitted with c/o CHF  2023 code stroke around 2 AM     IMAGING:  CXR: 6/15/2023  IMPRESSION:  Small pleural effusions. CT HEAD: 2023  IMPRESSION:  No acute intracranial abnormality. Chronic appearing infarct, encephalomalacia right MCA distribution. CTA HEAD/NECK: 2023  IMPRESSION:  No significant stenosis or large vessel occlusion in the CTA of the head and neck. 1.5 mm outpouching distal left A1 segment may represent focal  ectasia/tortuosity, infundibulum, or minimal aneurysm. Consider follow  follow-up consultation and imaging surveillance. BRAIN MRI: 2023  IMPRESSION:  Acute/subacute ischemia in the parasagittal aspect of the high left  frontal/parietal lobes. Chronic microvascular disease with remote right MCA distribution infarct.      Prior Status:   Lives alone  Reports independent for ADLs and
maintained without evidence of an acute infarct. There is no evidence of hydrocephalus. ORBITS: The visualized portion of the orbits demonstrate no acute abnormality. SINUSES: The visualized paranasal sinuses and mastoid air cells demonstrate no acute abnormality. SOFT TISSUES/SKULL:  No acute abnormality of the visualized skull or soft tissues. No acute intracranial abnormality. Chronic appearing infarct, encephalomalacia right MCA distribution. Findings were communicated to Dr. Ester Monzon by the CORE team on 6/18/2023 at 2:59 am. RECOMMENDATIONS: Unavailable     CTA HEAD NECK W CONTRAST    Result Date: 6/18/2023  EXAMINATION: CTA OF THE HEAD AND NECK WITH CONTRAST 6/18/2023 2:08 am: TECHNIQUE: CTA of the head and neck was performed with the administration of intravenous contrast. Multiplanar reformatted images are provided for review. MIP images are provided for review. Stenosis of the internal carotid arteries measured using NASCET criteria. Automated exposure control, iterative reconstruction, and/or weight based adjustment of the mA/kV was utilized to reduce the radiation dose to as low as reasonably achievable. COMPARISON: CT head 08/10/2015 HISTORY: ORDERING SYSTEM PROVIDED HISTORY: RLE weakness/numbness TECHNOLOGIST PROVIDED HISTORY: Reason for exam:->RLE weakness/numbness Has a \"code stroke\" or \"stroke alert\" been called? ->Yes Reason for Exam: RLE weakness/numbness FINDINGS: CTA NECK: AORTIC ARCH/ARCH VESSELS: No dissection or arterial injury. No significant stenosis of the brachiocephalic or subclavian arteries. CAROTID ARTERIES: No dissection, arterial injury, or hemodynamically significant stenosis by NASCET criteria. VERTEBRAL ARTERIES: No dissection, arterial injury, or significant stenosis. SOFT TISSUES: The lung apices are clear. No cervical or superior mediastinal lymphadenopathy. The larynx and pharynx are unremarkable. No acute abnormality of the salivary and thyroid glands.
functional limits  Coordination: Within functional limits  Tone: Normal  Sensation: Intact  ADL  Grooming: Stand by assistance  Grooming Skilled Clinical Factors: to complete oral care amd washing face sink side  LE Dressing: Stand by assistance;Contact guard assistance  LE Dressing Skilled Clinical Factors: SBA to thread B LEs through pants seated EOB and to pull up over hips in stance with CGA for balance  Toileting: Contact guard assistance;Stand by assistance  Toileting Skilled Clinical Factors: to void in stance over toilet with SBA/CGA  Additional Comments: Anticipate pt to require CGA UB bathing/dressing, set up assist feeding, and CGA/Mala LB bathing due to decreased strength, balance and endurance. Activity Tolerance  Activity Tolerance: Patient tolerated evaluation without incident  Bed mobility  Bed Mobility Comments: Unable to assess. Pt seated EOB upon therapist arrival and seated in recliner at end of session. Transfers  Sit to stand: Contact guard assistance;Stand by assistance  Stand to sit: Contact guard assistance;Stand by assistance  Transfer Comments: EOB>stance without device with CGA; RW>recliner with CGA  Vision  Vision: Within Functional Limits  Hearing  Hearing: Within functional limits  Cognition  Overall Cognitive Status: WNL  Orientation  Overall Orientation Status: Within Normal Limits  Orientation Level: Oriented X4               Functional Mobility Circuit Training: Pt ambulated 20 ft without device with CGA and min unsteadiness and 1x LOB;  Pt ambulated 100 ft with RW and CGA with few instances of running into obstacles on L side  Static Sitting Balance Exercises: SUP seated EOB  Dynamic Sitting Balance Exercises: SBA to complete LB dressing seated EOB  Static Standing Balance Exercises: SBA/CGA to void in stance over toilet x1-2 minutes; SBA to complete grooming sink side x5 minutes  Dynamic Standing Balance Exercises: CGA with and without RW during functional mobility requiring
high left frontal/parietal lobes. There is no abnormal blooming artifact on susceptibility weighted imaging. ORBITS: The visualized portion of the orbits demonstrate no acute abnormality. SINUSES: The visualized paranasal sinuses and mastoid air cells demonstrate no acute abnormality. BONES/SOFT TISSUES: The bone marrow signal intensity appears normal. The soft tissues demonstrate no acute abnormality. Acute/subacute ischemia in the parasagittal aspect of the high left frontal/parietal lobes. Chronic microvascular disease with remote right MCA distribution infarct.        CBC:   Recent Labs     06/18/23  0328 06/19/23  0502   WBC 7.1 5.1   HGB 14.4 13.7    163     BMP:    Recent Labs     06/17/23  0713 06/18/23  0328 06/19/23  0502    135* 134*   K 3.8 3.8 3.6    100 98*   CO2 26 26 28   BUN 27* 23* 17   CREATININE 1.5* 1.2 0.9   GLUCOSE 145* 153* 174*     Hepatic:   Recent Labs     06/18/23  0328   *  256*   *  253*   BILITOT 2.0*  1.9*   ALKPHOS 96  92     Lipids:   Lab Results   Component Value Date/Time    CHOL 86 06/18/2023 03:28 AM    HDL 20 06/18/2023 03:28 AM    TRIG 61 06/18/2023 03:28 AM     Hemoglobin A1C:   Lab Results   Component Value Date/Time    LABA1C 6.9 06/16/2023 05:03 AM     TSH:   Lab Results   Component Value Date/Time    TSH 1.47 10/17/2022 12:49 AM     Troponin: No results found for: TROPONINT  Lactic Acid:   Recent Labs     06/16/23  1116   LACTA 4.0*     BNP:   Recent Labs     06/18/23  0328   PROBNP 1,265*     UA:  Lab Results   Component Value Date/Time    NITRU Negative 12/03/2018 10:50 PM    COLORU YELLOW 12/03/2018 10:50 PM    PHUR 5.0 12/03/2018 10:50 PM    WBCUA 3-5 01/19/2018 12:45 PM    RBCUA None seen 01/19/2018 12:45 PM    BACTERIA 1+ 01/19/2018 12:45 PM    CLARITYU Clear 12/03/2018 10:50 PM    SPECGRAV 1.006 12/03/2018 10:50 PM    LEUKOCYTESUR Negative 12/03/2018 10:50 PM    UROBILINOGEN 0.2 12/03/2018 10:50 PM    BILIRUBINUR Negative

## 2023-06-21 LAB
ANION GAP SERPL CALCULATED.3IONS-SCNC: 10 MMOL/L (ref 3–16)
BASOPHILS # BLD: 0 K/UL (ref 0–0.2)
BASOPHILS NFR BLD: 0.3 %
BUN SERPL-MCNC: 17 MG/DL (ref 7–20)
CALCIUM SERPL-MCNC: 8.2 MG/DL (ref 8.3–10.6)
CHLORIDE SERPL-SCNC: 97 MMOL/L (ref 99–110)
CO2 SERPL-SCNC: 27 MMOL/L (ref 21–32)
CREAT SERPL-MCNC: 1 MG/DL (ref 0.9–1.3)
DEPRECATED RDW RBC AUTO: 18.7 % (ref 12.4–15.4)
EOSINOPHIL # BLD: 0.1 K/UL (ref 0–0.6)
EOSINOPHIL NFR BLD: 1.9 %
GFR SERPLBLD CREATININE-BSD FMLA CKD-EPI: >60 ML/MIN/{1.73_M2}
GLUCOSE BLD-MCNC: 118 MG/DL (ref 70–99)
GLUCOSE BLD-MCNC: 131 MG/DL (ref 70–99)
GLUCOSE BLD-MCNC: 155 MG/DL (ref 70–99)
GLUCOSE BLD-MCNC: 183 MG/DL (ref 70–99)
GLUCOSE SERPL-MCNC: 154 MG/DL (ref 70–99)
HCT VFR BLD AUTO: 40.7 % (ref 40.5–52.5)
HGB BLD-MCNC: 13.5 G/DL (ref 13.5–17.5)
LYMPHOCYTES # BLD: 1.7 K/UL (ref 1–5.1)
LYMPHOCYTES NFR BLD: 34.5 %
MAGNESIUM SERPL-MCNC: 2.2 MG/DL (ref 1.8–2.4)
MCH RBC QN AUTO: 29.1 PG (ref 26–34)
MCHC RBC AUTO-ENTMCNC: 33.1 G/DL (ref 31–36)
MCV RBC AUTO: 88 FL (ref 80–100)
MONOCYTES # BLD: 0.6 K/UL (ref 0–1.3)
MONOCYTES NFR BLD: 11.3 %
NEUTROPHILS # BLD: 2.6 K/UL (ref 1.7–7.7)
NEUTROPHILS NFR BLD: 52 %
PERFORMED ON: ABNORMAL
PLATELET # BLD AUTO: 180 K/UL (ref 135–450)
PMV BLD AUTO: 8.8 FL (ref 5–10.5)
POTASSIUM SERPL-SCNC: 4.1 MMOL/L (ref 3.5–5.1)
PREALB SERPL-MCNC: 5.7 MG/DL (ref 20–40)
RBC # BLD AUTO: 4.62 M/UL (ref 4.2–5.9)
SODIUM SERPL-SCNC: 134 MMOL/L (ref 136–145)
WBC # BLD AUTO: 5.1 K/UL (ref 4–11)

## 2023-06-21 PROCEDURE — 97166 OT EVAL MOD COMPLEX 45 MIN: CPT

## 2023-06-21 PROCEDURE — 80048 BASIC METABOLIC PNL TOTAL CA: CPT

## 2023-06-21 PROCEDURE — 97535 SELF CARE MNGMENT TRAINING: CPT

## 2023-06-21 PROCEDURE — 1280000000 HC REHAB R&B

## 2023-06-21 PROCEDURE — 6370000000 HC RX 637 (ALT 250 FOR IP): Performed by: PHYSICAL MEDICINE & REHABILITATION

## 2023-06-21 PROCEDURE — 85025 COMPLETE CBC W/AUTO DIFF WBC: CPT

## 2023-06-21 PROCEDURE — 36415 COLL VENOUS BLD VENIPUNCTURE: CPT

## 2023-06-21 PROCEDURE — 97116 GAIT TRAINING THERAPY: CPT

## 2023-06-21 PROCEDURE — 94760 N-INVAS EAR/PLS OXIMETRY 1: CPT

## 2023-06-21 PROCEDURE — 97110 THERAPEUTIC EXERCISES: CPT

## 2023-06-21 PROCEDURE — 2580000003 HC RX 258: Performed by: PHYSICAL MEDICINE & REHABILITATION

## 2023-06-21 PROCEDURE — 6360000002 HC RX W HCPCS: Performed by: PHYSICAL MEDICINE & REHABILITATION

## 2023-06-21 PROCEDURE — 84134 ASSAY OF PREALBUMIN: CPT

## 2023-06-21 PROCEDURE — 83735 ASSAY OF MAGNESIUM: CPT

## 2023-06-21 PROCEDURE — 99232 SBSQ HOSP IP/OBS MODERATE 35: CPT | Performed by: NURSE PRACTITIONER

## 2023-06-21 PROCEDURE — 97530 THERAPEUTIC ACTIVITIES: CPT

## 2023-06-21 PROCEDURE — 97162 PT EVAL MOD COMPLEX 30 MIN: CPT

## 2023-06-21 PROCEDURE — 92523 SPEECH SOUND LANG COMPREHEN: CPT

## 2023-06-21 RX ORDER — ALLOPURINOL 100 MG/1
100 TABLET ORAL DAILY
Status: ON HOLD | COMMUNITY
End: 2023-06-27 | Stop reason: SDUPTHER

## 2023-06-21 RX ORDER — TRAZODONE HYDROCHLORIDE 50 MG/1
50 TABLET ORAL NIGHTLY PRN
Status: DISCONTINUED | OUTPATIENT
Start: 2023-06-21 | End: 2023-06-28 | Stop reason: HOSPADM

## 2023-06-21 RX ORDER — SPIRONOLACTONE 25 MG/1
25 TABLET ORAL DAILY
Status: ON HOLD | COMMUNITY
End: 2023-06-27 | Stop reason: SDUPTHER

## 2023-06-21 RX ADMIN — ACETAMINOPHEN 650 MG: 325 TABLET ORAL at 20:45

## 2023-06-21 RX ADMIN — CARVEDILOL 3.12 MG: 6.25 TABLET, FILM COATED ORAL at 17:27

## 2023-06-21 RX ADMIN — ATORVASTATIN CALCIUM 40 MG: 40 TABLET, FILM COATED ORAL at 20:44

## 2023-06-21 RX ADMIN — VALSARTAN 80 MG: 80 TABLET ORAL at 08:55

## 2023-06-21 RX ADMIN — VALSARTAN 80 MG: 80 TABLET ORAL at 20:44

## 2023-06-21 RX ADMIN — Medication 10 ML: at 20:43

## 2023-06-21 RX ADMIN — RIVAROXABAN 20 MG: 20 TABLET, FILM COATED ORAL at 17:27

## 2023-06-21 RX ADMIN — Medication 10 ML: at 08:58

## 2023-06-21 RX ADMIN — Medication 100 MG: at 08:51

## 2023-06-21 RX ADMIN — FUROSEMIDE 40 MG: 10 INJECTION, SOLUTION INTRAMUSCULAR; INTRAVENOUS at 14:25

## 2023-06-21 RX ADMIN — ASPIRIN 81 MG 81 MG: 81 TABLET ORAL at 08:51

## 2023-06-21 RX ADMIN — TRAZODONE HYDROCHLORIDE 50 MG: 50 TABLET ORAL at 21:24

## 2023-06-21 ASSESSMENT — PAIN DESCRIPTION - DESCRIPTORS: DESCRIPTORS: ACHING

## 2023-06-21 ASSESSMENT — PAIN DESCRIPTION - LOCATION: LOCATION: FOOT

## 2023-06-21 ASSESSMENT — PAIN SCALES - GENERAL
PAINLEVEL_OUTOF10: 0
PAINLEVEL_OUTOF10: 3

## 2023-06-21 ASSESSMENT — PAIN DESCRIPTION - ONSET: ONSET: ON-GOING

## 2023-06-21 ASSESSMENT — PAIN DESCRIPTION - ORIENTATION: ORIENTATION: RIGHT;LEFT

## 2023-06-21 ASSESSMENT — PAIN DESCRIPTION - FREQUENCY: FREQUENCY: INTERMITTENT

## 2023-06-21 ASSESSMENT — PAIN DESCRIPTION - PAIN TYPE: TYPE: ACUTE PAIN

## 2023-06-21 NOTE — PATIENT CARE CONFERENCE
[facility]  Inpatient Rehabilitation  Weekly Team Conference Note      Date: 2023  Patient Name:  Chary Emerson    MRN: [id number]  : 1965  Gender: male  Physician: Dr. Dana Loomis MD  Diagnosis: Acute ischemic stroke [facility]) [I63.9]    CASE MANAGEMENT  Assessment: Pt's goal is to return home. Barriers are medical transportation and getting his medications filled as his insurance recently changed. Will update [facility] (his preferred pharmacy) to his insurance. PHYSICAL THERAPY    Bed mobility  Rolling to Left: Stand by assistance  Rolling to Right: Stand by assistance  Supine to Sit: Stand by assistance  Sit to Supine: Stand by assistance  Scooting: Stand by assistance  Bed Mobility Comments: bed mobility done on regular bed in ADL    Transfers:  Sit to Stand: Contact guard assistance  Stand to Sit: Contact guard assistance  Bed to Chair: Contact guard assistance, Minimal assistance (without a device, intermittent min assist as feet scissor at times)    Ambulation  Surface: Level tile  Device: No Device  Assistance: Contact guard assistance, Minimal assistance  Quality of Gait: upright posture with intermittent internal rotation of bilateral LE. He also experiences intermittent scissoring during ambulating that requires min assist at times to recover  Gait Deviations: Slow Aubrie, Staggers  Distance: 200', 48' with three turns, short distances in therapy gym           Stairs  # Steps : 12  Stairs Height: 6\"  Rails: Bilateral  Curbs: 6\" (patient completed curb step without a device with CGA. He pauses before ascending and descending. No overt LOB occured buti slightly unsteady.)  Device: No Device  Assistance: Contact guard assistance  Comment: slow pace with reciprocal pattern up and down. He is able to maintain good balance with use of bilateral UE support on rail. Car Transfer: Contact guard assistance (Mock car transfer done without a device with CGA.  Patient used good

## 2023-06-21 NOTE — DISCHARGE INSTRUCTIONS
Extra Heart Failure sites:     https://Xfire.com/   --- this is American Heart Association interactive Healthier Living with Heart Failure guidebook. Please click hyperlink or copy / paste link into search bar. Use your mouse to scroll through the pages. Lots of information about weight monitoring, diet tips, activity, meds, etc    HF Clancy hans  -- this is a free smart phone hans available for iPhone and Android download   Use your phone to track sodium / fluid intake, zone tool symptom tracking, weights, medications, etc. Click on this hyperlink  HF Clancy Hans   for QR code for easy download. DASH (Dietary Approach to Stop Hypertension) diet --  [email or web address] -- this diet is a flexible eating plan that promotes heart healthy eating style. Click on hyperlink or copy / paste link into search bar. Lots of low sodium recipes and tips.     [email or web address]  -- more free recipes

## 2023-06-21 NOTE — CONSULTS
Comprehensive Nutrition Assessment    Type and Reason for Visit:  Initial, Consult    Nutrition Recommendations/Plan:   Continue current diet. Monitor meal intakes and reassess need for oral nutrition supplements. Honor food/fluid preferences as possible. Malnutrition Assessment:  Malnutrition Status: At risk for malnutrition (Comment) (06/21/23 1136)    Context:  Acute Illness     Findings of the 6 clinical characteristics of malnutrition:  Energy Intake:  No significant decrease in energy intake  Weight Loss:  Greater than 2% over 1 week (Planned and favorable weight loss.)     Body Fat Loss:  No significant body fat loss     Muscle Mass Loss:  No significant muscle mass loss    Fluid Accumulation:  Mild Generalized    Nutrition Assessment:    Pt. admitted for acute ischemic stroke. Pt. reports SOB and tightness in chest a few days leading up to ED visit. Pt. states there were issues with their medications and they have been without for the last two months. Receiving a low fat/low chl/high fiber/2g diet. Tolerating diet well, likely meeting EER. 1500mL fluid restriction in place. Appears compliant. Nutrition Related Findings:    Labs reviewed Na 143. Meds reviewed, use of diuretic noted. Skin intact. Last BM 6/20. Wound Type: None       Current Nutrition Intake & Therapies:    Average Meal Intake: %  Average Supplements Intake: None Ordered  ADULT DIET; Regular; Low Fat/Low Chol/High Fiber/2 gm Na; 1500 ml    Anthropometric Measures:  Height: 6' 3\" (190.5 cm)  Ideal Body Weight (IBW): 196 lbs (89 kg)    Admission Body Weight: 224 lb 14 oz (102 kg)  Current Body Weight: 209 lb 10.5 oz (95.1 kg), 107 % IBW. Weight Source: Standing Scale  Current BMI (kg/m2): 26.2        Weight Adjustment For: No Adjustment                 BMI Categories: Overweight (BMI 25.0-29. 9)    Estimated Daily Nutrient Needs:  Energy Requirements Based On: Kcal/kg  Weight Used for Energy Requirements: Current  Energy

## 2023-06-21 NOTE — H&P
cognition  Prognosis: Good  Anticipated Dispo: home alone  ELOS: 1-2 weeks    Rehabilitation Diagnosis:   Stroke, 1.2, Right Body (L Brain)      Assessment and Plan:    Impairments: right hemiparesis, decreased balance, endurance, dysarthria, dysphagia, higher level cognition    Acute ischemic left MEL stroke  -Etiology: cardioembolic with apical thrombi seen on TTE  -Secondary ppx: Xarelto, statin  -PT/OT/SLP    Acute on chronic combined systolic and diastolic CHF   -EF 15-91%, grade II DD  -Initially with cardiogenic shock requiring milrinone gtt  -Cardiology following  -IV lasix  -bb, arb  -Daily weight    CAD, demand ischemia  -consider ischemic work-up when CHF better compensated  -statin, bb, arb    LIBRADO  -Avoid nephrotoxins, renally dose meds  -Monitor renal function    Transaminitis, shock liver  -LFTs now down trending    DM2 with hyperglycemia  -Hgb A1C 6.9%  -ISS    H/o prior right MCA ischemic stroke with M2 occlusion s/p thrombectomy   -Mild residual left hemiparesis, contributing to functional impairment  -Secondary ppx as above  -PT/OT/SLP    Chronic etoh use  -Cessation counseling  -Thiamine    Tobacco dependence  -Nicotine patch offered  -cessation counseling    Dysphagia   -Dietitian and SLP consults.   -Currently on regular + thins with precautions    Bladder   -High risk retention   -Monitor PVRs, SC prn >300cc    Bowel   -High risk constipation   -senna+colace BID, PRN miralax, MoM, and bisacodyl supp. Safety   -fall precautions    Pain control  -acetaminophen    DVT ppx  -Xarelto    FULL CODE    Boris Ramirez MD 6/21/2023, 10:55 AM

## 2023-06-21 NOTE — PLAN OF CARE
Problem: Safety - Adult  Goal: Free from fall injury  6/20/2023 2323 by Duyen Landin RN  Outcome: Progressing  Note: Fall risk wrist band on the patient. Non-skid footwear in place. Bed/chair alarms used appropriately. Educate Patient call appropriately for needs. Side rails up. Appropriate safety devices used for transfer. Will continue to monitor. Problem: Pain  Goal: Verbalizes/displays adequate comfort level or baseline comfort level  Outcome: Progressing  Flowsheets (Taken 6/20/2023 2323)  Verbalizes/displays adequate comfort level or baseline comfort level:   Encourage patient to monitor pain and request assistance   Administer analgesics based on type and severity of pain and evaluate response   Consider cultural and social influences on pain and pain management   Assess pain using appropriate pain scale   Implement non-pharmacological measures as appropriate and evaluate response   Notify Licensed Independent Practitioner if interventions unsuccessful or patient reports new pain     Problem: Skin/Tissue Integrity  Goal: Absence of new skin breakdown  Description: 1. Monitor for areas of redness and/or skin breakdown  2. Assess vascular access sites hourly  3. Every 4-6 hours minimum:  Change oxygen saturation probe site  4. Every 4-6 hours:  If on nasal continuous positive airway pressure, respiratory therapy assess nares and determine need for appliance change or resting period. Outcome: Progressing  Note: Skin assessment completed on admission . Barrier wipes used in the event of incontinence. Pressure relief techniques used as needed while in chair and in bed. Position changes encouraged at least every two hours while in bed.

## 2023-06-21 NOTE — PLAN OF CARE
Problem: Discharge Planning  Goal: Discharge to home or other facility with appropriate resources  6/21/2023 1157 by Vilma Lr LPN  Outcome: Progressing  Flowsheets (Taken 6/21/2023 1131)  Discharge to home or other facility with appropriate resources: Identify barriers to discharge with patient and caregiver     Problem: Safety - Adult  Goal: Free from fall injury  6/21/2023 1157 by Vilma Lr LPN  Outcome: Progressing     Problem: ABCDS Injury Assessment  Goal: Absence of physical injury  6/21/2023 1157 by Vilma Lr LPN  Outcome: Progressing     Problem: Pain  Goal: Verbalizes/displays adequate comfort level or baseline comfort level  6/21/2023 1157 by Vilma Lr LPN  Outcome: Progressing     Problem: Skin/Tissue Integrity  Goal: Absence of new skin breakdown  Description: 1. Monitor for areas of redness and/or skin breakdown  2. Assess vascular access sites hourly  3. Every 4-6 hours minimum:  Change oxygen saturation probe site  4. Every 4-6 hours:  If on nasal continuous positive airway pressure, respiratory therapy assess nares and determine need for appliance change or resting period.   6/21/2023 1157 by Vilma Lr LPN  Outcome: Progressing     Problem: Nutrition Deficit:  Goal: Optimize nutritional status  Outcome: Progressing

## 2023-06-21 NOTE — PLAN OF CARE
[facility] PATIENT TREATMENT PLAN  [facility]   [address]  [phone]    Austin Haynes    : 1965  [facility]  MRN: [id number]   PHYSICIAN:  Gage Mckinney MD  Primary Problem    Patient Active Problem List   Diagnosis    Essential hypertension    Hyperlipidemia    DMII (diabetes mellitus, type 2) ([facility] [address]    Nonischemic cardiomyopathy ([facility] [address] [facility]    Tobacco abuse    Coronary artery disease involving native coronary artery of native heart without angina pectoris    Chronic systolic HF (heart failure) (HCC)    CHF (congestive heart failure), NYHA class III, acute, systolic ([facility])    Chest pain    Knee effusion, left    Right wrist pain    Acute ischemic right MCA stroke (HCC)    Congestive heart failure due to cardiomyopathy ([facility] [facility]    CHF (congestive heart failure), NYHA class IV, acute on chronic, systolic (HCC)    Cardiogenic shock ([facility] [address] [facility]    Demand ischemia ([facility] [address] [facility]    LIBRADO (acute kidney injury) ([facility] [address] [facility]    Ischemic hepatitis    Non-compliance    Left ventricular apical thrombus    Acute ischemic stroke [facility])       Rehabilitation Diagnosis:     Acute ischemic stroke ([facility] [address] [I63.9]       ADMIT DATE:2023    Patient Goals: \"be able to maneuver on my own\" \"not have to ask for help\"    Admitting Impairments: right hemiparesis, decreased balance, endurance, dysarthria, dysphagia, higher level cognition     Acute ischemic left MEL stroke  -Etiology: cardioembolic with apical thrombi seen on TTE  -Secondary ppx: Xarelto, statin  -PT/OT/SLP     Acute on chronic combined systolic and diastolic CHF   -EF 06-84%, grade II DD  -Initially with cardiogenic shock requiring milrinone gtt  -Cardiology following  -IV lasix  -bb, arb  -Daily weight     CAD, demand ischemia  -consider ischemic work-up when CHF better compensated  -statin, bb, arb     LIBRADO  -Avoid nephrotoxins, renally dose meds  -Monitor renal function     Transaminitis, shock liver  -LFTs now down

## 2023-06-22 LAB
ANION GAP SERPL CALCULATED.3IONS-SCNC: 10 MMOL/L (ref 3–16)
BASOPHILS # BLD: 0 K/UL (ref 0–0.2)
BASOPHILS NFR BLD: 0.7 %
BUN SERPL-MCNC: 16 MG/DL (ref 7–20)
CALCIUM SERPL-MCNC: 8.1 MG/DL (ref 8.3–10.6)
CHLORIDE SERPL-SCNC: 99 MMOL/L (ref 99–110)
CO2 SERPL-SCNC: 28 MMOL/L (ref 21–32)
CREAT SERPL-MCNC: 0.9 MG/DL (ref 0.9–1.3)
DEPRECATED RDW RBC AUTO: 18.7 % (ref 12.4–15.4)
EOSINOPHIL # BLD: 0.1 K/UL (ref 0–0.6)
EOSINOPHIL NFR BLD: 2.2 %
GFR SERPLBLD CREATININE-BSD FMLA CKD-EPI: >60 ML/MIN/{1.73_M2}
GLUCOSE BLD-MCNC: 116 MG/DL (ref 70–99)
GLUCOSE BLD-MCNC: 135 MG/DL (ref 70–99)
GLUCOSE BLD-MCNC: 146 MG/DL (ref 70–99)
GLUCOSE BLD-MCNC: 178 MG/DL (ref 70–99)
GLUCOSE SERPL-MCNC: 121 MG/DL (ref 70–99)
HCT VFR BLD AUTO: 41.7 % (ref 40.5–52.5)
HGB BLD-MCNC: 13.8 G/DL (ref 13.5–17.5)
LYMPHOCYTES # BLD: 1.8 K/UL (ref 1–5.1)
LYMPHOCYTES NFR BLD: 38.5 %
MAGNESIUM SERPL-MCNC: 1.9 MG/DL (ref 1.8–2.4)
MCH RBC QN AUTO: 29.2 PG (ref 26–34)
MCHC RBC AUTO-ENTMCNC: 33.1 G/DL (ref 31–36)
MCV RBC AUTO: 88.4 FL (ref 80–100)
MONOCYTES # BLD: 0.5 K/UL (ref 0–1.3)
MONOCYTES NFR BLD: 10.8 %
NEUTROPHILS # BLD: 2.2 K/UL (ref 1.7–7.7)
NEUTROPHILS NFR BLD: 47.8 %
PERFORMED ON: ABNORMAL
PLATELET # BLD AUTO: 197 K/UL (ref 135–450)
PMV BLD AUTO: 8.7 FL (ref 5–10.5)
POTASSIUM SERPL-SCNC: 3.9 MMOL/L (ref 3.5–5.1)
RBC # BLD AUTO: 4.72 M/UL (ref 4.2–5.9)
SODIUM SERPL-SCNC: 137 MMOL/L (ref 136–145)
WBC # BLD AUTO: 4.7 K/UL (ref 4–11)

## 2023-06-22 PROCEDURE — 97530 THERAPEUTIC ACTIVITIES: CPT

## 2023-06-22 PROCEDURE — 83735 ASSAY OF MAGNESIUM: CPT

## 2023-06-22 PROCEDURE — 97113 AQUATIC THERAPY/EXERCISES: CPT

## 2023-06-22 PROCEDURE — 6360000002 HC RX W HCPCS: Performed by: PHYSICAL MEDICINE & REHABILITATION

## 2023-06-22 PROCEDURE — 80048 BASIC METABOLIC PNL TOTAL CA: CPT

## 2023-06-22 PROCEDURE — 97530 THERAPEUTIC ACTIVITIES: CPT | Performed by: PHYSICAL THERAPIST

## 2023-06-22 PROCEDURE — 94760 N-INVAS EAR/PLS OXIMETRY 1: CPT

## 2023-06-22 PROCEDURE — 97110 THERAPEUTIC EXERCISES: CPT | Performed by: PHYSICAL THERAPIST

## 2023-06-22 PROCEDURE — 97535 SELF CARE MNGMENT TRAINING: CPT

## 2023-06-22 PROCEDURE — 97116 GAIT TRAINING THERAPY: CPT | Performed by: PHYSICAL THERAPIST

## 2023-06-22 PROCEDURE — 6370000000 HC RX 637 (ALT 250 FOR IP): Performed by: NURSE PRACTITIONER

## 2023-06-22 PROCEDURE — 36415 COLL VENOUS BLD VENIPUNCTURE: CPT

## 2023-06-22 PROCEDURE — 1280000000 HC REHAB R&B

## 2023-06-22 PROCEDURE — 85025 COMPLETE CBC W/AUTO DIFF WBC: CPT

## 2023-06-22 PROCEDURE — 2580000003 HC RX 258: Performed by: PHYSICAL MEDICINE & REHABILITATION

## 2023-06-22 PROCEDURE — 6370000000 HC RX 637 (ALT 250 FOR IP): Performed by: INTERNAL MEDICINE

## 2023-06-22 PROCEDURE — 99232 SBSQ HOSP IP/OBS MODERATE 35: CPT | Performed by: NURSE PRACTITIONER

## 2023-06-22 PROCEDURE — 6370000000 HC RX 637 (ALT 250 FOR IP): Performed by: PHYSICAL MEDICINE & REHABILITATION

## 2023-06-22 RX ORDER — FUROSEMIDE 20 MG/1
20 TABLET ORAL DAILY
Status: DISCONTINUED | OUTPATIENT
Start: 2023-06-23 | End: 2023-06-28 | Stop reason: HOSPADM

## 2023-06-22 RX ORDER — SPIRONOLACTONE 25 MG/1
12.5 TABLET ORAL DAILY
Status: DISCONTINUED | OUTPATIENT
Start: 2023-06-22 | End: 2023-06-28 | Stop reason: HOSPADM

## 2023-06-22 RX ADMIN — Medication 10 ML: at 07:51

## 2023-06-22 RX ADMIN — CARVEDILOL 3.12 MG: 6.25 TABLET, FILM COATED ORAL at 07:47

## 2023-06-22 RX ADMIN — ATORVASTATIN CALCIUM 40 MG: 40 TABLET, FILM COATED ORAL at 21:50

## 2023-06-22 RX ADMIN — VALSARTAN 80 MG: 80 TABLET ORAL at 07:47

## 2023-06-22 RX ADMIN — ASPIRIN 81 MG 81 MG: 81 TABLET ORAL at 07:47

## 2023-06-22 RX ADMIN — CARVEDILOL 3.12 MG: 6.25 TABLET, FILM COATED ORAL at 17:05

## 2023-06-22 RX ADMIN — TRAZODONE HYDROCHLORIDE 50 MG: 50 TABLET ORAL at 22:25

## 2023-06-22 RX ADMIN — Medication 10 ML: at 21:52

## 2023-06-22 RX ADMIN — VALSARTAN 80 MG: 80 TABLET ORAL at 21:50

## 2023-06-22 RX ADMIN — SPIRONOLACTONE 12.5 MG: 25 TABLET ORAL at 12:31

## 2023-06-22 RX ADMIN — FUROSEMIDE 40 MG: 10 INJECTION, SOLUTION INTRAMUSCULAR; INTRAVENOUS at 07:46

## 2023-06-22 RX ADMIN — RIVAROXABAN 20 MG: 20 TABLET, FILM COATED ORAL at 17:05

## 2023-06-22 RX ADMIN — Medication 100 MG: at 07:47

## 2023-06-22 NOTE — PLAN OF CARE
Problem: Safety - Adult  Goal: Free from fall injury  6/22/2023 1259 by Leann Bates RN  Outcome: Progressing  Flowsheets (Taken 6/21/2023 2351 by Tatyana Moreno RN)  Free From Fall Injury:   Instruct family/caregiver on patient safety   Based on caregiver fall risk screen, instruct family/caregiver to ask for assistance with transferring infant if caregiver noted to have fall risk factors  6/21/2023 2351 by Tatyana Moreno RN  Outcome: Progressing  Flowsheets  Taken 6/21/2023 2351 by Tatyana Moreno RN  Free From Fall Injury:   Instruct family/caregiver on patient safety   Based on caregiver fall risk screen, instruct family/caregiver to ask for assistance with transferring infant if caregiver noted to have fall risk factors  Taken 6/21/2023 1158 by Inna Lentz LPN  Free From Fall Injury: Instruct family/caregiver on patient safety  Note: Pt educated on falls precautions and safety. Call light and personal belongings within reach at all times. Non skid socks in use when up. Hourly rounding and alarms active.        Problem: ABCDS Injury Assessment  Goal: Absence of physical injury  6/22/2023 1259 by Leann Bates RN  Outcome: Progressing  Flowsheets (Taken 6/21/2023 1158 by Inna Lentz LPN)  Absence of Physical Injury: Implement safety measures based on patient assessment  6/21/2023 2351 by Tatyana Moreno RN  Outcome: Progressing  Flowsheets (Taken 6/21/2023 1158 by Inna Lentz LPN)  Absence of Physical Injury: Implement safety measures based on patient assessment

## 2023-06-22 NOTE — PLAN OF CARE
Problem: Safety - Adult  Goal: Free from fall injury  6/21/2023 2351 by Yuli Weaver RN  Outcome: Progressing  Flowsheets  Taken 6/21/2023 2351 by Yuli Weaver RN  Free From Fall Injury:   Instruct family/caregiver on patient safety   Based on caregiver fall risk screen, instruct family/caregiver to ask for assistance with transferring infant if caregiver noted to have fall risk factors  Taken 6/21/2023 1158 by Jaqui Alvarez LPN  Free From Fall Injury: Instruct family/caregiver on patient safety  Note: Pt educated on falls precautions and safety. Call light and personal belongings within reach at all times. Non skid socks in use when up. Hourly rounding and alarms active. Problem: Skin/Tissue Integrity  Goal: Absence of new skin breakdown  Description: 1. Monitor for areas of redness and/or skin breakdown  2. Assess vascular access sites hourly  3. Every 4-6 hours minimum:  Change oxygen saturation probe site  4. Every 4-6 hours:  If on nasal continuous positive airway pressure, respiratory therapy assess nares and determine need for appliance change or resting period. 6/21/2023 2351 by Yuli Weaver RN  Outcome: Progressing  Note: Able to change positions in bed without assist, no evidence of skin breakdown noted. Waffle cushion in place to chair.

## 2023-06-22 NOTE — CARE COORDINATION
Chart Reviewed. Met with patient to introduce  role, initiate discussion regarding DC planning and to inform of weekly Team Conferences held on Thursdays. He reports his goal is to return home. SOCIAL WORK ASSESSMENT      GOAL:   To return home. HOME SITUATION:  Pt lives alone, apartment with 3 steps to enter the building, then 14 stairs straight up to his apartment. His laundry would be down 14 and then down 14 more. He reports he does have support from adult kids, sisters and nieces but he is resistant to being a burden to them. He relies on them for MD appts. He states he does his own shopping but needs a ride to/from to get to the store. Pcp:    Dr Resendiz Raw: This has been an issue for him as he states he was not notified that he switched to Medicare since he had been disabled for two years and when he would get his scripts he had a copay that he never had before. He has not filled his medications due to this. Asked him if he updated [facility] where he gets his meds filled that he also has Medicaid and he states this has not been worked out as yet. PRIOR LEVEL OF FUNCTIONING:       PERSONAL CARE:                                                                           DRIVES:  no                                                                     FINANCES:  independent                                                                   MEALS:   independent                              GROCERY SHOPS:   does the shopping himself but needs a ride. DME CURRENTLY AT HOME:    None      CURRENT HOME CARE/SERVICES: none. Informed him of possible post acute services such as home care or out patient therapy needs. He states he had done Out Patient therapy at [address] before but the transportation had been an issue. PREFERRED HOME CARE:  TO be determined. TEAM CONFERENCE DAY:    Thursdays.   Informed him of

## 2023-06-23 LAB
ANION GAP SERPL CALCULATED.3IONS-SCNC: 9 MMOL/L (ref 3–16)
BUN SERPL-MCNC: 15 MG/DL (ref 7–20)
CALCIUM SERPL-MCNC: 8.3 MG/DL (ref 8.3–10.6)
CHLORIDE SERPL-SCNC: 99 MMOL/L (ref 99–110)
CO2 SERPL-SCNC: 30 MMOL/L (ref 21–32)
CREAT SERPL-MCNC: 1 MG/DL (ref 0.9–1.3)
GFR SERPLBLD CREATININE-BSD FMLA CKD-EPI: >60 ML/MIN/{1.73_M2}
GLUCOSE BLD-MCNC: 114 MG/DL (ref 70–99)
GLUCOSE BLD-MCNC: 119 MG/DL (ref 70–99)
GLUCOSE BLD-MCNC: 128 MG/DL (ref 70–99)
GLUCOSE BLD-MCNC: 179 MG/DL (ref 70–99)
GLUCOSE SERPL-MCNC: 120 MG/DL (ref 70–99)
MAGNESIUM SERPL-MCNC: 1.8 MG/DL (ref 1.8–2.4)
PERFORMED ON: ABNORMAL
POTASSIUM SERPL-SCNC: 4.1 MMOL/L (ref 3.5–5.1)
SODIUM SERPL-SCNC: 138 MMOL/L (ref 136–145)

## 2023-06-23 PROCEDURE — 6370000000 HC RX 637 (ALT 250 FOR IP): Performed by: PHYSICAL MEDICINE & REHABILITATION

## 2023-06-23 PROCEDURE — 97112 NEUROMUSCULAR REEDUCATION: CPT

## 2023-06-23 PROCEDURE — 97535 SELF CARE MNGMENT TRAINING: CPT

## 2023-06-23 PROCEDURE — 6370000000 HC RX 637 (ALT 250 FOR IP): Performed by: INTERNAL MEDICINE

## 2023-06-23 PROCEDURE — 1280000000 HC REHAB R&B

## 2023-06-23 PROCEDURE — 97116 GAIT TRAINING THERAPY: CPT

## 2023-06-23 PROCEDURE — 6370000000 HC RX 637 (ALT 250 FOR IP): Performed by: NURSE PRACTITIONER

## 2023-06-23 PROCEDURE — 80048 BASIC METABOLIC PNL TOTAL CA: CPT

## 2023-06-23 PROCEDURE — 83735 ASSAY OF MAGNESIUM: CPT

## 2023-06-23 PROCEDURE — 97129 THER IVNTJ 1ST 15 MIN: CPT

## 2023-06-23 PROCEDURE — 97130 THER IVNTJ EA ADDL 15 MIN: CPT

## 2023-06-23 PROCEDURE — 36415 COLL VENOUS BLD VENIPUNCTURE: CPT

## 2023-06-23 PROCEDURE — 97110 THERAPEUTIC EXERCISES: CPT

## 2023-06-23 PROCEDURE — 94760 N-INVAS EAR/PLS OXIMETRY 1: CPT

## 2023-06-23 RX ADMIN — VALSARTAN 80 MG: 80 TABLET ORAL at 21:54

## 2023-06-23 RX ADMIN — Medication 100 MG: at 08:48

## 2023-06-23 RX ADMIN — ATORVASTATIN CALCIUM 40 MG: 40 TABLET, FILM COATED ORAL at 21:54

## 2023-06-23 RX ADMIN — TRAZODONE HYDROCHLORIDE 50 MG: 50 TABLET ORAL at 21:54

## 2023-06-23 RX ADMIN — CARVEDILOL 3.12 MG: 6.25 TABLET, FILM COATED ORAL at 16:45

## 2023-06-23 RX ADMIN — ASPIRIN 81 MG 81 MG: 81 TABLET ORAL at 08:47

## 2023-06-23 RX ADMIN — RIVAROXABAN 20 MG: 20 TABLET, FILM COATED ORAL at 16:46

## 2023-06-23 RX ADMIN — SPIRONOLACTONE 12.5 MG: 25 TABLET ORAL at 08:47

## 2023-06-23 RX ADMIN — CARVEDILOL 3.12 MG: 6.25 TABLET, FILM COATED ORAL at 08:48

## 2023-06-23 RX ADMIN — VALSARTAN 80 MG: 80 TABLET ORAL at 08:45

## 2023-06-23 RX ADMIN — FUROSEMIDE 20 MG: 20 TABLET ORAL at 08:47

## 2023-06-23 ASSESSMENT — PAIN SCALES - GENERAL: PAINLEVEL_OUTOF10: 0

## 2023-06-23 NOTE — CARE COORDINATION
Team Conference held on Thursday. Team reviewed progress, dme and dc date. Team recommends DC to home on Wed 6-. Team reports he is distractible and lives alone. Team recomends DME:  TTB. Team recommends home care for SN/PT. Met with patient today to review. He states he would rather do Out Patient vs in home as he is preparing to complete a project that will need him to be out and about. He does wish for me to find a new pcp at [address] [place] [address],[address] for him. He would like to have meds filled at [address] [place] [address],[address] for his dc needs. He is agreeable with plan for DC on Wed 6-.   [place], [place]     Case Management   [phone]    6/23/2023  1:23 PM

## 2023-06-23 NOTE — PLAN OF CARE
Problem: Discharge Planning  Goal: Discharge to home or other facility with appropriate resources  6/23/2023 1209 by Paul Stiles RN  Outcome: Irineo Wells (Taken 6/21/2023 1131 by Jessica Solis LPN)  Discharge to home or other facility with appropriate resources: Identify barriers to discharge with patient and caregiver  6/23/2023 0146 by Jeannie Francois RN  Outcome: Progressing     Problem: Safety - Adult  Goal: Free from fall injury  6/23/2023 1209 by Paul Stiles RN  Outcome: Progressing  [address] (Taken 6/21/2023 2351 by Keerthi Don RN)  Free From Fall Injury:   Instruct family/caregiver on patient safety   Based on caregiver fall risk screen, instruct family/caregiver to ask for assistance with transferring infant if caregiver noted to have fall risk factors  6/23/2023 0146 by Jeannie Francois RN  Outcome: Progressing     Problem: ABCDS Injury Assessment  Goal: Absence of physical injury  6/23/2023 1209 by Paul Stiles RN  Outcome: Progressing  [address] (Taken 6/21/2023 1158 by Jessica Solis LPN)  Absence of Physical Injury: Implement safety measures based on patient assessment  6/23/2023 0146 by Jeannie Francois RN  Outcome: Progressing     Problem: Pain  Goal: Verbalizes/displays adequate comfort level or baseline comfort level  6/23/2023 1209 by Paul Stiles RN  Outcome: Progressing  [address] (Taken 6/20/2023 2323 by Flornecio Ivan RN)  Verbalizes/displays adequate comfort level or baseline comfort level:   Encourage patient to monitor pain and request assistance   Administer analgesics based on type and severity of pain and evaluate response   Consider cultural and social influences on pain and pain management   Assess pain using appropriate pain scale   Implement non-pharmacological measures as appropriate and evaluate response   Notify Licensed Independent Practitioner if interventions unsuccessful or patient reports new pain  6/23/2023 0146 by Jeannie Francois

## 2023-06-23 NOTE — PLAN OF CARE
Problem: Discharge Planning  Goal: Discharge to home or other facility with appropriate resources  Outcome: Progressing     Problem: Safety - Adult  Goal: Free from fall injury  6/23/2023 0146 by Yanna Clemons RN  Outcome: Progressing     Problem: ABCDS Injury Assessment  Goal: Absence of physical injury  6/23/2023 0146 by Yanna Clemons RN  Outcome: Progressing     Problem: Pain  Goal: Verbalizes/displays adequate comfort level or baseline comfort level  Outcome: Progressing     Problem: Skin/Tissue Integrity  Goal: Absence of new skin breakdown  Description: 1. Monitor for areas of redness and/or skin breakdown  2. Assess vascular access sites hourly  3. Every 4-6 hours minimum:  Change oxygen saturation probe site  4. Every 4-6 hours:  If on nasal continuous positive airway pressure, respiratory therapy assess nares and determine need for appliance change or resting period.   Outcome: Progressing     Problem: Nutrition Deficit:  Goal: Optimize nutritional status  Outcome: Progressing

## 2023-06-24 LAB
ANION GAP SERPL CALCULATED.3IONS-SCNC: 9 MMOL/L (ref 3–16)
BUN SERPL-MCNC: 15 MG/DL (ref 7–20)
CALCIUM SERPL-MCNC: 8.7 MG/DL (ref 8.3–10.6)
CHLORIDE SERPL-SCNC: 100 MMOL/L (ref 99–110)
CO2 SERPL-SCNC: 28 MMOL/L (ref 21–32)
CREAT SERPL-MCNC: 1 MG/DL (ref 0.9–1.3)
GFR SERPLBLD CREATININE-BSD FMLA CKD-EPI: >60 ML/MIN/{1.73_M2}
GLUCOSE BLD-MCNC: 100 MG/DL (ref 70–99)
GLUCOSE BLD-MCNC: 122 MG/DL (ref 70–99)
GLUCOSE BLD-MCNC: 150 MG/DL (ref 70–99)
GLUCOSE BLD-MCNC: 170 MG/DL (ref 70–99)
GLUCOSE SERPL-MCNC: 190 MG/DL (ref 70–99)
MAGNESIUM SERPL-MCNC: 1.7 MG/DL (ref 1.8–2.4)
PERFORMED ON: ABNORMAL
POTASSIUM SERPL-SCNC: 4 MMOL/L (ref 3.5–5.1)
SODIUM SERPL-SCNC: 137 MMOL/L (ref 136–145)

## 2023-06-24 PROCEDURE — 97129 THER IVNTJ 1ST 15 MIN: CPT

## 2023-06-24 PROCEDURE — 83735 ASSAY OF MAGNESIUM: CPT

## 2023-06-24 PROCEDURE — 97530 THERAPEUTIC ACTIVITIES: CPT

## 2023-06-24 PROCEDURE — 36415 COLL VENOUS BLD VENIPUNCTURE: CPT

## 2023-06-24 PROCEDURE — 97112 NEUROMUSCULAR REEDUCATION: CPT

## 2023-06-24 PROCEDURE — 97110 THERAPEUTIC EXERCISES: CPT

## 2023-06-24 PROCEDURE — 97130 THER IVNTJ EA ADDL 15 MIN: CPT

## 2023-06-24 PROCEDURE — 97535 SELF CARE MNGMENT TRAINING: CPT

## 2023-06-24 PROCEDURE — 6370000000 HC RX 637 (ALT 250 FOR IP): Performed by: PHYSICAL MEDICINE & REHABILITATION

## 2023-06-24 PROCEDURE — 80048 BASIC METABOLIC PNL TOTAL CA: CPT

## 2023-06-24 PROCEDURE — 6370000000 HC RX 637 (ALT 250 FOR IP): Performed by: NURSE PRACTITIONER

## 2023-06-24 PROCEDURE — 97116 GAIT TRAINING THERAPY: CPT

## 2023-06-24 PROCEDURE — 1280000000 HC REHAB R&B

## 2023-06-24 RX ADMIN — CARVEDILOL 3.12 MG: 6.25 TABLET, FILM COATED ORAL at 17:38

## 2023-06-24 RX ADMIN — RIVAROXABAN 20 MG: 20 TABLET, FILM COATED ORAL at 17:38

## 2023-06-24 RX ADMIN — ATORVASTATIN CALCIUM 40 MG: 40 TABLET, FILM COATED ORAL at 21:31

## 2023-06-24 RX ADMIN — ASPIRIN 81 MG 81 MG: 81 TABLET ORAL at 09:38

## 2023-06-24 RX ADMIN — SPIRONOLACTONE 12.5 MG: 25 TABLET ORAL at 09:37

## 2023-06-24 RX ADMIN — FUROSEMIDE 20 MG: 20 TABLET ORAL at 09:37

## 2023-06-24 RX ADMIN — Medication 100 MG: at 09:37

## 2023-06-24 RX ADMIN — CARVEDILOL 3.12 MG: 6.25 TABLET, FILM COATED ORAL at 10:03

## 2023-06-24 ASSESSMENT — PAIN SCALES - GENERAL: PAINLEVEL_OUTOF10: 0

## 2023-06-24 NOTE — PROGRESS NOTES
4 Eyes Skin Assessment     NAME:  Sue Mitchell  YOB: 1965  MEDICAL RECORD NUMBER:  [id number]    The patient is being assessed for  Admission    I agree that at least one RN has performed a thorough Head to Toe Skin Assessment on the patient. ALL assessment sites listed below have been assessed. Areas assessed by both nurses:    Head, Face, Ears, Shoulders, Back, Chest, Arms, Elbows, Hands, Sacrum. Buttock, Coccyx, Ischium, Legs. Feet and Heels, Under Medical Devices , and Other          Does the Patient have a Wound?  No noted wound(s)       Omega Prevention initiated by RN: Yes  Wound Care Orders initiated by RN: No    Pressure Injury (Stage 3,4, Unstageable, DTI, NWPT, and Complex wounds) if present, place Wound referral order by RN under : No    New Ostomies, if present place, Ostomy referral order under : No     Nurse 1 eSignature: Electronically signed by Aylin Shaver RN on 6/20/23 at 11:41 PM EDT    **SHARE this note so that the co-signing nurse can place an eSignature**    Nurse 2 eSignature: {Esignature:[id number]}
A complete drug regimen review was completed for this patient.      [x]  No clinically significant medication issue was identified    []   Yes, a clinically significant medication issue was identified     []  Adverse Drug Event:      []  Allergy:      []  Side Effect:      []  Ineffective Therapy:      []  Drug Interaction:     []  Duplicated Therapy:     []  Untreated Indication:      []  Non-adherence:     []  Other:      Nursing/Pharmacy contacted the physician:   Date:    Time:      Actions recommended by physician were completed:  Date :  Time:     Electronically signed by Grant Heard RN on 6/20/23 at 11:44 PM EDT
ACUTE REHAB UNIT  SPEECH/LANGUAGE PATHOLOGY      [x] Daily  [] Weekly Care Conference Note  [] Discharge    Patient:Refugio Dawkins      :1965  ABENA:[id number]  Rehab Dx/Hx: Acute ischemic stroke [facility]) [I63.9]    Precautions: Fall risk  Home situation: Lives alone  ST Dx: [] Aphasia  [] Dysarthria  [] Apraxia   [] Oropharyngeal dysphagia [x] Cognitive   Impairment  [] Other:   Initial Speech Therapy Assessment Diagnosis:   1. Cognitive Diagnosis: pt dmeosntrated functional temporal / spatial orientation; VPS reasoning and sustained/focused attention and concrete math language skills. Breakdown on testing with higher level atteniton and numeric reasoning. Kannan Allred continue ongoing assessment and treatmetn trial unless otherwise notified. 2. Speech Diagnosis: Pt demonstrating audible and intelligible connected speech. pt with some left sided but pt reports that is from previous stroke. Occasinoal phonemic distortions which do not impact connected speech  3. Communication Diagnosis: Pt demonstratting functional concrete and mild complex language processing (auditory and visual language) and verbal expressive language skills. Date of Admit: 2023  Room #: P6N-9249/9747-22  Date: 2023       Current functional status (updated daily):         Current Diet Order:ADULT DIET;  Regular; Low Fat/Low Chol/High Fiber/2 gm Na; 1500 ml   Behavior: [x] Alert  [x] Cooperative  [x]  Pleasant  [] Confused  [] Agitated  [] Uncooperative  [] Distractible [] Motivated  [] Self-Limiting [] Anxious  [] Other:  Endurance:  [x] Adequate for participation in SLP sessions  [] Reduced overall  [] Lethargic  [] Other:  Safety: [] No concerns at this time  [] Reduced insight into deficits  []  Reduced safety awareness [] Not following call light procedures  [] Unable to Assess  [x] Other: unclear potential safety and physical motility /coordination  Barriers toward progress: none unless medical issues           Date: 2023
ACUTE REHAB UNIT  SPEECH/LANGUAGE PATHOLOGY      [x] Daily  [] Weekly Care Conference Note  [] Discharge    Patient:Refugio Dawkins      :1965  JXE:[id number]  Rehab Dx/Hx: Acute ischemic stroke [facility]) [I63.9]    Precautions: Fall risk  Home situation: Lives alone  ST Dx: [] Aphasia  [] Dysarthria  [] Apraxia   [] Oropharyngeal dysphagia [x] Cognitive   Impairment  [] Other:   Initial Speech Therapy Assessment Diagnosis:   1. Cognitive Diagnosis: pt dmeosntrated functional temporal / spatial orientation; VPS reasoning and sustained/focused attention and concrete math language skills. Breakdown on testing with higher level atteniton and numeric reasoning. Mich Banegas continue ongoing assessment and treatmetn trial unless otherwise notified. 2. Speech Diagnosis: Pt demonstrating audible and intelligible connected speech. pt with some left sided but pt reports that is from previous stroke. Occasinoal phonemic distortions which do not impact connected speech  3. Communication Diagnosis: Pt demonstratting functional concrete and mild complex language processing (auditory and visual language) and verbal expressive language skills. Date of Admit: 2023  Room #: O6G-6764/8345-79  Date: 2023       Current functional status (updated daily):         Current Diet Order:ADULT DIET;  Regular; Low Fat/Low Chol/High Fiber/2 gm Na; 1500 ml   Behavior: [x] Alert  [x] Cooperative  [x]  Pleasant  [] Confused  [] Agitated  [] Uncooperative  [] Distractible [] Motivated  [] Self-Limiting [] Anxious  [] Other:  Endurance:  [x] Adequate for participation in SLP sessions  [] Reduced overall  [] Lethargic  [] Other:  Safety: [] No concerns at this time  [] Reduced insight into deficits  []  Reduced safety awareness [] Not following call light procedures  [] Unable to Assess  [x] Other: unclear potential safety and physical motility /coordination  Barriers toward progress: none unless medical issues           Date: 2023
Cardiology - PROGRESS NOTE    Admit Date: 6/20/2023     Reason for follow up: CHF     60 y.o. patient who presented to the hospital with complaints of progressive shortness of breath. He typically follows with [facility] but has been without medication for several months due to various factors. Regardless, he notes worsening shortness of breath and ultimately is having symptoms at rest prompting his visit to the emergency department. He denies associated chest pains, weight gain, or lower extremity edema. The patient is in cardiogenic shock with worsening renal function, worsening lactic acidosis, and worsening liver function. His periphery is cool but seems to be mentating clearly. Social History:   reports that he has quit smoking. His smoking use included cigarettes. He smoked an average of .2 packs per day. He has never used smokeless tobacco. He reports current alcohol use. He reports that he does not use drugs. Family History: family history includes Diabetes in his father; Heart Failure in his mother and sister; Hypertension in his father and sister. Interval History:   Patient seen and examined and notes reviewed   In therapy room    Working with PT/OT   NAD   Cr stable   Wt 207 lb  All other systems reviewed and negative except as above. Diet: ADULT DIET; Regular; Low Fat/Low Chol/High Fiber/2 gm Na; 1500 ml      In: 750 [P.O.:740;  I.V.:10]  Out: 1975    Patient Vitals for the past 96 hrs (Last 3 readings):   Weight   06/22/23 0448 207 lb 10.8 oz (94.2 kg)   06/21/23 0530 209 lb 10.5 oz (95.1 kg)   06/20/23 1830 208 lb 8.9 oz (94.6 kg)           Data:   Scheduled Meds:   Scheduled Meds:   aspirin  81 mg Oral Daily    atorvastatin  40 mg Oral Nightly    carvedilol  3.125 mg Oral BID WC    furosemide  40 mg IntraVENous Daily    insulin lispro  0-4 Units SubCUTAneous TID WC    insulin lispro  0-4 Units SubCUTAneous Nightly
Department of Physical Medicine & Rehabilitation  Progress Note    Patient Identification:  Aba Dozier  [id number]  : 1965  Admit date: 2023    Chief Complaint: Acute ischemic stroke [facility])    Subjective:   No acute events overnight. Patient seen this afternoon sitting up in gym. Reports ongoing progress with therapies. Denies dizziness/lightheadedness. Still with fair sleep. Does not want to increase trazodone at this point. Labs reviewed. ROS: No f/c, n/v, cp     Objective:  Patient Vitals for the past 24 hrs:   BP Temp Temp src Pulse Resp SpO2 Weight   23 1645 114/84 -- -- 84 -- -- --   23 1610 114/84 97.2 °F (36.2 °C) Oral 84 17 100 % --   23 0940 -- -- -- -- 16 98 % --   23 0844 103/66 97.9 °F (36.6 °C) Oral 87 16 98 % --   23 0609 102/73 98.1 °F (36.7 °C) Oral 89 18 97 % 208 lb 12.4 oz (94.7 kg)   23 2148 123/80 99 °F (37.2 °C) Oral 93 16 97 % --     Const: Alert. No distress, pleasant. HEENT: Normocephalic, atraumatic. Normal sclera/conjunctiva. MMM. CV: Regular rate and rhythm. Resp: No respiratory distress. Lungs CTAB. Abd: Soft, nontender, nondistended, NABS+   Ext: Trace edema improving  Neuro: Alert, oriented, appropriately interactive. Right  hemiparesis improving overall 4/5  Psych: Cooperative, appropriate mood and affect    Laboratory data: Available via EMR.    Last 24 hour lab  Recent Results (from the past 24 hour(s))   POCT Glucose    Collection Time: 23  8:22 PM   Result Value Ref Range    POC Glucose 178 (H) 70 - 99 mg/dl    Performed on ACCU-CHEK    POCT Glucose    Collection Time: 23  6:52 AM   Result Value Ref Range    POC Glucose 119 (H) 70 - 99 mg/dl    Performed on ACCU-CHEK    Basic Metabolic Panel    Collection Time: 06/23/23  7:31 AM   Result Value Ref Range    Sodium 138 136 - 145 mmol/L    Potassium 4.1 3.5 - 5.1 mmol/L    Chloride 99 99 - 110 mmol/L    CO2 30 21 - 32 mmol/L    Anion Gap 9 3 - 16
Department of Physical Medicine & Rehabilitation  Progress Note    Patient Identification:  Ashley Shleton  [id number]  : 1965  Admit date: 2023    Chief Complaint: Acute ischemic stroke [facility])    Subjective:   No acute events overnight. Patient seen this afternoon sitting up in gym. Making progress with therapies. He remains very motivated. Sleep improved. Labs reviewed. ROS: No f/c, n/v, cp     Objective:  Patient Vitals for the past 24 hrs:   BP Temp Temp src Pulse Resp SpO2 Weight   23 1412 118/84 98.4 °F (36.9 °C) Oral 89 16 97 % --   23 1231 120/82 -- -- -- -- -- --   23 0915 -- -- -- -- -- 94 % --   23 0746 115/84 -- -- 93 -- -- --   23 0448 106/67 98 °F (36.7 °C) Oral 84 16 94 % 207 lb 10.8 oz (94.2 kg)   23 2038 114/79 97.8 °F (36.6 °C) Oral 95 18 97 % --   23 1545 (!) 125/94 99.1 °F (37.3 °C) Oral (!) 112 16 95 % --     Const: Alert. No distress, pleasant. HEENT: Normocephalic, atraumatic. Normal sclera/conjunctiva. MMM. CV: Regular rate and rhythm. Resp: No respiratory distress. Lungs CTAB. Abd: Soft, nontender, nondistended, NABS+   Ext: Trace edema improving  Neuro: Alert, oriented, appropriately interactive. Right  hemiparesis improving overall 4/5  Psych: Cooperative, appropriate mood and affect    Laboratory data: Available via EMR.    Last 24 hour lab  Recent Results (from the past 24 hour(s))   POCT Glucose    Collection Time: 23  4:06 PM   Result Value Ref Range    POC Glucose 131 (H) 70 - 99 mg/dl    Performed on ACCU-CHEK    POCT Glucose    Collection Time: 23  8:33 PM   Result Value Ref Range    POC Glucose 183 (H) 70 - 99 mg/dl    Performed on ACCU-CHEK    POCT Glucose    Collection Time: 23  7:01 AM   Result Value Ref Range    POC Glucose 135 (H) 70 - 99 mg/dl    Performed on ACCU-CHEK    Basic Metabolic Panel    Collection Time: 23  7:27 AM   Result Value Ref Range    Sodium 137 136 - 145 mmol/L
OCCUPATIONAL THERAPY  Progress Note   Second Session    Patient Name: April Jurado  Medical Record Number: [id number]    Treatment Diagnosis: Impaired functional mobility/balance/ ADL/IADL function, R spatial awareness    General  Additional Pertinent Hx: Per Dr. Alonzo Marroquinon is a 59 yo M with pmh CAD, HFrEF, HTN, HLD, DM2, gout, alcohol use, tobacco dependence, and prior stroke (Right MCA infarct s/p M2 occlusion thrombectomy, residual left hemiparesis) who initially presented 6/15/2023 with shortness of breath, orthopnea, chest tightness. Of note, he had not been taking medications for the preceding 2 months. He was admitted for acute on chronic CHF with cardiogenic shock, LIBRADO, and shock liver. He was treated with IV diuresis and inotropic therapy with milrinone. On early morning 6/18 he developed new right side weakness. Code stroke called but he was not a candidate for TNK due to being on Xarelto. CTA head/neck was negative for large vessel occlusion or flow limiting stenosis. MRI brain revealed acute ischemic infarct in left parasagittal aspect of the high left frontal/parietal lobes with chronic encephalomalacia right MCA distribution. Etiology suspected cardioembolic. Neurology consulted and recommends holding anticoagulation until repeat CT head on 6/23. Using ASA in the interim. Currently, patient reports ongoing but improved right side weakness. He denies tingling/numbness, vision changes, headache, difficulty with speech or swallow. His shortness of breath is much improved since admission.  He is interested in coming to inpatient rehabilitation unit to improve his function prior to returning home.'  Referring Practitioner: Dr. Prieto Bess  Diagnosis: acute on chronic systolic heart failure     Restrictions/Precautions  Restrictions/Precautions: Seizure, Fall Risk        Position Activity Restriction  Other position/activity restrictions: IV left hand    Subjective: pt met seated in wheelchair, no
OCCUPATIONAL THERAPY  Progress Note   Second Session    Patient Name: Sean Rodríguez  Medical Record Number: [id number]    Treatment Diagnosis: Impaired ADL/IADL function, functional transfers/mobility    General  Additional Pertinent Hx: Per Dr. San Asad is a 61 yo M with pmh CAD, HFrEF, HTN, HLD, DM2, gout, alcohol use, tobacco dependence, and prior stroke (Right MCA infarct s/p M2 occlusion thrombectomy, residual left hemiparesis) who initially presented 6/15/2023 with shortness of breath, orthopnea, chest tightness. Of note, he had not been taking medications for the preceding 2 months. He was admitted for acute on chronic CHF with cardiogenic shock, LIBRADO, and shock liver. He was treated with IV diuresis and inotropic therapy with milrinone. On early morning 6/18 he developed new right side weakness. Code stroke called but he was not a candidate for TNK due to being on Xarelto. CTA head/neck was negative for large vessel occlusion or flow limiting stenosis. MRI brain revealed acute ischemic infarct in left parasagittal aspect of the high left frontal/parietal lobes with chronic encephalomalacia right MCA distribution. Etiology suspected cardioembolic. Neurology consulted and recommends holding anticoagulation until repeat CT head on 6/23. Using ASA in the interim. Currently, patient reports ongoing but improved right side weakness. He denies tingling/numbness, vision changes, headache, difficulty with speech or swallow. His shortness of breath is much improved since admission. He is interested in coming to inpatient rehabilitation unit to improve his function prior to returning home.'  Referring Practitioner: Dr. Lesia Ricci  Diagnosis: acute on chronic systolic heart failure     Restrictions/Precautions  Restrictions/Precautions: Seizure, Fall Risk        Position Activity Restriction  Other position/activity restrictions: IV left hand, pt wearing glove on left hand.     Subjective: patient arrived in
Occupational Therapy  Facility/Department: [facility]  REHAB  Rehabilitation Occupational Therapy Daily Treatment Note    Date: 23  Patient Name: Sean Rodríguez       Room: G6R-3209/4990-08  MRN: [id number]  Account: [de-identified]   : 1965  (63 y.o.) Gender: male         Past Medical History:  has a past medical history of CHF (congestive heart failure) ([facility] [address] [facility] [facility] Hyperlipidemia, Hypertension, and Vitamin D deficiency. Past Surgical History:   has a past surgical history that includes Diagnostic Cardiac Cath Lab Procedure (2018). Restrictions  Restrictions/Precautions: Seizure, Fall Risk  Other position/activity restrictions: IV right hand    Subjective  Subjective: Patient met in room, seated on edge of bed. Agreeable to therapy. No reports of pain  Restrictions/Precautions: Seizure; Fall Risk             Objective     Cognition  Overall Cognitive Status: WNL  Cognition Comment: Patient completed trail making part A and B without errors. Completed part A in 1:52 and part B in 2:48  Orientation  Overall Orientation Status: Within Normal Limits         ADL  Toileting  Assistance Level: Stand by assist  Skilled Clinical Factors: Stood in front of commode to urinate  Toilet Transfers  Skilled Clinical Factors: Stood for toileting tasks    Instrumental ADL's  Instrumental ADLs: Yes  Meal Prep  Meal Prep Level: Other (No device)  Meal Prep Level of Assistance: Contact guard assistance  Meal Preparation: Able to reach into high, low cabinets, gather and transport items with CGA  Light Housekeeping  Light Housekeeping Level: Other (no device)  Light Housekeeping Level of Assistance: Contact guard assistance  Light Housekeeping: able to gather laundry bag from closet and transport up/down 4 steps with B rails with CGA, one slight LOB noted     Functional Mobility  Device:  (No device)  Activity: Retrieve items;Transport items; To/From
Occupational Therapy  Facility/Department: [facility]  RE[facility]AB  Rehabilitation Occupational Therapy Evaluation       Date: 23  Patient Name: Brooklynn Marion       Room: H6A-5669/7435-43  MRN: [id number]  Account: [de-identified]   : 1965  (60 y.o.) Gender: male     Referring Practitioner: Dr. Gino Moseley  Diagnosis: acute on chronic systolic heart failure  Additional Pertinent Hx: Per Dr. Monisha Calzadater is a 59 yo M with pmh CAD, HFrEF, HTN, HLD, DM2, gout, alcohol use, tobacco dependence, and prior stroke (Right MCA infarct s/p M2 occlusion thrombectomy, residual left hemiparesis) who initially presented 6/15/2023 with shortness of breath, orthopnea, chest tightness. Of note, he had not been taking medications for the preceding 2 months. He was admitted for acute on chronic CHF with cardiogenic shock, LIBRADO, and shock liver. He was treated with IV diuresis and inotropic therapy with milrinone. On early morning  he developed new right side weakness. Code stroke called but he was not a candidate for TNK due to being on Xarelto. CTA head/neck was negative for large vessel occlusion or flow limiting stenosis. MRI brain revealed acute ischemic infarct in left parasagittal aspect of the high left frontal/parietal lobes with chronic encephalomalacia right MCA distribution. Etiology suspected cardioembolic. Neurology consulted and recommends holding anticoagulation until repeat CT head on . Using ASA in the interim. Currently, patient reports ongoing but improved right side weakness. He denies tingling/numbness, vision changes, headache, difficulty with speech or swallow. His shortness of breath is much improved since admission. He is interested in coming to inpatient rehabilitation unit to improve his function prior to returning home.'    Restrictions  Restrictions/Precautions: Seizure; Fall Risk    Subjective  Subjective: Pt seen in room, agreed to OT and shower, complained of fatigue and not sleeping at
Occupational Therapy  Facility/Department: [facility]  REHAB  Rehabilitation Occupational Therapy Daily Treatment Note    Date: 23  Patient Name: Chary Emerson       Room: V9I-3111/5809-07  MRN: [id number]  Account: [de-identified]   : 1965  (63 y.o.) Gender: male                    Past Medical History:  has a past medical history of CHF (congestive heart failure) ([facility] [address] [facility] CVA (cerebral vascular accident) ([facility] Diabetes mellitus ([facility] Hyperlipidemia, Hypertension, and Vitamin D deficiency. Past Surgical History:   has a past surgical history that includes Diagnostic Cardiac Cath Lab Procedure (2018). Restrictions  Restrictions/Precautions: Seizure, Fall Risk  Other position/activity restrictions: IV left hand, pt wearing glove on left hand. Subjective  Subjective: Seen in room, reported he already completed sponge bath but agreed to OT in gym. No complaints of pain  Restrictions/Precautions: Seizure; Fall Risk             Objective     Cognition  Overall Cognitive Status: WNL  Orientation  Overall Orientation Status: Within Normal Limits  Orientation Level: Oriented X4         ADL  Putting On/Taking Off Footwear  Assistance Level: Stand by assist  Skilled Clinical Factors: Donned shoes and tied without assist          Functional Mobility  Device: Cane  Activity: To/From therapy gym  Assistance Level: Contact guard assist;Stand by assist  Skilled Clinical Factors: Some unsteadiness and ran into the vanity on the left once and the railing on left once  Sit to Stand  Assistance Level: Contact guard assist  Skilled Clinical Factors: Pt stood to complete dynamic standing balance activity x 5 min without LOB, holding onto cane for [email or web address] game. Good coordination. Pt then able to hold onto cane and  bean bags from ground without LOB.   CGA provided throughout  Stand to Sit  Assistance Level: Contact guard assist   OT Exercises  Exercise Treatment: 10# free weight for bicep curl BUE, 5# free
Patient Admitted to Rehab Unit for Acute ischemic stroke. Contact guard with grecia belt for transfers. On Xarelto/ASA for DVT prophylaxis. Currently on an Select-Cardiac diet and tolerating well. Takes medications whole with thins. Patient is also an AC&HS Blood sugar check. Patient has been continent of bowel and bladder and his last bowel movement was today 6/21/23. Standard safety precautions followed.  Electronically signed by Sheri Beckford LPN on 3/14/5493 at 4:12 PM
Patient admitted to rehab with CHF, SOB. A/Ox 4. Transfers with walker x 1 and gait belt. Mobility restrictions: WBAT. On Cardiac diet, tolerating well. Medications taken whole with thin liquid. On Aspirin and Xarelto for DVT prophylaxis. Skin: Dry feet. Oxygen: Room air. LDA: LFA. Has been continent  of bowel and bladder. Use urinal at bedside. LBM 06/20/23 per day shift nurse during report. . Chair/bed alarms in use and call light in reach. Will monitor for safety.
Patient admitted to rehab with acute ischemic stroke. A/Ox 4. Transfers with CGA. Mobility restrictions: none. On cardiac diet, tolerating well. Medications taken whole in thins. On Xarelto for DVT prophylaxis. Skin: intact. Oxygen: none. LDA: LFA. Has been continent of bowel and continent of bladder. LBM 6/21. Chair/bed alarms in use and call light in reach. Will monitor for safety.
Patient admitted to rehab with acute ischemic stroke. A/Ox4. Transfers with cane x1. Mobility restrictions: WBAT. On cardiac 1500mL fluid restrictions diet, tolerating well. Medications taken whole with thins. On asa for DVT prophylaxis. Skin: intact. Oxygen: RA. LDA: LFA IV removed today. Has been continent of bowel and continent of bladder. LBM 6/21. Chair/bed alarms in use and call light in reach. Will monitor for safety. No complaints of pain.
Patient admitted to rehab with acute ischemic stroke. A/Ox4. Transfers with cane. Mobility restrictions: none. On cardiac 1500 ml FR diet, tolerating well. Medications taken whole in thins. On Xarelto for DVT prophylaxis. Skin: intact. Oxygen: none. LDA: none. Has been continent of bowel and continent of bladder. LBM 6/23. Chair/bed alarms in use and call light in reach. Will monitor for safety.
Patient get OOB without calling. Went and checked on him, he complained about \"Why I have bed alarm on\"?, explained about he is risk for fall due to all the medication patient taking, new environment. He complained about not getting good sleep in bed. Chair alarm placed on chair, Patient not happy while I was placing chair alarm on. He stated\" I am on Lasix, I do not have time to wait to use urinal\", I explained him to use call light so, one of us come and help him right away. Patient resting comfortably on chair. Will continue to monitor.
Patient is up with out calling, did explained about call light use and call staff before getting OOB, Patient is refusing bed alarm and to sit up on the chair. Pt stated \" Insurance paid for his care and he can do what ever he want to do\". Will continue to monitor.
Physical Therapy  Facility/Department: [facility]  Rehabilitation Physical Therapy Treatment Note    NAME: Lizbeth Gilliam  : 1965 (60 y.o.)  MRN: [id number]  CODE STATUS: Full Code    Date of Service: 23       Restrictions:  Restrictions/Precautions: Seizure, Fall Risk  Position Activity Restriction  Other position/activity restrictions: IV left hand, pt wearing glove on left hand. Pertinent medical information:  Additional Pertinent Hx: per Dr. Deliliah Lesch H&P, \" 61 yo M with pmh CAD, HFrEF, HTN, HLD, DM2, gout, alcohol use, tobacco dependence, and prior stroke (Right MCA infarct s/p M2 occlusion thrombectomy, residual left hemiparesis) who initially presented 6/15/2023 with shortness of breath, orthopnea, chest tightness. Of note, he had not been taking medications for the preceding 2 months. He was admitted for acute on chronic CHF with cardiogenic shock, LIBRADO, and shock liver. He was treated with IV diuresis and inotropic therapy with milrinone. On early morning  he developed new right side weakness. Code stroke called but he was not a candidate for TNK due to being on Xarelto. CTA head/neck was negative for large vessel occlusion or flow limiting stenosis. MRI brain revealed acute ischemic infarct in left parasagittal aspect of the high left frontal/parietal lobes with chronic encephalomalacia right MCA distribution. Etiology suspected cardioembolic. Neurology consulted and recommended holding anticoagulation until repeat CT head on . However TTE was highly suspicious for apical thrombi and decision was made to resume Xarelto. Patient now presents to ARU with impaired mobility, self-care, and cognition below his baseline. Currently, patient reports ongoing but improved right side weakness. He denies tingling/numbness, vision changes, headache, difficulty with speech or swallow. His shortness of breath is much improved since admission.  He is motivated to start inpatient
Physical Therapy  Facility/Department: [facility] REHAB  Rehabilitation Physical Therapy Initial Assessment    NAME: Aba Boucher  : 1965 (60 y.o.)  MRN: [id number]  CODE STATUS: Full Code    Date of Service: 23      Past Medical History:   Diagnosis Date    CHF (congestive heart failure) [address] [address]     CVA (cerebral vascular accident) ([facility] [address]     Diabetes mellitus ([facility] [address]     Hyperlipidemia     Hypertension     Vitamin D deficiency      Past Surgical History:   Procedure Laterality Date    DIAGNOSTIC CARDIAC CATH LAB PROCEDURE  2018       Chart Reviewed: Yes  Additional Pertinent Hx: per Dr. Watt Darya H&P, \" 61 yo M with pmh CAD, HFrEF, HTN, HLD, DM2, gout, alcohol use, tobacco dependence, and prior stroke (Right MCA infarct s/p M2 occlusion thrombectomy, residual left hemiparesis) who initially presented 6/15/2023 with shortness of breath, orthopnea, chest tightness. Of note, he had not been taking medications for the preceding 2 months. He was admitted for acute on chronic CHF with cardiogenic shock, LIBRADO, and shock liver. He was treated with IV diuresis and inotropic therapy with milrinone. On early morning  he developed new right side weakness. Code stroke called but he was not a candidate for TNK due to being on Xarelto. CTA head/neck was negative for large vessel occlusion or flow limiting stenosis. MRI brain revealed acute ischemic infarct in left parasagittal aspect of the high left frontal/parietal lobes with chronic encephalomalacia right MCA distribution. Etiology suspected cardioembolic. Neurology consulted and recommended holding anticoagulation until repeat CT head on . However TTE was highly suspicious for apical thrombi and decision was made to resume Xarelto. Patient now presents to ARU with impaired mobility, self-care, and cognition below his baseline. Currently, patient reports ongoing but improved right side weakness.  He denies tingling/numbness, vision changes, headache,
Physical Therapy  [facility]  Rehabilitation Physical Therapy Treatment Note    NAME: Rani Alexis  : 1965 (60 y.o.)  MRN: [id number]  CODE STATUS: Full Code    Date of Service: 23       Restrictions:  Restrictions/Precautions: Seizure, Fall Risk  Position Activity Restriction  Other position/activity restrictions: IV left hand, pt wearing glove on left hand. Pertinent medical information:  Additional Pertinent Hx: per Dr. Ludy Porter H&P, \" 61 yo M with pmh CAD, HFrEF, HTN, HLD, DM2, gout, alcohol use, tobacco dependence, and prior stroke (Right MCA infarct s/p M2 occlusion thrombectomy, residual left hemiparesis) who initially presented 6/15/2023 with shortness of breath, orthopnea, chest tightness. Of note, he had not been taking medications for the preceding 2 months. He was admitted for acute on chronic CHF with cardiogenic shock, LIBRADO, and shock liver. He was treated with IV diuresis and inotropic therapy with milrinone. On early morning  he developed new right side weakness. Code stroke called but he was not a candidate for TNK due to being on Xarelto. CTA head/neck was negative for large vessel occlusion or flow limiting stenosis. MRI brain revealed acute ischemic infarct in left parasagittal aspect of the high left frontal/parietal lobes with chronic encephalomalacia right MCA distribution. Etiology suspected cardioembolic. Neurology consulted and recommended holding anticoagulation until repeat CT head on . However TTE was highly suspicious for apical thrombi and decision was made to resume Xarelto. Patient now presents to ARU with impaired mobility, self-care, and cognition below his baseline. Currently, patient reports ongoing but improved right side weakness. He denies tingling/numbness, vision changes, headache, difficulty with speech or swallow. His shortness of breath is much improved since admission.  He is motivated to start inpatient
Pt awake and agitated after setting off the bed alarm to move his urinals to his walker. This RN explained during shift report that he must call before getting OOB to prevent a fall. Pt becoming verbally abusive to staff stating, \"go get me water, ruben crackers and wait on me hand and foot while clapping his hands together. \" Explained to pt that he is not allowed to get up without assist from staff. Explained to other RN to chart refusal of alarms and not to argue with pt. Pt stated, oh its Fuck him right? \" Pt stated, \" you know who I am, Im with administration, you know me. Explained that we did not and it does not matter, the rehab policy requires bed and chair alarms. Also reminded pt that he is on a fluid restriction of 1500 ml for CHF metrics and that staff will chart his I and O. Pt stated, \" just go get me some ruben crackers. \"
Resting quietly in bed. Admitted with exacerbation of CHF and a CVA. No one sided weakness noted. Ambulating with no device and the gait belt. Has not been out of bed this shift. Lungs CTA, HR regular. Abdomen non tender with active bowel sounds. Has been continent of bladder. Pt c/o BLE foot pain and medicated per PRN orders. Medication effective. Encouraged to call for all needs, call light in reach at all times. Bed alarm active.  Hollie Winchester RN
SLP ALL NOTES  Facility/Department: [address] REHAB  Initial Speech/Language/Cognitive Assessment    NAME: Sana Chavez  : 1965   MRN: [id number]  ADMISSION DATE: 2023  ADMITTING DIAGNOSIS:   has Essential hypertension; Hyperlipidemia; DMII (diabetes mellitus, type 2) ([address] Nonischemic cardiomyopathy ([address] Tobacco abuse; Coronary artery disease involving native coronary artery of native heart without angina pectoris; Chronic systolic HF (heart failure) ([address] CHF (congestive heart failure), NYHA class III, acute, systolic ([address] Chest pain; Knee effusion, left; Right wrist pain; Acute ischemic right MCA stroke ([facility] [address] Congestive heart failure due to cardiomyopathy [facility]); CHF (congestive heart failure), NYHA class IV, acute on chronic, systolic ([address] Cardiogenic shock ([address] Demand ischemia ([address] LIBRADO (acute kidney injury) ([address] Ischemic hepatitis; Non-compliance; Left ventricular apical thrombus; and Acute ischemic stroke (HCC) on their problem list.   has a past medical history of CHF (congestive heart failure) ([address] CVA (cerebral vascular accident) ([address] Diabetes mellitus ([address] Hyperlipidemia, Hypertension, and Vitamin D deficiency. History includes Dysphagia and dysarthria and Cognitive-Linguistic remediation from a previosu stroke in . DATE ONSET: 6/15/2023    Date of Eval: 2023   Evaluating Therapist: MITCHEL Mei      Chart Reviewed  MD History and Physical documentation revealed:   History of Present Illness/Hospital Course:  Patient is a 59 yo M with pmh CAD, HFrEF, HTN, HLD, DM2, gout, alcohol use, tobacco dependence, and prior stroke (Right MCA infarct s/p M2 occlusion thrombectomy, residual left hemiparesis) who initially presented 6/15/2023 with shortness of breath, orthopnea, chest tightness. Of note, he had not been taking medications for the preceding 2 months. He was admitted for acute on chronic CHF with cardiogenic shock, LIBRADO, and shock liver.  He was treated
This is a 60 y.o.  male admitted on 6/20/2023 with Acute ischemic stroke ([facility] [address] [I63.9]. A&O X 4. Patient was agitated because his room was cold according to him, the thermostat was at 75 at that time. RN increased it again and offered warm blankets but patient refused. Abd soft and nontender. Active bowel sounds. Last BM 06/21/2023. ;patient is continent of bowel & bladder. Patient is on a cardiac diet. Medications taken whole with thins. He is 1500 fluid restriction. Skin is intact. Saline lock to the left forearm. Transfers with  cane. HS medication given. Tolerated well. Call light and bedside table within reach. Patient instructed to call if he needs anything.
This is a 60 y.o.  male admitted on 6/20/2023 with Acute ischemic stroke ([facility] [address] [I63.9]. A&O X 4. Active bowel sounds. Last BM 06/23/2023. ;patient is continent of bowel & bladder. Patient is on a cardiac diet. Medications taken whole with thins. He is on 1500 ml fluid restriction. Skin is intact. Transfers with a cane. HS medication given. Tolerated well. Call light and bedside table within reach. Patient instructed to call if he needs anything.
G3. CPAP []   Other   H1. IV Medications []           H2. Vasoactive medications []           H3. Antibiotics []           H4. Anticoagulation []           H10. Other []   I1. Transfusions []   J1. Dialysis []           J2. Hemodialysis []           J3. Peritoneal dialysis []   O1. IV access []           O2. Peripheral [x]           O3. Midline []           O4.  Central (PICC, tunneled, port) []      None of the above (select if no Cancer, Respiratory, or Other boxes are checked) []
Tx session 1 Tx session 2   Total Timed Code Min Speech therapy  Time in: 6417  Time out: 1122  Coded treatment time: 35      N/a   Group Treatment Minutes 0 0   Co-Treat Minutes 0 0   Variance/Reason:  N/a    Pain denied    Pain Intervention [] RN notified  [] Repositioned  [] Intervention offered and patient declined  [x] N/A  [] Other: [] RN notified  [] Repositioned  [] Intervention offered and patient declined  [] N/A  [] Other:   Subjective     Seen in treatment office  Pt reporting hopes to get home and back into the possibility of a job training activity that does not impact my disability but lets me do something a few hours a week to save up for a car and give him something to do. Objective:  Goals     Goals: Pt goal is to return home    Therapy working toward pt goal    Goal 1: Pt will demonstrated improved VPS/PS/reasoning and higher level attention Darrall Piety memory for mild complex cognitive-linguistic activities for adv DL requiring higher level executive funciton with <mild assist PL/S and organization:     Use of written instructions for spatial /sequential organization:  N/a  Use of written instructions for spatial/sequential organization for deductive and mild inductive reasoning:   for organization of 10 items: n/a    Use of written instructions and paragraph information for both deductive and >mild complex inductive reasoning emphasizing working memory for organization of a daily schedule:   N/a    Pt accurately appears to predict several difficulties he cold have at home and states solutions that do no appear to compromise safety. Pt given written suggestions for home activities to provide cognitive challenge on an ongoing basis. Other areas targeted: Pt reports that he iglesia like to resume driving one day;  Trailmaking: part A 59 seconds; part B 3 min 10 seconds with cue x 2. Education:   Ongoing pt ed.  Discussed continuing tx versus discontinuing as pt reporting he feels he
and sway but no LOB. Supine to Sit  Assistance Level: Independent         Assessment  Assessment  Assessment: Pt tolerated session well. He completed mobility in the room with a cane and CGA/SBA. Pt with slight sway but no LOB. He completed transfers to the shower, recliner with SBA. He was able to complete bathing tasks with SBA. He completed all dressing tasks with SBA. Pt's bandage over his IV was coming off so nursing contacted. Checking with Dr. Yumiko Uribe if they can remove the IV. Activity Tolerance: Patient tolerated treatment well  Discharge Recommendations: Home with assist PRN;Continue to assess pending progress  Safety Devices  Type of devices: Call light within reach; Chair alarm in place; Left in chair;Gait belt        Plan  Occupational Therapy Plan  Times Per Week: 5-6  Times Per Day: Twice a day  Current Treatment Recommendations: Balance training;Strengthening; Endurance training;Functional mobility training; Safety education & training;Patient/Caregiver education & training;Self-Care / ADL;Equipment evaluation, education, & procurement;Home management training;Neuromuscular re-education;Gait training;Coordination training    Goals  Patient Goals   Patient goals : \"To get my heart under control\"  \"To get back to being me\".   Short Term Goals  Time Frame for Short Term Goals: 1 week  Short Term Goal 1: Pt will be mod I with funtional transfers  Short Term Goal 2: Pt will be mod I with toileting  Short Term Goal 3: Pt will be mod I with functional mobility  Short Term Goal 4: Pt will be mod I with bathing and dressing  Short Term Goal 5: Pt will be mod I with bed mobility  Long Term Goals  Time Frame for Long Term Goals : STGS=LTGs      Therapy Time   Individual Concurrent Group Co-treatment   Time In 0745         Time Out 0845         Minutes [address] [facility],  [address], [address]
session, eager to get moving. Objective  Sit <-> stand SBA-CGA throughout with intermittent cues for safety. Ambulation: Surface: Level surface; Carpet, Device: Lucy Beverage (single point cane), Distance: 400' with multiple turns and short distances in therapy gym, short distances in therapy gym without device CGA-SBA - occasional narrow MITUL with slight scissoring, but less intermittent. Slight lateral sway especially towards right at times requiring up to [facility] for safety. patient is able to recognize that he is much improved with the use of the cane. He does hold left UE up in a flexed position very stiff and needs verbal cueing to relax left arm and achieve natural arm swing, assistance Level: Contact guard assist;Stand by assist, Gait Deviations: Slow reg; Path deviations; Unsteady gait. Standing without device short sticks in X position forward/backward X 10 each direction increased effort and time with CGA-min assist, lateral stepping X 12 each direction with CGA cues to improve step length and clearance BLE increased effort and time. Standing with right railing maintaining SLS on blue airex pad advancing opposite LE forward/backward over 2\"height pad X 12 each LE to promote control step length and clearance with cues for body positioning CGA throughout. NuStep 10 minutes, level 6 resistance, seat 14 BLEs, BUE 14, average SPM 26. Pt tolerated well. Assessment: Increased effort and time to perform all mobility tasks this afternoon due to increased fatigue but participated well receptive to cues throughout for proper sequencing. Continues to be limited with ambulation due to postural impairments with balance deficits but progressing well towards goals.        Safety Device - Type of devices:  [x]  All fall risk precautions in place [] Bed alarm in place  [] Call light within reach [] Chair alarm in place [] Positioning belt [x] Gait belt [x] Patient at risk for falls [] Left in bed [x] Left in
-----------------------------------------------------------------  Echo: Personally interpreted. 6/19/2023. Overall left ventricular systolic function appears severely reduced. Ejection fraction is visually estimated to be 20-25% with diffuse  hypokinesis. There is mildly increased left ventricular wall thickness. Mildly dilated left ventricle. Diastolic filling parameters suggest grade II  diastolic dysfunction. Probable multiple apical thrombi within  trabeculations. The right ventricle appears mildly dilated with normal systolic function. The left atrium is severely dilated. Trivial aortic, mitral, and tricuspid regurgitation. There is a trivial pericardial effusion. A bubble study was performed and fails to show evidence of shunting. Cath: 12/11/2018. 1.  Right dominant coronary arterial circulation with a 30% proximal RCA  disease in the dominant vessel. These are very large coronary arteries. In the left system, there is no  left main disease. The circumflex  artery has 50% disease at the bifurcation of the obtuse marginal  branches and the proximal circ. In the left anterior descending artery,  there is a 25% disease in the proximal and mid-portions. 2.  Severe left ventricular systolic function. LV ejection fraction of 20%. Objective:   Vitals: /66   Pulse 87   Temp 97.9 °F (36.6 °C) (Oral)   Resp 16   Ht 6' 3\" (1.905 m)   Wt 208 lb 12.4 oz (94.7 kg)   SpO2 98%   BMI 26.10 kg/m²   General appearance: alert, appears stated age and cooperative, No acute distress   Skin: Skin color, texture, turgor normal. No rashes or ecchymosis. HEENT: Head: Normal, normocephalic, atraumatic.   Neck: no JVD, supple, symmetrical, trachea midline, and thyroid not enlarged, symmetric, no tenderness/mass/nodules  Lungs: clear to auscultation bilaterally, no accessory muscle use, no respiratory distress, on RA  Heart: regular rate and rhythm, S1, S2 normal, no murmur, click, rub or
6/19/2023. Overall left ventricular systolic function appears severely reduced. Ejection fraction is visually estimated to be 20-25% with diffuse  hypokinesis. There is mildly increased left ventricular wall thickness. Mildly dilated left ventricle. Diastolic filling parameters suggest grade II  diastolic dysfunction. Probable multiple apical thrombi within  trabeculations. The right ventricle appears mildly dilated with normal systolic function. The left atrium is severely dilated. Trivial aortic, mitral, and tricuspid regurgitation. There is a trivial pericardial effusion. A bubble study was performed and fails to show evidence of shunting. Cath: 12/11/2018. 1.  Right dominant coronary arterial circulation with a 30% proximal RCA  disease in the dominant vessel. These are very large coronary arteries. In the left system, there is no  left main disease. The circumflex  artery has 50% disease at the bifurcation of the obtuse marginal  branches and the proximal circ. In the left anterior descending artery,  there is a 25% disease in the proximal and mid-portions. 2.  Severe left ventricular systolic function. LV ejection fraction of 20%. Objective:   Vitals: /72   Pulse 64   Temp 98.4 °F (36.9 °C) (Oral)   Resp 18   Ht 6' 3\" (1.905 m)   Wt 209 lb 10.5 oz (95.1 kg)   SpO2 98%   BMI 26.21 kg/m²   General appearance: alert, appears stated age and cooperative, No acute distress   Skin: Skin color, texture, turgor normal. No rashes or ecchymosis. HEENT: Head: Normal, normocephalic, atraumatic.   Neck: no JVD, supple, symmetrical, trachea midline, and thyroid not enlarged, symmetric, no tenderness/mass/nodules  Lungs: clear to auscultation bilaterally, no accessory muscle use, no respiratory distress, on RA  Heart: regular rate and rhythm, S1, S2 normal, no murmur, click, rub or gallop  Abdomen: soft, non-tender; bowel sounds normal; no masses,  no organomegaly  Extremities:
Assessment  Assessment: Mr. Veronica Cabrera is a 59 yo male admitted with CVA. He was fully independent and lived alone PTA, but did not drive. Currently patient is CGA for transfers and CG/min A to ambulate community distance without device but CGA with cane. He was able to complete full flight of stairs with CGA and curb step without rails with CGA (but hesitated). Patient does have some scissoring during ambulation and is not steady at this time. Patient reports that he was previously hesitant to use a cane, but is realizing potential benefits and is less concerned about vanity. Patient tolerated treatment well but is functioning below baseline at this time. He will benefit from continued skilled therapy for strengthening, gait training, balance training, and coordination while on rehab. Recommend patient use cane for ambulation now, work on high level gait activities with therapy, and determine if patient progressing well enough to safey ambulate without a cane prior to discharge. Activity Tolerance: Patient tolerated evaluation without incident  Discharge Recommendations: Patient would benefit from continued therapy after discharge;Continue to assess pending progress (5-7)  PT Equipment Recommendations  Other: cont to assess - may need cane    Goals  Patient Goals   Patient Goals : \"be able ot maneuver on my own\" \"not have to ask for help\"  Short Term Goals  Time Frame for Short Term Goals: upon discharge - approx 7 days from 6/21/2023  Short Term Goal 1: bed mobility with modified independence  Short Term Goal 2: transfers with modified independence  Short Term Goal 3: Ambulate 200' without a device with modified independence  Short Term Goal 4: Ascend/descend 12 steps with 1-2 rails with modified independence  Short Term Goal 5: Ascend/descend curb step without a device with modified independence.   Long Term Goals  Time Frame for Long Term Goals : STG=LTG    PLAN OF CARE/SAFETY  Physcial Therapy

## 2023-06-24 NOTE — PLAN OF CARE
Problem: Discharge Planning  Goal: Discharge to home or other facility with appropriate resources  6/24/2023 0122 by Adelso Osorio RN  Outcome: Progressing     Problem: Safety - Adult  Goal: Free from fall injury  6/24/2023 0122 by Adelso Osorio RN  Outcome: Progressing     Problem: ABCDS Injury Assessment  Goal: Absence of physical injury  6/24/2023 0122 by Adelso Osorio RN  Outcome: Progressing     Problem: Pain  Goal: Verbalizes/displays adequate comfort level or baseline comfort level  6/24/2023 0122 by Adelso Osorio RN  Outcome: Progressing     Problem: Skin/Tissue Integrity  Goal: Absence of new skin breakdown  Description: 1. Monitor for areas of redness and/or skin breakdown  2. Assess vascular access sites hourly  3. Every 4-6 hours minimum:  Change oxygen saturation probe site  4. Every 4-6 hours:  If on nasal continuous positive airway pressure, respiratory therapy assess nares and determine need for appliance change or resting period.   6/24/2023 0122 by Adelso Osorio RN  Outcome: Progressing     Problem: Skin/Tissue Integrity - Adult  Goal: Skin integrity remains intact  6/24/2023 0122 by Adelso Osorio RN  Outcome: Progressing     Problem: Metabolic/Fluid and Electrolytes - Adult  Goal: Electrolytes maintained within normal limits  6/24/2023 0122 by Adelso Osorio RN  Outcome: Progressing     Problem: Metabolic/Fluid and Electrolytes - Adult  Goal: Glucose maintained within prescribed range  6/24/2023 0122 by Adelso Osorio RN  Outcome: Progressing

## 2023-06-24 NOTE — PLAN OF CARE
Problem: Discharge Planning  Goal: Discharge to home or other facility with appropriate resources  Outcome: Progressing  Flowsheets (Taken 6/24/2023 0948)  Discharge to home or other facility with appropriate resources: Identify barriers to discharge with patient and caregiver     Problem: Safety - Adult  Goal: Free from fall injury  Outcome: Progressing     Problem: ABCDS Injury Assessment  Goal: Absence of physical injury  Outcome: Progressing     Problem: Pain  Goal: Verbalizes/displays adequate comfort level or baseline comfort level  Outcome: Progressing     Problem: Skin/Tissue Integrity  Goal: Absence of new skin breakdown  Description: 1. Monitor for areas of redness and/or skin breakdown  2. Assess vascular access sites hourly  3. Every 4-6 hours minimum:  Change oxygen saturation probe site  4. Every 4-6 hours:  If on nasal continuous positive airway pressure, respiratory therapy assess nares and determine need for appliance change or resting period.   Outcome: Progressing     Problem: Nutrition Deficit:  Goal: Optimize nutritional status  Outcome: Progressing     Problem: Neurosensory - Adult  Goal: Achieves stable or improved neurological status  Outcome: Progressing  Flowsheets (Taken 6/24/2023 0948)  Achieves stable or improved neurological status: Assess for and report changes in neurological status  Goal: Achieves maximal functionality and self care  Outcome: Progressing  Flowsheets (Taken 6/24/2023 0948)  Achieves maximal functionality and self care: Monitor swallowing and airway patency with patient fatigue and changes in neurological status     Problem: Respiratory - Adult  Goal: Achieves optimal ventilation and oxygenation  Outcome: Progressing  Flowsheets (Taken 6/24/2023 0948)  Achieves optimal ventilation and oxygenation: Assess for changes in respiratory status     Problem: Cardiovascular - Adult  Goal: Maintains optimal cardiac output and hemodynamic stability  Outcome:

## 2023-06-25 VITALS
HEART RATE: 92 BPM | WEIGHT: 207.67 LBS | BODY MASS INDEX: 25.82 KG/M2 | OXYGEN SATURATION: 97 % | HEIGHT: 75 IN | DIASTOLIC BLOOD PRESSURE: 92 MMHG | RESPIRATION RATE: 18 BRPM | SYSTOLIC BLOOD PRESSURE: 123 MMHG | TEMPERATURE: 98.3 F

## 2023-06-25 LAB
ANION GAP SERPL CALCULATED.3IONS-SCNC: 8 MMOL/L (ref 3–16)
BUN SERPL-MCNC: 16 MG/DL (ref 7–20)
CALCIUM SERPL-MCNC: 8.8 MG/DL (ref 8.3–10.6)
CHLORIDE SERPL-SCNC: 102 MMOL/L (ref 99–110)
CO2 SERPL-SCNC: 29 MMOL/L (ref 21–32)
CREAT SERPL-MCNC: 1 MG/DL (ref 0.9–1.3)
GFR SERPLBLD CREATININE-BSD FMLA CKD-EPI: >60 ML/MIN/{1.73_M2}
GLUCOSE BLD-MCNC: 113 MG/DL (ref 70–99)
GLUCOSE BLD-MCNC: 118 MG/DL (ref 70–99)
GLUCOSE BLD-MCNC: 174 MG/DL (ref 70–99)
GLUCOSE BLD-MCNC: 89 MG/DL (ref 70–99)
GLUCOSE SERPL-MCNC: 116 MG/DL (ref 70–99)
MAGNESIUM SERPL-MCNC: 1.8 MG/DL (ref 1.8–2.4)
PERFORMED ON: ABNORMAL
PERFORMED ON: NORMAL
POTASSIUM SERPL-SCNC: 4.4 MMOL/L (ref 3.5–5.1)
SODIUM SERPL-SCNC: 139 MMOL/L (ref 136–145)

## 2023-06-25 PROCEDURE — 6370000000 HC RX 637 (ALT 250 FOR IP): Performed by: INTERNAL MEDICINE

## 2023-06-25 PROCEDURE — 80048 BASIC METABOLIC PNL TOTAL CA: CPT

## 2023-06-25 PROCEDURE — 6370000000 HC RX 637 (ALT 250 FOR IP): Performed by: NURSE PRACTITIONER

## 2023-06-25 PROCEDURE — 6370000000 HC RX 637 (ALT 250 FOR IP): Performed by: PHYSICAL MEDICINE & REHABILITATION

## 2023-06-25 PROCEDURE — 83735 ASSAY OF MAGNESIUM: CPT

## 2023-06-25 PROCEDURE — 1280000000 HC REHAB R&B

## 2023-06-25 PROCEDURE — 36415 COLL VENOUS BLD VENIPUNCTURE: CPT

## 2023-06-25 RX ADMIN — SENNOSIDES AND DOCUSATE SODIUM 1 TABLET: 50; 8.6 TABLET ORAL at 21:14

## 2023-06-25 RX ADMIN — RIVAROXABAN 20 MG: 20 TABLET, FILM COATED ORAL at 17:42

## 2023-06-25 RX ADMIN — VALSARTAN 80 MG: 80 TABLET ORAL at 21:20

## 2023-06-25 RX ADMIN — TRAZODONE HYDROCHLORIDE 50 MG: 50 TABLET ORAL at 21:14

## 2023-06-25 RX ADMIN — ASPIRIN 81 MG 81 MG: 81 TABLET ORAL at 07:37

## 2023-06-25 RX ADMIN — SENNOSIDES AND DOCUSATE SODIUM 1 TABLET: 50; 8.6 TABLET ORAL at 07:34

## 2023-06-25 RX ADMIN — CARVEDILOL 3.12 MG: 6.25 TABLET, FILM COATED ORAL at 07:34

## 2023-06-25 RX ADMIN — VALSARTAN 80 MG: 80 TABLET ORAL at 07:36

## 2023-06-25 RX ADMIN — Medication 100 MG: at 07:34

## 2023-06-25 RX ADMIN — FUROSEMIDE 20 MG: 20 TABLET ORAL at 07:34

## 2023-06-25 RX ADMIN — SPIRONOLACTONE 12.5 MG: 25 TABLET ORAL at 07:34

## 2023-06-25 RX ADMIN — ATORVASTATIN CALCIUM 40 MG: 40 TABLET, FILM COATED ORAL at 21:14

## 2023-06-25 RX ADMIN — CARVEDILOL 3.12 MG: 6.25 TABLET, FILM COATED ORAL at 16:15

## 2023-06-25 ASSESSMENT — PAIN SCALES - GENERAL: PAINLEVEL_OUTOF10: 5

## 2023-06-25 ASSESSMENT — PAIN DESCRIPTION - LOCATION: LOCATION: HEAD

## 2023-06-25 ASSESSMENT — PAIN DESCRIPTION - DESCRIPTORS: DESCRIPTORS: ACHING

## 2023-06-26 ENCOUNTER — APPOINTMENT (OUTPATIENT)
Dept: GENERAL RADIOLOGY | Age: 58
DRG: 056 | End: 2023-06-26
Attending: PHYSICAL MEDICINE & REHABILITATION
Payer: MEDICARE

## 2023-06-26 LAB
ANION GAP SERPL CALCULATED.3IONS-SCNC: 10 MMOL/L (ref 3–16)
BASOPHILS # BLD: 0 K/UL (ref 0–0.2)
BASOPHILS NFR BLD: 0.9 %
BUN SERPL-MCNC: 18 MG/DL (ref 7–20)
CALCIUM SERPL-MCNC: 8.8 MG/DL (ref 8.3–10.6)
CHLORIDE SERPL-SCNC: 102 MMOL/L (ref 99–110)
CO2 SERPL-SCNC: 26 MMOL/L (ref 21–32)
CREAT SERPL-MCNC: 1.1 MG/DL (ref 0.9–1.3)
DEPRECATED RDW RBC AUTO: 18.9 % (ref 12.4–15.4)
EOSINOPHIL # BLD: 0.1 K/UL (ref 0–0.6)
EOSINOPHIL NFR BLD: 1.3 %
GFR SERPLBLD CREATININE-BSD FMLA CKD-EPI: >60 ML/MIN/{1.73_M2}
GLUCOSE BLD-MCNC: 109 MG/DL (ref 70–99)
GLUCOSE BLD-MCNC: 127 MG/DL (ref 70–99)
GLUCOSE BLD-MCNC: 189 MG/DL (ref 70–99)
GLUCOSE BLD-MCNC: 98 MG/DL (ref 70–99)
GLUCOSE SERPL-MCNC: 193 MG/DL (ref 70–99)
HCT VFR BLD AUTO: 41.5 % (ref 40.5–52.5)
HGB BLD-MCNC: 13.5 G/DL (ref 13.5–17.5)
LYMPHOCYTES # BLD: 1.7 K/UL (ref 1–5.1)
LYMPHOCYTES NFR BLD: 41.9 %
MAGNESIUM SERPL-MCNC: 1.8 MG/DL (ref 1.8–2.4)
MCH RBC QN AUTO: 28.9 PG (ref 26–34)
MCHC RBC AUTO-ENTMCNC: 32.6 G/DL (ref 31–36)
MCV RBC AUTO: 88.5 FL (ref 80–100)
MONOCYTES # BLD: 0.3 K/UL (ref 0–1.3)
MONOCYTES NFR BLD: 8.1 %
NEUTROPHILS # BLD: 1.9 K/UL (ref 1.7–7.7)
NEUTROPHILS NFR BLD: 47.8 %
PERFORMED ON: ABNORMAL
PERFORMED ON: NORMAL
PLATELET # BLD AUTO: 220 K/UL (ref 135–450)
PMV BLD AUTO: 9.1 FL (ref 5–10.5)
POTASSIUM SERPL-SCNC: 4.3 MMOL/L (ref 3.5–5.1)
RBC # BLD AUTO: 4.68 M/UL (ref 4.2–5.9)
SODIUM SERPL-SCNC: 138 MMOL/L (ref 136–145)
WBC # BLD AUTO: 4 K/UL (ref 4–11)

## 2023-06-26 PROCEDURE — 94760 N-INVAS EAR/PLS OXIMETRY 1: CPT

## 2023-06-26 PROCEDURE — 97110 THERAPEUTIC EXERCISES: CPT

## 2023-06-26 PROCEDURE — 1280000000 HC REHAB R&B

## 2023-06-26 PROCEDURE — 6370000000 HC RX 637 (ALT 250 FOR IP): Performed by: PHYSICAL MEDICINE & REHABILITATION

## 2023-06-26 PROCEDURE — 6370000000 HC RX 637 (ALT 250 FOR IP): Performed by: INTERNAL MEDICINE

## 2023-06-26 PROCEDURE — 71045 X-RAY EXAM CHEST 1 VIEW: CPT

## 2023-06-26 PROCEDURE — 83735 ASSAY OF MAGNESIUM: CPT

## 2023-06-26 PROCEDURE — 97530 THERAPEUTIC ACTIVITIES: CPT

## 2023-06-26 PROCEDURE — 97112 NEUROMUSCULAR REEDUCATION: CPT

## 2023-06-26 PROCEDURE — 85025 COMPLETE CBC W/AUTO DIFF WBC: CPT

## 2023-06-26 PROCEDURE — 97535 SELF CARE MNGMENT TRAINING: CPT

## 2023-06-26 PROCEDURE — 36415 COLL VENOUS BLD VENIPUNCTURE: CPT

## 2023-06-26 PROCEDURE — 6370000000 HC RX 637 (ALT 250 FOR IP): Performed by: NURSE PRACTITIONER

## 2023-06-26 PROCEDURE — 97130 THER IVNTJ EA ADDL 15 MIN: CPT

## 2023-06-26 PROCEDURE — 80048 BASIC METABOLIC PNL TOTAL CA: CPT

## 2023-06-26 PROCEDURE — 97129 THER IVNTJ 1ST 15 MIN: CPT

## 2023-06-26 PROCEDURE — 97116 GAIT TRAINING THERAPY: CPT

## 2023-06-26 RX ADMIN — RIVAROXABAN 20 MG: 20 TABLET, FILM COATED ORAL at 17:35

## 2023-06-26 RX ADMIN — FUROSEMIDE 20 MG: 20 TABLET ORAL at 07:53

## 2023-06-26 RX ADMIN — CARVEDILOL 3.12 MG: 6.25 TABLET, FILM COATED ORAL at 17:35

## 2023-06-26 RX ADMIN — SPIRONOLACTONE 12.5 MG: 25 TABLET ORAL at 07:53

## 2023-06-26 RX ADMIN — TRAZODONE HYDROCHLORIDE 50 MG: 50 TABLET ORAL at 20:49

## 2023-06-26 RX ADMIN — VALSARTAN 80 MG: 80 TABLET ORAL at 07:53

## 2023-06-26 RX ADMIN — CARVEDILOL 3.12 MG: 6.25 TABLET, FILM COATED ORAL at 07:52

## 2023-06-26 RX ADMIN — SENNOSIDES AND DOCUSATE SODIUM 1 TABLET: 50; 8.6 TABLET ORAL at 07:52

## 2023-06-26 RX ADMIN — VALSARTAN 80 MG: 80 TABLET ORAL at 20:49

## 2023-06-26 RX ADMIN — ASPIRIN 81 MG 81 MG: 81 TABLET ORAL at 07:52

## 2023-06-26 RX ADMIN — ATORVASTATIN CALCIUM 40 MG: 40 TABLET, FILM COATED ORAL at 20:49

## 2023-06-26 RX ADMIN — Medication 100 MG: at 07:52

## 2023-06-27 ENCOUNTER — APPOINTMENT (OUTPATIENT)
Dept: GENERAL RADIOLOGY | Age: 58
DRG: 056 | End: 2023-06-27
Attending: PHYSICAL MEDICINE & REHABILITATION
Payer: MEDICARE

## 2023-06-27 LAB
ANION GAP SERPL CALCULATED.3IONS-SCNC: 13 MMOL/L (ref 3–16)
ANISOCYTOSIS BLD QL SMEAR: ABNORMAL
BASOPHILS # BLD: 0 K/UL (ref 0–0.2)
BASOPHILS NFR BLD: 0.5 %
BUN SERPL-MCNC: 20 MG/DL (ref 7–20)
CALCIUM SERPL-MCNC: 9.1 MG/DL (ref 8.3–10.6)
CHLORIDE SERPL-SCNC: 102 MMOL/L (ref 99–110)
CO2 SERPL-SCNC: 24 MMOL/L (ref 21–32)
CREAT SERPL-MCNC: 1.1 MG/DL (ref 0.9–1.3)
DEPRECATED RDW RBC AUTO: 18.3 % (ref 12.4–15.4)
EOSINOPHIL # BLD: 0.1 K/UL (ref 0–0.6)
EOSINOPHIL NFR BLD: 1.6 %
GFR SERPLBLD CREATININE-BSD FMLA CKD-EPI: >60 ML/MIN/{1.73_M2}
GLUCOSE BLD-MCNC: 184 MG/DL (ref 70–99)
GLUCOSE BLD-MCNC: 203 MG/DL (ref 70–99)
GLUCOSE BLD-MCNC: 79 MG/DL (ref 70–99)
GLUCOSE BLD-MCNC: 96 MG/DL (ref 70–99)
GLUCOSE SERPL-MCNC: 110 MG/DL (ref 70–99)
HCT VFR BLD AUTO: 43.3 % (ref 40.5–52.5)
HGB BLD-MCNC: 14 G/DL (ref 13.5–17.5)
LYMPHOCYTES # BLD: 2 K/UL (ref 1–5.1)
LYMPHOCYTES NFR BLD: 41.1 %
MAGNESIUM SERPL-MCNC: 1.7 MG/DL (ref 1.8–2.4)
MCH RBC QN AUTO: 28.8 PG (ref 26–34)
MCHC RBC AUTO-ENTMCNC: 32.4 G/DL (ref 31–36)
MCV RBC AUTO: 88.9 FL (ref 80–100)
MONOCYTES # BLD: 0.4 K/UL (ref 0–1.3)
MONOCYTES NFR BLD: 9 %
NEUTROPHILS # BLD: 2.3 K/UL (ref 1.7–7.7)
NEUTROPHILS NFR BLD: 47.8 %
PERFORMED ON: ABNORMAL
PERFORMED ON: ABNORMAL
PERFORMED ON: NORMAL
PERFORMED ON: NORMAL
PLATELET # BLD AUTO: 232 K/UL (ref 135–450)
PLATELET BLD QL SMEAR: ADEQUATE
PMV BLD AUTO: 9.2 FL (ref 5–10.5)
POTASSIUM SERPL-SCNC: 4.4 MMOL/L (ref 3.5–5.1)
PROCALCITONIN SERPL IA-MCNC: 0.05 NG/ML (ref 0–0.15)
RBC # BLD AUTO: 4.87 M/UL (ref 4.2–5.9)
SLIDE REVIEW: ABNORMAL
SODIUM SERPL-SCNC: 139 MMOL/L (ref 136–145)
WBC # BLD AUTO: 4.8 K/UL (ref 4–11)

## 2023-06-27 PROCEDURE — 6370000000 HC RX 637 (ALT 250 FOR IP): Performed by: INTERNAL MEDICINE

## 2023-06-27 PROCEDURE — 71045 X-RAY EXAM CHEST 1 VIEW: CPT

## 2023-06-27 PROCEDURE — 97530 THERAPEUTIC ACTIVITIES: CPT

## 2023-06-27 PROCEDURE — 6370000000 HC RX 637 (ALT 250 FOR IP): Performed by: NURSE PRACTITIONER

## 2023-06-27 PROCEDURE — 80048 BASIC METABOLIC PNL TOTAL CA: CPT

## 2023-06-27 PROCEDURE — 97116 GAIT TRAINING THERAPY: CPT

## 2023-06-27 PROCEDURE — 6370000000 HC RX 637 (ALT 250 FOR IP): Performed by: PHYSICAL MEDICINE & REHABILITATION

## 2023-06-27 PROCEDURE — 85025 COMPLETE CBC W/AUTO DIFF WBC: CPT

## 2023-06-27 PROCEDURE — 36415 COLL VENOUS BLD VENIPUNCTURE: CPT

## 2023-06-27 PROCEDURE — 84145 PROCALCITONIN (PCT): CPT

## 2023-06-27 PROCEDURE — 97535 SELF CARE MNGMENT TRAINING: CPT

## 2023-06-27 PROCEDURE — 1280000000 HC REHAB R&B

## 2023-06-27 PROCEDURE — 83735 ASSAY OF MAGNESIUM: CPT

## 2023-06-27 PROCEDURE — 97110 THERAPEUTIC EXERCISES: CPT

## 2023-06-27 PROCEDURE — 94760 N-INVAS EAR/PLS OXIMETRY 1: CPT

## 2023-06-27 RX ORDER — VALSARTAN 80 MG/1
80 TABLET ORAL 2 TIMES DAILY
Qty: 60 TABLET | Refills: 0 | Status: SHIPPED | OUTPATIENT
Start: 2023-06-27

## 2023-06-27 RX ORDER — FUROSEMIDE 20 MG/1
20 TABLET ORAL DAILY
Qty: 60 TABLET | Refills: 0 | Status: SHIPPED | OUTPATIENT
Start: 2023-06-28

## 2023-06-27 RX ORDER — ALLOPURINOL 100 MG/1
100 TABLET ORAL DAILY
Qty: 30 TABLET | Refills: 0 | Status: SHIPPED | OUTPATIENT
Start: 2023-06-27

## 2023-06-27 RX ORDER — THIAMINE MONONITRATE (VIT B1) 100 MG
100 TABLET ORAL DAILY
Qty: 30 TABLET | Refills: 0 | Status: SHIPPED | OUTPATIENT
Start: 2023-06-27

## 2023-06-27 RX ORDER — CARVEDILOL 3.12 MG/1
3.12 TABLET ORAL 2 TIMES DAILY WITH MEALS
Qty: 60 TABLET | Refills: 0 | Status: SHIPPED | OUTPATIENT
Start: 2023-06-27

## 2023-06-27 RX ORDER — SPIRONOLACTONE 25 MG/1
12.5 TABLET ORAL DAILY
Qty: 30 TABLET | Refills: 0 | Status: SHIPPED | OUTPATIENT
Start: 2023-06-27

## 2023-06-27 RX ORDER — ATORVASTATIN CALCIUM 40 MG/1
40 TABLET, FILM COATED ORAL NIGHTLY
Qty: 30 TABLET | Refills: 0 | Status: SHIPPED | OUTPATIENT
Start: 2023-06-27

## 2023-06-27 RX ADMIN — CARVEDILOL 3.12 MG: 6.25 TABLET, FILM COATED ORAL at 17:32

## 2023-06-27 RX ADMIN — SENNOSIDES AND DOCUSATE SODIUM 1 TABLET: 50; 8.6 TABLET ORAL at 21:39

## 2023-06-27 RX ADMIN — RIVAROXABAN 20 MG: 20 TABLET, FILM COATED ORAL at 17:32

## 2023-06-27 RX ADMIN — CARVEDILOL 3.12 MG: 6.25 TABLET, FILM COATED ORAL at 07:09

## 2023-06-27 RX ADMIN — SPIRONOLACTONE 12.5 MG: 25 TABLET ORAL at 07:08

## 2023-06-27 RX ADMIN — Medication 100 MG: at 07:08

## 2023-06-27 RX ADMIN — FUROSEMIDE 20 MG: 20 TABLET ORAL at 07:09

## 2023-06-27 RX ADMIN — INSULIN LISPRO 1 UNITS: 100 INJECTION, SOLUTION INTRAVENOUS; SUBCUTANEOUS at 12:03

## 2023-06-27 RX ADMIN — ATORVASTATIN CALCIUM 40 MG: 40 TABLET, FILM COATED ORAL at 21:39

## 2023-06-27 RX ADMIN — VALSARTAN 80 MG: 80 TABLET ORAL at 21:42

## 2023-06-27 RX ADMIN — VALSARTAN 80 MG: 80 TABLET ORAL at 07:26

## 2023-06-27 RX ADMIN — ASPIRIN 81 MG 81 MG: 81 TABLET ORAL at 07:09

## 2023-06-27 RX ADMIN — TRAZODONE HYDROCHLORIDE 50 MG: 50 TABLET ORAL at 21:39

## 2023-06-27 ASSESSMENT — PAIN SCALES - GENERAL: PAINLEVEL_OUTOF10: 0

## 2023-06-28 VITALS
HEIGHT: 75 IN | TEMPERATURE: 97.3 F | SYSTOLIC BLOOD PRESSURE: 113 MMHG | DIASTOLIC BLOOD PRESSURE: 79 MMHG | HEART RATE: 95 BPM | RESPIRATION RATE: 18 BRPM | OXYGEN SATURATION: 97 % | WEIGHT: 206.57 LBS | BODY MASS INDEX: 25.68 KG/M2

## 2023-06-28 LAB
ANION GAP SERPL CALCULATED.3IONS-SCNC: 8 MMOL/L (ref 3–16)
BUN SERPL-MCNC: 17 MG/DL (ref 7–20)
CALCIUM SERPL-MCNC: 8.8 MG/DL (ref 8.3–10.6)
CHLORIDE SERPL-SCNC: 105 MMOL/L (ref 99–110)
CO2 SERPL-SCNC: 26 MMOL/L (ref 21–32)
CREAT SERPL-MCNC: 1 MG/DL (ref 0.9–1.3)
GFR SERPLBLD CREATININE-BSD FMLA CKD-EPI: >60 ML/MIN/{1.73_M2}
GLUCOSE BLD-MCNC: 106 MG/DL (ref 70–99)
GLUCOSE BLD-MCNC: 141 MG/DL (ref 70–99)
GLUCOSE SERPL-MCNC: 113 MG/DL (ref 70–99)
MAGNESIUM SERPL-MCNC: 1.7 MG/DL (ref 1.8–2.4)
PERFORMED ON: ABNORMAL
PERFORMED ON: ABNORMAL
POTASSIUM SERPL-SCNC: 4.9 MMOL/L (ref 3.5–5.1)
SODIUM SERPL-SCNC: 139 MMOL/L (ref 136–145)

## 2023-06-28 PROCEDURE — 94760 N-INVAS EAR/PLS OXIMETRY 1: CPT

## 2023-06-28 PROCEDURE — 6370000000 HC RX 637 (ALT 250 FOR IP): Performed by: PHYSICAL MEDICINE & REHABILITATION

## 2023-06-28 PROCEDURE — 83735 ASSAY OF MAGNESIUM: CPT

## 2023-06-28 PROCEDURE — 6370000000 HC RX 637 (ALT 250 FOR IP): Performed by: NURSE PRACTITIONER

## 2023-06-28 PROCEDURE — 36415 COLL VENOUS BLD VENIPUNCTURE: CPT

## 2023-06-28 PROCEDURE — 80048 BASIC METABOLIC PNL TOTAL CA: CPT

## 2023-06-28 PROCEDURE — 6370000000 HC RX 637 (ALT 250 FOR IP): Performed by: INTERNAL MEDICINE

## 2023-06-28 RX ADMIN — VALSARTAN 80 MG: 80 TABLET ORAL at 07:55

## 2023-06-28 RX ADMIN — CARVEDILOL 3.12 MG: 6.25 TABLET, FILM COATED ORAL at 07:56

## 2023-06-28 RX ADMIN — FUROSEMIDE 20 MG: 20 TABLET ORAL at 07:56

## 2023-06-28 RX ADMIN — ASPIRIN 81 MG 81 MG: 81 TABLET ORAL at 07:58

## 2023-06-28 RX ADMIN — Medication 100 MG: at 07:56

## 2023-06-28 RX ADMIN — SPIRONOLACTONE 12.5 MG: 25 TABLET ORAL at 07:55

## 2023-07-05 ENCOUNTER — OFFICE VISIT (OUTPATIENT)
Dept: INTERNAL MEDICINE CLINIC | Age: 58
End: 2023-07-05
Payer: MEDICARE

## 2023-07-05 VITALS
TEMPERATURE: 97.8 F | HEART RATE: 84 BPM | DIASTOLIC BLOOD PRESSURE: 70 MMHG | WEIGHT: 208 LBS | OXYGEN SATURATION: 96 % | SYSTOLIC BLOOD PRESSURE: 116 MMHG | BODY MASS INDEX: 26 KG/M2

## 2023-07-05 DIAGNOSIS — E83.42 HYPOMAGNESEMIA: ICD-10-CM

## 2023-07-05 DIAGNOSIS — I50.9 CONGESTIVE HEART FAILURE DUE TO CARDIOMYOPATHY (HCC): ICD-10-CM

## 2023-07-05 DIAGNOSIS — I63.9 ACUTE ISCHEMIC STROKE (HCC): ICD-10-CM

## 2023-07-05 DIAGNOSIS — E11.59 TYPE 2 DIABETES MELLITUS WITH OTHER CIRCULATORY COMPLICATION, WITHOUT LONG-TERM CURRENT USE OF INSULIN (HCC): Primary | ICD-10-CM

## 2023-07-05 DIAGNOSIS — I10 ESSENTIAL HYPERTENSION: ICD-10-CM

## 2023-07-05 DIAGNOSIS — I42.9 CONGESTIVE HEART FAILURE DUE TO CARDIOMYOPATHY (HCC): ICD-10-CM

## 2023-07-05 PROCEDURE — 3074F SYST BP LT 130 MM HG: CPT | Performed by: INTERNAL MEDICINE

## 2023-07-05 PROCEDURE — 1111F DSCHRG MED/CURRENT MED MERGE: CPT | Performed by: INTERNAL MEDICINE

## 2023-07-05 PROCEDURE — 2022F DILAT RTA XM EVC RTNOPTHY: CPT | Performed by: INTERNAL MEDICINE

## 2023-07-05 PROCEDURE — 1036F TOBACCO NON-USER: CPT | Performed by: INTERNAL MEDICINE

## 2023-07-05 PROCEDURE — G8427 DOCREV CUR MEDS BY ELIG CLIN: HCPCS | Performed by: INTERNAL MEDICINE

## 2023-07-05 PROCEDURE — 3078F DIAST BP <80 MM HG: CPT | Performed by: INTERNAL MEDICINE

## 2023-07-05 PROCEDURE — G8419 CALC BMI OUT NRM PARAM NOF/U: HCPCS | Performed by: INTERNAL MEDICINE

## 2023-07-05 PROCEDURE — 3044F HG A1C LEVEL LT 7.0%: CPT | Performed by: INTERNAL MEDICINE

## 2023-07-05 PROCEDURE — 3017F COLORECTAL CA SCREEN DOC REV: CPT | Performed by: INTERNAL MEDICINE

## 2023-07-05 PROCEDURE — 99204 OFFICE O/P NEW MOD 45 MIN: CPT | Performed by: INTERNAL MEDICINE

## 2023-07-05 RX ORDER — GLUCOSAMINE HCL/CHONDROITIN SU 500-400 MG
CAPSULE ORAL
Qty: 100 STRIP | Refills: 1 | Status: SHIPPED | OUTPATIENT
Start: 2023-07-05

## 2023-07-05 RX ORDER — LANCETS 30 GAUGE
1 EACH MISCELLANEOUS DAILY
Qty: 100 EACH | Refills: 1 | Status: SHIPPED | OUTPATIENT
Start: 2023-07-05

## 2023-07-05 RX ORDER — BLOOD-GLUCOSE METER
1 KIT MISCELLANEOUS DAILY
Qty: 1 KIT | Refills: 0 | Status: SHIPPED | OUTPATIENT
Start: 2023-07-05

## 2023-07-05 SDOH — ECONOMIC STABILITY: HOUSING INSECURITY
IN THE LAST 12 MONTHS, WAS THERE A TIME WHEN YOU DID NOT HAVE A STEADY PLACE TO SLEEP OR SLEPT IN A SHELTER (INCLUDING NOW)?: NO

## 2023-07-05 SDOH — ECONOMIC STABILITY: INCOME INSECURITY: HOW HARD IS IT FOR YOU TO PAY FOR THE VERY BASICS LIKE FOOD, HOUSING, MEDICAL CARE, AND HEATING?: NOT VERY HARD

## 2023-07-05 SDOH — ECONOMIC STABILITY: FOOD INSECURITY: WITHIN THE PAST 12 MONTHS, YOU WORRIED THAT YOUR FOOD WOULD RUN OUT BEFORE YOU GOT MONEY TO BUY MORE.: NEVER TRUE

## 2023-07-05 SDOH — ECONOMIC STABILITY: FOOD INSECURITY: WITHIN THE PAST 12 MONTHS, THE FOOD YOU BOUGHT JUST DIDN'T LAST AND YOU DIDN'T HAVE MONEY TO GET MORE.: NEVER TRUE

## 2023-07-05 ASSESSMENT — ENCOUNTER SYMPTOMS
SORE THROAT: 0
COUGH: 0
BLOOD IN STOOL: 0
VOMITING: 0
ABDOMINAL PAIN: 0
SHORTNESS OF BREATH: 0
NAUSEA: 0

## 2023-07-05 ASSESSMENT — PATIENT HEALTH QUESTIONNAIRE - PHQ9
SUM OF ALL RESPONSES TO PHQ QUESTIONS 1-9: 0
SUM OF ALL RESPONSES TO PHQ QUESTIONS 1-9: 0
2. FEELING DOWN, DEPRESSED OR HOPELESS: 0
SUM OF ALL RESPONSES TO PHQ QUESTIONS 1-9: 0
1. LITTLE INTEREST OR PLEASURE IN DOING THINGS: 0
SUM OF ALL RESPONSES TO PHQ9 QUESTIONS 1 & 2: 0
SUM OF ALL RESPONSES TO PHQ QUESTIONS 1-9: 0

## 2023-07-05 NOTE — PROGRESS NOTES
CVA and heart disease. Risk factors for coronary artery disease include male sex and diabetes mellitus. When asked about current treatments, none were reported. He is following a generally healthy diet. An ACE inhibitor/angiotensin II receptor blocker is being taken. Congestive Heart Failure  Presents for initial visit. The disease course has been stable. Pertinent negatives include no abdominal pain, chest pain, edema, fatigue, orthopnea, palpitations or shortness of breath. The symptoms have been improving. Past treatments include angiotensin receptor blockers, aldosterone receptor blockers and beta blockers. The treatment provided significant relief. Compliance with prior treatments has been variable. His past medical history is significant for CVA. Review of Systems   Constitutional:  Negative for fatigue and fever. HENT:  Negative for nosebleeds and sore throat. Respiratory:  Negative for cough and shortness of breath. Cardiovascular:  Negative for chest pain, palpitations and leg swelling. Gastrointestinal:  Negative for abdominal pain, blood in stool, nausea and vomiting. Neurological:  Negative for dizziness and weakness. Objective   Physical Exam  Constitutional:       Appearance: Normal appearance. HENT:      Head: Normocephalic and atraumatic. Eyes:      General: No scleral icterus. Conjunctiva/sclera: Conjunctivae normal.   Cardiovascular:      Rate and Rhythm: Normal rate and regular rhythm. Pulses: Normal pulses. Heart sounds: Normal heart sounds. Pulmonary:      Effort: Pulmonary effort is normal.      Breath sounds: Normal breath sounds. Musculoskeletal:         General: No swelling. Right lower leg: No edema. Left lower leg: No edema. Skin:     General: Skin is warm and dry. Neurological:      General: No focal deficit present. Mental Status: He is alert and oriented to person, place, and time. Mental status is at baseline.

## 2023-07-06 ENCOUNTER — HOSPITAL ENCOUNTER (OUTPATIENT)
Dept: OCCUPATIONAL THERAPY | Age: 58
Setting detail: THERAPIES SERIES
Discharge: HOME | End: 2023-07-06
Payer: MEDICARE

## 2023-07-06 ENCOUNTER — HOSPITAL ENCOUNTER (OUTPATIENT)
Dept: PHYSICAL THERAPY | Age: 58
Setting detail: THERAPIES SERIES
Discharge: HOME | End: 2023-07-06
Payer: MEDICARE

## 2023-07-06 PROCEDURE — 97110 THERAPEUTIC EXERCISES: CPT

## 2023-07-06 PROCEDURE — 97116 GAIT TRAINING THERAPY: CPT

## 2023-07-06 PROCEDURE — 97162 PT EVAL MOD COMPLEX 30 MIN: CPT

## 2023-07-06 PROCEDURE — 97165 OT EVAL LOW COMPLEX 30 MIN: CPT

## 2023-07-06 ASSESSMENT — 9 HOLE PEG TEST
TEST_RESULT: FUNCTIONAL
TESTTIME_SECONDS: 25.49
TEST_RESULT: FUNCTIONAL
TESTTIME_SECONDS: 29.69

## 2023-07-06 NOTE — PLAN OF CARE
outlined in the POC to increase the likelihood of meeting the functionally related goals stated below. Patient's Activity Tolerance: Patient tolerated evaluation without incident, Patient tolerated treatment well, Patient limited by endurance        Patient's rehabilitation potential/prognosis is considered to be: Good    Factors which may impact rehabilitation potential include: None  Measures taken to address barrier(s): N/A     GOALS     Patient Goal(s): \"independence, increase activity, promote return to PLOF\"  Short Term Goals Completed by 3-4 weeks Goal Status   Pt will ambulate > 800' with LRAD independent within 6 minutes New, In progress   Improved RECINOS balance score 50/56 demonstrating increased higher level balance decrease risk for falls New, In progress   Improved strength bilateral hips 4/5 improving stability during functional transfers. New, In progress   Improved SIS score > 30 promoting increased ability to perform daily functional tasks with decreased difficulty. New, In progress                                           Long Term Goals Completed by 6-8 weeks Goal Status   Pt will ambulate > 1200' with LRAD independent within 6 minutes New, In progress   Improved RECINOS balance score 54/56 demonstrating increased higher level balance decrease risk for falls New, In progress   Improved strength bilateral hips 4+/5 improving stability during functional transfers. New, In progress   Improved SIS score > 40 promoting increased ability to perform daily functional tasks with decreased difficulty.  New, In progress   [place] with HEP to promote discharge with continued ability to perform strength program 100% of the time New, In progress                                      TREATMENT PLAN   PT Equipment Recommendations  Equipment Needed: No   Requires PT Follow-Up: Yes  Treatment Initiated : 7/6/2023    Pt. actively involved in establishing Plan of Care and Goals: Yes  Patient/ Caregiver education

## 2023-07-06 NOTE — PROGRESS NOTES
[facility]      Cardiology Consult    Luis Keller  1965 July 7, 2023    Referring Physician: Katey Araya MD  Reason for Referral: CHF    CC: \"Some fatigue. \"     HPI:  The patient is 60 y.o. male with a past medical history significant for systolic heart failure, hypertension, hyperlipidemia, and CVA who presents today for management of heart failure. He presented to the hospital 6/15/23 with complaints of progressive shortness of breath. He typically followed with [facility] but had been without medication for several months due to various factors. He noted worsening shortness of breath and ultimately was having symptoms at rest prompting his visit to the emergency department. He denied associated chest pains, weight gain, or lower extremity edema. The patient was in cardiogenic shock with worsening renal function, worsening lactic acidosis, and worsening liver function. During hospitalization, he reported right sided weakness and MRI revealed an acute ischemic infarct. He was evaluated by neurology and suspected to be embolic. His echo was suspicious for an apical thrombus and anticoagulation was restarted. He was discharged to rehab and presents today for a follow up. He states overall he is doing well since discharge. He continues to participate in rehab and states he is fatigued but continues to work on building his endurance. He denies any new cardiac complaints. He denies any chest pains or worsening shortness of breath. He reports compliance with his medications. He was seen by his primary care physician and started on Jardiance and plans to  the prescription from his pharmacy today. He denies any abnormal bleeding or bruising. Patient denies exertional chest pain/pressure, dyspnea at rest, worsening FRANCOIS, PND, orthopnea, palpitations, lightheadedness, weight changes, changes in LE edema, and syncope.     Past Medical History:   Diagnosis Date    CHF (congestive

## 2023-07-06 NOTE — PROGRESS NOTES
Take 1 tablet by mouth daily (with breakfast), Disp: 30 tablet, Rfl: 0    atorvastatin (LIPITOR) 40 MG tablet, Take 1 tablet by mouth nightly, Disp: 30 tablet, Rfl: 0    valsartan (DIOVAN) 80 MG tablet, Take 1 tablet by mouth 2 times daily, Disp: 60 tablet, Rfl: 0    carvedilol (COREG) 3.125 MG tablet, Take 1 tablet by mouth 2 times daily (with meals), Disp: 60 tablet, Rfl: 0    furosemide (LASIX) 20 MG tablet, Take 1 tablet by mouth daily, Disp: 60 tablet, Rfl: 0    spironolactone (ALDACTONE) 25 MG tablet, Take 0.5 tablets by mouth daily, Disp: 30 tablet, Rfl: 0    allopurinol (ZYLOPRIM) 100 MG tablet, Take 1 tablet by mouth daily, Disp: 30 tablet, Rfl: 0    vitamin B-1 (THIAMINE) 100 MG tablet, Take 1 tablet by mouth daily, Disp: 30 tablet, Rfl: 0    vitamin D (CHOLECALCIFEROL) 25 MCG (1000 UT) TABS tablet, Take 1 tablet by mouth daily, Disp: 30 tablet, Rfl: 0  Allergies: Lisinopril       SUBJECTIVE EXAMINATION     History obtained from[de-identified] Patient, Chart Review,      Family/Caregiver Present: No    Subjective History: Onset Date: 07/06/23  Pt pleasant, reports feeling well. Largest complaint is fatigue quickly with activities. Pt agreeable to PT evaluation    Additional Pertinent Hx (if applicable): Jim Molina is a 60 y.o. male admitted to inpatient rehabilitation on 6/20/2023 with Acute ischemic stroke ([address] [facility])- 6/28/2023. The patient participated in an aggressive multidisciplinary inpatient rehabilitation program involving 3 hours of therapy per day, at least 5 days per week. He made good progress with regard to right side function, balance, swallow, speech, cognition. Now overall modified independent. Discharging home alone. Outpatient therapy and DME ordered as below. Patient will follow-up with PCP, Cardiology, Neurology.       Impairments: right hemiparesis, decreased balance, endurance, dysarthria, dysphagia, higher level cognition\" (Dr. Oracio Shen 6/28/23)     Prior diagnostic testing[de-identified] MRI  Previous

## 2023-07-07 ENCOUNTER — OFFICE VISIT (OUTPATIENT)
Dept: CARDIOLOGY CLINIC | Age: 58
End: 2023-07-07
Payer: MEDICARE

## 2023-07-07 VITALS
HEIGHT: 75 IN | OXYGEN SATURATION: 98 % | HEART RATE: 106 BPM | WEIGHT: 208 LBS | BODY MASS INDEX: 25.86 KG/M2 | SYSTOLIC BLOOD PRESSURE: 110 MMHG | DIASTOLIC BLOOD PRESSURE: 72 MMHG

## 2023-07-07 DIAGNOSIS — Z86.73 HISTORY OF CVA (CEREBROVASCULAR ACCIDENT): ICD-10-CM

## 2023-07-07 DIAGNOSIS — I51.3 MURAL THROMBUS OF CARDIAC APEX WITHOUT ACUTE MI: ICD-10-CM

## 2023-07-07 DIAGNOSIS — I25.10 CORONARY ARTERY DISEASE INVOLVING NATIVE CORONARY ARTERY OF NATIVE HEART WITHOUT ANGINA PECTORIS: ICD-10-CM

## 2023-07-07 DIAGNOSIS — I50.22 CHRONIC SYSTOLIC CONGESTIVE HEART FAILURE (HCC): Primary | ICD-10-CM

## 2023-07-07 PROCEDURE — 3078F DIAST BP <80 MM HG: CPT | Performed by: INTERNAL MEDICINE

## 2023-07-07 PROCEDURE — 99214 OFFICE O/P EST MOD 30 MIN: CPT | Performed by: INTERNAL MEDICINE

## 2023-07-07 PROCEDURE — 1111F DSCHRG MED/CURRENT MED MERGE: CPT | Performed by: INTERNAL MEDICINE

## 2023-07-07 PROCEDURE — G8419 CALC BMI OUT NRM PARAM NOF/U: HCPCS | Performed by: INTERNAL MEDICINE

## 2023-07-07 PROCEDURE — 3074F SYST BP LT 130 MM HG: CPT | Performed by: INTERNAL MEDICINE

## 2023-07-07 PROCEDURE — 3017F COLORECTAL CA SCREEN DOC REV: CPT | Performed by: INTERNAL MEDICINE

## 2023-07-07 PROCEDURE — G8427 DOCREV CUR MEDS BY ELIG CLIN: HCPCS | Performed by: INTERNAL MEDICINE

## 2023-07-07 PROCEDURE — 1036F TOBACCO NON-USER: CPT | Performed by: INTERNAL MEDICINE

## 2023-07-07 ASSESSMENT — 9 HOLE PEG TEST
TESTTIME_SECONDS: 29.69
TEST_RESULT: FUNCTIONAL
TESTTIME_SECONDS: 25.49
TEST_RESULT: FUNCTIONAL

## 2023-07-07 NOTE — PROGRESS NOTES
Occupational Therapy     Outpatient Occupational Therapy  [] [facility] [address] HERON MONTGOMERY   Phone: [phone]   Fax: [phone]   [x] [facility]  Phone: [phone]   Fax: [phone]  [] Cathleen Schuster   Phone: [phone]   Fax: [phone]      To:  Dr. Rafy Foster      Patient: Thais Zayas  : 1965  MRN: [id number]  Evaluation Date: 2023      Diagnosis Information:  Diagnosis: I63.9 (ICD-10-CM) - Acute ischemic stroke ([address])   OT Insurance Information: Felix Anderson  Dear Dr. Rafy Foster,   The following patient has been evaluated for occupational therapy services and for therapy to continue, Medicare requires monthly physician review of the treatment plan. Please review the attached evaluation and/or summary of the patient's plan of care, and verify that you agree therapy should continue by signing the attached document and sending it back to our office. Plan of Care/Treatment to date:  [x] Therapeutic Exercise   [] Modalities:  [x] Therapeutic Activity    [] Ultrasound  [] Electrical Stimulation   [x] Activities of Daily Living    [] Fluidotherapy [] Kinesiotaping  [x] Neuromuscular Re-education   [] Iontophoresis [] Coldpack/hotpack   [x] Instruction in HEP     [] Contrast Bath  [] Manual Therapy     Other:  [] Aquatic Therapy      []       Frequency/Duration:  # Days per week: [x] 1 day # Weeks: [x] 1 week [] 5 weeks      [x] 2 days   [x] 2 weeks [] 6 weeks     [] 3 days   [] 3 weeks [] 7 weeks     [] 4 days   [] 4 weeks [] 8 weeks    Rehab Potential: [x] excellent [] good [] fair  [] poor     Patient Goals      Patient goals  get stronger   Short Term Goals     Time Frame for Short Term Goals 1-2 visits   Short Term Goal 1 complete HEP Ind   STG 1 Current Status: --   STG Goal 1 Status:        Electronically signed by:  Kirsty Dover OT,OTR/L      If you have any questions or concerns, please don't hesitate to call.   Thank you for your

## 2023-07-07 NOTE — PROGRESS NOTES
Occupational Therapy      Occupational Therapy Daily Treatment Note    Date:  2023    Patient Name:  Kam Fernandez     :  1965  MRN: [id number]  Restrictions/Precautions:    Medical/Treatment Diagnosis Information:  Diagnosis: I63.9 (ICD-10-CM) - Acute ischemic stroke ([address])  Treatment Diagnosis: decreased endurance and coordination in B UE  Insurance/Certification information:  OT Insurance Information: MEDICARE PART A AND B  Physician Information:   Tamir De Guzman MD  Plan of care signed (Y/N):  N  Visit# / total visits:  1/2-3   Pain level: 0/10     G-Code (if applicable):      Date / Visit # G-Code Applied:  /   QuickDASH Total Score: 13 (23)       QuickDASH Disability/Symptom Score : 4.55 % (23)    Progress Note: [x]  Yes  []  No  Next due by: Visit #10      Subjective: Pt reports no pain.      Objective Measures:  Eval 23    Tone  RLE Tone: Normotonic  LLE Tone: Normotonic  RUE Tone: Normotonic  LUE Tone: Normotonic  Coordination  Movements Are Fluid And Coordinated: No  Coordination and Movement Description: Decreased accuracy  Activity Tolerance  Activity Tolerance: Patient tolerated evaluation without incident;Patient tolerated treatment well;Patient limited by endurance  Cognition  Overall Cognitive Status: Exceptions  Hand Dominance  Hand Dominance: Right  Left Hand Strength -  (lbs)  Handle Setting 2: 75, 76, 76  Trial 1: 75  Trial 2: 76  Trial 3: 76  Average: 75.67  Left 9-Hole Peg Test  Left 9-Hole Peg Test: Functional  Right Hand Strength -  (lbs)  Handle Setting 2: 125, 115, 120  Trial 1: 125  Trial 2: 115  Trial 3: 120  Average: 120  Right 9-Hole Peg Test  Right 9-Hole Peg Test: Functional  Fine Motor Skills  Left 9-Hole Peg Test: Functional  Left 9 Hole Peg Test Time (secs): 29.69  Right 9-Hole Peg Test: Functional  Right 9 Hole Peg Test Time (secs): 25.49                                Therapeutic Activities:    Therapeutic Exercise:

## 2023-07-07 NOTE — PROGRESS NOTES
Occupational Therapy  Occupational Therapy Initial Assessment  Date:  2023    Patient Name: Larry Graves  MRN: [id number]     :  1965     Treatment Diagnosis: decreased endurance and coordination in B UE    Restrictions:     Subjective:   General  Chart Reviewed: Yes  Patient assessed for rehabilitation services?: Yes  Additional Pertinent Hx: Patient is a 59 yo M with pmh CAD, HFrEF, HTN, HLD, DM2, gout, alcohol use, tobacco dependence, and prior stroke (Right MCA infarct s/p M2 occlusion thrombectomy, residual left hemiparesis) who initially presented 6/15/2023 with shortness of breath, orthopnea, chest tightness. Of note, he had not been taking medications for the preceding 2 months. He was admitted for acute on chronic CHF with cardiogenic shock, LIBRADO, and shock liver. He was treated with IV diuresis and inotropic therapy with milrinone. On early morning  he developed new right side weakness. Code stroke called but he was not a candidate for TNK due to being on Xarelto. CTA head/neck was negative for large vessel occlusion or flow limiting stenosis. MRI brain revealed acute ischemic infarct in left parasagittal aspect of the high left frontal/parietal lobes with chronic encephalomalacia right MCA distribution. Etiology suspected cardioembolic.   Self reported health status[de-identified] Good  History obtained from[de-identified] Patient  Family/Caregiver Present: No  Diagnosis: I63.9 (ICD-10-CM) - Acute ischemic stroke [facility])  Referring Provider (secondary): Clement Farr MD  OT Visit Information  Onset Date: 23  OT Insurance Information: MEDICARE PART A AND B  Subjective  Dominant Hand: : Right     Social History:   Social History  Lives With: Alone  Type of Home: Apartment  Home Layout: One level, Laundry in basement  Home Access: Stairs to enter with rails  Entrance Stairs - Rails: Both  Entrance Stairs - Number of Steps: 1 flight  Bathroom Shower/Tub: Shower chair with back, Walk-in shower  Bathroom

## 2023-07-11 ENCOUNTER — HOSPITAL ENCOUNTER (OUTPATIENT)
Dept: PHYSICAL THERAPY | Age: 58
Setting detail: THERAPIES SERIES
Discharge: HOME | End: 2023-07-11
Payer: MEDICARE

## 2023-07-11 ENCOUNTER — HOSPITAL ENCOUNTER (OUTPATIENT)
Dept: OCCUPATIONAL THERAPY | Age: 58
Setting detail: THERAPIES SERIES
Discharge: HOME | End: 2023-07-11
Payer: MEDICARE

## 2023-07-11 PROCEDURE — 97110 THERAPEUTIC EXERCISES: CPT

## 2023-07-11 PROCEDURE — 97530 THERAPEUTIC ACTIVITIES: CPT

## 2023-07-11 NOTE — PROGRESS NOTES
Physical Therapy: Daily Note   Patient: Jim Molina (50 y.o. male)   Examination Date: 2023  No data recorded  No data recorded   :  1965 # of Visits since [facility]:   2   MRN: [id number]  CSN: [id number] Start of Care Date:   2023   Insurance: Payor: MEDICARE / Plan: MEDICARE PART A AND B / Product Type: *No Product type* /   Insurance ID: [id number] - (Medicare) Secondary Insurance (if applicable): MEDICAID [place]   Referring Physician: MD Simón Durham MD   PCP: Sudhir Robledo MD Visits to Date/Visits Approved:     No Show/Cancelled Appts: 0  0     Medical Diagnosis: Cerebral infarction, unspecified [I63.9] Acute ischemic stroke  Treatment Diagnosis: Decreased activity tolerance with balance impairments        SUBJECTIVE EXAMINATION   Pain Level: Pain Screening  Patient Currently in Pain: Denies    Patient Comments: Subjective: Pt with significant fatigue this date, difficulty wanting to participate but despite reservations becomes agreeable to PT treatment session. Reports feeling like he did too much yesterday with laundry and grocery shopping. HEP Compliance: Good  Previous treatments prior to current episode?: Inpatient rehab  Any changes to allergies, medications, or have you had any medical procedures/ER visits since your last visit?: No     OBJECTIVE EXAMINATION   Restrictions:  Restrictions/Precautions: Seizure;  Fall Risk   No data recorded No data recorded              TREATMENT     Exercises:  Therapeutic exercise (CPT 80343)   Treatment Reasoning    Exercise 1: NuStep 6 minutes, level 4 seat and BUE 14, average SPM: 38  Exercise 2: double knee to chest with heels on blue swiss ball for stabilization/coordination X 15; double knee to chest with bridging end range CGA-min assist through swiss ball 2x5 limited due to fatigue  Exercise 3: Supine: SLR X 10 BLEs, heel slide orange maxi sheet X 10 BLEs  Exercise 4: Left side lying RLE X 10 each hip

## 2023-07-11 NOTE — PROGRESS NOTES
Occupational Therapy      Occupational Therapy Daily Treatment Note    Date:  2023    Patient Name:  Danette Main     :  1965  MRN: [id number]    Restrictions/Precautions:    Medical/Treatment Diagnosis Information:  Diagnosis: I63.9 (ICD-10-CM) - Acute ischemic stroke (H[facility])  Treatment Diagnosis: decreased endurance and coordination in B UE  Insurance/Certification information:  OT Insurance Information: MEDICARE PART A AND B  Physician Information:   Dmitry Calvillo MD  Plan of care signed (Y/N):  Y  Visit# / total visits:  2/2  Pain level: 0/10     G-Code (if applicable):      Date / Visit # G-Code Applied:  /   QuickDASH Total Score: 13 (23)       QuickDASH Disability/Symptom Score : 4.55 % (23)    Progress Note: [x]  Yes  []  No  Next due by: Visit #10      Subjective: Pt reports no pain. Pt reports no new changes. Reports completing IADLs with increased time due to endurance.      Objective Measures:  Eval 23    Tone  RLE Tone: Normotonic  LLE Tone: Normotonic  RUE Tone: Normotonic  LUE Tone: Normotonic  Coordination  Movements Are Fluid And Coordinated: No  Coordination and Movement Description: Decreased accuracy  Activity Tolerance  Activity Tolerance: Patient tolerated evaluation without incident;Patient tolerated treatment well;Patient limited by endurance  Cognition  Overall Cognitive Status: Exceptions  Hand Dominance  Hand Dominance: Right  Left Hand Strength -  (lbs)  Handle Setting 2: 75, 76, 76  Trial 1: 75  Trial 2: 76  Trial 3: 76  Average: 75.67  Left 9-Hole Peg Test  Left 9-Hole Peg Test: Functional  Right Hand Strength -  (lbs)  Handle Setting 2: 125, 115, 120  Trial 1: 125  Trial 2: 115  Trial 3: 120  Average: 120  Right 9-Hole Peg Test  Right 9-Hole Peg Test: Functional  Fine Motor Skills  Left 9-Hole Peg Test: Functional  Left 9 Hole Peg Test Time (secs): 29.69  Right 9-Hole Peg Test: Functional  Right 9 Hole Peg Test Time (secs): 25.49

## 2023-07-11 NOTE — PROGRESS NOTES
Occupational Therapy      Outpatient Occupational Therapy  [] [facility] [address] HERON MONTGOMERY   Phone: [phone]   Fax: [phone]   [x] [facility]           Phone: [phone]   Fax: [phone]  [] Juan Longo   Phone: [phone]   Fax: [phone]     Occupational Therapy Discharge Note  Date: 2023        Patient Name:  Brennen Wills    :  1965  MRN: [id number]    Restrictions/Precautions:    Medical/Treatment Diagnosis Information:  Diagnosis: I63.9 (ICD-10-CM) - Acute ischemic stroke ([address])  Treatment Diagnosis: decreased endurance and coordination in B UE  Insurance/Certification information:  OT Insurance Information: MEDICARE PART A AND B  Physician Information:   Juvencio Wilcox MD  Plan of care signed (Y/N):  Y  Visit# / total visits:  2/2  Pain level:      0/10          G-Code (if applicable):                                             Date / Visit # G-Code Applied:  /   QuickDASH Total Score: 13 (23)       QuickDASH Disability/Symptom Score : 4.55 % (23)    Time Period for Report:  23-23  Cancels/No-shows to date:  0    Plan of Care/Treatment to date: D/C from OT  [] Therapeutic Exercise    [] Modalities:  [] Therapeutic Activity     [] Ultrasound  [] Electrical Stimulation  [] Total Motion Release     [] Fluidotherapy [] Denisha Members  [] Neuromuscular Re-education  [] Cold/hotpack [] Iontophoresis  [] Instruction in HEP     Other:  [] Manual Therapy      []            [] Aquatic Therapy      []                         Significant Findings At Last Visit/Comments:    Subjective:    Pt reports no pain. Pt reports no new changes. Reports completing IADLs with increased time due to endurance.       Objective:  Eval 23     Tone  RLE Tone: Normotonic  LLE Tone: Normotonic  RUE Tone: Normotonic  LUE Tone: Normotonic  Coordination  Movements Are Fluid And Coordinated: No  Coordination and Movement Description: Decreased accuracy  Activity Tolerance  Activity

## 2023-07-13 ENCOUNTER — HOSPITAL ENCOUNTER (OUTPATIENT)
Dept: PHYSICAL THERAPY | Age: 58
Setting detail: THERAPIES SERIES
Discharge: HOME | End: 2023-07-13
Payer: MEDICARE

## 2023-07-13 PROCEDURE — 9990000010 HC NO CHARGE VISIT

## 2023-07-13 NOTE — PROGRESS NOTES
Physical Therapy  Cancellation/No-show Note  Patient Name:  Myla Scott  :  1965   Date:  2023  Cancelled visits to date: 1  No-shows to date: 0    For today's appointment patient:  [x]  Cancelled  []  Rescheduled appointment  []  No-show     Reason given by patient:  []  Patient ill  []  Conflicting appointment  [x]  No transportation    []  Conflict with work  []  No reason given  []  Other:     Comments:  called 10 am stating transportation conflict this morning.        Electronically signed by:  Brian Sultana DPT [id number] 10:29 AM

## 2023-07-18 ENCOUNTER — HOSPITAL ENCOUNTER (OUTPATIENT)
Dept: PHYSICAL THERAPY | Age: 58
Setting detail: THERAPIES SERIES
Discharge: HOME | End: 2023-07-18
Payer: MEDICARE

## 2023-07-18 PROCEDURE — 97110 THERAPEUTIC EXERCISES: CPT

## 2023-07-18 NOTE — PROGRESS NOTES
Physical Therapy: Daily Note   Patient: Zac Salazar (33 y.o. male)   Examination Date: 2023  No data recorded  No data recorded   :  1965 # of Visits since [facility]:   3   MRN: [id number]  CSN: [id number] Start of Care Date:   2023   Insurance: Payor: MEDICARE / Plan: MEDICARE PART A AND B / Product Type: *No Product type* /   Insurance ID: [id number] - (Medicare) Secondary Insurance (if applicable): [place]ICAID [place]   Referring Physician: MD Aysha Suarez MD   PCP: Tori Anne MD Visits to Date/Visits Approved: 3 / 30    No Show/Cancelled Appts: 0      Medical Diagnosis: Cerebral infarction, unspecified [I63.9] Acute ischemic stroke  Treatment Diagnosis: Decreased activity tolerance with balance impairments        SUBJECTIVE EXAMINATION   Pain Level: Pain Screening  Patient Currently in Pain: Denies    Patient Comments: Subjective: Pt with continued complaints due to fatigue with frustrations with transportation. Reports frustrated with slow recover. agreeable to PT treatment session. HEP Compliance: Poor (educated and stressed importance of participating HEP promoting return to pLOF)   Previous treatments prior to current episode?: Inpatient rehab  Any changes to allergies, medications, or have you had any medical procedures/ER visits since your last visit?: No     OBJECTIVE EXAMINATION   Restrictions:  Restrictions/Precautions: Seizure;  Fall Risk   No data recorded No data recorded              TREATMENT     Exercises:  Therapeutic exercise (CPT 69097)   Treatment Reasoning    Exercise 1: NuStep 6.5 minutes, level 4 seat and BUE 14, average SPM: 29  Exercise 2: double knee to chest with heels on blue swiss ball for stabilization/coordination X 20 supervision ; double knee to chest with bridging end range CGA-min assist through swiss ball x10 limited due to fatigue  Exercise 3: Supine: SLR X 2x 10 BLEs, heel slide orange maxi sheet X 10 BLEs  Exercise 4:

## 2023-07-20 ENCOUNTER — APPOINTMENT (OUTPATIENT)
Dept: PHYSICAL THERAPY | Age: 58
End: 2023-07-20
Payer: MEDICARE

## 2023-07-25 ENCOUNTER — HOSPITAL ENCOUNTER (OUTPATIENT)
Dept: PHYSICAL THERAPY | Age: 58
Setting detail: THERAPIES SERIES
Discharge: HOME | End: 2023-07-25
Payer: MEDICARE

## 2023-07-25 PROCEDURE — 97110 THERAPEUTIC EXERCISES: CPT

## 2023-07-25 NOTE — PROGRESS NOTES
Physical Therapy: Daily Note   Patient: Zac Salazar (86 y.o. male)   Examination Date: 2023     :  1965 # of Visits since [facility]: 4     MRN: [id number]  CSN: [id number] Start of Care Date: 2023     Insurance: Payor: MEDICARE / Plan: MEDICARE PART A AND B / Product Type: *No Product type* /   Insurance ID: [id number] - (Medicare) Secondary Insurance (if applicable): [place]   Referring Physician: MD Aysha Suarez MD   PCP: Tori Anne MD Visits to Date/Visits Approved:     No Show/Cancelled Appts:   /       Medical Diagnosis: Cerebral infarction, unspecified [I63.9] Acute ischemic stroke  Treatment Diagnosis: Decreased activity tolerance with balance impairments        SUBJECTIVE EXAMINATION   Pain Level:  0/10    Patient Comments: Subjective: Denies pain. C/o general fatigue. Has been ambulating without cane at home. No falls. HEP Compliance: Fair        OBJECTIVE EXAMINATION   Restrictions:  Restrictions/Precautions: Seizure; Fall Risk   No data recorded No data recorded Position Activity Restriction  Other position/activity restrictions: R-sided weakness      TREATMENT     Exercises:  Therapeutic exercise (CPT 32435)   Treatment Reasoning    Exercise 1: 6 MWT: 560', unsupported, supervision, mild drift intermittently. Exercise 2: Squat x 10 without resistance, 2 x 10 while holding 11 lb. ball. Exercise 3: Sidelying: RLE clamshell x 10, hip ABD AROM x 10, hip ABD isometric x 10 (cues to maintain knee Ext). Exercise 4: Prone: RLE/LLE hip Ext with knee Ext x 10, Knee Flexed x 10. Min A to maximize motion. Exercise 5: RLE step-up on 6\" step, unsupported, x 10.                           Pt Education: PT Education: Goals, PT Role, Plan of Care, Evaluative findings, Home Exercise Program, Transfer Training, Gait Training, Disease Specific Education, General Safety, Fall prevention strategies, Weight-bearing Education, Equipment, Functional

## 2023-07-27 ENCOUNTER — HOSPITAL ENCOUNTER (OUTPATIENT)
Dept: PHYSICAL THERAPY | Age: 58
Setting detail: THERAPIES SERIES
Discharge: HOME | End: 2023-07-27
Payer: MEDICARE

## 2023-07-27 NOTE — PROGRESS NOTES
Physical Therapy  Cancellation/No-show Note  Patient Name:  Audi Wilkes  :  1965   Date:  2023  Cancelled visits to date: 0  No-shows to date: 0    For today's appointment patient:  [x]  Cancelled  []  Rescheduled appointment  []  No-show     Reason given by patient:  []  Patient ill  []  Conflicting appointment  [x]  No transportation    []  Conflict with work  []  No reason given  []  Other:     Comments:      Electronically signed by:  Chiqui Herrera PT  Electronically signed by Chiqui Herrera, PT [id number] on 2023 at 11:08 AM

## 2023-08-01 ENCOUNTER — APPOINTMENT (OUTPATIENT)
Dept: CT IMAGING | Age: 58
End: 2023-08-01
Payer: COMMERCIAL

## 2023-08-01 ENCOUNTER — APPOINTMENT (OUTPATIENT)
Dept: GENERAL RADIOLOGY | Age: 58
End: 2023-08-01
Payer: COMMERCIAL

## 2023-08-01 ENCOUNTER — HOSPITAL ENCOUNTER (EMERGENCY)
Age: 58
Discharge: HOME | End: 2023-08-01
Payer: COMMERCIAL

## 2023-08-01 ENCOUNTER — HOSPITAL ENCOUNTER (OUTPATIENT)
Dept: PHYSICAL THERAPY | Age: 58
Setting detail: THERAPIES SERIES
Discharge: HOME | End: 2023-08-01
Payer: MEDICARE

## 2023-08-01 VITALS
BODY MASS INDEX: 27.25 KG/M2 | DIASTOLIC BLOOD PRESSURE: 102 MMHG | TEMPERATURE: 97.2 F | WEIGHT: 219.14 LBS | HEART RATE: 89 BPM | RESPIRATION RATE: 19 BRPM | HEIGHT: 75 IN | OXYGEN SATURATION: 100 % | SYSTOLIC BLOOD PRESSURE: 143 MMHG

## 2023-08-01 DIAGNOSIS — I50.22 CHRONIC SYSTOLIC CHF (CONGESTIVE HEART FAILURE) (HCC): ICD-10-CM

## 2023-08-01 DIAGNOSIS — M79.89 LEFT LEG SWELLING: ICD-10-CM

## 2023-08-01 DIAGNOSIS — R06.02 SHORTNESS OF BREATH: Primary | ICD-10-CM

## 2023-08-01 LAB
ALBUMIN SERPL-MCNC: 2.9 G/DL (ref 3.4–5)
ALBUMIN/GLOB SERPL: 0.8 {RATIO} (ref 1.1–2.2)
ALP SERPL-CCNC: 89 U/L (ref 40–129)
ALT SERPL-CCNC: 12 U/L (ref 10–40)
ANION GAP SERPL CALCULATED.3IONS-SCNC: 9 MMOL/L (ref 3–16)
APTT BLD: 31.5 SEC (ref 22.7–35.9)
AST SERPL-CCNC: 19 U/L (ref 15–37)
BASOPHILS # BLD: 0 K/UL (ref 0–0.2)
BASOPHILS NFR BLD: 0.9 %
BILIRUB SERPL-MCNC: 2.6 MG/DL (ref 0–1)
BUN SERPL-MCNC: 20 MG/DL (ref 7–20)
CALCIUM SERPL-MCNC: 8.7 MG/DL (ref 8.3–10.6)
CHLORIDE SERPL-SCNC: 106 MMOL/L (ref 99–110)
CO2 SERPL-SCNC: 24 MMOL/L (ref 21–32)
CREAT SERPL-MCNC: 1.1 MG/DL (ref 0.9–1.3)
DEPRECATED RDW RBC AUTO: 17.1 % (ref 12.4–15.4)
EKG ATRIAL RATE: 83 BPM
EKG DIAGNOSIS: NORMAL
EKG P AXIS: 60 DEGREES
EKG P-R INTERVAL: 164 MS
EKG Q-T INTERVAL: 412 MS
EKG QRS DURATION: 92 MS
EKG QTC CALCULATION (BAZETT): 484 MS
EKG R AXIS: 41 DEGREES
EKG T AXIS: 52 DEGREES
EKG VENTRICULAR RATE: 83 BPM
EOSINOPHIL # BLD: 0 K/UL (ref 0–0.6)
EOSINOPHIL NFR BLD: 1.1 %
GFR SERPLBLD CREATININE-BSD FMLA CKD-EPI: >60 ML/MIN/{1.73_M2}
GLUCOSE SERPL-MCNC: 116 MG/DL (ref 70–99)
HCT VFR BLD AUTO: 37.8 % (ref 40.5–52.5)
HGB BLD-MCNC: 12.4 G/DL (ref 13.5–17.5)
INR PPP: 1.5 (ref 0.84–1.16)
LYMPHOCYTES # BLD: 2 K/UL (ref 1–5.1)
LYMPHOCYTES NFR BLD: 43.3 %
MCH RBC QN AUTO: 29.4 PG (ref 26–34)
MCHC RBC AUTO-ENTMCNC: 32.7 G/DL (ref 31–36)
MCV RBC AUTO: 89.7 FL (ref 80–100)
MONOCYTES # BLD: 0.4 K/UL (ref 0–1.3)
MONOCYTES NFR BLD: 8.1 %
NEUTROPHILS # BLD: 2.1 K/UL (ref 1.7–7.7)
NEUTROPHILS NFR BLD: 46.6 %
NT-PROBNP SERPL-MCNC: 9869 PG/ML (ref 0–124)
PLATELET # BLD AUTO: 199 K/UL (ref 135–450)
PMV BLD AUTO: 8.5 FL (ref 5–10.5)
POTASSIUM SERPL-SCNC: 3.9 MMOL/L (ref 3.5–5.1)
PROT SERPL-MCNC: 6.6 G/DL (ref 6.4–8.2)
PROTHROMBIN TIME: 18 SEC (ref 11.5–14.8)
RBC # BLD AUTO: 4.22 M/UL (ref 4.2–5.9)
SARS-COV-2 RDRP RESP QL NAA+PROBE: NOT DETECTED
SODIUM SERPL-SCNC: 139 MMOL/L (ref 136–145)
TROPONIN, HIGH SENSITIVITY: 45 NG/L (ref 0–22)
TROPONIN, HIGH SENSITIVITY: 50 NG/L (ref 0–22)
WBC # BLD AUTO: 4.5 K/UL (ref 4–11)

## 2023-08-01 PROCEDURE — 87635 SARS-COV-2 COVID-19 AMP PRB: CPT

## 2023-08-01 PROCEDURE — 71260 CT THORAX DX C+: CPT

## 2023-08-01 PROCEDURE — 93010 ELECTROCARDIOGRAM REPORT: CPT | Performed by: INTERNAL MEDICINE

## 2023-08-01 PROCEDURE — 96374 THER/PROPH/DIAG INJ IV PUSH: CPT

## 2023-08-01 PROCEDURE — 36415 COLL VENOUS BLD VENIPUNCTURE: CPT

## 2023-08-01 PROCEDURE — 99285 EMERGENCY DEPT VISIT HI MDM: CPT

## 2023-08-01 PROCEDURE — 85610 PROTHROMBIN TIME: CPT

## 2023-08-01 PROCEDURE — 71046 X-RAY EXAM CHEST 2 VIEWS: CPT

## 2023-08-01 PROCEDURE — 85730 THROMBOPLASTIN TIME PARTIAL: CPT

## 2023-08-01 PROCEDURE — 83880 ASSAY OF NATRIURETIC PEPTIDE: CPT

## 2023-08-01 PROCEDURE — 6360000004 HC RX CONTRAST MEDICATION: Performed by: PHYSICIAN ASSISTANT

## 2023-08-01 PROCEDURE — 93971 EXTREMITY STUDY: CPT

## 2023-08-01 PROCEDURE — 85025 COMPLETE CBC W/AUTO DIFF WBC: CPT

## 2023-08-01 PROCEDURE — 6360000002 HC RX W HCPCS: Performed by: PHYSICIAN ASSISTANT

## 2023-08-01 PROCEDURE — 84484 ASSAY OF TROPONIN QUANT: CPT

## 2023-08-01 PROCEDURE — 9990000010 HC NO CHARGE VISIT

## 2023-08-01 PROCEDURE — 93005 ELECTROCARDIOGRAM TRACING: CPT | Performed by: PHYSICIAN ASSISTANT

## 2023-08-01 PROCEDURE — 80053 COMPREHEN METABOLIC PANEL: CPT

## 2023-08-01 RX ORDER — FUROSEMIDE 10 MG/ML
40 INJECTION INTRAMUSCULAR; INTRAVENOUS ONCE
Status: COMPLETED | OUTPATIENT
Start: 2023-08-01 | End: 2023-08-01

## 2023-08-01 RX ADMIN — IOPAMIDOL 75 ML: 755 INJECTION, SOLUTION INTRAVENOUS at 12:14

## 2023-08-01 RX ADMIN — FUROSEMIDE 40 MG: 10 INJECTION, SOLUTION INTRAMUSCULAR; INTRAVENOUS at 16:08

## 2023-08-01 ASSESSMENT — PAIN SCALES - GENERAL
PAINLEVEL_OUTOF10: 6
PAINLEVEL_OUTOF10: 7

## 2023-08-01 ASSESSMENT — LIFESTYLE VARIABLES
HOW OFTEN DO YOU HAVE A DRINK CONTAINING ALCOHOL: MONTHLY OR LESS
HOW MANY STANDARD DRINKS CONTAINING ALCOHOL DO YOU HAVE ON A TYPICAL DAY: 1 OR 2

## 2023-08-01 NOTE — PROGRESS NOTES
[facility]      Cardiology Consult    Jus Span  1965 August 4, 2023    Referring Physician: Sherita Dykes MD  Reason for Referral: CHF    CC: more short of breath and leg is swelling    HPI:  The patient is 60 y.o. male with a past medical history significant for systolic heart failure, hypertension, hyperlipidemia, and CVA who presents today for management of heart failure. He presented to the hospital 6/15/23 with complaints of progressive shortness of breath. He typically followed with [facility] but had been without medication for several months due to various factors. He noted worsening shortness of breath and ultimately was having symptoms at rest prompting his visit to the emergency department. He denied associated chest pains, weight gain, or lower extremity edema. The patient was in cardiogenic shock with worsening renal function, worsening lactic acidosis, and worsening liver function. During hospitalization, he reported right sided weakness and MRI revealed an acute ischemic infarct. He was evaluated by neurology and suspected to be embolic. His echo was suspicious for an apical thrombus and anticoagulation was restarted. Today, he reports he his breathing has been more \"labored\" and has difficulty walking short distances in his home. He also complains of generalized fatigue and notes new/worsening LLE edema. He was sent to the ED by his PCP and duplex was negative for a DVT.      Past Medical History:   Diagnosis Date    CHF (congestive heart failure) (HCC)     CVA (cerebral vascular accident) ([address])     Diabetes mellitus ([address])     Hyperlipidemia     Hypertension     Vitamin D deficiency      Past Surgical History:   Procedure Laterality Date    DIAGNOSTIC CARDIAC CATH LAB PROCEDURE  12/2018     Family History   Problem Relation Age of Onset    Heart Failure Mother     Hypertension Father     Diabetes Father     Hypertension Sister     Heart Failure Sister

## 2023-08-01 NOTE — ED NOTES
Discharge and education instructions reviewed. Patient verbalized understanding, teach-back successful. Patient denied questions at this time. No acute distress noted. Patient instructed to follow-up as noted - return to emergency department if symptoms worsen. Patient verbalized understanding. Discharged per EDMD with discharged instructions.        John Butler RN  08/01/23 3424

## 2023-08-01 NOTE — PROGRESS NOTES
Physical Therapy  Cancellation/No-show Note  Patient Name:  Harry Patel  :  1965   Date:  2023  Cancelled visits to date: 3  No-shows to date: 0    For today's appointment patient:  [x]  Cancelled  []  Rescheduled appointment  []  No-show     Reason given by patient:  [x]  Patient ill  []  Conflicting appointment  []  No transportation    []  Conflict with work  []  No reason given  []  Other:     Comments:  Pt arrive to therapy gym with increased complaints of R knee pain states most likely experiencing gout flare up per patient \"self diagnosed\". States plans to go to ER following therapy session and not primary care due to transportation difficulty at this time. Completed assessment of LLE at this time, reports significant increased swelling of LLE starting at foot over the weekend starting with complaints of SOB 23. Denies pain with L foot but states swelling started initially with knee pain only beginning this date 2023. Upon assessment pitting edema (at least 2+-3+) noted for LLE below the knee. At this time determined not appropriate to continue with treatment session, referred patient to medical treatment for swelling of LLE and increased SOB.        Electronically signed by:  Brian Perales DPT [id number] 23 10:51 AM

## 2023-08-01 NOTE — ED PROVIDER NOTES
[facility] [address] [address]        Pt Name: Audi Wilkes  MRN: [id number]  [address] [facility] 1965  Date of evaluation: 8/1/2023  Provider: Hudsno Brown PA-C  PCP: Jose Yan MD  Note Started: 11:19 AM EDT 8/1/23      ZAHRAA. I have evaluated this patient. CHIEF COMPLAINT       Chief Complaint   Patient presents with    Leg Swelling     Left leg swelling since Saturday     Shortness of Breath     SOB since Friday        HISTORY OF PRESENT ILLNESS: 1 or more Elements     History From: patient    Audi Wilkes is a 60 y.o. male past medical history of hypertension, diabetes, CVA, CHF with reduced ejection fraction who presents complaining of shortness of breath and left leg swelling. Patient states he feels like his left leg is swollen at the knee and down x4 days. He also reports feeling short of breath for about 5 days. He initially stated he was compliant with all his medications and then afterwards told the nurse he thinks he may have missed his Xarelto for the past week. Denies falling, trauma, fever, cough, chest pain, dizziness, syncope, bleeding. Nursing Notes were all reviewed and agreed with or any disagreements were addressed in the HPI. REVIEW OF SYSTEMS :      Review of Systems   All other systems reviewed and are negative. Positives and Pertinent negatives as per HPI. PAST MEDICAL HISTORY    has a past medical history of CHF (congestive heart failure) ([address] CVA (cerebral vascular accident) ([address]), Diabetes mellitus ([address]), Hyperlipidemia, Hypertension, and Vitamin D deficiency.      SURGICAL HISTORY     Past Surgical History:   Procedure Laterality Date    DIAGNOSTIC CARDIAC CATH LAB PROCEDURE  12/2018       CURRENTMEDICATIONS       Discharge Medication List as of 8/1/2023  4:04 PM        CONTINUE these medications which have NOT CHANGED    Details   empagliflozin (JARDIANCE) 10 MG tablet Take 1

## 2023-08-01 NOTE — PATIENT INSTRUCTIONS
STOP Valsartan  Replace with Entresto 24-26 mg twice daily    Refill of Xarelto sent    Get bloodwork in 2-3 weeks    Follow up in 1 month

## 2023-08-03 ENCOUNTER — HOSPITAL ENCOUNTER (OUTPATIENT)
Dept: PHYSICAL THERAPY | Age: 58
Setting detail: THERAPIES SERIES
Discharge: HOME | End: 2023-08-03
Payer: MEDICARE

## 2023-08-03 PROCEDURE — 9990000010 HC NO CHARGE VISIT

## 2023-08-03 NOTE — PROGRESS NOTES
Physical Therapy  Cancellation/No-show Note  Patient Name:  Danette Main  :  1965   Date:  8/3/2023  Cancelled visits to date: 3  No-shows to date: 0    For today's appointment patient:  [x]  Cancelled  []  Rescheduled appointment  []  No-show     Reason given by patient:  []  Patient ill  []  Conflicting appointment  []  No transportation    []  Conflict with work  [x]  No reason given  []  Other:     Comments:  Called to cancel appointment no reason given.      Electronically signed by:  Brian Mathis DPT [id number] 23 8:32 AM

## 2023-08-04 ENCOUNTER — TELEPHONE (OUTPATIENT)
Dept: CARDIOLOGY CLINIC | Age: 58
End: 2023-08-04

## 2023-08-04 ENCOUNTER — OFFICE VISIT (OUTPATIENT)
Dept: CARDIOLOGY CLINIC | Age: 58
End: 2023-08-04
Payer: MEDICARE

## 2023-08-04 VITALS
WEIGHT: 221 LBS | HEIGHT: 75 IN | DIASTOLIC BLOOD PRESSURE: 88 MMHG | SYSTOLIC BLOOD PRESSURE: 138 MMHG | BODY MASS INDEX: 27.48 KG/M2 | OXYGEN SATURATION: 99 % | HEART RATE: 97 BPM

## 2023-08-04 DIAGNOSIS — I51.3 LEFT VENTRICULAR APICAL THROMBUS: ICD-10-CM

## 2023-08-04 DIAGNOSIS — I50.22 CHRONIC SYSTOLIC HEART FAILURE (HCC): Primary | ICD-10-CM

## 2023-08-04 DIAGNOSIS — E11.59 TYPE 2 DIABETES MELLITUS WITH OTHER CIRCULATORY COMPLICATION, WITHOUT LONG-TERM CURRENT USE OF INSULIN (HCC): ICD-10-CM

## 2023-08-04 DIAGNOSIS — I25.10 CORONARY ARTERY DISEASE INVOLVING NATIVE CORONARY ARTERY OF NATIVE HEART WITHOUT ANGINA PECTORIS: ICD-10-CM

## 2023-08-04 DIAGNOSIS — I50.23 ACUTE ON CHRONIC SYSTOLIC CONGESTIVE HEART FAILURE (HCC): ICD-10-CM

## 2023-08-04 PROCEDURE — 99214 OFFICE O/P EST MOD 30 MIN: CPT | Performed by: INTERNAL MEDICINE

## 2023-08-04 PROCEDURE — 3079F DIAST BP 80-89 MM HG: CPT | Performed by: INTERNAL MEDICINE

## 2023-08-04 PROCEDURE — 1036F TOBACCO NON-USER: CPT | Performed by: INTERNAL MEDICINE

## 2023-08-04 PROCEDURE — 3017F COLORECTAL CA SCREEN DOC REV: CPT | Performed by: INTERNAL MEDICINE

## 2023-08-04 PROCEDURE — G8419 CALC BMI OUT NRM PARAM NOF/U: HCPCS | Performed by: INTERNAL MEDICINE

## 2023-08-04 PROCEDURE — G8427 DOCREV CUR MEDS BY ELIG CLIN: HCPCS | Performed by: INTERNAL MEDICINE

## 2023-08-04 PROCEDURE — 3075F SYST BP GE 130 - 139MM HG: CPT | Performed by: INTERNAL MEDICINE

## 2023-08-04 RX ORDER — SPIRONOLACTONE 25 MG/1
12.5 TABLET ORAL DAILY
Qty: 60 TABLET | Refills: 3 | Status: SHIPPED | OUTPATIENT
Start: 2023-08-04

## 2023-08-04 RX ORDER — CARVEDILOL 3.12 MG/1
3.12 TABLET ORAL 2 TIMES DAILY WITH MEALS
Qty: 180 TABLET | Refills: 3 | Status: SHIPPED | OUTPATIENT
Start: 2023-08-04

## 2023-08-04 RX ORDER — ATORVASTATIN CALCIUM 40 MG/1
40 TABLET, FILM COATED ORAL NIGHTLY
Qty: 90 TABLET | Refills: 3 | Status: SHIPPED | OUTPATIENT
Start: 2023-08-04

## 2023-08-04 RX ORDER — FUROSEMIDE 20 MG/1
20 TABLET ORAL DAILY
Qty: 90 TABLET | Refills: 3 | Status: SHIPPED | OUTPATIENT
Start: 2023-08-04

## 2023-08-04 RX ORDER — ASPIRIN 81 MG/1
81 TABLET ORAL DAILY
Qty: 90 TABLET | Refills: 3 | Status: SHIPPED | OUTPATIENT
Start: 2023-08-04

## 2023-08-04 NOTE — TELEPHONE ENCOUNTER
See today's office notes. The [facility] is closed permanently . Pt needs all the meds listed below and sent to the pharmacy below.       RIVAROXBAN 20mg  EMPAGLIFLOZIN 10mg  ASPIRIN 81mg  ATORVASTATIN 40mg  CARVEDILOL 3.125mg  FUROSEMIDE 20mg  SPIRONOLACTONE 25mg  [id number]      [place] [id number] [facility]  [phone]  FAX # UNKNOWN/ LEFT ON HOLD FOR TOO LONG       [facility] # [phone]

## 2023-08-08 ENCOUNTER — HOSPITAL ENCOUNTER (OUTPATIENT)
Dept: PHYSICAL THERAPY | Age: 58
Setting detail: THERAPIES SERIES
Discharge: HOME | End: 2023-08-08
Payer: MEDICARE

## 2023-08-08 PROCEDURE — 9990000010 HC NO CHARGE VISIT

## 2023-08-10 ENCOUNTER — APPOINTMENT (OUTPATIENT)
Dept: PHYSICAL THERAPY | Age: 58
End: 2023-08-10
Payer: COMMERCIAL

## 2023-08-15 DIAGNOSIS — E11.59 TYPE 2 DIABETES MELLITUS WITH OTHER CIRCULATORY COMPLICATION, WITHOUT LONG-TERM CURRENT USE OF INSULIN (HCC): ICD-10-CM

## 2023-08-15 NOTE — TELEPHONE ENCOUNTER
Please sign for samples. Pt was also given 4 bottles of Xarelto 10 mg since we did not have enough Xarelto 20 mg.      LOT: [id number]  EXP: 09/24
None

## 2023-08-22 NOTE — TELEPHONE ENCOUNTER
Asked by Janice Can RN to pend Rx for samples that she prepared and gave to patient. Four bottles of Jardiance 10 mg tablets. Lot number [id number]. Expiration date: 01/25. Seven tablets in each bottle. Detail Level: Zone Detail Level: Detailed

## 2023-08-24 DIAGNOSIS — I50.22 CHRONIC SYSTOLIC HEART FAILURE (HCC): ICD-10-CM

## 2023-08-24 LAB
ANION GAP SERPL CALCULATED.3IONS-SCNC: 11 MMOL/L (ref 3–16)
BUN SERPL-MCNC: 23 MG/DL (ref 7–20)
CALCIUM SERPL-MCNC: 9.7 MG/DL (ref 8.3–10.6)
CHLORIDE SERPL-SCNC: 107 MMOL/L (ref 99–110)
CO2 SERPL-SCNC: 24 MMOL/L (ref 21–32)
CREAT SERPL-MCNC: 1.5 MG/DL (ref 0.9–1.3)
GFR SERPLBLD CREATININE-BSD FMLA CKD-EPI: 53 ML/MIN/{1.73_M2}
GLUCOSE SERPL-MCNC: 125 MG/DL (ref 70–99)
NT-PROBNP SERPL-MCNC: ABNORMAL PG/ML (ref 0–124)
POTASSIUM SERPL-SCNC: 4.8 MMOL/L (ref 3.5–5.1)
SODIUM SERPL-SCNC: 142 MMOL/L (ref 136–145)

## 2023-08-25 DIAGNOSIS — I50.23 ACUTE ON CHRONIC SYSTOLIC CONGESTIVE HEART FAILURE (HCC): Primary | ICD-10-CM

## 2023-08-31 NOTE — PROGRESS NOTES
Abdominal: Soft, non-tender. Bowel sounds are normal. He exhibits no organomegaly, mass or bruit. Extremities: Trace BLE edema. No cyanosis or clubbing. Pulses are 2+ radial/carotid bilaterally. Neurological: No gross cranial nerve deficit. Coordination normal.   Skin: Skin is warm and dry. There is no rash or diaphoresis. Psychiatric: He has a normal mood and affect. His speech is normal and behavior is normal.     Lab Review:   FLP:    Lab Results   Component Value Date/Time    TRIG 61 06/18/2023 03:28 AM    HDL 20 06/18/2023 03:28 AM    LDLCALC 54 06/18/2023 03:28 AM    LABVLDL 12 06/18/2023 03:28 AM     BUN/Creatinine:    Lab Results   Component Value Date/Time    BUN 23 08/24/2023 02:18 PM    CREATININE 1.5 08/24/2023 02:18 PM     EKG: Personally interpreted. 6/16/2023. Normal sinus rhythm. Left atrial enlargement. Low voltage in limb leads. Nonspecific T wave abnormality. Delayed transition. Prolonged QT interval.     Echo: Personally interpreted. 6/19/2023. Overall left ventricular systolic function appears severely reduced. Ejection fraction is visually estimated to be 20-25% with diffuse hypokinesis. There is mildly increased left ventricular wall thickness. Mildly dilated left ventricle. Diastolic filling parameters suggest grade II diastolic dysfunction. Probable multiple apical thrombi within trabeculations. The right ventricle appears mildly dilated with normal systolic function. The left atrium is severely dilated. Trivial aortic, mitral, and tricuspid regurgitation. There is a trivial pericardial effusion. A bubble study was performed and fails to show evidence of shunting. Cath: 12/11/2018. 1.  Right dominant coronary arterial circulation with a 30% proximal RCA disease in the dominant vessel. These are very large coronary arteries. In the left system, there is no  left main disease.   The circumflex artery has 50% disease at the bifurcation of the obtuse marginal  branches and

## 2023-09-01 ENCOUNTER — OFFICE VISIT (OUTPATIENT)
Dept: CARDIOLOGY CLINIC | Age: 58
End: 2023-09-01
Payer: MEDICAID

## 2023-09-01 VITALS
WEIGHT: 210 LBS | HEIGHT: 75 IN | BODY MASS INDEX: 26.11 KG/M2 | DIASTOLIC BLOOD PRESSURE: 80 MMHG | HEART RATE: 97 BPM | SYSTOLIC BLOOD PRESSURE: 130 MMHG | OXYGEN SATURATION: 98 %

## 2023-09-01 DIAGNOSIS — Z86.73 HISTORY OF CVA (CEREBROVASCULAR ACCIDENT): ICD-10-CM

## 2023-09-01 DIAGNOSIS — I51.3 MURAL THROMBUS OF CARDIAC APEX WITHOUT ACUTE MI: ICD-10-CM

## 2023-09-01 DIAGNOSIS — I25.10 CORONARY ARTERY DISEASE INVOLVING NATIVE CORONARY ARTERY OF NATIVE HEART WITHOUT ANGINA PECTORIS: ICD-10-CM

## 2023-09-01 DIAGNOSIS — I50.22 CHRONIC SYSTOLIC HEART FAILURE (HCC): Primary | ICD-10-CM

## 2023-09-01 PROCEDURE — 3075F SYST BP GE 130 - 139MM HG: CPT | Performed by: INTERNAL MEDICINE

## 2023-09-01 PROCEDURE — 3079F DIAST BP 80-89 MM HG: CPT | Performed by: INTERNAL MEDICINE

## 2023-09-01 PROCEDURE — 99214 OFFICE O/P EST MOD 30 MIN: CPT | Performed by: INTERNAL MEDICINE

## 2023-09-01 RX ORDER — SPIRONOLACTONE 25 MG/1
25 TABLET ORAL DAILY
Qty: 90 TABLET | Refills: 0 | Status: SHIPPED | OUTPATIENT
Start: 2023-09-01

## 2023-09-08 ENCOUNTER — NURSE ONLY (OUTPATIENT)
Dept: CARDIOLOGY CLINIC | Age: 58
End: 2023-09-08

## 2023-09-08 VITALS
SYSTOLIC BLOOD PRESSURE: 138 MMHG | OXYGEN SATURATION: 100 % | BODY MASS INDEX: 26.25 KG/M2 | DIASTOLIC BLOOD PRESSURE: 84 MMHG | HEART RATE: 98 BPM | HEIGHT: 75 IN

## 2023-09-08 DIAGNOSIS — I50.22 CHRONIC SYSTOLIC HEART FAILURE (HCC): Primary | ICD-10-CM

## 2023-09-08 DIAGNOSIS — S80.822A BLISTER (NONTHERMAL), LEFT LOWER LEG, INITIAL ENCOUNTER: Primary | ICD-10-CM

## 2023-09-08 NOTE — PROGRESS NOTES
Patient of Dr. Jordan Phan who comes for a visit regarding \"blister\" on lower left extremity. Patient states he woke up on Deonte 9/10/2023 with a blister and uncertain how it developed. States the blister burst on Monday 9/11/2023. Since then he has been treating with neosporin and covering with gauze. He also noticed pain in the LLE after the blister burst. He describes the pain as a constant ache. Pain is limiting his ability to ambulate. He currently uses a cane for assistance. Patient currently has a bandage to the lower leg that I removed. There is a golf ball sized blister that continues to have some fluid. Dr. Marian Wilder assessed the blister and determined it was not vascular. Patient advised to use peroxide, with anti-biotic ointment and gauze. Patient and his sister both verbalized understanding. Keep f/u with Dr. Jordan Phan on 9/22/23.

## 2023-09-19 DIAGNOSIS — Z86.73 HISTORY OF CVA (CEREBROVASCULAR ACCIDENT): ICD-10-CM

## 2023-09-19 DIAGNOSIS — I25.10 CORONARY ARTERY DISEASE INVOLVING NATIVE CORONARY ARTERY OF NATIVE HEART WITHOUT ANGINA PECTORIS: ICD-10-CM

## 2023-09-19 DIAGNOSIS — I51.3 MURAL THROMBUS OF CARDIAC APEX WITHOUT ACUTE MI: ICD-10-CM

## 2023-09-19 DIAGNOSIS — I50.22 CHRONIC SYSTOLIC HEART FAILURE (HCC): ICD-10-CM

## 2023-09-20 LAB
ANION GAP SERPL CALCULATED.3IONS-SCNC: 15 MMOL/L (ref 3–16)
BUN SERPL-MCNC: 16 MG/DL (ref 7–20)
CALCIUM SERPL-MCNC: 8.5 MG/DL (ref 8.3–10.6)
CHLORIDE SERPL-SCNC: 102 MMOL/L (ref 99–110)
CO2 SERPL-SCNC: 22 MMOL/L (ref 21–32)
CREAT SERPL-MCNC: 1.1 MG/DL (ref 0.9–1.3)
GFR SERPLBLD CREATININE-BSD FMLA CKD-EPI: >60 ML/MIN/{1.73_M2}
GLUCOSE SERPL-MCNC: 179 MG/DL (ref 70–99)
POTASSIUM SERPL-SCNC: 4.1 MMOL/L (ref 3.5–5.1)
SODIUM SERPL-SCNC: 139 MMOL/L (ref 136–145)

## 2023-09-22 ENCOUNTER — OFFICE VISIT (OUTPATIENT)
Dept: CARDIOLOGY CLINIC | Age: 58
End: 2023-09-22
Payer: MEDICARE

## 2023-09-22 VITALS
SYSTOLIC BLOOD PRESSURE: 120 MMHG | OXYGEN SATURATION: 97 % | HEART RATE: 89 BPM | WEIGHT: 199 LBS | BODY MASS INDEX: 24.74 KG/M2 | HEIGHT: 75 IN | DIASTOLIC BLOOD PRESSURE: 80 MMHG

## 2023-09-22 DIAGNOSIS — I25.10 CORONARY ARTERY DISEASE INVOLVING NATIVE CORONARY ARTERY OF NATIVE HEART WITHOUT ANGINA PECTORIS: ICD-10-CM

## 2023-09-22 DIAGNOSIS — I51.3 APICAL MURAL THROMBUS: ICD-10-CM

## 2023-09-22 DIAGNOSIS — I50.22 CHRONIC SYSTOLIC HEART FAILURE (HCC): Primary | ICD-10-CM

## 2023-09-22 DIAGNOSIS — Z86.73 HISTORY OF CVA (CEREBROVASCULAR ACCIDENT): ICD-10-CM

## 2023-09-22 PROCEDURE — 99214 OFFICE O/P EST MOD 30 MIN: CPT | Performed by: INTERNAL MEDICINE

## 2023-09-22 PROCEDURE — 3017F COLORECTAL CA SCREEN DOC REV: CPT | Performed by: INTERNAL MEDICINE

## 2023-09-22 PROCEDURE — 3074F SYST BP LT 130 MM HG: CPT | Performed by: INTERNAL MEDICINE

## 2023-09-22 PROCEDURE — G8420 CALC BMI NORM PARAMETERS: HCPCS | Performed by: INTERNAL MEDICINE

## 2023-09-22 PROCEDURE — 3079F DIAST BP 80-89 MM HG: CPT | Performed by: INTERNAL MEDICINE

## 2023-09-22 PROCEDURE — G8427 DOCREV CUR MEDS BY ELIG CLIN: HCPCS | Performed by: INTERNAL MEDICINE

## 2023-09-22 PROCEDURE — 1036F TOBACCO NON-USER: CPT | Performed by: INTERNAL MEDICINE

## 2023-09-22 RX ORDER — CARVEDILOL 6.25 MG/1
6.25 TABLET ORAL 2 TIMES DAILY WITH MEALS
Qty: 180 TABLET | Refills: 3 | Status: SHIPPED | OUTPATIENT
Start: 2023-09-22

## 2023-09-22 RX ORDER — CARVEDILOL 6.25 MG/1
6.25 TABLET ORAL 2 TIMES DAILY WITH MEALS
Qty: 180 TABLET | Refills: 3
Start: 2023-09-22 | End: 2023-09-22 | Stop reason: SDUPTHER

## 2023-09-22 NOTE — PROGRESS NOTES
medical decision making performed by me. All portions of the note including but not limited to the chief complaint, history of present illness, physical exam, assessment and plan/medical decision making were personally reviewed, edited, and updated on the day of the visit.

## 2023-09-22 NOTE — PATIENT INSTRUCTIONS
Increase Carvedilol to taking two 3.125 mg tablets twice daily. Then in two weeks if you are feeling good take two tablets of Entresto twice daily and stop the Furosemide.

## 2023-09-29 ENCOUNTER — HOSPITAL ENCOUNTER (OUTPATIENT)
Dept: CARDIAC REHAB | Age: 58
Setting detail: THERAPIES SERIES
Discharge: HOME | End: 2023-09-25

## 2023-09-29 ENCOUNTER — HOSPITAL ENCOUNTER (EMERGENCY)
Age: 58
Discharge: HOME | End: 2023-09-29
Payer: COMMERCIAL

## 2023-09-29 VITALS
WEIGHT: 198.85 LBS | HEART RATE: 79 BPM | SYSTOLIC BLOOD PRESSURE: 124 MMHG | HEIGHT: 75 IN | OXYGEN SATURATION: 100 % | BODY MASS INDEX: 24.73 KG/M2 | TEMPERATURE: 97.7 F | RESPIRATION RATE: 18 BRPM | DIASTOLIC BLOOD PRESSURE: 88 MMHG

## 2023-09-29 DIAGNOSIS — Z51.89 VISIT FOR WOUND CHECK: Primary | ICD-10-CM

## 2023-09-29 PROCEDURE — 99283 EMERGENCY DEPT VISIT LOW MDM: CPT

## 2023-09-29 RX ORDER — CEPHALEXIN 500 MG/1
500 CAPSULE ORAL 3 TIMES DAILY
Qty: 30 CAPSULE | Refills: 0 | Status: SHIPPED | OUTPATIENT
Start: 2023-09-29

## 2023-09-29 ASSESSMENT — PAIN - FUNCTIONAL ASSESSMENT: PAIN_FUNCTIONAL_ASSESSMENT: NONE - DENIES PAIN

## 2023-09-29 NOTE — ED PROVIDER NOTES
**ADVANCED PRACTICE PROVIDER, I HAVE EVALUATED THIS PATIENT**        [address] ENCOUNTER      Pt Name: Kyle Carrasquillo  [facility]:[id number]  [address] [facility] 1965  Date of evaluation: 9/29/2023  Provider: Je Anderson PA-C  Note Started: 4:59 PM EDT 9/29/23        Chief Complaint:    Chief Complaint   Patient presents with    Wound Check     Left leg since labor day         Nursing Notes, Past Medical Hx, Past Surgical Hx, Social Hx, Allergies, and Family Hx were all reviewed and agreed with or any disagreements were addressed in the HPI.    HPI: (Location, Duration, Timing, Severity, Quality, Assoc Sx, Context, Modifying factors)    History From: ***  {Limitations to history (Optional):90916}    {Social Determinants Significantly Affecting Health (Optional):53539}    Chief Complaint of ***    This is a  60 y.o. male who presents  ***    PastMedical/Surgical History:      Diagnosis Date    CHF (congestive heart failure) ([address])     CVA (cerebral vascular accident) ([address])     Diabetes mellitus ([address])     Hyperlipidemia     Hypertension     Vitamin D deficiency          Procedure Laterality Date    DIAGNOSTIC CARDIAC CATH LAB PROCEDURE  12/2018       Medications:  Previous Medications    ASPIRIN 81 MG EC TABLET    Take 1 tablet by mouth daily    ATORVASTATIN (LIPITOR) 40 MG TABLET    Take 1 tablet by mouth nightly    BLOOD GLUCOSE MONITOR STRIPS    Test 1 times a day & as needed for symptoms of irregular blood glucose. Dispense sufficient amount for indicated testing frequency plus additional to accommodate PRN testing needs.     CARVEDILOL (COREG) 6.25 MG TABLET    Take 1 tablet by mouth 2 times daily (with meals)    EMPAGLIFLOZIN (JARDIANCE) 10 MG TABLET    Take 1 tablet by mouth daily    FUROSEMIDE (LASIX) 20 MG TABLET    Take 1 tablet by mouth daily    GLUCOSE MONITORING (FREESTYLE FREEDOM) KIT    1 kit by Does not apply route daily    LANCETS MISC    1 each by
997446:1 week|| ||00\01||False;

## 2023-09-29 NOTE — DISCHARGE INSTRUCTIONS
Keep area clean and dry  Take prescribed medication as prescribed only  Follow-up with [address] [place] [address] wound [facility]. Give their office a call on Monday to schedule follow-up appointment. Return for any worsening. Redness, swelling, fever and or drainage.

## 2023-09-29 NOTE — CARDIO/PULMONARY
09/29/2023 Pt here for initial cardiac rehab interview. Pt has a wound on his left leg that has bloody drng leaking through 4x4's. Pt states wound is painful and that he has been treating it at home with peroxide and antibiotic ointment. Wound about size of a small apple. Serosanguinous drng noted. Sides of wound scabbed. Pt states he has had it since labor day. He believes it may have started as a bug bite The wound is several skin layers deep. Pt is a diabetic. Dr. Villanueva Meeter office notified. Dr Sheth Favor asked that patient have wound checked in the ER. Pt escorted to ER via wheel chair. Pt does walk with a cane.  Giancarlo Guzmán BSN, Med 09/29/2023

## 2023-09-29 NOTE — ED NOTES
Patient DCed from ED at this time. Discussed AVS, follow up, and scripts. They verbalized understanding. Reinforced that should symptoms persist or worsen to return to the ED. They verbalized understanding. Patient ambulated out of ED. RN thanked patient for choosing [facility]).         Zain Rascon RN  09/29/23 7141

## 2023-10-02 ASSESSMENT — ENCOUNTER SYMPTOMS
VOMITING: 0
SORE THROAT: 0
COUGH: 0
ABDOMINAL PAIN: 0
EYE PAIN: 0
NAUSEA: 0
SHORTNESS OF BREATH: 0
BACK PAIN: 0

## 2023-10-13 ENCOUNTER — HOSPITAL ENCOUNTER (OUTPATIENT)
Dept: CARDIAC REHAB | Age: 58
Setting detail: THERAPIES SERIES
Discharge: HOME | End: 2023-10-13
Payer: MEDICAID

## 2023-10-13 PROCEDURE — 93798 PHYS/QHP OP CAR RHAB W/ECG: CPT

## 2023-10-17 ENCOUNTER — APPOINTMENT (OUTPATIENT)
Dept: CARDIAC REHAB | Age: 58
End: 2023-10-17
Payer: MEDICARE

## 2023-10-19 ENCOUNTER — HOSPITAL ENCOUNTER (OUTPATIENT)
Dept: CARDIAC REHAB | Age: 58
Setting detail: THERAPIES SERIES
Discharge: HOME | End: 2023-10-19
Payer: MEDICAID

## 2023-10-19 LAB
GLUCOSE BLD-MCNC: 116 MG/DL (ref 70–99)
GLUCOSE BLD-MCNC: 93 MG/DL (ref 70–99)
PERFORMED ON: ABNORMAL
PERFORMED ON: NORMAL

## 2023-10-19 PROCEDURE — 93798 PHYS/QHP OP CAR RHAB W/ECG: CPT

## 2023-10-24 ENCOUNTER — APPOINTMENT (OUTPATIENT)
Dept: CARDIAC REHAB | Age: 58
End: 2023-10-24
Payer: MEDICARE

## 2023-10-24 ENCOUNTER — OFFICE VISIT (OUTPATIENT)
Dept: CARDIOLOGY CLINIC | Age: 58
End: 2023-10-24
Payer: COMMERCIAL

## 2023-10-24 ENCOUNTER — TELEPHONE (OUTPATIENT)
Dept: CARDIOLOGY CLINIC | Age: 58
End: 2023-10-24

## 2023-10-24 VITALS
BODY MASS INDEX: 24.25 KG/M2 | DIASTOLIC BLOOD PRESSURE: 70 MMHG | HEIGHT: 75 IN | WEIGHT: 195 LBS | OXYGEN SATURATION: 99 % | HEART RATE: 70 BPM | SYSTOLIC BLOOD PRESSURE: 116 MMHG

## 2023-10-24 DIAGNOSIS — Z86.73 HISTORY OF CVA (CEREBROVASCULAR ACCIDENT): ICD-10-CM

## 2023-10-24 DIAGNOSIS — I25.10 CORONARY ARTERY DISEASE INVOLVING NATIVE CORONARY ARTERY OF NATIVE HEART WITHOUT ANGINA PECTORIS: ICD-10-CM

## 2023-10-24 DIAGNOSIS — I51.3 APICAL MURAL THROMBUS: ICD-10-CM

## 2023-10-24 DIAGNOSIS — I50.22 CHRONIC SYSTOLIC HEART FAILURE (HCC): Primary | ICD-10-CM

## 2023-10-24 PROCEDURE — 99214 OFFICE O/P EST MOD 30 MIN: CPT | Performed by: INTERNAL MEDICINE

## 2023-10-24 PROCEDURE — 3078F DIAST BP <80 MM HG: CPT | Performed by: INTERNAL MEDICINE

## 2023-10-24 PROCEDURE — 3074F SYST BP LT 130 MM HG: CPT | Performed by: INTERNAL MEDICINE

## 2023-10-24 RX ORDER — CARVEDILOL 6.25 MG/1
6.25 TABLET ORAL 2 TIMES DAILY WITH MEALS
Qty: 180 TABLET | Refills: 3 | Status: SHIPPED | OUTPATIENT
Start: 2023-10-24

## 2023-10-24 NOTE — PATIENT INSTRUCTIONS
1) Increase Entresto from 24-26 mg twice daily to 49-51 mg twice daily    -may take 24-26 mg tablets, 2 tablets each  morning and each evening until supply runs out  2) New prescription will be Entresto 49-51 mg twice daily   3) Complete labs in 2-3 weeks   4) Echo-US heart  5) Plan follow up in 3 months.

## 2023-10-24 NOTE — TELEPHONE ENCOUNTER
Please contact patient to schedule him for both an echo as ordered in office today per Dr. Marissa Almeida and Dr. Marissa Almeida would also like for a Lexiscan myoview stress test to be completed. This RN left a detailed vm for patient and instructed our office will schedule then reach out to him with all info. Any questions. Let us know. Thanks.

## 2023-10-24 NOTE — PROGRESS NOTES
treatment, procedures, and medical decision making performed by me. All portions of the note including but not limited to the chief complaint, history of present illness, physical exam, assessment and plan/medical decision making were personally reviewed, edited, and updated on the day of the visit.

## 2023-10-26 ENCOUNTER — HOSPITAL ENCOUNTER (OUTPATIENT)
Dept: CARDIAC REHAB | Age: 58
Setting detail: THERAPIES SERIES
Discharge: HOME | End: 2023-10-26
Payer: MEDICARE

## 2023-10-26 PROCEDURE — 93798 PHYS/QHP OP CAR RHAB W/ECG: CPT

## 2023-10-26 NOTE — TELEPHONE ENCOUNTER
Called and talked to Feli Rapp, I have the Stress test set up for 11/3 and the Echo set up for 11/27. He vu date/time/location .  I am going to mail him the prep for each test.

## 2023-10-27 LAB
GLUCOSE BLD-MCNC: 104 MG/DL (ref 70–99)
GLUCOSE BLD-MCNC: 79 MG/DL (ref 70–99)
GLUCOSE BLD-MCNC: 96 MG/DL (ref 70–99)
PERFORMED ON: ABNORMAL
PERFORMED ON: NORMAL
PERFORMED ON: NORMAL

## 2023-10-31 ENCOUNTER — HOSPITAL ENCOUNTER (OUTPATIENT)
Dept: CARDIAC REHAB | Age: 58
Setting detail: THERAPIES SERIES
Discharge: HOME | End: 2023-10-31
Payer: MEDICARE

## 2023-10-31 DIAGNOSIS — I50.22 CHRONIC SYSTOLIC HEART FAILURE (HCC): ICD-10-CM

## 2023-10-31 DIAGNOSIS — I25.10 CORONARY ARTERY DISEASE INVOLVING NATIVE CORONARY ARTERY OF NATIVE HEART WITHOUT ANGINA PECTORIS: ICD-10-CM

## 2023-10-31 PROCEDURE — 93798 PHYS/QHP OP CAR RHAB W/ECG: CPT

## 2023-11-01 LAB
ANION GAP SERPL CALCULATED.3IONS-SCNC: 11 MMOL/L (ref 3–16)
BUN SERPL-MCNC: 15 MG/DL (ref 7–20)
CALCIUM SERPL-MCNC: 9.8 MG/DL (ref 8.3–10.6)
CHLORIDE SERPL-SCNC: 103 MMOL/L (ref 99–110)
CO2 SERPL-SCNC: 26 MMOL/L (ref 21–32)
CREAT SERPL-MCNC: 1 MG/DL (ref 0.9–1.3)
GFR SERPLBLD CREATININE-BSD FMLA CKD-EPI: >60 ML/MIN/{1.73_M2}
GLUCOSE BLD-MCNC: 100 MG/DL (ref 70–99)
GLUCOSE BLD-MCNC: 89 MG/DL (ref 70–99)
GLUCOSE SERPL-MCNC: 156 MG/DL (ref 70–99)
PERFORMED ON: ABNORMAL
PERFORMED ON: NORMAL
POTASSIUM SERPL-SCNC: 5 MMOL/L (ref 3.5–5.1)
SODIUM SERPL-SCNC: 140 MMOL/L (ref 136–145)

## 2023-11-02 ENCOUNTER — HOSPITAL ENCOUNTER (OUTPATIENT)
Dept: CARDIAC REHAB | Age: 58
Setting detail: THERAPIES SERIES
Discharge: HOME | End: 2023-11-02
Payer: MEDICARE

## 2023-11-02 PROCEDURE — 93798 PHYS/QHP OP CAR RHAB W/ECG: CPT

## 2023-11-07 ENCOUNTER — HOSPITAL ENCOUNTER (OUTPATIENT)
Dept: CARDIAC REHAB | Age: 58
Setting detail: THERAPIES SERIES
Discharge: HOME | End: 2023-11-07
Payer: MEDICARE

## 2023-11-07 PROCEDURE — 93798 PHYS/QHP OP CAR RHAB W/ECG: CPT

## 2023-11-09 ENCOUNTER — HOSPITAL ENCOUNTER (OUTPATIENT)
Dept: CARDIAC REHAB | Age: 58
Setting detail: THERAPIES SERIES
Discharge: HOME | End: 2023-11-09
Payer: MEDICARE

## 2023-11-09 PROCEDURE — 93798 PHYS/QHP OP CAR RHAB W/ECG: CPT

## 2023-11-10 ENCOUNTER — TELEPHONE (OUTPATIENT)
Dept: CARDIOLOGY CLINIC | Age: 58
End: 2023-11-10

## 2023-11-10 NOTE — TELEPHONE ENCOUNTER
Received call from stress lab. States patient scheduled for test on Monday but no order in the system. Chart reviewed, order placed 10/24/23 by [facility] RN. Will not place new order at this time, will place on Monday if needed. UPDATE 11/13  Stress test completed and read by Dr. Meryl Mendoza

## 2023-11-13 ENCOUNTER — HOSPITAL ENCOUNTER (OUTPATIENT)
Dept: NON INVASIVE DIAGNOSTICS | Age: 58
Discharge: HOME | End: 2023-11-13
Attending: INTERNAL MEDICINE
Payer: COMMERCIAL

## 2023-11-13 DIAGNOSIS — I25.10 CORONARY ARTERY DISEASE INVOLVING NATIVE CORONARY ARTERY OF NATIVE HEART WITHOUT ANGINA PECTORIS: ICD-10-CM

## 2023-11-13 DIAGNOSIS — I50.22 CHRONIC SYSTOLIC HEART FAILURE (HCC): ICD-10-CM

## 2023-11-13 PROCEDURE — A9502 TC99M TETROFOSMIN: HCPCS | Performed by: INTERNAL MEDICINE

## 2023-11-13 PROCEDURE — 78452 HT MUSCLE IMAGE SPECT MULT: CPT

## 2023-11-13 PROCEDURE — 6360000002 HC RX W HCPCS: Performed by: INTERNAL MEDICINE

## 2023-11-13 PROCEDURE — 93017 CV STRESS TEST TRACING ONLY: CPT

## 2023-11-13 PROCEDURE — 3430000000 HC RX DIAGNOSTIC RADIOPHARMACEUTICAL: Performed by: INTERNAL MEDICINE

## 2023-11-13 RX ORDER — REGADENOSON 0.08 MG/ML
0.4 INJECTION, SOLUTION INTRAVENOUS
Status: COMPLETED | OUTPATIENT
Start: 2023-11-13 | End: 2023-11-13

## 2023-11-13 RX ADMIN — TETROFOSMIN 10 MILLICURIE: 1.38 INJECTION, POWDER, LYOPHILIZED, FOR SOLUTION INTRAVENOUS at 08:54

## 2023-11-13 RX ADMIN — TETROFOSMIN 30 MILLICURIE: 1.38 INJECTION, POWDER, LYOPHILIZED, FOR SOLUTION INTRAVENOUS at 10:00

## 2023-11-13 RX ADMIN — REGADENOSON 0.4 MG: 0.08 INJECTION, SOLUTION INTRAVENOUS at 09:57

## 2023-11-14 ENCOUNTER — HOSPITAL ENCOUNTER (OUTPATIENT)
Dept: CARDIAC REHAB | Age: 58
Setting detail: THERAPIES SERIES
Discharge: HOME | End: 2023-11-14
Payer: COMMERCIAL

## 2023-11-14 PROCEDURE — 93798 PHYS/QHP OP CAR RHAB W/ECG: CPT

## 2023-11-16 ENCOUNTER — APPOINTMENT (OUTPATIENT)
Dept: CARDIAC REHAB | Age: 58
End: 2023-11-16
Payer: COMMERCIAL

## 2023-11-21 ENCOUNTER — APPOINTMENT (OUTPATIENT)
Dept: CARDIAC REHAB | Age: 58
End: 2023-11-21
Payer: COMMERCIAL

## 2023-11-27 ENCOUNTER — HOSPITAL ENCOUNTER (OUTPATIENT)
Dept: NON INVASIVE DIAGNOSTICS | Age: 58
Discharge: HOME | End: 2023-11-27
Payer: COMMERCIAL

## 2023-11-27 DIAGNOSIS — I50.22 CHRONIC SYSTOLIC HEART FAILURE (HCC): ICD-10-CM

## 2023-11-27 DIAGNOSIS — I51.3 APICAL MURAL THROMBUS: ICD-10-CM

## 2023-11-27 PROCEDURE — 93306 TTE W/DOPPLER COMPLETE: CPT

## 2023-11-28 ENCOUNTER — HOSPITAL ENCOUNTER (OUTPATIENT)
Dept: CARDIAC REHAB | Age: 58
Setting detail: THERAPIES SERIES
End: 2023-11-28
Payer: COMMERCIAL

## 2023-11-30 ENCOUNTER — HOSPITAL ENCOUNTER (OUTPATIENT)
Dept: CARDIAC REHAB | Age: 58
Setting detail: THERAPIES SERIES
Discharge: HOME | End: 2023-11-30
Payer: COMMERCIAL

## 2023-11-30 PROCEDURE — 93798 PHYS/QHP OP CAR RHAB W/ECG: CPT

## 2023-12-08 ENCOUNTER — OFFICE VISIT (OUTPATIENT)
Dept: CARDIOLOGY CLINIC | Age: 58
End: 2023-12-08
Payer: COMMERCIAL

## 2023-12-08 VITALS
HEART RATE: 58 BPM | HEIGHT: 75 IN | OXYGEN SATURATION: 98 % | DIASTOLIC BLOOD PRESSURE: 66 MMHG | SYSTOLIC BLOOD PRESSURE: 124 MMHG | BODY MASS INDEX: 24.99 KG/M2 | WEIGHT: 201 LBS

## 2023-12-08 DIAGNOSIS — E11.59 TYPE 2 DIABETES MELLITUS WITH OTHER CIRCULATORY COMPLICATION, WITHOUT LONG-TERM CURRENT USE OF INSULIN (HCC): ICD-10-CM

## 2023-12-08 DIAGNOSIS — Z86.73 HISTORY OF CVA (CEREBROVASCULAR ACCIDENT): ICD-10-CM

## 2023-12-08 DIAGNOSIS — I25.10 CORONARY ARTERY DISEASE INVOLVING NATIVE CORONARY ARTERY OF NATIVE HEART WITHOUT ANGINA PECTORIS: ICD-10-CM

## 2023-12-08 DIAGNOSIS — I50.22 CHRONIC SYSTOLIC HEART FAILURE (HCC): Primary | ICD-10-CM

## 2023-12-08 DIAGNOSIS — I51.3 APICAL MURAL THROMBUS: ICD-10-CM

## 2023-12-08 PROCEDURE — 3017F COLORECTAL CA SCREEN DOC REV: CPT | Performed by: INTERNAL MEDICINE

## 2023-12-08 PROCEDURE — 93000 ELECTROCARDIOGRAM COMPLETE: CPT | Performed by: INTERNAL MEDICINE

## 2023-12-08 PROCEDURE — 99205 OFFICE O/P NEW HI 60 MIN: CPT | Performed by: INTERNAL MEDICINE

## 2023-12-08 PROCEDURE — 3074F SYST BP LT 130 MM HG: CPT | Performed by: INTERNAL MEDICINE

## 2023-12-08 PROCEDURE — 3078F DIAST BP <80 MM HG: CPT | Performed by: INTERNAL MEDICINE

## 2023-12-08 PROCEDURE — G8419 CALC BMI OUT NRM PARAM NOF/U: HCPCS | Performed by: INTERNAL MEDICINE

## 2023-12-08 PROCEDURE — 2022F DILAT RTA XM EVC RTNOPTHY: CPT | Performed by: INTERNAL MEDICINE

## 2023-12-08 PROCEDURE — G8427 DOCREV CUR MEDS BY ELIG CLIN: HCPCS | Performed by: INTERNAL MEDICINE

## 2023-12-08 PROCEDURE — 3044F HG A1C LEVEL LT 7.0%: CPT | Performed by: INTERNAL MEDICINE

## 2023-12-08 PROCEDURE — 1036F TOBACCO NON-USER: CPT | Performed by: INTERNAL MEDICINE

## 2023-12-08 PROCEDURE — G8484 FLU IMMUNIZE NO ADMIN: HCPCS | Performed by: INTERNAL MEDICINE

## 2023-12-08 RX ORDER — SPIRONOLACTONE 25 MG/1
25 TABLET ORAL DAILY
Qty: 90 TABLET | Refills: 3 | Status: SHIPPED | OUTPATIENT
Start: 2023-12-08

## 2023-12-08 RX ORDER — ASPIRIN 81 MG/1
81 TABLET ORAL DAILY
Qty: 90 TABLET | Refills: 3 | Status: SHIPPED | OUTPATIENT
Start: 2023-12-08

## 2023-12-08 NOTE — PATIENT INSTRUCTIONS
If you have any question regarding your procedure please contact Nurse Brigid at [phone]. Implantation of  ICD  Pre procedure Instructions    Date:______________________________    Arrive at:__________________________    Procedure time:____________________    The morning of your procedure you will park in the hospital parking lot and report directly to the cath lab to check in. At the information desk stay right and go all the way to the end of the knight, this will take you directly to your check in desk for the cath lab. Pre-Procedure Instructions   You will need to fast (nothing to eat or drink) after midnight the day of your procedure  Do NOT chew gum or eat mints the day of your procedure  You will need to hold your Aspirin for 7 days prior to the procedure. You will need to hold your Xarelto for three days prior to the procedure. You will need to hold your Jardiance the morning of the procedure. Do not use any lotions, creams or perfume the morning of procedure. You will need to complete pre-procedure lab work 5-7 days prior to your procedure. Please have a responsible adult to drive you home after procedure, you should go home same day, but there is always a possibility of an overnight stay. Cath lab will provide you with all post procedure instructions                                          [facility] Lab services  [address] [place] [address]   [address], [address] [place] [address]  Phone: [phone]  The hours are Mon -Fri.   6:30 am - 4:00 pm   Saturday 8:00 am - noon  No appointment necessary

## 2023-12-08 NOTE — PROGRESS NOTES
***  Tata Duke MD, MS, [facility]  Cardiac Electrophysiology  [place]  [address] [facility], [address] [place] [address] [address] [place], [address] [place] [address]  [phone]
daily.      Follow up: Follow up 1 week post ICD  implantation in the device clinic for site check and device interrogation and 3 months after ICD  implantation with EP NP if he decides to proceed. If he does not proceed with ICD implantation, he will follow-up PRN. Continue routine follow up with Austen Lau MD.    Thank you for allowing me to participate in the care of [facility]. All questions and concerns were addressed to the patient/family. Alternatives to my treatment were discussed. This note was scribed in the presence of Dr. Burgess Lucille MD by Krupa Heard RN. The scribe's documentation has been prepared under my direction and personally reviewed by me in its entirety. I confirm that the note above accurately reflects all work, physical examination, the discussion of treatments and procedures, and medical decision making performed by me.     Burgess Lucille MD, MS, [facility]  Cardiac Electrophysiology  [address] [place], [address] [facility]  [phone]

## 2023-12-24 NOTE — PLAN OF CARE
Pt free from falls this shift. Fall precautions in place at all times. Call light always within reach. Pt able and agreeable to contact for safety appropriately. Pain/discomfort being managed with PRN analgesics per MD orders. Pt able to express presence and absence of pain and rate pain appropriately using numerical scale. Patient able to maintain an adequate hemodynamic status at this time. dry, intact and hematoma.

## 2024-01-16 ENCOUNTER — TELEPHONE (OUTPATIENT)
Dept: CARDIOLOGY CLINIC | Age: 59
End: 2024-01-16

## 2024-01-16 DIAGNOSIS — I50.22 CHRONIC SYSTOLIC HEART FAILURE (HCC): Primary | ICD-10-CM

## 2024-01-16 NOTE — TELEPHONE ENCOUNTER
Please reach out to patient and schedule dual chamber ICD with Dr. Long.    Anesthesia is NOT needed   Include [employer] rep in email [email or web address]      Implantation of  dual chamber ICD  Pre procedure Instructions     Date:______________________________     Arrive at:__________________________     Procedure time:____________________     The morning of your procedure you will park in the hospital parking lot and report directly to the cath lab to check in.   At the information desk stay right and go all the way to the end of the knight, this will take you directly to your check in desk for the cath lab.     Pre-Procedure Instructions   You will need to fast (nothing to eat or drink) after midnight the day of your procedure  Do NOT chew gum or eat mints the day of your procedure  You will need to hold your Aspirin for 7 days prior to the procedure.  You will need to hold your Xarelto for three days prior to the procedure.  You will need to hold your Jardiance the morning of the procedure.  Do not use any lotions, creams or perfume the morning of procedure.   You will need to complete pre-procedure lab work 5-7 days prior to your procedure.  Please have a responsible adult to drive you home after procedure, you should go home same day, but there is always a possibility of an overnight stay.  Cath lab will provide you with all post procedure instructions                                             [facility]     [facility]  [address]  [address]   [place] [address]  Phone: [phone]  The hours are Mon -Fri.  6:30 am - 4:00 pm   Saturday 8:00 am - noon  No appointment necessary

## 2024-01-16 NOTE — TELEPHONE ENCOUNTER
Spoke with the pt and he doesn't want to schedule at this time. He wants to see Dr Kapadia first then decide from there. He will call me when ready to schedule.

## 2024-01-22 NOTE — TELEPHONE ENCOUNTER
Spoke w/the pt and he didn't get to see Dr Kapadia last week. He is now scheduled on 2/2 and I will check back after that date to see if he wants to do procedure or not.

## 2024-02-13 NOTE — TELEPHONE ENCOUNTER
Attempted to reach pt but VM Box is full. Also don't see where he saw Dr Kapadia yet which he was supposed to do on 2/2. I will try and reach out again later in week to schedule or see about scheduling procedure.

## 2024-02-16 NOTE — TELEPHONE ENCOUNTER
Spoke with the pt and he still wants to see Dr Kapadia before scheduling procedure. I will have front office give him a call to get scheduled. Once that is scheduled I will call him that week or week after or the dr can send me a message advising on scheduling.

## 2024-02-20 NOTE — PROGRESS NOTES
hemoptysis.     Gastrointestinal: No abdominal pain, appetite loss, blood in stools. No change in bowel or bladder habits.  Genitourinary: No dysuria, trouble voiding, or hematuria.  Musculoskeletal:  No gait disturbance, no joint complaints.  Integumentary: No rash or pruritis.  Neurological: No headache, diplopia, change in muscle strength, numbness or tingling.   Psychiatric: No anxiety or depression.  Endocrine: No temperature intolerance. No excessive thirst, fluid intake, or urination. No tremor.  Hematologic/Lymphatic: No abnormal bruising or bleeding, blood clots or swollen lymph nodes.  Allergic/Immunologic: No nasal congestion or hives.    Physical Exam:   BP (!) 92/56   Pulse 72   Ht 1.905 m (6' 3\")   Wt 93 kg (205 lb)   SpO2 99%   BMI 25.62 kg/m²   Wt Readings from Last 3 Encounters:   02/26/24 93 kg (205 lb)   12/08/23 91.2 kg (201 lb)   10/24/23 88.5 kg (195 lb)     Constitutional: He is oriented to person, place, and time. He appears well-developed and well-nourished. In no acute distress.   Head: Normocephalic and atraumatic. Pupils equal and round.  Neck: Neck supple. No JVP or carotid bruit appreciated. No mass and no thyromegaly present. No lymphadenopathy present.  Cardiovascular: Normal rate. Normal heart sounds. Exam reveals no gallop and no friction rub. No murmur heard.  Pulmonary/Chest: Effort normal and breath sounds normal. No respiratory distress. He has no wheezes, rhonchi or rales.   Abdominal: Soft, non-tender. Bowel sounds are normal. He exhibits no organomegaly, mass or bruit.   Extremities: No LE edema. No cyanosis or clubbing. Pulses are 2+ radial/carotid bilaterally.  Neurological: No gross cranial nerve deficit. Coordination normal.   Skin: Skin is warm and dry. There is no rash or diaphoresis.   Psychiatric: He has a normal mood and affect. His speech is normal and behavior is normal.     Lab Review:   Lab Results   Component Value Date/Time    TRIG 61 06/18/2023 03:28 AM

## 2024-02-26 ENCOUNTER — OFFICE VISIT (OUTPATIENT)
Dept: CARDIOLOGY CLINIC | Age: 59
End: 2024-02-26
Payer: COMMERCIAL

## 2024-02-26 VITALS
HEART RATE: 72 BPM | HEIGHT: 75 IN | DIASTOLIC BLOOD PRESSURE: 56 MMHG | WEIGHT: 205 LBS | BODY MASS INDEX: 25.49 KG/M2 | SYSTOLIC BLOOD PRESSURE: 92 MMHG | OXYGEN SATURATION: 99 %

## 2024-02-26 DIAGNOSIS — I50.22 CHRONIC SYSTOLIC HEART FAILURE (HCC): Primary | ICD-10-CM

## 2024-02-26 DIAGNOSIS — I51.3 APICAL MURAL THROMBUS: ICD-10-CM

## 2024-02-26 DIAGNOSIS — I25.10 CORONARY ARTERY DISEASE INVOLVING NATIVE CORONARY ARTERY OF NATIVE HEART WITHOUT ANGINA PECTORIS: ICD-10-CM

## 2024-02-26 DIAGNOSIS — Z86.73 HISTORY OF CVA (CEREBROVASCULAR ACCIDENT): ICD-10-CM

## 2024-02-26 DIAGNOSIS — I50.22 CHRONIC SYSTOLIC HEART FAILURE (HCC): ICD-10-CM

## 2024-02-26 LAB
ANION GAP SERPL CALCULATED.3IONS-SCNC: 11 MMOL/L (ref 3–16)
BUN SERPL-MCNC: 20 MG/DL (ref 7–20)
CALCIUM SERPL-MCNC: 9.7 MG/DL (ref 8.3–10.6)
CHLORIDE SERPL-SCNC: 104 MMOL/L (ref 99–110)
CO2 SERPL-SCNC: 27 MMOL/L (ref 21–32)
CREAT SERPL-MCNC: 0.9 MG/DL (ref 0.9–1.3)
GFR SERPLBLD CREATININE-BSD FMLA CKD-EPI: >60 ML/MIN/{1.73_M2}
GLUCOSE SERPL-MCNC: 122 MG/DL (ref 70–99)
POTASSIUM SERPL-SCNC: 5 MMOL/L (ref 3.5–5.1)
SODIUM SERPL-SCNC: 142 MMOL/L (ref 136–145)

## 2024-02-26 PROCEDURE — 99214 OFFICE O/P EST MOD 30 MIN: CPT | Performed by: INTERNAL MEDICINE

## 2024-02-26 PROCEDURE — 3078F DIAST BP <80 MM HG: CPT | Performed by: INTERNAL MEDICINE

## 2024-02-26 PROCEDURE — 1036F TOBACCO NON-USER: CPT | Performed by: INTERNAL MEDICINE

## 2024-02-26 PROCEDURE — G8427 DOCREV CUR MEDS BY ELIG CLIN: HCPCS | Performed by: INTERNAL MEDICINE

## 2024-02-26 PROCEDURE — 3017F COLORECTAL CA SCREEN DOC REV: CPT | Performed by: INTERNAL MEDICINE

## 2024-02-26 PROCEDURE — G8419 CALC BMI OUT NRM PARAM NOF/U: HCPCS | Performed by: INTERNAL MEDICINE

## 2024-02-26 PROCEDURE — 3074F SYST BP LT 130 MM HG: CPT | Performed by: INTERNAL MEDICINE

## 2024-02-26 PROCEDURE — G8484 FLU IMMUNIZE NO ADMIN: HCPCS | Performed by: INTERNAL MEDICINE

## 2024-02-26 RX ORDER — ATORVASTATIN CALCIUM 10 MG/1
10 TABLET, FILM COATED ORAL DAILY
Qty: 90 TABLET | Refills: 3 | Status: SHIPPED | OUTPATIENT
Start: 2024-02-26

## 2024-07-09 RX ORDER — CARVEDILOL 6.25 MG/1
6.25 TABLET ORAL 2 TIMES DAILY WITH MEALS
Qty: 180 TABLET | Refills: 3 | Status: SHIPPED | OUTPATIENT
Start: 2024-07-09

## 2024-07-09 NOTE — TELEPHONE ENCOUNTER
Last OV: 2/26/24  Next OV: 8/26/24  Last refill: 10/24/23 #180 3 R/F  Most recent Labs: 2/26/24  Last EKG (if needed): 12/8/23       normal...

## 2024-07-23 ENCOUNTER — COMMUNITY OUTREACH (OUTPATIENT)
Dept: INTERNAL MEDICINE CLINIC | Age: 59
End: 2024-07-23

## 2024-10-04 DIAGNOSIS — I50.23 ACUTE ON CHRONIC SYSTOLIC CONGESTIVE HEART FAILURE (HCC): ICD-10-CM

## 2024-10-04 NOTE — TELEPHONE ENCOUNTER
Last OV: 24 Steffi   Next OV: none scheduled ( left message for pt to return call and schedule an appointment   Last labs: 23 Lipid  Last EK23  Last filled:    Disp Refills Start End     atorvastatin (LIPITOR) 10 MG tablet 90 tablet 3 2024 --    Sig - Route: Take 1 tablet by mouth daily - Oral    Sent to pharmacy as: Atorvastatin Calcium 10 MG Oral Tablet (LIPITOR)    Cosign for Ordering: Accepted by Cheng Kapadia MD on 2024 11:15 AM    E-Prescribing Status: Receipt confirmed by pharmacy (2024 11:01 AM EST)

## 2024-10-08 RX ORDER — ATORVASTATIN CALCIUM 40 MG/1
40 TABLET, FILM COATED ORAL NIGHTLY
Qty: 90 TABLET | Refills: 3 | Status: SHIPPED | OUTPATIENT
Start: 2024-10-08

## 2024-10-08 NOTE — TELEPHONE ENCOUNTER
Last OV: 24 Steffi   Next OV: Pt scheduled 24 @ 3:30pm - Steffi   Last labs: 23 Lipid  Last EK23  Last filled:    Disp Refills Start End       atorvastatin (LIPITOR) 10 MG tablet 90 tablet 3 2024 --     Sig - Route: Take 1 tablet by mouth daily - Oral     Sent to pharmacy as: Atorvastatin Calcium 10 MG Oral Tablet (LIPITOR)     Cosign for Ordering: Accepted by Cheng Kapadia MD on 2024 11:15 AM     E-Prescribing Status: Receipt confirmed by pharmacy (2024 11:01 AM EST)

## 2024-10-23 RX ORDER — RIVAROXABAN 20 MG/1
20 TABLET, FILM COATED ORAL DAILY
Qty: 30 TABLET | Refills: 1 | Status: SHIPPED | OUTPATIENT
Start: 2024-10-23

## 2024-10-23 NOTE — TELEPHONE ENCOUNTER
Last OV: 2/26/24  Next OV: 11/8/24  Last refill: 9/1/23  #90 3 R/F  Most recent Labs: 10/31/23  Last EKG (if needed): 12/8/23

## 2024-10-24 DIAGNOSIS — E11.59 TYPE 2 DIABETES MELLITUS WITH OTHER CIRCULATORY COMPLICATION, WITHOUT LONG-TERM CURRENT USE OF INSULIN (HCC): ICD-10-CM

## 2024-10-24 RX ORDER — EMPAGLIFLOZIN 10 MG/1
10 TABLET, FILM COATED ORAL DAILY
Qty: 90 TABLET | Refills: 3 | Status: SHIPPED | OUTPATIENT
Start: 2024-10-24

## 2024-10-24 NOTE — TELEPHONE ENCOUNTER
Last OV: 2/26/24  Next OV: 11/8/24  Last refill: 8/4/23 #90 3 R/F  Most recent Labs: 2/26/24  Last EKG (if needed): 12/8/23

## 2024-10-31 LAB
ESTIMATED AVERAGE GLUCOSE: 128
HBA1C MFR BLD: 6.1 %

## 2024-10-31 NOTE — TELEPHONE ENCOUNTER
According to Dr. Kpaadia's OV note it is Entresto 49-51 mg BID, but please call patient to confirm what dose he is taking. Thanks.

## 2024-10-31 NOTE — TELEPHONE ENCOUNTER
Last OV: 2/26/24  Next OV: 11/8/24  Last refill: 12/8/23, 2/26/24, 7/9/24  Most recent Labs: 2/26/24  Last EKG (if needed): 12/8/23    MARCIANO Hull  I'm not sure what mg Entresto patient is taking.

## 2024-11-01 RX ORDER — ASPIRIN 81 MG/1
81 TABLET, COATED ORAL DAILY
Qty: 90 TABLET | Refills: 3 | Status: SHIPPED | OUTPATIENT
Start: 2024-11-01

## 2024-11-01 RX ORDER — SPIRONOLACTONE 25 MG/1
12.5 TABLET ORAL DAILY
Qty: 45 TABLET | Refills: 3 | Status: SHIPPED | OUTPATIENT
Start: 2024-11-01

## 2024-11-01 RX ORDER — CARVEDILOL 3.12 MG/1
3.12 TABLET ORAL 2 TIMES DAILY WITH MEALS
Qty: 180 TABLET | Refills: 3 | Status: SHIPPED | OUTPATIENT
Start: 2024-11-01

## 2024-11-01 RX ORDER — SACUBITRIL AND VALSARTAN 24; 26 MG/1; MG/1
1 TABLET, FILM COATED ORAL 2 TIMES DAILY
Qty: 180 TABLET | Refills: 3 | OUTPATIENT
Start: 2024-11-01

## 2024-11-01 NOTE — TELEPHONE ENCOUNTER
Called spoke with patient states went to ER 10/31/24 has been Admitted in [facility]. States was having chest pain, lightheaded, felt he was going to pass out.   Has appt on 11/8/24 with Dr Kapadia.    States he is taking Entresto 49-51 mg  States he has been out for 2 weeks due to [facility] not having 49-51 mg dose.     Called [facility] pharmacy states Entresto 49-51 mg will be shipped in and ready Monday 11/4/24 to .

## 2025-01-20 ENCOUNTER — COMMUNITY OUTREACH (OUTPATIENT)
Dept: INTERNAL MEDICINE CLINIC | Age: 60
End: 2025-01-20

## 2025-01-20 NOTE — PROGRESS NOTES
Patient's  shows they are overdue for Hemoglobin A1C  Care Everywhere and  files searched.   updated with 10/31/24 A1C result.

## 2025-05-13 ENCOUNTER — TELEPHONE (OUTPATIENT)
Dept: INTERNAL MEDICINE CLINIC | Age: 60
End: 2025-05-13

## 2025-05-13 NOTE — TELEPHONE ENCOUNTER
----- Message from MARIBELL CANTU MA sent at 5/13/2025  8:36 AM EDT -----  Overdue for appt. Please call to see if has a new pcp or if he would like to schedule with Dr. Martinez at his new [place] location.    Thank you!

## 2025-06-01 ENCOUNTER — HOSPITAL ENCOUNTER (OUTPATIENT)
Age: 60
Setting detail: OBSERVATION
Discharge: HOME | End: 2025-06-02
Attending: HOSPITALIST | Admitting: HOSPITALIST
Payer: MEDICARE

## 2025-06-01 ENCOUNTER — APPOINTMENT (OUTPATIENT)
Dept: CT IMAGING | Age: 60
End: 2025-06-01
Payer: MEDICARE

## 2025-06-01 ENCOUNTER — APPOINTMENT (OUTPATIENT)
Dept: GENERAL RADIOLOGY | Age: 60
End: 2025-06-01
Payer: MEDICARE

## 2025-06-01 DIAGNOSIS — E11.59 TYPE 2 DIABETES MELLITUS WITH OTHER CIRCULATORY COMPLICATION, WITHOUT LONG-TERM CURRENT USE OF INSULIN (HCC): ICD-10-CM

## 2025-06-01 DIAGNOSIS — R07.9 CHEST PAIN, UNSPECIFIED TYPE: Primary | ICD-10-CM

## 2025-06-01 DIAGNOSIS — I50.23 ACUTE ON CHRONIC SYSTOLIC CONGESTIVE HEART FAILURE (HCC): ICD-10-CM

## 2025-06-01 LAB
ALBUMIN SERPL-MCNC: 3.9 G/DL (ref 3.4–5)
ALBUMIN/GLOB SERPL: 1.2 {RATIO} (ref 1.1–2.2)
ALP SERPL-CCNC: 115 U/L (ref 40–129)
ALT SERPL-CCNC: 18 U/L (ref 10–40)
ANION GAP SERPL CALCULATED.3IONS-SCNC: 12 MMOL/L (ref 3–16)
AST SERPL-CCNC: 40 U/L (ref 15–37)
BASOPHILS # BLD: 0 K/UL (ref 0–0.2)
BASOPHILS NFR BLD: 0.6 %
BILIRUB SERPL-MCNC: 0.7 MG/DL (ref 0–1)
BUN SERPL-MCNC: 13 MG/DL (ref 7–20)
CALCIUM SERPL-MCNC: 9.2 MG/DL (ref 8.3–10.6)
CHLORIDE SERPL-SCNC: 103 MMOL/L (ref 99–110)
CO2 SERPL-SCNC: 22 MMOL/L (ref 21–32)
CREAT SERPL-MCNC: 0.9 MG/DL (ref 0.8–1.3)
DEPRECATED RDW RBC AUTO: 13.1 % (ref 12.4–15.4)
EOSINOPHIL # BLD: 0 K/UL (ref 0–0.6)
EOSINOPHIL NFR BLD: 0.9 %
GFR SERPLBLD CREATININE-BSD FMLA CKD-EPI: >90 ML/MIN/{1.73_M2}
GLUCOSE BLD-MCNC: 110 MG/DL (ref 70–99)
GLUCOSE BLD-MCNC: 213 MG/DL (ref 70–99)
GLUCOSE SERPL-MCNC: 129 MG/DL (ref 70–99)
HCT VFR BLD AUTO: 44.2 % (ref 40.5–52.5)
HGB BLD-MCNC: 15.2 G/DL (ref 13.5–17.5)
LYMPHOCYTES # BLD: 1.5 K/UL (ref 1–5.1)
LYMPHOCYTES NFR BLD: 32.9 %
MCH RBC QN AUTO: 30.7 PG (ref 26–34)
MCHC RBC AUTO-ENTMCNC: 34.3 G/DL (ref 31–36)
MCV RBC AUTO: 89.4 FL (ref 80–100)
MONOCYTES # BLD: 0.4 K/UL (ref 0–1.3)
MONOCYTES NFR BLD: 7.5 %
NEUTROPHILS # BLD: 2.7 K/UL (ref 1.7–7.7)
NEUTROPHILS NFR BLD: 58.1 %
NT-PROBNP SERPL-MCNC: 2480 PG/ML (ref 0–124)
PERFORMED ON: ABNORMAL
PERFORMED ON: ABNORMAL
PLATELET # BLD AUTO: 203 K/UL (ref 135–450)
PMV BLD AUTO: 8 FL (ref 5–10.5)
POTASSIUM SERPL-SCNC: 3.7 MMOL/L (ref 3.5–5.1)
PROT SERPL-MCNC: 7.2 G/DL (ref 6.4–8.2)
RBC # BLD AUTO: 4.95 M/UL (ref 4.2–5.9)
SODIUM SERPL-SCNC: 137 MMOL/L (ref 136–145)
TROPONIN, HIGH SENSITIVITY: 39 NG/L (ref 0–22)
TROPONIN, HIGH SENSITIVITY: 41 NG/L (ref 0–22)
TROPONIN, HIGH SENSITIVITY: 42 NG/L (ref 0–22)
TROPONIN, HIGH SENSITIVITY: 49 NG/L (ref 0–22)
WBC # BLD AUTO: 4.7 K/UL (ref 4–11)

## 2025-06-01 PROCEDURE — 6360000002 HC RX W HCPCS: Performed by: PHYSICIAN ASSISTANT

## 2025-06-01 PROCEDURE — G0378 HOSPITAL OBSERVATION PER HR: HCPCS

## 2025-06-01 PROCEDURE — 2500000003 HC RX 250 WO HCPCS: Performed by: HOSPITALIST

## 2025-06-01 PROCEDURE — 36415 COLL VENOUS BLD VENIPUNCTURE: CPT

## 2025-06-01 PROCEDURE — 6370000000 HC RX 637 (ALT 250 FOR IP): Performed by: HOSPITALIST

## 2025-06-01 PROCEDURE — 96374 THER/PROPH/DIAG INJ IV PUSH: CPT

## 2025-06-01 PROCEDURE — 6370000000 HC RX 637 (ALT 250 FOR IP): Performed by: PHYSICIAN ASSISTANT

## 2025-06-01 PROCEDURE — 99285 EMERGENCY DEPT VISIT HI MDM: CPT

## 2025-06-01 PROCEDURE — 71260 CT THORAX DX C+: CPT

## 2025-06-01 PROCEDURE — 71045 X-RAY EXAM CHEST 1 VIEW: CPT

## 2025-06-01 PROCEDURE — 6360000004 HC RX CONTRAST MEDICATION: Performed by: PHYSICIAN ASSISTANT

## 2025-06-01 PROCEDURE — 93005 ELECTROCARDIOGRAM TRACING: CPT | Performed by: EMERGENCY MEDICINE

## 2025-06-01 PROCEDURE — 84484 ASSAY OF TROPONIN QUANT: CPT

## 2025-06-01 PROCEDURE — 85025 COMPLETE CBC W/AUTO DIFF WBC: CPT

## 2025-06-01 PROCEDURE — 80053 COMPREHEN METABOLIC PANEL: CPT

## 2025-06-01 PROCEDURE — 6360000002 HC RX W HCPCS: Performed by: HOSPITALIST

## 2025-06-01 PROCEDURE — 96375 TX/PRO/DX INJ NEW DRUG ADDON: CPT

## 2025-06-01 PROCEDURE — 83880 ASSAY OF NATRIURETIC PEPTIDE: CPT

## 2025-06-01 RX ORDER — ACETAMINOPHEN 650 MG/1
650 SUPPOSITORY RECTAL EVERY 6 HOURS PRN
Status: DISCONTINUED | OUTPATIENT
Start: 2025-06-01 | End: 2025-06-02 | Stop reason: HOSPADM

## 2025-06-01 RX ORDER — MORPHINE SULFATE 2 MG/ML
2 INJECTION, SOLUTION INTRAMUSCULAR; INTRAVENOUS EVERY 4 HOURS PRN
Status: DISCONTINUED | OUTPATIENT
Start: 2025-06-01 | End: 2025-06-02 | Stop reason: HOSPADM

## 2025-06-01 RX ORDER — SODIUM CHLORIDE 0.9 % (FLUSH) 0.9 %
5-40 SYRINGE (ML) INJECTION PRN
Status: DISCONTINUED | OUTPATIENT
Start: 2025-06-01 | End: 2025-06-02 | Stop reason: HOSPADM

## 2025-06-01 RX ORDER — POTASSIUM CHLORIDE 7.45 MG/ML
10 INJECTION INTRAVENOUS PRN
Status: DISCONTINUED | OUTPATIENT
Start: 2025-06-01 | End: 2025-06-02 | Stop reason: HOSPADM

## 2025-06-01 RX ORDER — SODIUM CHLORIDE 9 MG/ML
INJECTION, SOLUTION INTRAVENOUS PRN
Status: DISCONTINUED | OUTPATIENT
Start: 2025-06-01 | End: 2025-06-02 | Stop reason: HOSPADM

## 2025-06-01 RX ORDER — ASPIRIN 81 MG/1
81 TABLET ORAL DAILY
Status: DISCONTINUED | OUTPATIENT
Start: 2025-06-02 | End: 2025-06-02 | Stop reason: HOSPADM

## 2025-06-01 RX ORDER — SPIRONOLACTONE 25 MG/1
12.5 TABLET ORAL DAILY
Status: DISCONTINUED | OUTPATIENT
Start: 2025-06-01 | End: 2025-06-02 | Stop reason: HOSPADM

## 2025-06-01 RX ORDER — MORPHINE SULFATE 4 MG/ML
4 INJECTION, SOLUTION INTRAMUSCULAR; INTRAVENOUS ONCE
Refills: 0 | Status: COMPLETED | OUTPATIENT
Start: 2025-06-01 | End: 2025-06-01

## 2025-06-01 RX ORDER — SODIUM CHLORIDE 0.9 % (FLUSH) 0.9 %
5-40 SYRINGE (ML) INJECTION EVERY 12 HOURS SCHEDULED
Status: DISCONTINUED | OUTPATIENT
Start: 2025-06-01 | End: 2025-06-02 | Stop reason: HOSPADM

## 2025-06-01 RX ORDER — ATORVASTATIN CALCIUM 40 MG/1
40 TABLET, FILM COATED ORAL NIGHTLY
Status: DISCONTINUED | OUTPATIENT
Start: 2025-06-01 | End: 2025-06-02 | Stop reason: HOSPADM

## 2025-06-01 RX ORDER — ACETAMINOPHEN 325 MG/1
650 TABLET ORAL EVERY 6 HOURS PRN
Status: DISCONTINUED | OUTPATIENT
Start: 2025-06-01 | End: 2025-06-02 | Stop reason: HOSPADM

## 2025-06-01 RX ORDER — FUROSEMIDE 10 MG/ML
20 INJECTION INTRAMUSCULAR; INTRAVENOUS DAILY
Status: DISCONTINUED | OUTPATIENT
Start: 2025-06-01 | End: 2025-06-02

## 2025-06-01 RX ORDER — ASPIRIN 325 MG
325 TABLET ORAL ONCE
Status: COMPLETED | OUTPATIENT
Start: 2025-06-01 | End: 2025-06-01

## 2025-06-01 RX ORDER — CARVEDILOL 3.12 MG/1
3.12 TABLET ORAL 2 TIMES DAILY WITH MEALS
Status: DISCONTINUED | OUTPATIENT
Start: 2025-06-01 | End: 2025-06-02 | Stop reason: HOSPADM

## 2025-06-01 RX ORDER — IOPAMIDOL 755 MG/ML
75 INJECTION, SOLUTION INTRAVASCULAR
Status: COMPLETED | OUTPATIENT
Start: 2025-06-01 | End: 2025-06-01

## 2025-06-01 RX ORDER — ONDANSETRON 4 MG/1
4 TABLET, ORALLY DISINTEGRATING ORAL EVERY 8 HOURS PRN
Status: DISCONTINUED | OUTPATIENT
Start: 2025-06-01 | End: 2025-06-02 | Stop reason: HOSPADM

## 2025-06-01 RX ORDER — POTASSIUM CHLORIDE 1500 MG/1
40 TABLET, EXTENDED RELEASE ORAL PRN
Status: DISCONTINUED | OUTPATIENT
Start: 2025-06-01 | End: 2025-06-02 | Stop reason: HOSPADM

## 2025-06-01 RX ORDER — MAGNESIUM SULFATE IN WATER 40 MG/ML
2000 INJECTION, SOLUTION INTRAVENOUS PRN
Status: DISCONTINUED | OUTPATIENT
Start: 2025-06-01 | End: 2025-06-02 | Stop reason: HOSPADM

## 2025-06-01 RX ORDER — GLUCAGON 1 MG/ML
1 KIT INJECTION PRN
Status: DISCONTINUED | OUTPATIENT
Start: 2025-06-01 | End: 2025-06-02 | Stop reason: HOSPADM

## 2025-06-01 RX ORDER — DEXTROSE MONOHYDRATE 100 MG/ML
INJECTION, SOLUTION INTRAVENOUS CONTINUOUS PRN
Status: DISCONTINUED | OUTPATIENT
Start: 2025-06-01 | End: 2025-06-02 | Stop reason: HOSPADM

## 2025-06-01 RX ORDER — INSULIN LISPRO 100 [IU]/ML
0-4 INJECTION, SOLUTION INTRAVENOUS; SUBCUTANEOUS
Status: DISCONTINUED | OUTPATIENT
Start: 2025-06-01 | End: 2025-06-02 | Stop reason: HOSPADM

## 2025-06-01 RX ORDER — ALBUTEROL SULFATE 0.83 MG/ML
2.5 SOLUTION RESPIRATORY (INHALATION) EVERY 4 HOURS PRN
Status: DISCONTINUED | OUTPATIENT
Start: 2025-06-01 | End: 2025-06-02 | Stop reason: HOSPADM

## 2025-06-01 RX ORDER — ONDANSETRON 2 MG/ML
4 INJECTION INTRAMUSCULAR; INTRAVENOUS EVERY 6 HOURS PRN
Status: DISCONTINUED | OUTPATIENT
Start: 2025-06-01 | End: 2025-06-02 | Stop reason: HOSPADM

## 2025-06-01 RX ORDER — POLYETHYLENE GLYCOL 3350 17 G/17G
17 POWDER, FOR SOLUTION ORAL DAILY PRN
Status: DISCONTINUED | OUTPATIENT
Start: 2025-06-01 | End: 2025-06-02 | Stop reason: HOSPADM

## 2025-06-01 RX ADMIN — NITROGLYCERIN 1 INCH: 20 OINTMENT TOPICAL at 12:54

## 2025-06-01 RX ADMIN — SPIRONOLACTONE 12.5 MG: 25 TABLET ORAL at 17:03

## 2025-06-01 RX ADMIN — IOPAMIDOL 75 ML: 755 INJECTION, SOLUTION INTRAVENOUS at 13:48

## 2025-06-01 RX ADMIN — ASPIRIN 325 MG: 325 TABLET ORAL at 12:54

## 2025-06-01 RX ADMIN — INSULIN LISPRO 1 UNITS: 100 INJECTION, SOLUTION INTRAVENOUS; SUBCUTANEOUS at 17:29

## 2025-06-01 RX ADMIN — SACUBITRIL AND VALSARTAN 1 TABLET: 49; 51 TABLET, FILM COATED ORAL at 20:42

## 2025-06-01 RX ADMIN — MORPHINE SULFATE 4 MG: 4 INJECTION, SOLUTION INTRAMUSCULAR; INTRAVENOUS at 12:57

## 2025-06-01 RX ADMIN — RIVAROXABAN 20 MG: 20 TABLET, FILM COATED ORAL at 17:03

## 2025-06-01 RX ADMIN — SODIUM CHLORIDE, PRESERVATIVE FREE 10 ML: 5 INJECTION INTRAVENOUS at 20:43

## 2025-06-01 RX ADMIN — CARVEDILOL 3.12 MG: 3.12 TABLET, FILM COATED ORAL at 17:03

## 2025-06-01 RX ADMIN — ATORVASTATIN CALCIUM 40 MG: 40 TABLET, FILM COATED ORAL at 20:42

## 2025-06-01 RX ADMIN — FUROSEMIDE 20 MG: 10 INJECTION, SOLUTION INTRAMUSCULAR; INTRAVENOUS at 17:03

## 2025-06-01 ASSESSMENT — PAIN DESCRIPTION - DESCRIPTORS
DESCRIPTORS: PRESSURE
DESCRIPTORS: PRESSURE

## 2025-06-01 ASSESSMENT — PAIN DESCRIPTION - ORIENTATION
ORIENTATION: MID
ORIENTATION: MID

## 2025-06-01 ASSESSMENT — PAIN DESCRIPTION - LOCATION
LOCATION: CHEST
LOCATION: CHEST

## 2025-06-01 ASSESSMENT — PAIN - FUNCTIONAL ASSESSMENT
PAIN_FUNCTIONAL_ASSESSMENT: ACTIVITIES ARE NOT PREVENTED
PAIN_FUNCTIONAL_ASSESSMENT: 0-10
PAIN_FUNCTIONAL_ASSESSMENT: PREVENTS OR INTERFERES SOME ACTIVE ACTIVITIES AND ADLS

## 2025-06-01 ASSESSMENT — PAIN DESCRIPTION - PAIN TYPE: TYPE: ACUTE PAIN

## 2025-06-01 ASSESSMENT — HEART SCORE: ECG: NON-SPECIFC REPOLARIZATION DISTURBANCE/LBTB/PM

## 2025-06-01 ASSESSMENT — PAIN DESCRIPTION - ONSET: ONSET: PROGRESSIVE

## 2025-06-01 ASSESSMENT — PAIN SCALES - GENERAL
PAINLEVEL_OUTOF10: 6
PAINLEVEL_OUTOF10: 7

## 2025-06-01 ASSESSMENT — PAIN DESCRIPTION - FREQUENCY: FREQUENCY: INTERMITTENT

## 2025-06-01 NOTE — PLAN OF CARE
Problem: Chronic Conditions and Co-morbidities  Goal: Patient's chronic conditions and co-morbidity symptoms are monitored and maintained or improved  Outcome: Progressing  Flowsheets  Taken 6/1/2025 1713 by Carrie Cotton RN  Care Plan - Patient's Chronic Conditions and Co-Morbidity Symptoms are Monitored and Maintained or Improved:   Monitor and assess patient's chronic conditions and comorbid symptoms for stability, deterioration, or improvement   Collaborate with multidisciplinary team to address chronic and comorbid conditions and prevent exacerbation or deterioration   Update acute care plan with appropriate goals if chronic or comorbid symptoms are exacerbated and prevent overall improvement and discharge  Taken 6/1/2025 1640 by Clau Stewart RN  Care Plan - Patient's Chronic Conditions and Co-Morbidity Symptoms are Monitored and Maintained or Improved: Monitor and assess patient's chronic conditions and comorbid symptoms for stability, deterioration, or improvement     Problem: Discharge Planning  Goal: Discharge to home or other facility with appropriate resources  Outcome: Progressing  Flowsheets  Taken 6/1/2025 1713 by Carrie Cotton RN  Discharge to home or other facility with appropriate resources:   Arrange for needed discharge resources and transportation as appropriate   Identify barriers to discharge with patient and caregiver   Identify discharge learning needs (meds, wound care, etc)   Arrange for interpreters to assist at discharge as needed   Refer to discharge planning if patient needs post-hospital services based on physician order or complex needs related to functional status, cognitive ability or social support system  Taken 6/1/2025 1640 by Clau Stewart RN  Discharge to home or other facility with appropriate resources: Identify barriers to discharge with patient and caregiver     Problem: Pain  Goal: Verbalizes/displays adequate comfort level or baseline comfort

## 2025-06-01 NOTE — ED TRIAGE NOTES
Pt arrived from home via private vehicle c/o chest pain and shortness of breath with some lightheadedness intermittently for about 2 days. Pt of Dr. Waldrop with history of CHF and stroke. Hypertensive and tachycardic at this time with mid-sternal chest pressure. Other VS stable. Calm and cooperative.

## 2025-06-01 NOTE — ED NOTES
ED TO INPATIENT SBAR HANDOFF    Patient Name: Refugio Dawkins   Preferred Name: Refugio  : 1965  59 y.o.   Family/Caregiver Present: no   Code Status Order: Prior  PO Status: NPO:No; NPO starting midnight  Telemetry Order: Yes  C-SSRS: Risk of Suicide: No Risk  Sitter no   Restraints:     Sepsis Risk Score      Situation  Chief Complaint   Patient presents with    Chest Pain    Shortness of Breath    Lightheadedness     Chest pain and shortness of breath intermittent for 2 days with lightheadedness. Unable to sleep last night d/t the discomfort. Hx of CHF and stroke     Brief Description of Patient's Condition: pt resting comfortably in bed. Pt ambulatory.  Mental Status: oriented, alert, coherent, logical, thought processes intact, and able to concentrate and follow conversation  Arrived from:Home  Imaging:   CT CHEST PULMONARY EMBOLISM W CONTRAST   Final Result   No evidence of pulmonary embolism or acute pulmonary abnormality.      Cholelithiasis.         XR CHEST PORTABLE   Final Result   No acute cardiopulmonary process.           Abnormal labs:   Abnormal Labs Reviewed   COMPREHENSIVE METABOLIC PANEL W/ REFLEX TO MG FOR LOW K - Abnormal; Notable for the following components:       Result Value    Glucose 129 (*)     AST 40 (*)     All other components within normal limits   TROPONIN - Abnormal; Notable for the following components:    Troponin, High Sensitivity 49 (*)     All other components within normal limits   BRAIN NATRIURETIC PEPTIDE - Abnormal; Notable for the following components:    NT Pro-BNP 2,480 (*)     All other components within normal limits   TROPONIN - Abnormal; Notable for the following components:    Troponin, High Sensitivity 42 (*)     All other components within normal limits       Background  Allergies:   Allergies   Allergen Reactions    Lisinopril Swelling     Lips swell     History:   Past Medical History:   Diagnosis Date    CHF (congestive heart failure) (HCC)     CVA

## 2025-06-01 NOTE — ED PROVIDER NOTES
[facility]  EMERGENCY DEPARTMENT ENCOUNTER        Pt Name: Refugio Dawkins  MRN: [id number]  Birthdate 1965  Date of evaluation: 6/1/2025  Provider: Atul Sahw PA-C  PCP: Ruddy Tiwari APRN - CRNA  Note Started: 12:50 PM EDT 6/1/25      ZAHRAA. I have evaluated this patient.        CHIEF COMPLAINT       Chief Complaint   Patient presents with    Chest Pain    Shortness of Breath    Lightheadedness     Chest pain and shortness of breath intermittent for 2 days with lightheadedness. Unable to sleep last night d/t the discomfort. Hx of CHF and stroke       HISTORY OF PRESENT ILLNESS: 1 or more Elements     History From: patient  Limitations to history : None    Refugio Dawkins is a 59 y.o. male who presents to the emergency department with a chief complaint of a 2 to 3-day history of some chest discomfort that he states feels as heaviness in the mid sternum and chest region that is worse with deep inhalation associated with shortness of breath and lightheadedness.  He has history of hypertension, hyperlipidemia, diabetes, CHF.  He states he has been under a lot of stress recently and has not taken his medication in the past 1 month due to this including Xarelto.  Denies any unilateral leg swelling or tenderness, cough, fevers, nausea, vomiting, history of DVT or PE, testosterone use or hormone replacement.  States the pain is moderate to 7 out of 10.    Nursing Notes were all reviewed and agreed with or any disagreements were addressed in the HPI.    REVIEW OF SYSTEMS :      Review of Systems    Positives and Pertinent negatives as per HPI.     SURGICAL HISTORY     Past Surgical History:   Procedure Laterality Date    DIAGNOSTIC CARDIAC CATH LAB PROCEDURE  12/2018       CURRENTMEDICATIONS       Current Discharge Medication List        CONTINUE these medications which have NOT CHANGED    Details   ASPIRIN LOW DOSE 81 MG EC tablet TAKE 1 TABLET BY MOUTH DAILY  Qty: 90 tablet, Refills: 3

## 2025-06-01 NOTE — ACP (ADVANCE CARE PLANNING)
Advanced Care Planning Note.    Purpose of Encounter: Advanced care planning in light of acute and chronic deteriorating medical conditions.    Parties In Attendance: Patient (Refugio Dawkins),  , Eusebio Schwarz MD  (myself)    Decisional Capacity: Yes    Subjective: Patient/family understand in this voluntary conversation that Refugio Dawkins continues to deteriorate and discuss what inteventions and plans patient would want implemented in light of the following diagnoses:    Chest pain  CHF  HTN        Objective: Pt has been having chronic decline in functional status with increased dependence on others for ADLs  Increased frequency of Hospitalizations.  Likelihood of further hospitalizations with likelihood of escalation of medical interventions with the expectation of returning to a diminishing baseline level of functionality.      Discussion highlights: I discussed with patient the ramifications of their acute and chronic medical problems- and the likely outcomes expected, both in terms of statistics, and as part of my experience seeing patients with similar conditions.      Goals of Care Determination:  Patient wishes to remain Full code for now, but has interest in continuing this conversation, and discussing with family before making any changes to code status and goals of care parameters.      Plan: Continue to educate patient on medical conditions and the choices available regarding possible outcomes with regard to goals of care and code status.         Time spent on Advanced care Plannin minutes    Eusebio Schwarz MD  2025 4:59 PM

## 2025-06-01 NOTE — H&P
V2.0  History and Physical      Name:  Refugio Dawkins /Age/Sex: 1965  (59 y.o. male)   MRN & CSN:  [id number] & [id number] Encounter Date/Time: 2025 3:30 PM EDT   Location:  [address] [place] [address] PCP: Ruddy Tiwari APRN - CRNA       Hospital Day: 1    Assessment and Plan:   Refugio Dawkins is a 59 y.o. male with a pmh of  who presents with Chest pain in adult    Hospital Problems           Last Modified POA    * (Principal) Chest pain in adult 2025 Yes       Plan:    Admit Observation  Telemetry monitering  Trend Troponin  Cardiology consult  SL nitro prn  Keep n.p.o. past midnight    Uncontrolled hypertension  : Could be secondary to noncompliance with medication  Will restart Coreg  Continue Entresto  Continue Aldactone  Give labetalol IV as needed as needed if systolic blood pressure more than 160     History of apical thrombus  : Continue Xarelto      DVT ppx  : on xarelto    Diet : regular diet    Code status  : full code    Advanced care planning was discussed with patient for a total of 16 minutes.  Full code, DNR CC, DNR CCA, power of  and living will were discussed.  Patient elected to be a full code.         Disposition:   Current Living situation: home  Expected Disposition: home  Estimated D/C: 2-3 days    Diet No diet orders on file   DVT Prophylaxis [] Lovenox, []  Heparin, [] SCDs, [] Ambulation,  [] Eliquis, [] Xarelto, [] Coumadin   Code Status Prior   Surrogate Decision Maker/ POA    Personally reviewed Lab Studies and Imaging      Discussed management of the case with ED  who recommended Hospital admission     EKG interpreted personally and results : no STEMI     Imaging that was interpreted personally includes  CXR  and results : no PNA     Drugs that require monitoring for toxicity include  IV morphine , IV lasix   and the method of monitoring was Vitals monitering, BMP and CBC monitering , Telemetry monitering          History from:     patient    History of Present Illness:

## 2025-06-02 VITALS
HEIGHT: 75 IN | WEIGHT: 202 LBS | BODY MASS INDEX: 25.12 KG/M2 | OXYGEN SATURATION: 98 % | TEMPERATURE: 97.7 F | RESPIRATION RATE: 18 BRPM | HEART RATE: 84 BPM | SYSTOLIC BLOOD PRESSURE: 122 MMHG | DIASTOLIC BLOOD PRESSURE: 88 MMHG

## 2025-06-02 LAB
ANION GAP SERPL CALCULATED.3IONS-SCNC: 8 MMOL/L (ref 3–16)
BASOPHILS # BLD: 0 K/UL (ref 0–0.2)
BASOPHILS NFR BLD: 0.8 %
BUN SERPL-MCNC: 18 MG/DL (ref 7–20)
CALCIUM SERPL-MCNC: 9.1 MG/DL (ref 8.3–10.6)
CHLORIDE SERPL-SCNC: 104 MMOL/L (ref 99–110)
CO2 SERPL-SCNC: 28 MMOL/L (ref 21–32)
CREAT SERPL-MCNC: 1 MG/DL (ref 0.8–1.3)
DEPRECATED RDW RBC AUTO: 13.3 % (ref 12.4–15.4)
EKG ATRIAL RATE: 112 BPM
EKG DIAGNOSIS: NORMAL
EKG P AXIS: 69 DEGREES
EKG P-R INTERVAL: 168 MS
EKG Q-T INTERVAL: 350 MS
EKG QRS DURATION: 92 MS
EKG QTC CALCULATION (BAZETT): 477 MS
EKG R AXIS: 97 DEGREES
EKG T AXIS: -52 DEGREES
EKG VENTRICULAR RATE: 112 BPM
EOSINOPHIL # BLD: 0.2 K/UL (ref 0–0.6)
EOSINOPHIL NFR BLD: 3.1 %
GFR SERPLBLD CREATININE-BSD FMLA CKD-EPI: 86 ML/MIN/{1.73_M2}
GLUCOSE BLD-MCNC: 103 MG/DL (ref 70–99)
GLUCOSE BLD-MCNC: 132 MG/DL (ref 70–99)
GLUCOSE BLD-MCNC: 144 MG/DL (ref 70–99)
GLUCOSE SERPL-MCNC: 128 MG/DL (ref 70–99)
HCT VFR BLD AUTO: 44.6 % (ref 40.5–52.5)
HGB BLD-MCNC: 15 G/DL (ref 13.5–17.5)
LYMPHOCYTES # BLD: 2.9 K/UL (ref 1–5.1)
LYMPHOCYTES NFR BLD: 59 %
MCH RBC QN AUTO: 30.4 PG (ref 26–34)
MCHC RBC AUTO-ENTMCNC: 33.6 G/DL (ref 31–36)
MCV RBC AUTO: 90.5 FL (ref 80–100)
MONOCYTES # BLD: 0.4 K/UL (ref 0–1.3)
MONOCYTES NFR BLD: 9 %
NEUTROPHILS # BLD: 1.4 K/UL (ref 1.7–7.7)
NEUTROPHILS NFR BLD: 28.1 %
PERFORMED ON: ABNORMAL
PLATELET # BLD AUTO: 204 K/UL (ref 135–450)
PMV BLD AUTO: 8.2 FL (ref 5–10.5)
POTASSIUM SERPL-SCNC: 4.5 MMOL/L (ref 3.5–5.1)
RBC # BLD AUTO: 4.93 M/UL (ref 4.2–5.9)
SODIUM SERPL-SCNC: 140 MMOL/L (ref 136–145)
WBC # BLD AUTO: 4.9 K/UL (ref 4–11)

## 2025-06-02 PROCEDURE — 2500000003 HC RX 250 WO HCPCS: Performed by: HOSPITALIST

## 2025-06-02 PROCEDURE — 36415 COLL VENOUS BLD VENIPUNCTURE: CPT

## 2025-06-02 PROCEDURE — 99223 1ST HOSP IP/OBS HIGH 75: CPT | Performed by: INTERNAL MEDICINE

## 2025-06-02 PROCEDURE — 80048 BASIC METABOLIC PNL TOTAL CA: CPT

## 2025-06-02 PROCEDURE — G0378 HOSPITAL OBSERVATION PER HR: HCPCS

## 2025-06-02 PROCEDURE — 6370000000 HC RX 637 (ALT 250 FOR IP): Performed by: HOSPITALIST

## 2025-06-02 PROCEDURE — 85025 COMPLETE CBC W/AUTO DIFF WBC: CPT

## 2025-06-02 PROCEDURE — 94760 N-INVAS EAR/PLS OXIMETRY 1: CPT

## 2025-06-02 PROCEDURE — 96376 TX/PRO/DX INJ SAME DRUG ADON: CPT

## 2025-06-02 PROCEDURE — 6360000002 HC RX W HCPCS: Performed by: HOSPITALIST

## 2025-06-02 PROCEDURE — 93010 ELECTROCARDIOGRAM REPORT: CPT | Performed by: INTERNAL MEDICINE

## 2025-06-02 RX ORDER — ASPIRIN 81 MG/1
81 TABLET ORAL DAILY
Qty: 90 TABLET | Refills: 3 | Status: SHIPPED | OUTPATIENT
Start: 2025-06-02

## 2025-06-02 RX ORDER — CARVEDILOL 3.12 MG/1
3.12 TABLET ORAL 2 TIMES DAILY WITH MEALS
Qty: 180 TABLET | Refills: 3 | Status: SHIPPED | OUTPATIENT
Start: 2025-06-02

## 2025-06-02 RX ORDER — SPIRONOLACTONE 25 MG/1
12.5 TABLET ORAL DAILY
Qty: 45 TABLET | Refills: 3 | Status: SHIPPED | OUTPATIENT
Start: 2025-06-02

## 2025-06-02 RX ORDER — ATORVASTATIN CALCIUM 40 MG/1
40 TABLET, FILM COATED ORAL NIGHTLY
Qty: 90 TABLET | Refills: 3 | Status: SHIPPED | OUTPATIENT
Start: 2025-06-02

## 2025-06-02 RX ADMIN — SODIUM CHLORIDE, PRESERVATIVE FREE 10 ML: 5 INJECTION INTRAVENOUS at 08:09

## 2025-06-02 RX ADMIN — ASPIRIN 81 MG: 81 TABLET, COATED ORAL at 14:28

## 2025-06-02 RX ADMIN — SPIRONOLACTONE 12.5 MG: 25 TABLET ORAL at 08:09

## 2025-06-02 RX ADMIN — FUROSEMIDE 20 MG: 10 INJECTION, SOLUTION INTRAMUSCULAR; INTRAVENOUS at 08:09

## 2025-06-02 RX ADMIN — SACUBITRIL AND VALSARTAN 1 TABLET: 49; 51 TABLET, FILM COATED ORAL at 09:42

## 2025-06-02 RX ADMIN — CARVEDILOL 3.12 MG: 3.12 TABLET, FILM COATED ORAL at 08:09

## 2025-06-02 NOTE — CONSULTS
sodium chloride infusion   IntraVENous PRN Eusebio Schwarz MD        potassium chloride (KLOR-CON M) extended release tablet 40 mEq  40 mEq Oral PRN Eusebio Schwarz MD        Or    potassium bicarb-citric acid (EFFER-K) effervescent tablet 40 mEq  40 mEq Oral PREusebio Bhatt MD        Or    potassium chloride 10 mEq/100 mL IVPB (Peripheral Line)  10 mEq IntraVENous PREusebio Bhatt MD        magnesium sulfate 2000 mg in 50 mL IVPB premix  2,000 mg IntraVENous PRN Eusbeio Schwarz MD        ondansetron (ZOFRAN-ODT) disintegrating tablet 4 mg  4 mg Oral Q8H PRN Eusebio Schwarz MD        Or    ondansetron (ZOFRAN) injection 4 mg  4 mg IntraVENous Q6H PREusebio Bhatt MD        polyethylene glycol (GLYCOLAX) packet 17 g  17 g Oral Daily PRN Eusebio Schwarz MD        acetaminophen (TYLENOL) tablet 650 mg  650 mg Oral Q6H PRN Eusebio Schwarz MD        Or    acetaminophen (TYLENOL) suppository 650 mg  650 mg Rectal Q6H PRN Eusebio Schwarz MD        insulin lispro (HUMALOG,ADMELOG) injection vial 0-4 Units  0-4 Units SubCUTAneous With meals & nightly Eusebio Schwarz MD   1 Units at 06/01/25 1729    glucose chewable tablet 16 g  4 tablet Oral PREusebio Bhatt MD        dextrose bolus 10% 125 mL  125 mL IntraVENous PREusebio Bhatt MD        Or    dextrose bolus 10% 250 mL  250 mL IntraVENous PREusebio Bhatt MD        glucagon injection 1 mg  1 mg SubCUTAneous PRN Eusebio Schwarz MD        dextrose 10 % infusion   IntraVENous Continuous PREusebio Bhatt MD           Physical Exam:   /87   Pulse 81   Temp 97.5 °F (36.4 °C)   Resp 18   Ht 1.905 m (6' 3\")   Wt 91.6 kg (202 lb)   SpO2 100%   BMI 25.25 kg/m²     Intake/Output Summary (Last 24 hours) at 6/2/2025 1437  Last data filed at 6/2/2025 1131  Gross per 24 hour   Intake 500 ml   Output --   Net 500 ml     Wt Readings from Last 2 Encounters:   06/02/25 91.6 kg

## 2025-06-02 NOTE — DISCHARGE INSTRUCTIONS
see Dr. Kapadia in follow-up sometime in the next 4 to 6 weeks if possible.  Restart taking your heart medications.  Return to the emergency room for worsening chest pain shortness of breath, fever or chills.

## 2025-06-02 NOTE — PLAN OF CARE
Problem: Chronic Conditions and Co-morbidities  Goal: Patient's chronic conditions and co-morbidity symptoms are monitored and maintained or improved  6/1/2025 2336 by Daryl Latham, RN  Outcome: Progressing  6/1/2025 1713 by Carrie Cotton, RN  Outcome: Progressing  Flowsheets  Taken 6/1/2025 1713 by Carrie Cotton, RN  Care Plan - Patient's Chronic Conditions and Co-Morbidity Symptoms are Monitored and Maintained or Improved:   Monitor and assess patient's chronic conditions and comorbid symptoms for stability, deterioration, or improvement   Collaborate with multidisciplinary team to address chronic and comorbid conditions and prevent exacerbation or deterioration   Update acute care plan with appropriate goals if chronic or comorbid symptoms are exacerbated and prevent overall improvement and discharge  Taken 6/1/2025 1640 by Clau Stewart, RN  Care Plan - Patient's Chronic Conditions and Co-Morbidity Symptoms are Monitored and Maintained or Improved: Monitor and assess patient's chronic conditions and comorbid symptoms for stability, deterioration, or improvement

## 2025-06-02 NOTE — DISCHARGE SUMMARY
Hospital Medicine Discharge Summary    Patient ID: Refugio Dawkins      Patient's PCP: Ruddy Tiwari APRN - CRNA    Admit Date: 6/1/2025     Discharge Date:   6/2/25    Admitting Physician: Eusebio Schwarz MD     Discharge Physician: Myranda Starr PA-C         Hospital Course: 59-year-old man with a past medical history of combined diastolic and systolic heart failure, essential pretension, history of apical thrombus, hyperlipidemia who presented to the ED with complaints of chest pain, shortness of breath and lightheadedness.  Patient reported he was not taking any of his medications for the past month and had not followed up with cardiology in some time.  While in the ED, he was tachycardic and hypertensive.  Initial workup with CT PE protocol showed no evidence of PE or other acute abnormality.EKG showed no signs of ST elevation.  Troponin was mildly elevated at 49 but flat.  NT-proBNP elevated at 2480 but patient appeared euvolemic with no pulmonary edema seen on CT and no lower extremity edema.  He was stable on room air and not hypoxic.  He was admitted for further workup/treatment and cardiology consultation.  Cardiology recommended restarting patient's prior heart failure and cardiac medications.  Felt chest pain could be related to mild volume overload, no further need for diuresis.  Cardiology recommended no further ischemic workup, he should follow-up with Dr. Kc his primary cardiologist in 4-6 weeks.  Refills for all of patient's cardiac medications were sent to his preferred pharmacy.  Return precautions such as worsening chest pain, shortness of breath, fever or chills were given to patient.    Chest pain  - Troponin mildly elevated but flat.  EKG showing no ST elevation.  - Started on ASA, statin, given nitroglycerin.  - CT PE protocol with no evidence of PE or other pulmonary abnormality  - Cardiology consulted for recommendations, last heart cath only showed mild CAD, cardiology

## 2025-06-02 NOTE — PLAN OF CARE
Problem: Chronic Conditions and Co-morbidities  Goal: Patient's chronic conditions and co-morbidity symptoms are monitored and maintained or improved  6/2/2025 0812 by Carrie Cotton, RN  Outcome: Progressing  Flowsheets (Taken 6/2/2025 0812)  Care Plan - Patient's Chronic Conditions and Co-Morbidity Symptoms are Monitored and Maintained or Improved:   Monitor and assess patient's chronic conditions and comorbid symptoms for stability, deterioration, or improvement   Collaborate with multidisciplinary team to address chronic and comorbid conditions and prevent exacerbation or deterioration   Update acute care plan with appropriate goals if chronic or comorbid symptoms are exacerbated and prevent overall improvement and discharge  6/1/2025 2336 by Daryl Latham, RN  Outcome: Progressing     Problem: Discharge Planning  Goal: Discharge to home or other facility with appropriate resources  6/2/2025 0812 by Carrie Cotton, RN  Outcome: Progressing  Flowsheets (Taken 6/2/2025 0812)  Discharge to home or other facility with appropriate resources:   Identify barriers to discharge with patient and caregiver   Arrange for needed discharge resources and transportation as appropriate   Identify discharge learning needs (meds, wound care, etc)   Arrange for interpreters to assist at discharge as needed   Refer to discharge planning if patient needs post-hospital services based on physician order or complex needs related to functional status, cognitive ability or social support system  6/1/2025 2336 by Daryl Latham, RN  Outcome: Progressing  Flowsheets (Taken 6/1/2025 2317)  Discharge to home or other facility with appropriate resources:   Identify barriers to discharge with patient and caregiver   Arrange for needed discharge resources and transportation as appropriate   Refer to discharge planning if patient needs post-hospital services based on physician order or complex needs related to functional status,

## 2025-06-02 NOTE — PLAN OF CARE
Problem: Chronic Conditions and Co-morbidities  Goal: Patient's chronic conditions and co-morbidity symptoms are monitored and maintained or improved  6/2/2025 1746 by Carrie Cotton RN  Outcome: Adequate for Discharge  6/2/2025 1746 by Carrie Cotton RN  Outcome: Adequate for Discharge  6/2/2025 0812 by Carrie Cotton RN  Outcome: Progressing  Flowsheets (Taken 6/2/2025 0812)  Care Plan - Patient's Chronic Conditions and Co-Morbidity Symptoms are Monitored and Maintained or Improved:   Monitor and assess patient's chronic conditions and comorbid symptoms for stability, deterioration, or improvement   Collaborate with multidisciplinary team to address chronic and comorbid conditions and prevent exacerbation or deterioration   Update acute care plan with appropriate goals if chronic or comorbid symptoms are exacerbated and prevent overall improvement and discharge     Problem: Discharge Planning  Goal: Discharge to home or other facility with appropriate resources  6/2/2025 1746 by Carrie Cotton RN  Outcome: Adequate for Discharge  6/2/2025 1746 by Carrie Cotton RN  Outcome: Adequate for Discharge  6/2/2025 0812 by Carrie Cotton RN  Outcome: Progressing  Flowsheets (Taken 6/2/2025 0812)  Discharge to home or other facility with appropriate resources:   Identify barriers to discharge with patient and caregiver   Arrange for needed discharge resources and transportation as appropriate   Identify discharge learning needs (meds, wound care, etc)   Arrange for interpreters to assist at discharge as needed   Refer to discharge planning if patient needs post-hospital services based on physician order or complex needs related to functional status, cognitive ability or social support system     Problem: Pain  Goal: Verbalizes/displays adequate comfort level or baseline comfort level  6/2/2025 1746 by Carrie Cotton RN  Outcome: Adequate for Discharge  6/2/2025 1746 by Lula

## 2025-06-02 NOTE — PROGRESS NOTES
4 Eyes Skin Assessment     NAME:  Refugio Dawkins  YOB: 1965  MEDICAL RECORD NUMBER:  [id number]    The patient is being assessed for  Admission    I agree that at least one RN has performed a thorough Head to Toe Skin Assessment on the patient. ALL assessment sites listed below have been assessed.      Areas assessed by both nurses:    Head, Face, Ears, Shoulders, Back, Chest, Arms, Elbows, Hands, Sacrum. Buttock, Coccyx, Ischium, Legs. Feet and Heels, and Under Medical Devices         Does the Patient have a Wound? No noted wound(s)       Omega Prevention initiated by RN: No  Wound Care Orders initiated by RN: No    Pressure Injury (Stage 3,4, Unstageable, DTI, NWPT, and Complex wounds) if present, place Wound referral order by RN under : No    New Ostomies, if present place, Ostomy referral order under : No     Nurse 1 eSignature: Electronically signed by Carrie Cotton RN on 6/1/25 at 5:25 PM EDT    **SHARE this note so that the co-signing nurse can place an eSignature**    Nurse 2 eSignature: Electronically signed by Clau Stewart RN on 6/1/25 at 6:34 PM EDT   
CMU called to notify that patient had 9 beats of V tach. I checked on patient he was resting in bed, watching TV. no chest pain, but he reported some chest heaviness when when he breaths, but not as bad as when he was admitted. Note was sent to Shyla LOPEZ. BP a little elevated, 152/99, HR 79.   
Medication Reconciliation    List of medications patient is currently taking is complete.     Source of information: 1. Conversation with patient at bedside                                      2. EPIC records         Notes regarding home medications:   1. Patient reports he stopped taking medications a few months ago, but is able to identify all medications he is supposed to be taking. Left all medications on med list.  2. Encouraged adherence to medications going forward.       Osito Bundy, [facility]   6/2/2025  11:00 AM    
Melody PT phoned nurse, stated pt SaO2 is 89-90% with exertion from PT. Pt put on 2L NC. Jean continue to monitor.  
Printed AVS given and reviewed with patient with medications and follow up appointments. PIV removed and CM[facility] notified of pt's discharge. Tele monitor removed. Questions and concerns answered. Pt instructed to follow up with Dr. Kapadia within 3 days of discharge. Pt ambulated to patient discharge bridge to private automobile to home.  
Pt A/O x4, VSS, RA. Pt remains NPO till cardiology assess and makes plan for pt. Pt refuses bed/chair alarms. Pt UAL. NS/ST on tele monitor. Pt denies pain, states has heaviness in chest. Physician aware. Call light and personal belongings within reach.  
Pt admitted to room 3129 from ER for chest  and SOB. VSS, RA, pt denies pain. Pt A/O x4. Pt oriented to room and remote, call light. Daughter at bedside. Assessment perform. Dinner ordered.  
Pt refusing bed/chair alarms.  
age and cooperative.  HEENT: Pupils equal, round. Conjunctivae/corneas clear.  Neck: Supple, with full range of motion.   Respiratory:  Normal respiratory effort. Clear to auscultation, bilaterally without Rales/Wheezes/Rhonchi.  Cardiovascular: Regular rate and rhythm with normal S1/S2 without murmurs, rubs or gallops.  No pain with palpation of chest wall  Abdomen: Soft, non-tender, non-distended with normal bowel sounds.  Musculoskeletal: No lower extremity edema  Neurologic:  Face symmetrical, speech clear, moves all 4 extremities, sensations are intact bilaterally   Psychiatric: Alert and oriented, thought content appropriate, normal insight      Labs:   Recent Labs     06/01/25  1214 06/02/25  0507   WBC 4.7 4.9   HGB 15.2 15.0   HCT 44.2 44.6    204     Recent Labs     06/01/25  1214 06/02/25  0507    140   K 3.7 4.5    104   CO2 22 28   BUN 13 18   CREATININE 0.9 1.0   CALCIUM 9.2 9.1     Recent Labs     06/01/25  1214   AST 40*   ALT 18   BILITOT 0.7   ALKPHOS 115     No results for input(s): \"INR\" in the last 72 hours.  No results for input(s): \"CKTOTAL\", \"TROPONINI\" in the last 72 hours.    Urinalysis:      Lab Results   Component Value Date/Time    NITRU Negative 12/03/2018 10:50 PM    WBCUA 3-5 01/19/2018 12:45 PM    BACTERIA 1+ 01/19/2018 12:45 PM    RBCUA None seen 01/19/2018 12:45 PM    BLOODU Negative 12/03/2018 10:50 PM    GLUCOSEU Negative 12/03/2018 10:50 PM       Radiology:  CT CHEST PULMONARY EMBOLISM W CONTRAST   Final Result   No evidence of pulmonary embolism or acute pulmonary abnormality.      Cholelithiasis.         XR CHEST PORTABLE   Final Result   No acute cardiopulmonary process.                 DVT Prophylaxis: Lovenox  Diet: Diet NPO  Code Status: Full Code    PT/OT Eval Status: Not needed    Dispo -Home pending cardiology workup/recommendations    Myranda Starr PA-C      NOTE:  This report was transcribed using voice recognition software.  Every effort was

## 2025-06-03 ENCOUNTER — CARE COORDINATION (OUTPATIENT)
Dept: CASE MANAGEMENT | Age: 60
End: 2025-06-03

## 2025-06-03 NOTE — CARE COORDINATION
Care Transitions Note    Initial Call - Call within 2 business days of discharge: Yes    Attempted to reach patient for transitions of care follow up. Unable to reach patient.    Outreach Attempts:   1ST CTC attempt to reach Pt regarding recent hospital discharge.  CTC unable to leave voice recording with call back number requesting a call bernadine, mailbox full. Will attempt to reach patient again.    Patient: Refugio Dawkins    Patient : 1965   MRN: <[id number]>     Reason for Admission: Chest pain, CHF, Hypertensive Urgency  Discharge Date: 25    RURS: No data recorded  Last Discharge Facility       Date Complaint Diagnosis Description Type Department Provider    25 Chest Pain; Shortness of Breath; Lightheadedness Chest pain, unspecified type ... ED to Hosp-Admission (Discharged) (ADMITTED) LORI CruzW Eusebio Schwarz MD            Was this an external facility discharge? No    Follow Up Appointment:   Patient does not have a follow up appointment scheduled at time of call.  Unable to reach patient      Plan for follow-up on next business day.      Thank You,    Scarlett Cobb RN  Care Transition Coordinator  Contact Number:[phone]

## 2025-06-04 ENCOUNTER — CARE COORDINATION (OUTPATIENT)
Dept: CASE MANAGEMENT | Age: 60
End: 2025-06-04

## 2025-06-04 NOTE — CARE COORDINATION
Care Transitions Note    Initial Call - Call within 2 business days of discharge: Yes    Attempted to reach patient for transitions of care follow up. Unable to reach patient.    Outreach Attempts:   2ND CTC attempt to reach Pt regarding recent hospital discharge.  CTC left voice recording with call back number requesting a call back.  CTN will close out CTN episode at this time.    Patient: Refugio Dawkins    Patient : 1965   MRN: <[id number]>     Reason for Admission: Chest pain, CHF, Hypertensive Urgency   Discharge Date: 25    RURS: No data recorded  Last Discharge Facility       Date Complaint Diagnosis Description Type Department Provider    25 Chest Pain; Shortness of Breath; Lightheadedness Chest pain, unspecified type ... ED to Hosp-Admission (Discharged) (ADMITTED) LORI 3W Eusebio Schwarz MD            Was this an external facility discharge? No    Follow Up Appointment:   Patient does not have a follow up appointment scheduled at time of call.  Unable to reach patient, NM PCP [facility] Appointments         Provider Specialty Dept Phone    2025 11:15 AM Cheng Kapadia MD Cardiology [phone]            No further follow-up call indicated     Thank You,    Scarlett Cobb RN  Care Transition Coordinator  Contact Number:[phone]